# Patient Record
Sex: FEMALE | Race: WHITE | NOT HISPANIC OR LATINO | Employment: OTHER | URBAN - METROPOLITAN AREA
[De-identification: names, ages, dates, MRNs, and addresses within clinical notes are randomized per-mention and may not be internally consistent; named-entity substitution may affect disease eponyms.]

---

## 2017-01-04 ENCOUNTER — ANTICOAGULATION MONITORING (OUTPATIENT)
Dept: VASCULAR LAB | Facility: MEDICAL CENTER | Age: 77
End: 2017-01-04

## 2017-01-04 DIAGNOSIS — I48.20 CHRONIC ATRIAL FIBRILLATION (HCC): ICD-10-CM

## 2017-01-04 DIAGNOSIS — Z79.01 LONG TERM CURRENT USE OF ANTICOAGULANT THERAPY: ICD-10-CM

## 2017-01-04 LAB — INR PPP: 2.7 (ref 2–3.5)

## 2017-01-04 NOTE — PROGRESS NOTES
OP Anticoagulation Telephone Note    Date: 1/4/2017  Anticoagulation Summary as of 1/4/2017     INR goal 2.5-3.0   Selected INR 2.7 (1/3/2017)   Maintenance plan 4.5 mg (3 mg x 1.5) on Tue, Thu; 3 mg (3 mg x 1) all other days   Weekly total 24 mg   No change documented Arpita Wesley, Med Ass't   Plan last modified Emily Ibarraaggiereddy THAKKARAnitha Yi (12/28/2016)   Next INR check 1/17/2017   Priority Maintenance   Target end date Indefinite    Indications   Atrial fibrillation (HCC) [I48.91]  Long term current use of anticoagulant therapy [Z79.01]         Anticoagulation Episode Summary     INR check location Home Draw    Preferred lab     Send INR reminders to     Gabriella Zavaleta       Anticoagulation Care Providers     Provider Role Specialty Phone number    Hu Gamez M.D. Referring Cardiac Electrophysiology [phone]    [facility]agulation [facility] Responsible  [phone]    Kristopher Escobar, SUASNNED Responsible          Anticoagulation Patient Findings   Negatives Missed Doses, Extra Doses, Medication Changes, Antibiotic Use, Diet Changes, Dental/Other Procedures, Hospitalization, Bleeding Gums, Nose Bleeds, Blood in Urine, Blood in Stool, Any Bruising, Other Complaints      Plan:  Spoke with patient's  on the phone. Patient is therapeutic today. Patient denies any changes in medications or diet. Patient denies any signs or symptoms of bleeding or clotting. Instructed patient to call clinic if any unusual bleeding or bruising occurs. Will continue dosing as outlined above. Will follow-up with patient in 2 weeks.    Arpita Wesley, Medical Assistant      I have reviewed and agree with the plan above on 1/04/2017      Samantha Amaya, PHARMD

## 2017-01-17 ENCOUNTER — ANTICOAGULATION MONITORING (OUTPATIENT)
Dept: VASCULAR LAB | Facility: MEDICAL CENTER | Age: 77
End: 2017-01-17

## 2017-01-17 DIAGNOSIS — Z79.01 LONG TERM CURRENT USE OF ANTICOAGULANT THERAPY: ICD-10-CM

## 2017-01-17 DIAGNOSIS — I48.20 CHRONIC ATRIAL FIBRILLATION (HCC): ICD-10-CM

## 2017-01-17 LAB — INR PPP: 1.9 (ref 2–3.5)

## 2017-01-17 NOTE — PROGRESS NOTES
Anticoagulation Summary as of 1/17/2017     INR goal 2.5-3.0   Selected INR 1.9! (1/17/2017)   Maintenance plan 4.5 mg (3 mg x 1.5) on Tue, Thu; 3 mg (3 mg x 1) all other days   Weekly total 24 mg   Plan last modified Emily THAKKARAnitha Yi (12/28/2016)   Next INR check 1/24/2017   Priority Maintenance   Target end date Indefinite    Indications   Atrial fibrillation (CMS-HCC) [I48.91]  Long term current use of anticoagulant therapy [Z79.01]         Anticoagulation Episode Summary     INR check location Home Draw    Preferred lab     Send INR reminders to     Gabriella Zavaleta       Anticoagulation Care Providers     Provider Role Specialty Phone number    Hu Gamez M.D. Referring Cardiac Electrophysiology [phone]    [facility] Anticoagulation Services Responsible  [phone]    Kristopher Escobar, SUSANNED Responsible          Anticoagulation Patient Findings    Spoke with patient today regarding subtherapeutic INR of 1.9.  Patient denies any signs/symptoms of bruising or bleeding or any changes in diet and medications.  Instructed patient to call clinic with any questions or concerns.  She was taking low dose than outlined in dosing calendar.  Instructed her to increase weekly warfarin regimen as detailed above.  Follow up in 1 weeks.    Kristopher Escobar, SUSANNED

## 2017-01-24 ENCOUNTER — ANTICOAGULATION MONITORING (OUTPATIENT)
Dept: VASCULAR LAB | Facility: MEDICAL CENTER | Age: 77
End: 2017-01-24

## 2017-01-24 DIAGNOSIS — Z79.01 LONG TERM CURRENT USE OF ANTICOAGULANT THERAPY: ICD-10-CM

## 2017-01-24 DIAGNOSIS — I48.20 CHRONIC ATRIAL FIBRILLATION (HCC): ICD-10-CM

## 2017-01-24 LAB — INR PPP: 2.5 (ref 2–3.5)

## 2017-01-24 NOTE — PROGRESS NOTES
Anticoagulation Summary as of 1/24/2017     INR goal 2.5-3.0   Selected INR 2.5 (1/24/2017)   Maintenance plan 4.5 mg (3 mg x 1.5) on Tue, Thu; 3 mg (3 mg x 1) all other days   Weekly total 24 mg   Plan last modified Emily Ibarraaggiereddy THAKKARAnitha Yi (12/28/2016)   Next INR check 1/31/2017   Priority Maintenance   Target end date Indefinite    Indications   Atrial fibrillation (CMS-HCC) [I48.91]  Long term current use of anticoagulant therapy [Z79.01]         Anticoagulation Episode Summary     INR check location Home Draw    Preferred lab     Send INR reminders to     Gabriella Zavaleta       Anticoagulation Care Providers     Provider Role Specialty Phone number    Hu Gamez M.D. Referring Cardiac Electrophysiology [phone]    [facility] Anticoagulation Services Responsible  [phone]    Kristopher Escobar, SUSANNED Responsible          Anticoagulation Patient Findings    Left voicemail message to report a therapeutic INR of 2.5.  Pt to continue with current warfarin dosing regimen. Requested pt contact the clinic for any s/s of unusual bleeding, bruising, clotting or any changes to diet or medication. FU 1 weeks.  Kristopher Escobar, SUSANNED

## 2017-01-31 LAB — INR PPP: 2.9 (ref 2–3.5)

## 2017-02-01 ENCOUNTER — ANTICOAGULATION MONITORING (OUTPATIENT)
Dept: VASCULAR LAB | Facility: MEDICAL CENTER | Age: 77
End: 2017-02-01

## 2017-02-01 DIAGNOSIS — Z79.01 LONG TERM CURRENT USE OF ANTICOAGULANT THERAPY: ICD-10-CM

## 2017-02-01 DIAGNOSIS — I48.20 CHRONIC ATRIAL FIBRILLATION (HCC): ICD-10-CM

## 2017-02-01 NOTE — PROGRESS NOTES
Anticoagulation Summary as of 2/1/2017     INR goal 2.5-3.0   Selected INR 2.9 (1/31/2017)   Maintenance plan 4.5 mg (3 mg x 1.5) on Tue, Thu; 3 mg (3 mg x 1) all other days   Weekly total 24 mg   No change documented Emily Yi   Plan last modified Emily Yi (12/28/2016)   Next INR check 2/7/2017   Priority Maintenance   Target end date Indefinite    Indications   Atrial fibrillation (CMS-HCC) [I48.91]  Long term current use of anticoagulant therapy [Z79.01]         Anticoagulation Episode Summary     INR check location Home Draw    Preferred lab     Send INR reminders to     Gabriella Zavaleta       Anticoagulation Care Providers     Provider Role Specialty Phone number    Hu Gamez M.D. Referring Cardiac Electrophysiology [phone]    [facility] Responsible  [phone]    Kristopher Escobar, SUSANNED Responsible          Anticoagulation Patient Findings   Negatives Missed Doses, Extra Doses, Medication Changes, Antibiotic Use, Diet Changes, Dental/Other Procedures, Hospitalization, Bleeding Gums, Nose Bleeds, Blood in Urine, Blood in Stool, Any Bruising, Other Complaints        Spoke with the patient on the phone today, reporting a therapeutic INR of 2.9.  Confirmed the current warfarin dosing regimen and patient compliance.  Patient states she wants to decrease her weekly dose back down to only 4.5mg ONCE per week, because she does not feel good taking 4.5mg twice a week. She complains she is always cold and tired. I explained that previously being on that lower dose, she was falling below her therapeutic INR range. Patient insists that she wants to try it again anyway, since her INR is on the higher end. She will decrease dosing this week. I did not change her weekly dosing on the calendar YET, as it may end up being only a temporary decrease. Patient denies any other interval changes to diet and/or medications. Patient denies any signs/symptoms of bleeding or  clotting. Patient will follow up in 1 week.    Jason Yi PharmD

## 2017-02-07 ENCOUNTER — ANTICOAGULATION MONITORING (OUTPATIENT)
Dept: VASCULAR LAB | Facility: MEDICAL CENTER | Age: 77
End: 2017-02-07

## 2017-02-07 DIAGNOSIS — Z79.01 LONG TERM CURRENT USE OF ANTICOAGULANT THERAPY: ICD-10-CM

## 2017-02-07 DIAGNOSIS — I48.20 CHRONIC ATRIAL FIBRILLATION (HCC): ICD-10-CM

## 2017-02-07 LAB — INR PPP: 2.6 (ref 2–3.5)

## 2017-02-07 NOTE — PROGRESS NOTES
Anticoagulation Summary as of 2/7/2017     INR goal 2.5-3.0   Selected INR 2.6 (2/7/2017)   Maintenance plan 4.5 mg (3 mg x 1.5) on Tue; 3 mg (3 mg x 1) all other days   Weekly total 22.5 mg   Plan last modified Emily Yi (2/7/2017)   Next INR check 2/21/2017   Priority Maintenance   Target end date Indefinite    Indications   Atrial fibrillation (CMS-HCC) [I48.91]  Long term current use of anticoagulant therapy [Z79.01]         Anticoagulation Episode Summary     INR check location Home Draw    Preferred lab     Send INR reminders to     Comments Waqar       Anticoagulation Care Providers     Provider Role Specialty Phone number    Hu Gamez M.D. Referring Cardiac Electrophysiology [phone]    [facility] Anticoagulation Services Responsible  [phone]    Kristopher Escobar, SUSANNED Responsible          Anticoagulation Patient Findings   Negatives Missed Doses, Extra Doses, Medication Changes, Antibiotic Use, Diet Changes, Dental/Other Procedures, Hospitalization, Bleeding Gums, Nose Bleeds, Blood in Urine, Blood in Stool, Any Bruising, Other Complaints        Spoke with the patient on the phone today, reporting a therapeutic INR of 2.6.  Confirmed the current warfarin dosing regimen and patient compliance. Patient is in fact, taking 4.5mg only once per week (tues) so I changed the calendar to reflect this.  Patient denies any interval changes to diet and/or medications. Patient denies any signs/symptoms of bleeding or clotting. Patient will continue with the current warfarin dosing regimen, and will follow up again in 2 weeks.    Jason iY  PharmD

## 2017-02-13 LAB — INR PPP: 2.1 (ref 2–3.5)

## 2017-02-15 ENCOUNTER — HOSPITAL ENCOUNTER (OUTPATIENT)
Dept: LAB | Facility: MEDICAL CENTER | Age: 77
End: 2017-02-15
Attending: NURSE PRACTITIONER
Payer: MEDICARE

## 2017-02-15 ENCOUNTER — ANTICOAGULATION MONITORING (OUTPATIENT)
Dept: VASCULAR LAB | Facility: MEDICAL CENTER | Age: 77
End: 2017-02-15

## 2017-02-15 ENCOUNTER — OFFICE VISIT (OUTPATIENT)
Dept: CARDIOLOGY | Facility: MEDICAL CENTER | Age: 77
End: 2017-02-15
Payer: MEDICARE

## 2017-02-15 VITALS
SYSTOLIC BLOOD PRESSURE: 164 MMHG | BODY MASS INDEX: 26.84 KG/M2 | OXYGEN SATURATION: 98 % | HEIGHT: 67 IN | HEART RATE: 72 BPM | WEIGHT: 171 LBS | DIASTOLIC BLOOD PRESSURE: 76 MMHG

## 2017-02-15 DIAGNOSIS — I48.20 CHRONIC ATRIAL FIBRILLATION (HCC): ICD-10-CM

## 2017-02-15 DIAGNOSIS — Z95.0 PRESENCE OF PERMANENT CARDIAC PACEMAKER: Chronic | ICD-10-CM

## 2017-02-15 DIAGNOSIS — K57.32 DIVERTICULITIS OF LARGE INTESTINE WITHOUT PERFORATION OR ABSCESS WITHOUT BLEEDING: ICD-10-CM

## 2017-02-15 DIAGNOSIS — Z79.01 LONG TERM CURRENT USE OF ANTICOAGULANT THERAPY: Chronic | ICD-10-CM

## 2017-02-15 DIAGNOSIS — Z79.01 LONG TERM CURRENT USE OF ANTICOAGULANT THERAPY: ICD-10-CM

## 2017-02-15 DIAGNOSIS — I10 ESSENTIAL HYPERTENSION: ICD-10-CM

## 2017-02-15 LAB
ALBUMIN SERPL BCP-MCNC: 3.6 G/DL (ref 3.2–4.9)
ALBUMIN/GLOB SERPL: 1.2 G/DL
ALP SERPL-CCNC: 56 U/L (ref 30–99)
ALT SERPL-CCNC: 18 U/L (ref 2–50)
ANION GAP SERPL CALC-SCNC: 13 MMOL/L (ref 0–11.9)
AST SERPL-CCNC: 23 U/L (ref 12–45)
BASOPHILS # BLD AUTO: 0.03 K/UL (ref 0–0.12)
BASOPHILS NFR BLD AUTO: 0.5 % (ref 0–1.8)
BILIRUB SERPL-MCNC: 1.6 MG/DL (ref 0.1–1.5)
BUN SERPL-MCNC: 8 MG/DL (ref 8–22)
CALCIUM SERPL-MCNC: 9.2 MG/DL (ref 8.5–10.5)
CHLORIDE SERPL-SCNC: 101 MMOL/L (ref 96–112)
CO2 SERPL-SCNC: 24 MMOL/L (ref 20–33)
CREAT SERPL-MCNC: 0.85 MG/DL (ref 0.5–1.4)
EOSINOPHIL # BLD: 0.07 K/UL (ref 0–0.51)
EOSINOPHIL NFR BLD AUTO: 1.2 % (ref 0–6.9)
ERYTHROCYTE [DISTWIDTH] IN BLOOD BY AUTOMATED COUNT: 42.4 FL (ref 35.9–50)
GLOBULIN SER CALC-MCNC: 2.9 G/DL (ref 1.9–3.5)
GLUCOSE SERPL-MCNC: 85 MG/DL (ref 65–99)
HCT VFR BLD AUTO: 36.8 % (ref 37–47)
HGB BLD-MCNC: 12 G/DL (ref 12–16)
IMM GRANULOCYTES # BLD AUTO: 0.03 K/UL (ref 0–0.11)
IMM GRANULOCYTES NFR BLD AUTO: 0.5 % (ref 0–0.9)
INR PPP: 2.73 (ref 0.87–1.13)
INR PPP: 3.9 (ref 2–3.5)
LIPASE SERPL-CCNC: 99 U/L (ref 11–82)
LYMPHOCYTES # BLD: 1.23 K/UL (ref 1–4.8)
LYMPHOCYTES NFR BLD AUTO: 20.4 % (ref 22–41)
MCH RBC QN AUTO: 28.9 PG (ref 27–33)
MCHC RBC AUTO-ENTMCNC: 32.6 G/DL (ref 33.6–35)
MCV RBC AUTO: 88.7 FL (ref 81.4–97.8)
MONOCYTES # BLD: 0.84 K/UL (ref 0–0.85)
MONOCYTES NFR BLD AUTO: 13.9 % (ref 0–13.4)
NEUTROPHILS # BLD: 3.84 K/UL (ref 2–7.15)
NEUTROPHILS NFR BLD AUTO: 63.5 % (ref 44–72)
NRBC # BLD AUTO: 0 K/UL
NRBC BLD-RTO: 0 /100 WBC
PLATELET # BLD AUTO: 294 K/UL (ref 164–446)
PMV BLD AUTO: 10.5 FL (ref 9–12.9)
POTASSIUM SERPL-SCNC: 3.2 MMOL/L (ref 3.6–5.5)
PROT SERPL-MCNC: 6.5 G/DL (ref 6–8.2)
PROTHROMBIN TIME: 29.8 SEC (ref 12–14.6)
RBC # BLD AUTO: 4.15 M/UL (ref 4.2–5.4)
SODIUM SERPL-SCNC: 138 MMOL/L (ref 135–145)
WBC # BLD AUTO: 6 K/UL (ref 4.8–10.8)

## 2017-02-15 PROCEDURE — 80053 COMPREHEN METABOLIC PANEL: CPT

## 2017-02-15 PROCEDURE — 85025 COMPLETE CBC W/AUTO DIFF WBC: CPT

## 2017-02-15 PROCEDURE — 99214 OFFICE O/P EST MOD 30 MIN: CPT | Performed by: NURSE PRACTITIONER

## 2017-02-15 PROCEDURE — G8432 DEP SCR NOT DOC, RNG: HCPCS | Performed by: NURSE PRACTITIONER

## 2017-02-15 PROCEDURE — 85610 PROTHROMBIN TIME: CPT

## 2017-02-15 PROCEDURE — 4040F PNEUMOC VAC/ADMIN/RCVD: CPT | Mod: 8P | Performed by: NURSE PRACTITIONER

## 2017-02-15 PROCEDURE — 36415 COLL VENOUS BLD VENIPUNCTURE: CPT

## 2017-02-15 PROCEDURE — G8420 CALC BMI NORM PARAMETERS: HCPCS | Performed by: NURSE PRACTITIONER

## 2017-02-15 PROCEDURE — G8484 FLU IMMUNIZE NO ADMIN: HCPCS | Performed by: NURSE PRACTITIONER

## 2017-02-15 PROCEDURE — 83690 ASSAY OF LIPASE: CPT

## 2017-02-15 PROCEDURE — 1036F TOBACCO NON-USER: CPT | Performed by: NURSE PRACTITIONER

## 2017-02-15 PROCEDURE — 1101F PT FALLS ASSESS-DOCD LE1/YR: CPT | Mod: 8P | Performed by: NURSE PRACTITIONER

## 2017-02-15 RX ORDER — FLUCONAZOLE 200 MG/1
200 TABLET ORAL 2 TIMES DAILY
COMMUNITY
End: 2017-06-08

## 2017-02-15 RX ORDER — CEPHALEXIN 500 MG/1
500 CAPSULE ORAL 4 TIMES DAILY
COMMUNITY
End: 2017-06-08

## 2017-02-15 RX ORDER — PANTOPRAZOLE SODIUM 40 MG/1
40 TABLET, DELAYED RELEASE ORAL 2 TIMES DAILY
COMMUNITY
End: 2019-03-08

## 2017-02-15 ASSESSMENT — ENCOUNTER SYMPTOMS
DIZZINESS: 0
SPEECH CHANGE: 0
DOUBLE VISION: 0
ABDOMINAL PAIN: 1
SEIZURES: 0
NAUSEA: 0
MUSCULOSKELETAL NEGATIVE: 1
PND: 0
HEADACHES: 0
VOMITING: 0
PALPITATIONS: 0
SPUTUM PRODUCTION: 0
SHORTNESS OF BREATH: 0
LOSS OF CONSCIOUSNESS: 0
WEIGHT LOSS: 0
FEVER: 0
HEARTBURN: 0
PSYCHIATRIC NEGATIVE: 1
WHEEZING: 0
CHILLS: 0
SORE THROAT: 0
ORTHOPNEA: 0
BLURRED VISION: 0
CLAUDICATION: 0
COUGH: 0
FOCAL WEAKNESS: 0
FLANK PAIN: 0
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
## 2017-02-15 NOTE — PROGRESS NOTES
Anticoagulation Summary as of 2/15/2017     INR goal 2.5-3.0   Selected INR 3.9! (2/15/2017)   Maintenance plan 4.5 mg (3 mg x 1.5) on Tue; 3 mg (3 mg x 1) all other days   Weekly total 22.5 mg   Plan last modified Emily THAKKARAnitha Yi (2/7/2017)   Next INR check 2/21/2017   Priority Maintenance   Target end date Indefinite    Indications   Atrial fibrillation (CMS-HCC) [I48.91]  Long term current use of anticoagulant therapy [Z79.01]         Anticoagulation Episode Summary     INR check location Home Draw    Preferred lab     Send INR reminders to     Gabriella [facility] [place]      Anticoagulation Care Providers     Provider Role Specialty Phone number    Hu Gamez M.D. Referring Cardiac Electrophysiology [phone]    [facility] Responsible  [phone]    Kristopher Escobar, PHARMD Responsible          Anticoagulation Patient Findings   Positives Missed Doses    Negatives Extra Doses, Medication Changes, Antibiotic Use, Diet Changes, Dental/Other Procedures, Hospitalization, Bleeding Gums, Nose Bleeds, Blood in Urine, Blood in Stool, Any Bruising, Other Complaints    Comments Held doses for procedure that was cancelled        Spoke with patient today regarding supratherapeutic INR of 3.9.  Patient denies any signs/symptoms of bruising or bleeding or any changes in diet and medications.  Instructed patient to call clinic with any questions or concerns.  She held three doses last week for a procedure that was eventually cancelled.  She was then given two bolus doses of 5mg, which is contributing to high INR today.  Instructed her to HOLD X 1, then resume current warfarin regimen.  Follow up in 6 days.    Kristopher Escobar, SUSANNED

## 2017-02-15 NOTE — MR AVS SNAPSHOT
"        Madeline Dorantes   2/15/2017 2:40 PM   Office Visit   MRN: [id number]    Department:  [facility]   Dept Phone:  [phone]    Description:  Female : 1940   Provider:  Pat Hutchinson AAnithaPJEEVAN           Reason for Visit     Follow-Up           Allergies as of 2/15/2017     Allergen Noted Reactions    Lisinopril 2014       Angioedema 2014    Betadine [Povidone Iodine] 2010       Cefdinir 2013   Diarrhea    Cipro Xr 2009       Ciprofloxacin 2016       Severe headache    Fosamax 2009       Sulfa Drugs 2009   Hives      You were diagnosed with     Chronic atrial fibrillation (CMS-HCC)   [302842]       Essential hypertension   [3656996]       Long term current use of anticoagulant therapy   [7698798]       Presence of permanent cardiac pacemaker   [959958]       Diverticulitis of large intestine without perforation or abscess without bleeding   [248464]         Vital Signs     Blood Pressure Pulse Height Weight Body Mass Index Oxygen Saturation    164/76 mmHg 72 1.702 m (5' 7.01\") 77.565 kg (171 lb) 26.78 kg/m2 98%    Last Menstrual Period Smoking Status                1983 Never Smoker           Basic Information     Date Of Birth Sex Race Ethnicity Preferred Language    1940 Female White Non- English      Problem List              ICD-10-CM Priority Class Noted - Resolved    Atrial fibrillation (CMS-HCC) I48.91   2009 - Present    Hypothyroidism E03.9   2009 - Present    HTN (hypertension) I10   2009 - Present    GERD (gastroesophageal reflux disease) K21.9   2009 - Present    CHEST PAIN (Chronic)    2010 - Present    Long term current use of anticoagulant therapy (Chronic) Z79.01   2011 - Present    Dyspnea (Chronic) R06.00   2009 - Present    Peptic ulcer (Chronic) K27.9   2011 - Present    Presence of permanent cardiac pacemaker (Chronic) Z95.0   2011 - Present    Obstructive sleep apnea G47.33   " 7/21/2011 - Present    Third degree AV block (CMS-HCC) (Chronic) I44.2   9/28/2011 - Present    Cough R05   3/5/2013 - Present    Bronchitis J40   3/5/2013 - Present    TIA (transient ischemic attack) G45.9   5/12/2016 - Present    Sjogren's syndrome (CMS-HCC) M35.00   10/21/2016 - Present    H/O: pneumonia Z87.01   10/21/2016 - Present      Health Maintenance        Date Due Completion Dates    IMM DTaP/Tdap/Td Vaccine (1 - Tdap) 7/10/1957 ---    PAP SMEAR 7/10/1959 ---    COLONOSCOPY 7/10/1988 ---    IMM ZOSTER VACCINE 7/10/1998 ---    BONE DENSITY 7/10/2003 ---    IMM PNEUMOCOCCAL 65+ (ADULT) LOW/MEDIUM RISK SERIES (1 of 2 - PCV13) 7/10/2003 ---    IMM INFLUENZA (1) 9/1/2016 ---    MAMMOGRAM 12/2/2017 12/2/2016, 10/21/2015, 10/10/2014, 10/9/2013, 9/20/2012, 7/7/2011, 12/2/2008, 12/2/2008, 11/30/2007, 11/30/2007, 8/4/2006, 7/14/2004            Current Immunizations     No immunizations on file.      Below and/or attached are the medications your provider expects you to take. Review all of your home medications and newly ordered medications with your provider and/or pharmacist. Follow medication instructions as directed by your provider and/or pharmacist. Please keep your medication list with you and share with your provider. Update the information when medications are discontinued, doses are changed, or new medications (including over-the-counter products) are added; and carry medication information at all times in the event of emergency situations     Allergies:  LISINOPRIL - (reactions not documented)     BETADINE - (reactions not documented)     CEFDINIR - Diarrhea     CIPRO XR - (reactions not documented)     CIPROFLOXACIN - (reactions not documented)     FOSAMAX - (reactions not documented)     SULFA DRUGS - Hives               Medications  Valid as of: February 15, 2017 -  3:09 PM    Generic Name Brand Name Tablet Size Instructions for use    Calcium Carb-Cholecalciferol   Take 1,200 mg by mouth 3 times a  day.        Carboxymethylcellulose Sodium   1 Drop by Ophthalmic route 1 time daily as needed. Indications: Dry Eyes (Inactive)        Cephalexin (Cap) KEFLEX 500 MG Take 500 mg by mouth 4 times a day. 14 days        Cyanocobalamin (Solution) VITAMIN B-12 1000 MCG/ML 1,000 mcg by Intramuscular route. Once a week        CycloSPORINE (Emulsion) RESTASIS 0.05 % Place 1 Drop in both eyes 2 times a day. 0.4 ml  Indications: Drying and Inflammation of Cornea and Conjunctiva of Eyes        Estradiol (Cream) ESTRACE 0.1 MG/GM Insert  in vagina. 3 times daily  Indications: Vulvovaginal Atrophy        Famotidine   Take  by mouth.        Fluconazole (Tab) DIFLUCAN 200 MG Take 200 mg by mouth 2 Times a Day. For two weeks started 2/13/2017        Lactobacillus   Take 200 mg by mouth 2 Times a Day.        Levothyroxine Sodium (Tab) SYNTHROID 100 MCG Take 100 mcg by mouth. Every other day  Indications: Underactive Thyroid        Levothyroxine Sodium (Tab) SYNTHROID 88 MCG Take 88 mcg by mouth. Every other day  Indications: Underactive Thyroid        Liothyronine Sodium (Tab) CYTOMEL 5 MCG Take 5 mcg by mouth every day. Indications: Underactive Thyroid        Losartan Potassium (Tab) COZAAR 25 MG Take 1 Tab by mouth every day.        Magnesium (Cap) Magnesium 300 MG Take 400 mg by mouth every day.        Melatonin (Tab) Melatonin 1 MG Take 1 mg by mouth at bedtime as needed.        Metoprolol Succinate (TABLET SR 24 HR) TOPROL XL 25 MG Take 1 Tab by mouth every day.        Omeprazole (CAPSULE DELAYED RELEASE) PRILOSEC 20 MG Take 1 Cap by mouth every day.        Pantoprazole Sodium (Tablet Delayed Response) PROTONIX 40 MG Take 40 mg by mouth 2 Times a Day.        Potassium Acetate   Take 440 mg by mouth every evening.        Probiotic Product   Take 1 Tab by mouth.        Warfarin Sodium (Tab) COUMADIN 3 MG Take 1 Tab by mouth every day. TAKE AS DIRECTED.  Indications: A fib        .                 Medicines prescribed today were  sent to:     [facility] [place] - [address]    [address] [place] [address]    Phone: [phone] Fax: [phone]    Open 24 Hours?: No      Medication refill instructions:       If your prescription bottle indicates you have medication refills left, it is not necessary to call your provider’s office. Please contact your pharmacy and they will refill your medication.    If your prescription bottle indicates you do not have any refills left, you may request refills at any time through one of the following ways: The online [employer] system (except Urgent Care), by calling your provider’s office, or by asking your pharmacy to contact your provider’s office with a refill request. Medication refills are processed only during regular business hours and may not be available until the next business day. Your provider may request additional information or to have a follow-up visit with you prior to refilling your medication.   *Please Note: Medication refills are assigned a new Rx number when refilled electronically. Your pharmacy may indicate that no refills were authorized even though a new prescription for the same medication is available at the pharmacy. Please request the medicine by name with the pharmacy before contacting your provider for a refill.        Your To Do List     Future Labs/Procedures Complete By Expires    COMP METABOLIC PANEL  As directed 2/15/2018    LIPASE  As directed 2/15/2018    PROTHROMBIN TIME  As directed 2/15/2018         [employer] Access Code: Activation code not generated  Current [employer] Status: Active

## 2017-02-16 ENCOUNTER — TELEPHONE (OUTPATIENT)
Dept: CARDIOLOGY | Facility: MEDICAL CENTER | Age: 77
End: 2017-02-16

## 2017-02-16 DIAGNOSIS — E87.6 HYPOKALEMIA: ICD-10-CM

## 2017-02-16 RX ORDER — POTASSIUM CHLORIDE 20 MEQ/1
TABLET, EXTENDED RELEASE ORAL
Qty: 10 TAB | Refills: 0 | OUTPATIENT
Start: 2017-02-16 | End: 2017-06-08

## 2017-02-16 NOTE — TELEPHONE ENCOUNTER
Called pt. To give results.   To Eren at [facility] for Coumadin dosing. Copy of report faxed to Dr. Spence. Potassium 20mEq called to [facility], [place].

## 2017-02-16 NOTE — TELEPHONE ENCOUNTER
Called Meme to give improved CXBC results (Hgb=12.0, Hct=37.8). Potassium is low at 3.2. Pat will address this when she gets in to office and nurse will call pt.        From   Meme Barber    To   DESTINY Martinez.    Sent   2/15/2017  9:50 PM         Patrick Foster:    I am actually emailing about my Mom, Madeline Dorantes 1940. Today when we walked over to get her lab drawn she got SOB and asked me to slow down. She was a little pale too. I know in the hospital in [place] her hemoglobin dropped from 13.0 to 10.0 after fluids. Just wanted to make Pat aware, she might be lower. (no RBCs to carry oxygen?) She has an appt on this Friday with Dr. Spence. Thanks for all your help to expedite this!

## 2017-02-16 NOTE — TELEPHONE ENCOUNTER
----- Message from LOUIS Martinez sent at 2/16/2017  1:43 PM PST -----  KCL 20 mEq X 2 days   INR 2.73 good check with anticoag on warfarin doses.  CBC stable Hgb 12  See what Dr montilla recommends. Let her know I covered K+.

## 2017-02-17 ENCOUNTER — ANTICOAGULATION MONITORING (OUTPATIENT)
Dept: VASCULAR LAB | Facility: MEDICAL CENTER | Age: 77
End: 2017-02-17

## 2017-02-17 DIAGNOSIS — Z79.01 LONG TERM CURRENT USE OF ANTICOAGULANT THERAPY: ICD-10-CM

## 2017-02-17 LAB — INR PPP: 4.4 (ref 2–3.5)

## 2017-02-18 NOTE — PROGRESS NOTES
Anticoagulation Summary as of 2/17/2017     INR goal 2.5-3.0   Selected INR 4.4! (2/17/2017)   Maintenance plan 1.5 mg (3 mg x 0.5) on Mon, Fri; 3 mg (3 mg x 1) all other days   Weekly total 18 mg   Plan last modified Fazal Pineda, PHARMD (2/17/2017)   Next INR check 2/21/2017   Priority Maintenance   Target end date Indefinite    Indications   Atrial fibrillation (CMS-HCC) [I48.91]  Long term current use of anticoagulant therapy [Z79.01]         Anticoagulation Episode Summary     INR check location Home Draw    Preferred lab     Send INR reminders to     Comments Waqar       Anticoagulation Care Providers     Provider Role Specialty Phone number    Hu Gamez M.D. Referring Cardiac Electrophysiology [phone]    [facility] Anticoagulation Services Responsible  [phone]    SUSANNE QureshiD Responsible          Anticoagulation Patient Findings    Pt recently discharged and started on fluconazole.  Patient's INR was SUPRA therapeutic.   Denies any unusual s/s of bleeding, bruising, clotting.  Denies any changes to:   Diet  Confirmed dosing regimen. Patient has been holding for 3 days, will continue holding for 2 more days and reduce regimen by 20%.    Follow up in 4 days.    Fazal Pineda, PHARMD

## 2017-02-23 LAB — INR PPP: 2.5 (ref 2–3.5)

## 2017-02-28 ENCOUNTER — ANTICOAGULATION MONITORING (OUTPATIENT)
Dept: VASCULAR LAB | Facility: MEDICAL CENTER | Age: 77
End: 2017-02-28

## 2017-02-28 DIAGNOSIS — I48.91 ATRIAL FIBRILLATION, UNSPECIFIED TYPE (HCC): ICD-10-CM

## 2017-02-28 DIAGNOSIS — Z79.01 LONG TERM CURRENT USE OF ANTICOAGULANT THERAPY: ICD-10-CM

## 2017-02-28 NOTE — PROGRESS NOTES
Anticoagulation Summary as of 2/28/2017     INR goal 2.5-3.0   Selected INR 2.5 (2/23/2017)   Maintenance plan 1.5 mg (3 mg x 0.5) on Mon, Fri; 3 mg (3 mg x 1) all other days   Weekly total 18 mg   Plan last modified Fazal Pineda, PHARMD (2/17/2017)   Next INR check 3/9/2017   Priority Maintenance   Target end date Indefinite    Indications   Atrial fibrillation (CMS-HCC) [I48.91]  Long term current use of anticoagulant therapy [Z79.01]         Anticoagulation Episode Summary     INR check location Home Draw    Preferred lab     Send INR reminders to     [facility]      Anticoagulation Care Providers     Provider Role Specialty Phone number    Hu Gamez M.D. Referring Cardiac Electrophysiology [phone]    [facility] Anticoagulation Services Responsible  [phone]    Kristopher Escobar, SUSANNED Responsible          Anticoagulation Patient Findings   Negatives Missed Doses, Extra Doses, Medication Changes, Antibiotic Use, Diet Changes, Dental/Other Procedures, Hospitalization, Bleeding Gums, Nose Bleeds, Blood in Urine, Blood in Stool, Any Bruising, Other Complaints        Spoke with patient today regarding therapeutic INR of 2.5.  Patient denies any signs/symptoms of bruising or bleeding or any changes in diet and medications.  Instructed patient to call clinic with any questions or concerns.  Pt is to continue with current warfarin dosing regimen.  Follow up in 2 weeks from last test.    Kristopher Escobar, SUSANNED

## 2017-03-16 DIAGNOSIS — I10 ESSENTIAL HYPERTENSION, BENIGN: ICD-10-CM

## 2017-03-16 RX ORDER — LOSARTAN POTASSIUM 25 MG/1
25 TABLET ORAL DAILY
Qty: 90 TAB | Refills: 3 | Status: SHIPPED | OUTPATIENT
Start: 2017-03-16 | End: 2017-12-05 | Stop reason: SDUPTHER

## 2017-03-30 ENCOUNTER — ANTICOAGULATION MONITORING (OUTPATIENT)
Dept: VASCULAR LAB | Facility: MEDICAL CENTER | Age: 77
End: 2017-03-30

## 2017-03-30 DIAGNOSIS — Z79.01 LONG TERM CURRENT USE OF ANTICOAGULANT THERAPY: ICD-10-CM

## 2017-03-30 LAB — INR PPP: 4.3 (ref 2–3.5)

## 2017-03-31 NOTE — PROGRESS NOTES
Anticoagulation Summary as of 3/30/2017     INR goal 2.5-3.0   Selected INR 4.3! (3/29/2017)   Maintenance plan 1.5 mg (3 mg x 0.5) on Mon, Fri; 3 mg (3 mg x 1) all other days   Weekly total 18 mg   Plan last modified Fazal Pineda, PHARMD (2/17/2017)   Next INR check 4/3/2017   Priority Maintenance   Target end date Indefinite    Indications   Atrial fibrillation (CMS-HCC) [I48.91]  Long term current use of anticoagulant therapy [Z79.01]         Anticoagulation Episode Summary     INR check location Home Draw    Preferred lab     Send INR reminders to     Comments Waqar       Anticoagulation Care Providers     Provider Role Specialty Phone number    Hu Gamez M.D. Referring Cardiac Electrophysiology [phone]    [facility] Anticoagulation Services Responsible  [phone]    SUSANNE QureshiD Responsible          Anticoagulation Patient Findings    Patient's INR was SUPRA therapeutic.   Denies any unusual s/s of bleeding, bruising, clotting.  Denies any changes to:   Diet   Medications  Confirmed dosing regimen. Pt was taking more warfarin than recommended.   Pt already held dose.  Begin original (reduced) warfarin dosing regimen.    Follow up in 1 week(s)    Fazal Pineda, SUSANNED

## 2017-04-03 LAB — INR PPP: 2.7 (ref 2–3.5)

## 2017-04-05 ENCOUNTER — ANTICOAGULATION MONITORING (OUTPATIENT)
Dept: VASCULAR LAB | Facility: MEDICAL CENTER | Age: 77
End: 2017-04-05

## 2017-04-05 DIAGNOSIS — Z79.01 LONG TERM CURRENT USE OF ANTICOAGULANT THERAPY: ICD-10-CM

## 2017-04-05 DIAGNOSIS — I48.91 ATRIAL FIBRILLATION, UNSPECIFIED TYPE (HCC): ICD-10-CM

## 2017-04-05 NOTE — PROGRESS NOTES
Anticoagulation Summary as of 4/5/2017     INR goal 2.5-3.0   Selected INR 2.7 (4/3/2017)   Maintenance plan 1.5 mg (3 mg x 0.5) on Mon, Fri; 3 mg (3 mg x 1) all other days   Weekly total 18 mg   Plan last modified Fazal Pineda, SUSANNED (2/17/2017)   Next INR check 4/10/2017   Priority Maintenance   Target end date Indefinite    Indications   Atrial fibrillation (CMS-HCC) [I48.91]  Long term current use of anticoagulant therapy [Z79.01]         Anticoagulation Episode Summary     INR check location Home Draw    Preferred lab     Send INR reminders to     [facility]      Anticoagulation Care Providers     Provider Role Specialty Phone number    Hu Gamez M.D. Referring Cardiac Electrophysiology [phone]    [facility] Anticoagulation Services Responsible  [phone]    Kristopher Escobar PHARMD Responsible          Anticoagulation Patient Findings   Negatives Missed Doses, Extra Doses, Medication Changes, Antibiotic Use, Diet Changes, Dental/Other Procedures, Hospitalization, Bleeding Gums, Nose Bleeds, Blood in Urine, Blood in Stool, Any Bruising, Other Complaints        Spoke with patient today regarding therapeutic INR of 2.7.  Patient denies any signs/symptoms of bruising or bleeding or any changes in diet and medications.  Instructed patient to call clinic with any questions or concerns.  Pt is to continue with current warfarin dosing regimen.  Follow up in 1 weeks.    Kristopher Escobar PHARMD

## 2017-04-10 ENCOUNTER — ANTICOAGULATION MONITORING (OUTPATIENT)
Dept: VASCULAR LAB | Facility: MEDICAL CENTER | Age: 77
End: 2017-04-10

## 2017-04-10 DIAGNOSIS — Z79.01 LONG TERM CURRENT USE OF ANTICOAGULANT THERAPY: ICD-10-CM

## 2017-04-10 DIAGNOSIS — I48.91 ATRIAL FIBRILLATION, UNSPECIFIED TYPE (HCC): ICD-10-CM

## 2017-04-10 LAB — INR PPP: 2.9 (ref 2–3.5)

## 2017-04-11 NOTE — PROGRESS NOTES
Anticoagulation Summary as of 4/10/2017     INR goal 2.5-3.0   Selected INR 2.9 (4/10/2017)   Maintenance plan 1.5 mg (3 mg x 0.5) on Mon, Fri; 3 mg (3 mg x 1) all other days   Weekly total 18 mg   Plan last modified Fazal Pineda, PHARMD (2/17/2017)   Next INR check 4/17/2017   Priority Maintenance   Target end date Indefinite    Indications   Atrial fibrillation (CMS-HCC) [I48.91]  Long term current use of anticoagulant therapy [Z79.01]         Anticoagulation Episode Summary     INR check location Home Draw    Preferred lab     Send INR reminders to     [facility]       Anticoagulation Care Providers     Provider Role Specialty Phone number    Hu Gamez M.D. Referring Cardiac Electrophysiology [phone]    [facility] Anticoagulation Services Responsible  [phone]    Kristopher Escobar, SUSANNED Responsible          Anticoagulation Patient Findings    Spoke with patient today regarding therapeutic INR of 2.9.  Patient denies any signs/symptoms of bruising or bleeding or any changes in diet and medications.  Instructed patient to call clinic with any questions or concerns.  Pt is to continue with current warfarin dosing regimen.  Follow up in 1 weeks.    Kristopher Escobar, SUSANNED

## 2017-04-17 ENCOUNTER — ANTICOAGULATION MONITORING (OUTPATIENT)
Dept: VASCULAR LAB | Facility: MEDICAL CENTER | Age: 77
End: 2017-04-17

## 2017-04-17 DIAGNOSIS — Z79.01 LONG TERM CURRENT USE OF ANTICOAGULANT THERAPY: ICD-10-CM

## 2017-04-17 DIAGNOSIS — I48.91 ATRIAL FIBRILLATION, UNSPECIFIED TYPE (HCC): ICD-10-CM

## 2017-04-17 LAB — INR PPP: 2.7 (ref 2–3.5)

## 2017-04-17 NOTE — PROGRESS NOTES
Anticoagulation Summary as of 4/17/2017     INR goal 2.5-3.0   Selected INR 2.7 (4/17/2017)   Maintenance plan 1.5 mg (3 mg x 0.5) on Mon, Fri; 3 mg (3 mg x 1) all other days   Weekly total 18 mg   Plan last modified Fazal Pineda, SUSANNED (2/17/2017)   Next INR check 4/24/2017   Priority Maintenance   Target end date Indefinite    Indications   Atrial fibrillation (CMS-HCC) [I48.91]  Long term current use of anticoagulant therapy [Z79.01]         Anticoagulation Episode Summary     INR check location Home Draw    Preferred lab     Send INR reminders to     [facility]      Anticoagulation Care Providers     Provider Role Specialty Phone number    Hu Gamez M.D. Referring Cardiac Electrophysiology [phone]    [facility] Anticoagulation Services Responsible  [phone]    Kristopher Escobar PHARMD Responsible          Anticoagulation Patient Findings   Negatives Missed Doses, Extra Doses, Medication Changes, Antibiotic Use, Diet Changes, Dental/Other Procedures, Hospitalization, Bleeding Gums, Nose Bleeds, Blood in Urine, Blood in Stool, Any Bruising, Other Complaints        Spoke with patient today regarding therapeutic INR of 2.7.  Patient denies any signs/symptoms of bruising or bleeding or any changes in diet and medications.  Instructed patient to call clinic with any questions or concerns.  Pt is to continue with current warfarin dosing regimen.  Follow up in 1 weeks.    Kristopher Escobar PHARMD

## 2017-04-24 ENCOUNTER — ANTICOAGULATION MONITORING (OUTPATIENT)
Dept: VASCULAR LAB | Facility: MEDICAL CENTER | Age: 77
End: 2017-04-24

## 2017-04-24 DIAGNOSIS — Z79.01 LONG TERM CURRENT USE OF ANTICOAGULANT THERAPY: ICD-10-CM

## 2017-04-24 LAB — INR PPP: 3.2 (ref 2–3.5)

## 2017-04-24 NOTE — PROGRESS NOTES
Anticoagulation Summary as of 4/24/2017     INR goal 2.5-3.0   Selected INR 3.2! (4/24/2017)   Maintenance plan 1.5 mg (3 mg x 0.5) on Mon, Fri; 3 mg (3 mg x 1) all other days   Weekly total 18 mg   Plan last modified Fazal Pineda, PHARMD (2/17/2017)   Next INR check 5/1/2017   Priority Maintenance   Target end date Indefinite    Indications   Atrial fibrillation (CMS-HCC) [I48.91]  Long term current use of anticoagulant therapy [Z79.01]         Anticoagulation Episode Summary     INR check location Home Draw    Preferred lab     Send INR reminders to     Comments Waqar       Anticoagulation Care Providers     Provider Role Specialty Phone number    Hu Gamez M.D. Referring Cardiac Electrophysiology [phone]    [facility] Anticoagulation Services Responsible  [phone]    SUSANNE QureshiD Responsible          Anticoagulation Patient Findings    Patient's INR was SUPRA therapeutic.   Denies any unusual s/s of bleeding, bruising, clotting.  Denies any changes to:   Diet   Medications  Confirmed dosing regimen.    Given her INR is relatively stable compared to historical dosing, Pt is to continue with current warfarin dosing regimen.    Follow up in 1 week(s)    Fazal Pineda, SUSANNED

## 2017-05-01 LAB — INR PPP: 2.8 (ref 2–3.5)

## 2017-05-02 ENCOUNTER — ANTICOAGULATION MONITORING (OUTPATIENT)
Dept: VASCULAR LAB | Facility: MEDICAL CENTER | Age: 77
End: 2017-05-02

## 2017-05-02 DIAGNOSIS — Z79.01 LONG TERM CURRENT USE OF ANTICOAGULANT THERAPY: ICD-10-CM

## 2017-05-02 DIAGNOSIS — I48.91 ATRIAL FIBRILLATION, UNSPECIFIED TYPE (HCC): ICD-10-CM

## 2017-05-02 NOTE — PROGRESS NOTES
Anticoagulation Summary as of 5/2/2017     INR goal 2.5-3.0   Selected INR 2.8 (5/1/2017)   Maintenance plan 1.5 mg (3 mg x 0.5) on Mon, Fri; 3 mg (3 mg x 1) all other days   Weekly total 18 mg   Plan last modified Fazal Pineda, SUSANNED (2/17/2017)   Next INR check 5/8/2017   Priority Maintenance   Target end date Indefinite    Indications   Atrial fibrillation (CMS-HCC) [I48.91]  Long term current use of anticoagulant therapy [Z79.01]         Anticoagulation Episode Summary     INR check location Home Draw    Preferred lab     Send INR reminders to     [facility]      Anticoagulation Care Providers     Provider Role Specialty Phone number    Hu Gamez M.D. Referring Cardiac Electrophysiology [phone]    [facility] Anticoagulation Services Responsible  [phone]    Kristopher Escobar PHARMD Responsible          Anticoagulation Patient Findings   Negatives Missed Doses, Extra Doses, Medication Changes, Antibiotic Use, Diet Changes, Dental/Other Procedures, Hospitalization, Bleeding Gums, Nose Bleeds, Blood in Urine, Blood in Stool, Any Bruising, Other Complaints        Spoke with patient today regarding therapeutic INR of 2.8.  Patient denies any signs/symptoms of bruising or bleeding or any changes in diet and medications.  Instructed patient to call clinic with any questions or concerns.  Pt is to continue with current warfarin dosing regimen.  Follow up in 1 weeks.    Kristopher Escobar PHARMD

## 2017-05-08 ENCOUNTER — ANTICOAGULATION MONITORING (OUTPATIENT)
Dept: VASCULAR LAB | Facility: MEDICAL CENTER | Age: 77
End: 2017-05-08

## 2017-05-08 DIAGNOSIS — Z79.01 LONG TERM CURRENT USE OF ANTICOAGULANT THERAPY: ICD-10-CM

## 2017-05-08 DIAGNOSIS — I48.91 ATRIAL FIBRILLATION, UNSPECIFIED TYPE (HCC): ICD-10-CM

## 2017-05-08 LAB — INR PPP: 2.7 (ref 2–3.5)

## 2017-05-08 NOTE — PROGRESS NOTES
Anticoagulation Summary as of 5/8/2017     INR goal 2.5-3.0   Selected INR 2.7 (5/8/2017)   Maintenance plan 1.5 mg (3 mg x 0.5) on Mon, Fri; 3 mg (3 mg x 1) all other days   Weekly total 18 mg   Plan last modified Fazal Pineda, PHARMD (2/17/2017)   Next INR check 5/15/2017   Priority Maintenance   Target end date Indefinite    Indications   Atrial fibrillation (CMS-HCC) [I48.91]  Long term current use of anticoagulant therapy [Z79.01]         Anticoagulation Episode Summary     INR check location Home Draw    Preferred lab     Send INR reminders to     [facility]       Anticoagulation Care Providers     Provider Role Specialty Phone number    Hu Gamez M.D. Referring Cardiac Electrophysiology [phone]    [facility] Anticoagulation Services Responsible  [phone]    Kristopher Escobar, SUSANNED Responsible          Anticoagulation Patient Findings    Left voicemail message to report a therapeutic INR of 2.7.  Pt to continue with current warfarin dosing regimen. Requested pt contact the clinic for any s/s of unusual bleeding, bruising, clotting or any changes to diet or medication. FU 1 weeks.  Kristopher Escobar, SUSANNED

## 2017-05-15 ENCOUNTER — ANTICOAGULATION MONITORING (OUTPATIENT)
Dept: VASCULAR LAB | Facility: MEDICAL CENTER | Age: 77
End: 2017-05-15

## 2017-05-15 DIAGNOSIS — Z79.01 LONG TERM CURRENT USE OF ANTICOAGULANT THERAPY: ICD-10-CM

## 2017-05-15 DIAGNOSIS — I48.91 ATRIAL FIBRILLATION, UNSPECIFIED TYPE (HCC): ICD-10-CM

## 2017-05-15 LAB — INR PPP: 2.9 (ref 2–3.5)

## 2017-05-15 NOTE — PROGRESS NOTES
Anticoagulation Summary as of 5/15/2017     INR goal 2.5-3.0   Selected INR 2.9 (5/15/2017)   Maintenance plan 1.5 mg (3 mg x 0.5) on Mon, Fri; 3 mg (3 mg x 1) all other days   Weekly total 18 mg   Plan last modified Fazal Pineda, SUSANNED (2/17/2017)   Next INR check 5/22/2017   Priority Maintenance   Target end date Indefinite    Indications   Atrial fibrillation (CMS-HCC) [I48.91]  Long term current use of anticoagulant therapy [Z79.01]         Anticoagulation Episode Summary     INR check location Home Draw    Preferred lab     [facility]      Anticoagulation Care Providers     Provider Role Specialty Phone number    Hu Gamez M.D. Referring Cardiac Electrophysiology [phone]    [facility] Anticoagulation Services Responsible  [phone]    Kristopher Escobar PHARMD Responsible          Anticoagulation Patient Findings   Negatives Missed Doses, Extra Doses, Medication Changes, Antibiotic Use, Diet Changes, Dental/Other Procedures, Hospitalization, Bleeding Gums, Nose Bleeds, Blood in Urine, Blood in Stool, Any Bruising, Other Complaints        Spoke with patient today regarding therapeutic INR of 2.9.  Patient denies any signs/symptoms of bruising or bleeding or any changes in diet and medications.  Instructed patient to call clinic with any questions or concerns.  Pt is to continue with current warfarin dosing regimen.  Follow up in 1 weeks.    Kristopher Escobar PHARMD

## 2017-05-22 ENCOUNTER — ANTICOAGULATION MONITORING (OUTPATIENT)
Dept: VASCULAR LAB | Facility: MEDICAL CENTER | Age: 77
End: 2017-05-22

## 2017-05-22 DIAGNOSIS — Z79.01 LONG TERM CURRENT USE OF ANTICOAGULANT THERAPY: ICD-10-CM

## 2017-05-22 LAB — INR PPP: 3.3 (ref 2–3.5)

## 2017-05-22 NOTE — PROGRESS NOTES
Anticoagulation Summary as of 5/22/2017     INR goal 2.5-3.0   Selected INR 3.3! (5/22/2017)   Maintenance plan 1.5 mg (3 mg x 0.5) on Mon, Fri; 3 mg (3 mg x 1) all other days   Weekly total 18 mg   Plan last modified Fazal Pineda, PHARMD (2/17/2017)   Next INR check 6/5/2017   Priority Maintenance   Target end date Indefinite    Indications   Atrial fibrillation (CMS-HCC) [I48.91]  Long term current use of anticoagulant therapy [Z79.01]         Anticoagulation Episode Summary     INR check location Home Draw    Preferred lab     Send INR reminders to     Comments Waqar       Anticoagulation Care Providers     Provider Role Specialty Phone number    Hu Gamez M.D. Referring Cardiac Electrophysiology [phone]    [facility] Anticoagulation Services Responsible  [phone]    SUSANNE QureshiD Responsible          Anticoagulation Patient Findings    Patient's INR was SUPRA therapeutic.   Denies any unusual s/s of bleeding, bruising, clotting.  Denies any changes to:   Diet   Medications  Denies alcohol or cranberry use.   Confirmed dosing regimen.    Pt is to reduce today then continue with current warfarin dosing regimen.    Follow up in 2 week(s)    Fazal Pineda, SUSANNED

## 2017-06-05 LAB — INR PPP: 2 (ref 2–3.5)

## 2017-06-06 ENCOUNTER — ANTICOAGULATION MONITORING (OUTPATIENT)
Dept: VASCULAR LAB | Facility: MEDICAL CENTER | Age: 77
End: 2017-06-06

## 2017-06-06 DIAGNOSIS — Z79.01 LONG TERM CURRENT USE OF ANTICOAGULANT THERAPY: ICD-10-CM

## 2017-06-06 DIAGNOSIS — I48.91 ATRIAL FIBRILLATION, UNSPECIFIED TYPE (HCC): ICD-10-CM

## 2017-06-06 NOTE — PROGRESS NOTES
Anticoagulation Summary as of 6/6/2017     INR goal 2.5-3.0   Selected INR 2.0! (6/5/2017)   Maintenance plan 1.5 mg (3 mg x 0.5) on Mon, Fri; 3 mg (3 mg x 1) all other days   Weekly total 18 mg   Plan last modified Fazal Pineda, PHARMD (2/17/2017)   Next INR check 6/19/2017   Priority Maintenance   Target end date Indefinite    Indications   Atrial fibrillation (CMS-HCC) [I48.91]  Long term current use of anticoagulant therapy [Z79.01]         Anticoagulation Episode Summary     INR check location Home Draw    Preferred lab     Send INR reminders to     Comments Waqar       Anticoagulation Care Providers     Provider Role Specialty Phone number    Hu Gamez M.D. Referring Cardiac Electrophysiology [phone]    [facility]tico[facility] Responsible  [phone]    Kristopher Escobar, SUSANNED Responsible          Anticoagulation Patient Findings    Left voicemail message to report a sub therapeutic INR of 2.0.  INRs have been labile.  Pt to continue with current warfarin dosing regimen. Requested pt contact the clinic for any s/s of unusual bleeding, bruising, clotting or any changes to diet or medication. FU 2 weeks.  Samantha Amaya, PHARMD

## 2017-06-08 ENCOUNTER — OFFICE VISIT (OUTPATIENT)
Dept: CARDIOLOGY | Facility: MEDICAL CENTER | Age: 77
End: 2017-06-08
Payer: MEDICARE

## 2017-06-08 VITALS
SYSTOLIC BLOOD PRESSURE: 124 MMHG | HEART RATE: 91 BPM | DIASTOLIC BLOOD PRESSURE: 72 MMHG | HEIGHT: 67 IN | OXYGEN SATURATION: 72 % | WEIGHT: 160 LBS | BODY MASS INDEX: 25.11 KG/M2

## 2017-06-08 DIAGNOSIS — I10 ESSENTIAL HYPERTENSION: ICD-10-CM

## 2017-06-08 DIAGNOSIS — I48.20 CHRONIC ATRIAL FIBRILLATION (HCC): ICD-10-CM

## 2017-06-08 DIAGNOSIS — Z95.0 PRESENCE OF PERMANENT CARDIAC PACEMAKER: Chronic | ICD-10-CM

## 2017-06-08 DIAGNOSIS — I34.0 NON-RHEUMATIC MITRAL REGURGITATION: ICD-10-CM

## 2017-06-08 PROCEDURE — 99214 OFFICE O/P EST MOD 30 MIN: CPT | Performed by: NURSE PRACTITIONER

## 2017-06-08 ASSESSMENT — ENCOUNTER SYMPTOMS
WHEEZING: 0
PSYCHIATRIC NEGATIVE: 1
SPEECH CHANGE: 0
CHILLS: 0
SEIZURES: 0
FLANK PAIN: 0
COUGH: 0
CLAUDICATION: 0
SORE THROAT: 1
PND: 0
ORTHOPNEA: 0
LOSS OF CONSCIOUSNESS: 0
MUSCULOSKELETAL NEGATIVE: 1
WEIGHT LOSS: 0
SHORTNESS OF BREATH: 0
ABDOMINAL PAIN: 0
HEARTBURN: 0
PALPITATIONS: 0
NAUSEA: 0
DOUBLE VISION: 0
BLURRED VISION: 0
DIZZINESS: 0
FOCAL WEAKNESS: 0
SPUTUM PRODUCTION: 0
VOMITING: 0
HEADACHES: 0
FEVER: 0
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
## 2017-06-08 NOTE — MR AVS SNAPSHOT
"        Madeline Dorantes   2017 2:00 PM   Office Visit   MRN: [id number]    Department:  [facility]   Dept Phone:  [phone]    Description:  Female : 1940   Provider:  Pat Hutchinson A.P.N.           Reason for Visit     Follow-Up           Allergies as of 2017     Allergen Noted Reactions    Lisinopril 2014       Angioedema 2014    Betadine [Povidone Iodine] 2010       Cefdinir 2013   Diarrhea    Cipro Xr 2009       Ciprofloxacin 2016       Severe headache    Fosamax 2009       Sulfa Drugs 2009   Hives      You were diagnosed with     Chronic atrial fibrillation (CMS-HCC)   [323718]       Essential hypertension   [1234969]       Presence of permanent cardiac pacemaker   [588179]       Non-rheumatic mitral regurgitation   [026619]         Vital Signs     Blood Pressure Pulse Height Weight Body Mass Index Oxygen Saturation    124/72 mmHg 91 1.702 m (5' 7.01\") 72.576 kg (160 lb) 25.05 kg/m2 72%    Last Menstrual Period Smoking Status                1983 Never Smoker           Basic Information     Date Of Birth Sex Race Ethnicity Preferred Language    1940 Female White Non- English      Your appointments     Sep 12, 2017  1:20 PM   FOLLOW UP with Pat Hutchinson, A.P.N.   [facility] (--)    [address], [facility]  [facility] [address] [phone]              Problem List              ICD-10-CM Priority Class Noted - Resolved    Atrial fibrillation (CMS-HCC) I48.91   2009 - Present    Hypothyroidism E03.9   2009 - Present    HTN (hypertension) I10   2009 - Present    GERD (gastroesophageal reflux disease) K21.9   2009 - Present    CHEST PAIN (Chronic)    2010 - Present    Long term current use of anticoagulant therapy (Chronic) Z79.01   2011 - Present    Dyspnea (Chronic) R06.00   2009 - Present    Peptic ulcer (Chronic) K27.9   2011 - Present    Presence of " permanent cardiac pacemaker (Chronic) Z95.0   1/21/2011 - Present    Obstructive sleep apnea G47.33   7/21/2011 - Present    Third degree AV block (CMS-[facility]) (Chronic) I44.2   9/28/2011 - Present    Cough R05   3/5/2013 - Present    Bronchitis J40   3/5/2013 - Present    TIA (transient ischemic attack) G45.9   5/12/2016 - Present    Sjogren's syndrome (CMS-[facility]) M35.00   10/21/2016 - Present    H/O: pneumonia Z87.01   10/21/2016 - Present      Health Maintenance        Date Due Completion Dates    IMM DTaP/Tdap/Td Vaccine (1 - Tdap) 7/10/1957 ---    PAP SMEAR 7/10/1959 ---    COLONOSCOPY 7/10/1988 ---    IMM ZOSTER VACCINE 7/10/1998 ---    BONE DENSITY 7/10/2003 ---    IMM PNEUMOCOCCAL 65+ (ADULT) LOW/MEDIUM RISK SERIES (1 of 2 - PCV13) 7/10/2003 ---    MAMMOGRAM 12/2/2017 12/2/2016, 10/21/2015, 10/10/2014, 10/9/2013, 9/20/2012, 7/7/2011, 12/2/2008, 12/2/2008, 11/30/2007, 11/30/2007, 8/4/2006, 7/14/2004            Current Immunizations     No immunizations on file.      Below and/or attached are the medications your provider expects you to take. Review all of your home medications and newly ordered medications with your provider and/or pharmacist. Follow medication instructions as directed by your provider and/or pharmacist. Please keep your medication list with you and share with your provider. Update the information when medications are discontinued, doses are changed, or new medications (including over-the-counter products) are added; and carry medication information at all times in the event of emergency situations     Allergies:  LISINOPRIL - (reactions not documented)     BETADINE - (reactions not documented)     CEFDINIR - Diarrhea     CIPRO XR - (reactions not documented)     CIPROFLOXACIN - (reactions not documented)     FOSAMAX - (reactions not documented)     SULFA DRUGS - Hives               Medications  Valid as of: June 08, 2017 -  2:37 PM    Generic Name Brand Name Tablet Size Instructions for use     Calcium Carb-Cholecalciferol   Take 1,200 mg by mouth every day.        Carboxymethylcellulose Sodium   1 Drop by Ophthalmic route 1 time daily as needed. Indications: Dry Eyes (Inactive)        Cyanocobalamin (Solution) VITAMIN B-12 1000 MCG/ML 1,000 mcg by Intramuscular route. Once a week        Estradiol (Cream) ESTRACE 0.1 MG/GM Insert  in vagina. 3 times daily  Indications: Vulvovaginal Atrophy        Lactobacillus   Take 200 mg by mouth 2 Times a Day.        Levothyroxine Sodium (Tab) SYNTHROID 100 MCG Take 100 mcg by mouth. Every other day  Indications: Underactive Thyroid        Levothyroxine Sodium (Tab) SYNTHROID 88 MCG Take 88 mcg by mouth. Every other day  Indications: Underactive Thyroid        Liothyronine Sodium (Tab) CYTOMEL 5 MCG Take 5 mcg by mouth every day. Indications: Underactive Thyroid        Losartan Potassium (Tab) COZAAR 25 MG Take 1 Tab by mouth every day.        Magnesium (Cap) Magnesium 300 MG Take 400 mg by mouth every day.        Melatonin (Tab) Melatonin 1 MG Take 1 mg by mouth at bedtime as needed.        Metoprolol Succinate (TABLET SR 24 HR) TOPROL XL 25 MG Take 1 Tab by mouth every day.        Pantoprazole Sodium (Tablet Delayed Response) PROTONIX 40 MG Take 40 mg by mouth 2 Times a Day.        Potassium Acetate   Take 440 mg by mouth every evening.        Probiotic Product   Take 1 Tab by mouth.        Warfarin Sodium (Tab) COUMADIN 3 MG Take 1 Tab by mouth every day. TAKE AS DIRECTED.  Indications: A fib        .                 Medicines prescribed today were sent to:     [facility] [place] - [address]    [address]  [facility] [address]    Phone: [phone] Fax: [phone]    Open 24 Hours?: No    [id number] - [place] - [address] [facility] [address]    [address] [address]    Phone: [phone] Fax: [phone]    Open 24 Hours?: No      Medication refill instructions:       If your prescription bottle  indicates you have medication refills left, it is not necessary to call your provider’s office. Please contact your pharmacy and they will refill your medication.    If your prescription bottle indicates you do not have any refills left, you may request refills at any time through one of the following ways: The online [employer] system (except Urgent Care), by calling your provider’s office, or by asking your pharmacy to contact your provider’s office with a refill request. Medication refills are processed only during regular business hours and may not be available until the next business day. Your provider may request additional information or to have a follow-up visit with you prior to refilling your medication.   *Please Note: Medication refills are assigned a new Rx number when refilled electronically. Your pharmacy may indicate that no refills were authorized even though a new prescription for the same medication is available at the pharmacy. Please request the medicine by name with the pharmacy before contacting your provider for a refill.        Your To Do List     Future Labs/Procedures Complete By Expires    Echocardiogram Comp w/o Cont  As directed 6/8/2018    Scheduling Instructions:    Valvular heart disease         [employer] Access Code: Activation code not generated  Current [employer] Status: Active
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
## 2017-06-19 ENCOUNTER — ANTICOAGULATION MONITORING (OUTPATIENT)
Dept: VASCULAR LAB | Facility: MEDICAL CENTER | Age: 77
End: 2017-06-19

## 2017-06-19 DIAGNOSIS — I48.20 CHRONIC ATRIAL FIBRILLATION (HCC): ICD-10-CM

## 2017-06-19 DIAGNOSIS — Z79.01 LONG TERM CURRENT USE OF ANTICOAGULANT THERAPY: ICD-10-CM

## 2017-06-19 LAB — INR PPP: 2.2 (ref 2–3.5)

## 2017-06-19 NOTE — PROGRESS NOTES
Anticoagulation Summary as of 6/19/2017     INR goal 2.5-3.0   Selected INR 2.2! (6/19/2017)   Maintenance plan 1.5 mg (3 mg x 0.5) on Mon, Fri; 3 mg (3 mg x 1) all other days   Weekly total 18 mg   Plan last modified Fazal Pineda, PHARMD (2/17/2017)   Next INR check 7/3/2017   Priority Maintenance   Target end date Indefinite    Indications   Atrial fibrillation (CMS-HCC) [I48.91]  Long term current use of anticoagulant therapy [Z79.01]         Anticoagulation Episode Summary     INR check location Home Draw    Preferred lab     Send INR reminders to     Gabriella Zavaleta       Anticoagulation Care Providers     Provider Role Specialty Phone number    Hu Gamez M.D. Referring Cardiac Electrophysiology [phone]    [facility] Anticoagulation [facility] Responsible  [phone]    Kristopher Escobar, PHARMD Responsible          Anticoagulation Patient Findings   Negatives Missed Doses, Extra Doses, Medication Changes, Antibiotic Use, Diet Changes, Dental/Other Procedures, Hospitalization, Bleeding Gums, Nose Bleeds, Blood in Urine, Blood in Stool, Any Bruising, Other Complaints        Spoke with patient today regarding subtherapeutic INR of 2.2.  Patient denies any signs/symptoms of bruising or bleeding or any changes in diet and medications.  Instructed patient to call clinic with any questions or concerns.  Instructed patient to bolus with 3mg X 1, then resume current warfarin regimen.  Follow up in 2 weeks.    Kristopher Escobar, SUSANNED

## 2017-07-03 ENCOUNTER — ANTICOAGULATION MONITORING (OUTPATIENT)
Dept: VASCULAR LAB | Facility: MEDICAL CENTER | Age: 77
End: 2017-07-03

## 2017-07-03 DIAGNOSIS — Z79.01 LONG TERM CURRENT USE OF ANTICOAGULANT THERAPY: ICD-10-CM

## 2017-07-03 DIAGNOSIS — I48.20 CHRONIC ATRIAL FIBRILLATION (HCC): ICD-10-CM

## 2017-07-03 LAB — INR PPP: 2.3 (ref 2–3.5)

## 2017-07-03 NOTE — PROGRESS NOTES
Anticoagulation Summary as of 7/3/2017     INR goal 2.5-3.0   Selected INR 2.3! (7/3/2017)   Maintenance plan 1.5 mg (3 mg x 0.5) on Fri; 3 mg (3 mg x 1) all other days   Weekly total 19.5 mg   Plan last modified Kristopher Escobar, GEM (7/3/2017)   Next INR check 7/17/2017   Priority Maintenance   Target end date Indefinite    Indications   Atrial fibrillation (CMS-HCC) [I48.91]  Long term current use of anticoagulant therapy [Z79.01]         Anticoagulation Episode Summary     INR check location Home Draw    Preferred lab     Send INR reminders to     Gabriella Zavaleta       Anticoagulation Care Providers     Provider Role Specialty Phone number    Hu Gamez M.D. Referring Cardiac Electrophysiology [phone]    [facility] Anticoagulation Services Responsible  [phone]    Kristopher Escobar, SUSANNED Responsible          Anticoagulation Patient Findings   Negatives Missed Doses, Extra Doses, Medication Changes, Antibiotic Use, Diet Changes, Dental/Other Procedures, Hospitalization, Bleeding Gums, Nose Bleeds, Blood in Urine, Blood in Stool, Any Bruising, Other Complaints        Spoke with patient today regarding subtherapeutic INR of 2.3.  Patient denies any signs/symptoms of bruising or bleeding or any changes in diet and medications.  Instructed patient to call clinic with any questions or concerns.  Instructed patient to increase weekly warfarin regimen by ~8% as detailed above.  Follow up in 2 weeks.    Kristopher Escobar, GEM

## 2017-07-17 ENCOUNTER — HOSPITAL ENCOUNTER (OUTPATIENT)
Dept: CARDIOLOGY | Facility: MEDICAL CENTER | Age: 77
End: 2017-07-17
Attending: NURSE PRACTITIONER
Payer: MEDICARE

## 2017-07-17 DIAGNOSIS — I34.0 NON-RHEUMATIC MITRAL REGURGITATION: ICD-10-CM

## 2017-07-17 LAB
LV EJECT FRACT  99904: 65
LV EJECT FRACT MOD 2C 99903: 45.5
LV EJECT FRACT MOD 4C 99902: 48.51
LV EJECT FRACT MOD BP 99901: 46.3

## 2017-07-17 PROCEDURE — 93306 TTE W/DOPPLER COMPLETE: CPT | Mod: 26 | Performed by: INTERNAL MEDICINE

## 2017-07-17 PROCEDURE — 93306 TTE W/DOPPLER COMPLETE: CPT

## 2017-07-19 ENCOUNTER — TELEPHONE (OUTPATIENT)
Dept: CARDIOLOGY | Facility: MEDICAL CENTER | Age: 77
End: 2017-07-19

## 2017-07-19 NOTE — TELEPHONE ENCOUNTER
----- Message from LOUIS Martinez sent at 7/18/2017  1:01 PM PDT -----  Echo stable. Have we tested her for SYEDA? If she would consider testing would do OPO  Thank you

## 2017-07-24 ENCOUNTER — ANTICOAGULATION MONITORING (OUTPATIENT)
Dept: VASCULAR LAB | Facility: MEDICAL CENTER | Age: 77
End: 2017-07-24

## 2017-07-24 DIAGNOSIS — Z79.01 LONG TERM CURRENT USE OF ANTICOAGULANT THERAPY: ICD-10-CM

## 2017-07-24 DIAGNOSIS — I48.20 CHRONIC ATRIAL FIBRILLATION (HCC): ICD-10-CM

## 2017-07-24 LAB — INR PPP: 2.6 (ref 2–3.5)

## 2017-07-24 NOTE — PROGRESS NOTES
Anticoagulation Summary as of 7/24/2017     INR goal 2.5-3.0   Selected INR 2.6 (7/24/2017)   Maintenance plan 1.5 mg (3 mg x 0.5) on Fri; 3 mg (3 mg x 1) all other days   Weekly total 19.5 mg   Plan last modified Kristopher Escobar, GEM (7/3/2017)   Next INR check 8/7/2017   Priority Maintenance   Target end date Indefinite    Indications   Atrial fibrillation (CMS-HCC) [I48.91]  Long term current use of anticoagulant therapy [Z79.01]         Anticoagulation Episode Summary     INR check location Home Draw    Preferred lab     Send INR reminders to     Comments [facility]      Anticoagulation Care Providers     Provider Role Specialty Phone number    Hu Gamez M.D. Referring Cardiac Electrophysiology [phone]    [facility] Anticoagulation Services Responsible  [phone]    Kristopher Escobar, SUSANNED Responsible          Anticoagulation Patient Findings      Left message to report a therapeutic INR of 2.6.    Pt to continue with current warfarin dosing regimen. Requested pt contact the clinic for any s/s of unusual bleeding, bruising, clotting or any changes to diet or medication.    FU INR in 2 weeks.    Kellen Lackey, PHARMD

## 2017-08-09 ENCOUNTER — ANTICOAGULATION MONITORING (OUTPATIENT)
Dept: VASCULAR LAB | Facility: MEDICAL CENTER | Age: 77
End: 2017-08-09

## 2017-08-09 DIAGNOSIS — Z79.01 LONG TERM CURRENT USE OF ANTICOAGULANT THERAPY: ICD-10-CM

## 2017-08-09 DIAGNOSIS — I48.20 CHRONIC ATRIAL FIBRILLATION (HCC): ICD-10-CM

## 2017-08-09 LAB — INR PPP: 2.1 (ref 2–3.5)

## 2017-08-09 NOTE — PROGRESS NOTES
Anticoagulation Summary as of 8/9/2017     INR goal 2.5-3.0   Selected INR 2.1! (8/9/2017)   Maintenance plan 1.5 mg (3 mg x 0.5) on Fri; 3 mg (3 mg x 1) all other days   Weekly total 19.5 mg   Plan last modified Kristopher Escobar PHARMD (7/3/2017)   Next INR check 8/23/2017   Priority Maintenance   Target end date Indefinite    Indications   Atrial fibrillation (CMS-HCC) [I48.91]  Long term current use of anticoagulant therapy [Z79.01]         Anticoagulation Episode Summary     INR check location Home Draw    Preferred lab     Send INR reminders to     Comments Waqar       Anticoagulation Care Providers     Provider Role Specialty Phone number    Hu Gamez M.D. Referring Cardiac Electrophysiology [phone]    [facility] Anticoagulation Services Responsible  [phone]    Kristopher Escobar PHARMD Responsible          Anticoagulation Patient Findings      Spoke with patient.  INR is SUB therapeutic.  Pt was taking wrong dose, went back to 1.5mg on Mondays by accident   Pt denies any unusual s/s of bleeding, bruising, clotting or any changes to diet or medications. Denies any etoh, cranberries, supplements, or illness.   Pt verifies warfarin weekly dosing.     Will have pt take boost dose of 4.5mg x1 today, and then resume her normal dose of 3mg daily except fridays 1.5mg    Repeat INR in 2 weeks.     Kellen Lackey, PHARMD

## 2017-08-14 ENCOUNTER — TELEPHONE (OUTPATIENT)
Dept: CARDIOLOGY | Facility: MEDICAL CENTER | Age: 77
End: 2017-08-14

## 2017-08-14 NOTE — TELEPHONE ENCOUNTER
Called pt. To advise. Note sent to [facility] (Kellen Lackey) to advise re: bridging.   Clearance letter faxed to Dr. MIKE Holguin.      Message  Received: Today       LOUIS Martinez L.P.N.       Caller: Unspecified (Today, 12:35 PM)                     Will need to be bridged for surgery prior TIA and she is pacemaker dependent.Moderate risk.

## 2017-08-14 NOTE — TELEPHONE ENCOUNTER
Dr. Álvaro Holguin would like to schedule pt. For carpal tunnel releases (30 min. Of general anesthesia). He is requesting cardiac clearance and instructions re: holding Coumadin.    Romy [phone] , [phone] fax.

## 2017-08-23 ENCOUNTER — ANTICOAGULATION MONITORING (OUTPATIENT)
Dept: VASCULAR LAB | Facility: MEDICAL CENTER | Age: 77
End: 2017-08-23

## 2017-08-23 DIAGNOSIS — Z79.01 LONG TERM CURRENT USE OF ANTICOAGULANT THERAPY: ICD-10-CM

## 2017-08-23 DIAGNOSIS — I48.20 CHRONIC ATRIAL FIBRILLATION (HCC): ICD-10-CM

## 2017-08-23 LAB — INR PPP: 2.9 (ref 2–3.5)

## 2017-08-23 NOTE — PROGRESS NOTES
Anticoagulation Summary as of 8/23/2017     INR goal 2.5-3.0   Selected INR 2.9 (8/23/2017)   Maintenance plan 1.5 mg (3 mg x 0.5) on Fri; 3 mg (3 mg x 1) all other days   Weekly total 19.5 mg   Plan last modified Kristopher Escobar PHARMD (7/3/2017)   Next INR check 9/6/2017   Priority Maintenance   Target end date Indefinite    Indications   Atrial fibrillation (CMS-HCC) [I48.91]  Long term current use of anticoagulant therapy [Z79.01]         Anticoagulation Episode Summary     INR check location Home Draw    Preferred lab     Send INR reminders to     [facility]      Anticoagulation Care Providers     Provider Role Specialty Phone number    Hu Gamez M.D. Referring Cardiac Electrophysiology [phone]    [facility] Anticoagulation Services Responsible  [phone]    Kristopher Escobar PHARMD Responsible          Anticoagulation Patient Findings   Negatives Missed Doses, Extra Doses, Medication Changes, Antibiotic Use, Diet Changes, Dental/Other Procedures, Hospitalization, Bleeding Gums, Nose Bleeds, Blood in Urine, Blood in Stool, Any Bruising, Other Complaints        Spoke with patient today regarding therapeutic INR of 2.9.  Patient denies any signs/symptoms of bruising or bleeding or any changes in diet and medications.  Instructed patient to call clinic with any questions or concerns.  Pt is to continue with current warfarin dosing regimen.  Follow up in 2 weeks.    Kristopher Escobar PHARMD

## 2017-09-07 ENCOUNTER — ANTICOAGULATION MONITORING (OUTPATIENT)
Dept: VASCULAR LAB | Facility: MEDICAL CENTER | Age: 77
End: 2017-09-07

## 2017-09-07 DIAGNOSIS — I48.91 ATRIAL FIBRILLATION, UNSPECIFIED TYPE (HCC): ICD-10-CM

## 2017-09-07 DIAGNOSIS — Z79.01 LONG TERM CURRENT USE OF ANTICOAGULANT THERAPY: ICD-10-CM

## 2017-09-07 LAB — INR PPP: 2.6 (ref 2–3.5)

## 2017-09-07 NOTE — PROGRESS NOTES
Anticoagulation Summary  As of 9/7/2017    INR goal:   2.5-3.0   TTR:   67.6 % (2.3 y)   Today's INR:   2.6   Maintenance plan:   1.5 mg (3 mg x 0.5) on Fri; 3 mg (3 mg x 1) all other days   Weekly total:   19.5 mg   Plan last modified:   Kristopher Escobar PharmD (7/3/2017)   Next INR check:   9/21/2017   Priority:   Maintenance   Target end date:   Indefinite    Indications    Atrial fibrillation (CMS-HCC) [I48.91]  Long term current use of anticoagulant therapy [Z79.01]             Anticoagulation Episode Summary     INR check location:   Home Draw    Preferred lab:       Send INR reminders to:       Comments:   Waqar MCLAUGHLIN      Anticoagulation Care Providers     Provider Role Specialty Phone number    Hu Gamez M.D. Referring Cardiac Electrophysiology [phone]    [facility] Anticoagulation Services Responsible  [phone]    Clarisse QureshiD Responsible          Anticoagulation Patient Findings    See note by Hosea Amaya, ClarisseD

## 2017-09-12 ENCOUNTER — TELEPHONE (OUTPATIENT)
Dept: CARDIOLOGY | Facility: MEDICAL CENTER | Age: 77
End: 2017-09-12

## 2017-09-12 ENCOUNTER — OFFICE VISIT (OUTPATIENT)
Dept: CARDIOLOGY | Facility: MEDICAL CENTER | Age: 77
End: 2017-09-12
Payer: MEDICARE

## 2017-09-12 VITALS
DIASTOLIC BLOOD PRESSURE: 80 MMHG | OXYGEN SATURATION: 96 % | SYSTOLIC BLOOD PRESSURE: 110 MMHG | BODY MASS INDEX: 25.27 KG/M2 | HEIGHT: 67 IN | HEART RATE: 88 BPM | WEIGHT: 161 LBS

## 2017-09-12 DIAGNOSIS — G45.8 OTHER SPECIFIED TRANSIENT CEREBRAL ISCHEMIAS: ICD-10-CM

## 2017-09-12 DIAGNOSIS — Z95.0 PRESENCE OF PERMANENT CARDIAC PACEMAKER: Chronic | ICD-10-CM

## 2017-09-12 DIAGNOSIS — Z79.01 LONG TERM CURRENT USE OF ANTICOAGULANT THERAPY: Chronic | ICD-10-CM

## 2017-09-12 DIAGNOSIS — I48.20 CHRONIC ATRIAL FIBRILLATION (HCC): ICD-10-CM

## 2017-09-12 PROCEDURE — 99214 OFFICE O/P EST MOD 30 MIN: CPT | Mod: 25 | Performed by: NURSE PRACTITIONER

## 2017-09-12 ASSESSMENT — ENCOUNTER SYMPTOMS
PALPITATIONS: 0
LOSS OF CONSCIOUSNESS: 0
SHORTNESS OF BREATH: 0
CHILLS: 0
ABDOMINAL PAIN: 0
SPEECH CHANGE: 0
CLAUDICATION: 0
HEADACHES: 0
COUGH: 0
DIZZINESS: 0
VOMITING: 0
SORE THROAT: 0
BLURRED VISION: 0
NAUSEA: 0
ORTHOPNEA: 0
BRUISES/BLEEDS EASILY: 0
FEVER: 0
BLOOD IN STOOL: 0
MYALGIAS: 0
HEARTBURN: 0
FOCAL WEAKNESS: 0
PND: 0
WHEEZING: 0
PSYCHIATRIC NEGATIVE: 1
DOUBLE VISION: 0

## 2017-09-12 NOTE — TELEPHONE ENCOUNTER
Pt. Came in for appointment today. She is scheduled for carpal tinnel release surgery with Dr. Holguin (see 8-14-17 nursing note) and [facility] has not yet contacted her with bridging instructions.  Left message for clinic to call pt. And call nurse as well.
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
## 2017-09-18 ENCOUNTER — ANTICOAGULATION MONITORING (OUTPATIENT)
Dept: VASCULAR LAB | Facility: MEDICAL CENTER | Age: 77
End: 2017-09-18

## 2017-09-18 DIAGNOSIS — Z79.01 LONG TERM CURRENT USE OF ANTICOAGULANT THERAPY: ICD-10-CM

## 2017-09-18 DIAGNOSIS — I48.20 CHRONIC ATRIAL FIBRILLATION (HCC): ICD-10-CM

## 2017-09-18 NOTE — PROGRESS NOTES
Patient has upcoming carpal tunnel release procedure and will need to hold warfarin for five days.  CHADS2 = 4 with hx of TIA.  Will have patient bridge with lovenox (cardiology in agreement given patient history).  Bridging instructions as below.  Instructions emailed to patient and discuss over phone.  Prescription of lovenox injections sent to preferred pharmacy.    9/22:  NO Warfarin  9/23:  NO Warfarin, Lovenox 100mg SQ one time daily  9/24:  NO Warfarin, Lovenox 100mg SQ one time daily  9/25:  NO Warfarin, Lovenox 100mg SQ one time daily  9/26:  NO Warfarin, NO Lovenox  9/27:  Procedure Day, restart warfarin 3mg at physician discretion  9/28:  Warfarin 3mg, Lovenox 100mg SQ one time daily  9/29:  Continue warfarin and lovenox until INR >2.0  Check INR Oct 2nd    Kristopher Escobar, ClarisseD

## 2017-09-20 ENCOUNTER — HOSPITAL ENCOUNTER (OUTPATIENT)
Dept: HOSPITAL 8 - STAR | Age: 77
Discharge: HOME | End: 2017-09-20
Attending: ORTHOPAEDIC SURGERY
Payer: MEDICARE

## 2017-09-20 DIAGNOSIS — G56.01: ICD-10-CM

## 2017-09-20 DIAGNOSIS — M81.0: ICD-10-CM

## 2017-09-20 DIAGNOSIS — I48.91: ICD-10-CM

## 2017-09-20 DIAGNOSIS — Z01.818: Primary | ICD-10-CM

## 2017-09-20 LAB
AST SERPL-CCNC: 25 U/L (ref 15–37)
BUN SERPL-MCNC: 16 MG/DL (ref 7–18)
HCT VFR BLD CALC: 42.8 % (ref 34.6–47.8)
HGB BLD-MCNC: 14 G/DL (ref 11.7–16.4)
WBC # BLD AUTO: 4.5 X10^3/UL (ref 3.4–10)

## 2017-09-20 PROCEDURE — 85610 PROTHROMBIN TIME: CPT

## 2017-09-20 PROCEDURE — 85025 COMPLETE CBC W/AUTO DIFF WBC: CPT

## 2017-09-20 PROCEDURE — 93005 ELECTROCARDIOGRAM TRACING: CPT

## 2017-09-20 PROCEDURE — 36415 COLL VENOUS BLD VENIPUNCTURE: CPT

## 2017-09-20 PROCEDURE — 80053 COMPREHEN METABOLIC PANEL: CPT

## 2017-09-20 PROCEDURE — 85730 THROMBOPLASTIN TIME PARTIAL: CPT

## 2017-09-21 ENCOUNTER — ANTICOAGULATION MONITORING (OUTPATIENT)
Dept: VASCULAR LAB | Facility: MEDICAL CENTER | Age: 77
End: 2017-09-21

## 2017-09-21 DIAGNOSIS — Z79.01 LONG TERM CURRENT USE OF ANTICOAGULANT THERAPY: ICD-10-CM

## 2017-09-21 DIAGNOSIS — I48.91 ATRIAL FIBRILLATION, UNSPECIFIED TYPE (HCC): ICD-10-CM

## 2017-09-21 LAB — INR PPP: 4.2 (ref 2–3.5)

## 2017-09-22 ENCOUNTER — TELEPHONE (OUTPATIENT)
Dept: VASCULAR LAB | Facility: MEDICAL CENTER | Age: 77
End: 2017-09-22

## 2017-09-22 ENCOUNTER — PATIENT MESSAGE (OUTPATIENT)
Dept: CARDIOLOGY | Facility: MEDICAL CENTER | Age: 77
End: 2017-09-22

## 2017-09-22 NOTE — TELEPHONE ENCOUNTER
To Eren Mckeon to address. Pt. Needs to start Enoxaparin tomorrow.              ----- Message -----     From: Madeline Dorantes     Sent: 9/22/2017 10:22 AM PDT       To: LOUIS Martinez  Subject: Prescription Question    I filled the script for Enoxaparin, I noticed that it said it had Sulfonamide derivatives and sulfa antibiotics in it. I'm allergic to sulfa. Is this ok to use? I have to starts the shots on Saturday. phone  (cell)  Madeline Dorantes

## 2017-09-22 NOTE — TELEPHONE ENCOUNTER
9/22/2017    Spoke to patient on phone regarding her concern of her sulfa allergy with use of enoxaparin. As enoxaparin does not contain a sulfonamide group I explained to her that this would be safe for her to take.    Eren Mckeon, PharmD

## 2017-09-26 ENCOUNTER — ANTICOAGULATION MONITORING (OUTPATIENT)
Dept: VASCULAR LAB | Facility: MEDICAL CENTER | Age: 77
End: 2017-09-26

## 2017-09-26 DIAGNOSIS — Z79.01 LONG TERM CURRENT USE OF ANTICOAGULANT THERAPY: ICD-10-CM

## 2017-09-26 LAB — INR PPP: 1.1 (ref 2–3.5)

## 2017-09-27 ENCOUNTER — HOSPITAL ENCOUNTER (OUTPATIENT)
Dept: HOSPITAL 8 - OUT | Age: 77
End: 2017-09-27
Attending: ORTHOPAEDIC SURGERY
Payer: MEDICARE

## 2017-09-27 VITALS — DIASTOLIC BLOOD PRESSURE: 66 MMHG | SYSTOLIC BLOOD PRESSURE: 140 MMHG

## 2017-09-27 VITALS — BODY MASS INDEX: 24.11 KG/M2 | WEIGHT: 160.94 LBS | HEIGHT: 68.5 IN

## 2017-09-27 DIAGNOSIS — Z87.39: ICD-10-CM

## 2017-09-27 DIAGNOSIS — Z88.8: ICD-10-CM

## 2017-09-27 DIAGNOSIS — E03.9: ICD-10-CM

## 2017-09-27 DIAGNOSIS — Z91.09: ICD-10-CM

## 2017-09-27 DIAGNOSIS — Z88.6: ICD-10-CM

## 2017-09-27 DIAGNOSIS — I10: ICD-10-CM

## 2017-09-27 DIAGNOSIS — G56.01: Primary | ICD-10-CM

## 2017-09-27 DIAGNOSIS — Z88.1: ICD-10-CM

## 2017-09-27 DIAGNOSIS — K21.9: ICD-10-CM

## 2017-09-27 PROCEDURE — 29848 WRIST ENDOSCOPY/SURGERY: CPT

## 2017-09-27 PROCEDURE — 85610 PROTHROMBIN TIME: CPT

## 2017-09-27 PROCEDURE — 36415 COLL VENOUS BLD VENIPUNCTURE: CPT

## 2017-10-02 ENCOUNTER — ANTICOAGULATION MONITORING (OUTPATIENT)
Dept: VASCULAR LAB | Facility: MEDICAL CENTER | Age: 77
End: 2017-10-02

## 2017-10-02 DIAGNOSIS — Z79.01 LONG TERM CURRENT USE OF ANTICOAGULANT THERAPY: ICD-10-CM

## 2017-10-02 LAB — INR PPP: 1.5 (ref 2–3.5)

## 2017-10-02 NOTE — PROGRESS NOTES
Anticoagulation Summary  As of 10/2/2017    INR goal:   2.5-3.0   TTR:   66.1 % (2.4 y)   Today's INR:   1.5!   Maintenance plan:   1.5 mg (3 mg x 0.5) on Fri; 3 mg (3 mg x 1) all other days   Weekly total:   19.5 mg   Plan last modified:   Kristopher Escobar PharmD (7/3/2017)   Next INR check:   10/4/2017   Priority:   Maintenance   Target end date:   Indefinite    Indications    Atrial fibrillation (CMS-HCC) [I48.91]  Long term current use of anticoagulant therapy [Z79.01]             Anticoagulation Episode Summary     INR check location:   Home Draw    Preferred lab:       Send INR reminders to:       Comments:   Waqar       Anticoagulation Care Providers     Provider Role Specialty Phone number    Hu Gamez M.D. Referring Cardiac Electrophysiology [phone]    [facility] Anticoagulation Services Responsible  [phone]    Kristopher Escobar PharmD Responsible          Anticoagulation Patient Findings      HPI:  Madeline Dorantes, on anticoagulation therapy with warfarin for afib.   Changes to current medical/health status since last appt: Pt has been bridging.  Denies signs/symptoms of bleeding and/or thrombosis since the last appt.    Denies any interval changes to diet  Denies any interval changes to medications since last appt.   Denies any complications or cost restrictions with current therapy.   Continue with enoxaparin bridge.    A/P   INR  SUB-therapeutic.   Bolus 4.5 mg today and tomorrow.    Next INR in 2 days.    Fazal Pineda, PharmD

## 2017-10-03 DIAGNOSIS — I10 ESSENTIAL HYPERTENSION: ICD-10-CM

## 2017-10-03 RX ORDER — METOPROLOL SUCCINATE 25 MG/1
25 TABLET, EXTENDED RELEASE ORAL DAILY
Qty: 90 TAB | Refills: 3 | Status: SHIPPED
Start: 2017-10-03 | End: 2018-11-26 | Stop reason: SDUPTHER

## 2017-10-04 LAB — INR PPP: 2.1 (ref 2–3.5)

## 2017-10-05 ENCOUNTER — ANTICOAGULATION MONITORING (OUTPATIENT)
Dept: VASCULAR LAB | Facility: MEDICAL CENTER | Age: 77
End: 2017-10-05

## 2017-10-05 DIAGNOSIS — Z79.01 LONG TERM CURRENT USE OF ANTICOAGULANT THERAPY: ICD-10-CM

## 2017-10-05 NOTE — PROGRESS NOTES
Anticoagulation Summary  As of 10/5/2017    INR goal:   2.5-3.0   TTR:   65.9 % (2.4 y)   Today's INR:   2.1! (10/4/2017)   Maintenance plan:   1.5 mg (3 mg x 0.5) on Fri; 3 mg (3 mg x 1) all other days   Weekly total:   19.5 mg   Plan last modified:   Kristopher Escobar PharmD (7/3/2017)   Next INR check:   10/11/2017   Priority:   Maintenance   Target end date:   Indefinite    Indications    Atrial fibrillation (CMS-HCC) [I48.91]  Long term current use of anticoagulant therapy [Z79.01]             Anticoagulation Episode Summary     INR check location:   Home Draw    Preferred lab:       Send INR reminders to:       Comments:   Waqar MCLAUGHLIN  goal range changed 11- per cards      Anticoagulation Care Providers     Provider Role Specialty Phone number    Hu Gamez M.D. Referring Cardiac Electrophysiology [phone]    [facility] Anticoagulation [facility] Responsible  [phone]    Kristopher Escobar, PharmJANELLE Responsible          See note by Miri Lizarraga 05/20/2014    Samantha Amaya, [place]

## 2017-10-11 ENCOUNTER — ANTICOAGULATION MONITORING (OUTPATIENT)
Dept: VASCULAR LAB | Facility: MEDICAL CENTER | Age: 77
End: 2017-10-11

## 2017-10-11 DIAGNOSIS — Z79.01 LONG TERM CURRENT USE OF ANTICOAGULANT THERAPY: ICD-10-CM

## 2017-10-11 LAB — INR PPP: 2.4 (ref 2–3.5)

## 2017-10-11 NOTE — PROGRESS NOTES
Anticoagulation Summary  As of 10/11/2017    INR goal:   2.5-3.0   TTR:   65.4 % (2.4 y)   Today's INR:   2.4!   Maintenance plan:   1.5 mg (3 mg x 0.5) on Fri; 3 mg (3 mg x 1) all other days   Weekly total:   19.5 mg   Plan last modified:   Kristopher Escobar PharmD (7/3/2017)   Next INR check:   10/18/2017   Priority:   Maintenance   Target end date:   Indefinite    Indications    Atrial fibrillation (CMS-HCC) [I48.91]  Long term current use of anticoagulant therapy [Z79.01]             Anticoagulation Episode Summary     INR check location:   Home Draw    Preferred lab:       Send INR reminders to:       Comments:   Waqar MCLAUGHLIN  goal range changed 11- per cards      Anticoagulation Care Providers     Provider Role Specialty Phone number    Hu Gamez M.D. Referring Cardiac Electrophysiology [phone]    [facility] Anticoagulation Services Responsible  [phone]    Kristopher Escobar PharmD Responsible          Anticoagulation Patient Findings      Left voicemail message to report a SUB therapeutic INR of 2.4.    Will have pt take increase dose of warfarin friday of 3 mg and then   Pt to continue with current warfarin dosing regimen. Requested pt contact the clinic for any s/s of unusual bleeding, bruising, clotting or any changes to diet or medication.    FU INR in 1 weeks.    Kellen Lackey, ClarisseD

## 2017-10-18 ENCOUNTER — ANTICOAGULATION MONITORING (OUTPATIENT)
Dept: VASCULAR LAB | Facility: MEDICAL CENTER | Age: 77
End: 2017-10-18

## 2017-10-18 DIAGNOSIS — Z79.01 LONG TERM CURRENT USE OF ANTICOAGULANT THERAPY: ICD-10-CM

## 2017-10-18 DIAGNOSIS — I48.91 ATRIAL FIBRILLATION, UNSPECIFIED TYPE (HCC): ICD-10-CM

## 2017-10-18 LAB — INR PPP: 2.9 (ref 2–3.5)

## 2017-10-24 ENCOUNTER — SLEEP CENTER VISIT (OUTPATIENT)
Dept: SLEEP MEDICINE | Facility: MEDICAL CENTER | Age: 77
End: 2017-10-24
Payer: MEDICARE

## 2017-10-24 VITALS
HEIGHT: 67 IN | SYSTOLIC BLOOD PRESSURE: 120 MMHG | BODY MASS INDEX: 25.52 KG/M2 | DIASTOLIC BLOOD PRESSURE: 70 MMHG | OXYGEN SATURATION: 98 % | RESPIRATION RATE: 16 BRPM | WEIGHT: 162.6 LBS | HEART RATE: 84 BPM

## 2017-10-24 DIAGNOSIS — I10 ESSENTIAL HYPERTENSION: ICD-10-CM

## 2017-10-24 DIAGNOSIS — I48.91 ATRIAL FIBRILLATION, UNSPECIFIED TYPE (HCC): ICD-10-CM

## 2017-10-24 DIAGNOSIS — M35.00 SJOGREN'S SYNDROME, WITH UNSPECIFIED ORGAN INVOLVEMENT (HCC): ICD-10-CM

## 2017-10-24 DIAGNOSIS — G47.33 OBSTRUCTIVE SLEEP APNEA: ICD-10-CM

## 2017-10-24 DIAGNOSIS — K21.9 GASTROESOPHAGEAL REFLUX DISEASE, ESOPHAGITIS PRESENCE NOT SPECIFIED: ICD-10-CM

## 2017-10-24 PROCEDURE — 99213 OFFICE O/P EST LOW 20 MIN: CPT | Performed by: NURSE PRACTITIONER
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
## 2017-10-24 NOTE — PATIENT INSTRUCTIONS
Plan:    1) Continue CPAP @ 5 CM H20. Fit for new mask today in the office. RX for new machine, mask and supplies sent to her DME   2) Sleep hygiene discussed.   3) Continue to follow with Cardiology.   4) Follow up in 2 months with compliance card download, sooner if needed.

## 2017-11-01 ENCOUNTER — ANTICOAGULATION MONITORING (OUTPATIENT)
Dept: VASCULAR LAB | Facility: MEDICAL CENTER | Age: 77
End: 2017-11-01

## 2017-11-01 DIAGNOSIS — Z79.01 LONG TERM CURRENT USE OF ANTICOAGULANT THERAPY: ICD-10-CM

## 2017-11-01 LAB — INR PPP: 2.1 (ref 2–3.5)

## 2017-11-01 NOTE — PROGRESS NOTES
Anticoagulation Summary  As of 11/1/2017    INR goal:   2.5-3.0   TTR:   65.3 % (2.4 y)   Today's INR:   2.1!   Maintenance plan:   3 mg (3 mg x 1) every day   Weekly total:   21 mg   Plan last modified:   Kellen Lackey, PharmD (11/1/2017)   Next INR check:   11/15/2017   Priority:   Maintenance   Target end date:   Indefinite    Indications    Atrial fibrillation (CMS-HCC) [I48.91]  Long term current use of anticoagulant therapy [Z79.01]             Anticoagulation Episode Summary     INR check location:   Home Draw    Preferred lab:       Send INR reminders to:       Comments:   Waqar MCLAUGHLIN  goal range changed 11- per cards      Anticoagulation Care Providers     Provider Role Specialty Phone number    Hu Gamez M.D. Referring Cardiac Electrophysiology [phone]    [facility] Anticoagulation Services Responsible  [phone]    Clarisse BellD Responsible          Anticoagulation Patient Findings      Spoke with patient.  INR is SUB therapeutic.   Pt denies any unusual s/s of bleeding, bruising, clotting or any changes to diet or medications. Denies any etoh, cranberries, supplements, or illness.   Pt verifies warfarin weekly dosing.     Will have pt take a boost dose of 4.5mg x1 today and then resume her normal weekly dose of 3mg daily.     Repeat INR in 2 weeks.     Kellen Lackey, PharmD

## 2017-11-15 LAB — INR PPP: 1.9 (ref 2–3.5)

## 2017-11-17 ENCOUNTER — ANTICOAGULATION MONITORING (OUTPATIENT)
Dept: VASCULAR LAB | Facility: MEDICAL CENTER | Age: 77
End: 2017-11-17

## 2017-11-17 DIAGNOSIS — Z79.01 LONG TERM CURRENT USE OF ANTICOAGULANT THERAPY: ICD-10-CM

## 2017-11-17 DIAGNOSIS — I48.0 PAROXYSMAL ATRIAL FIBRILLATION (HCC): ICD-10-CM

## 2017-11-18 NOTE — PROGRESS NOTES
Anticoagulation Summary  As of 11/17/2017    INR goal:   2.5-3.0   TTR:   64.3 % (2.5 y)   Today's INR:   1.9! (11/15/2017)   Maintenance plan:   4.5 mg (3 mg x 1.5) on Wed, Fri; 3 mg (3 mg x 1) all other days   Weekly total:   24 mg   Plan last modified:   Samantha Amaya PharmD (11/17/2017)   Next INR check:   11/22/2017   Priority:   Maintenance   Target end date:   Indefinite    Indications    Atrial fibrillation (CMS-HCC) [I48.91]  Long term current use of anticoagulant therapy [Z79.01]             Anticoagulation Episode Summary     INR check location:   Home Draw    Preferred lab:       Send INR reminders to:       Comments:   Waqar MCLAUGHLIN  goal range changed 11- per cards      Anticoagulation Care Providers     Provider Role Specialty Phone number    Hu Gamez M.D. Referring Cardiac Electrophysiology [phone]    [facility] Anticoagulation Services Responsible  [phone]    Clarisse BellD Responsible          Anticoagulation Patient Findings    See note by Anisha Dowling 05.20.14    Samantha Amaya PharmD

## 2017-11-21 LAB — INR PPP: 1.8 (ref 2–3.5)

## 2017-11-27 DIAGNOSIS — Z79.01 CHRONIC ANTICOAGULATION: ICD-10-CM

## 2017-11-27 RX ORDER — WARFARIN SODIUM 3 MG/1
3-4.5 TABLET ORAL DAILY
Qty: 135 TAB | Refills: 1 | Status: SHIPPED | OUTPATIENT
Start: 2017-11-27 | End: 2018-06-28 | Stop reason: SDUPTHER

## 2017-11-28 ENCOUNTER — ANTICOAGULATION MONITORING (OUTPATIENT)
Dept: VASCULAR LAB | Facility: MEDICAL CENTER | Age: 77
End: 2017-11-28

## 2017-11-28 DIAGNOSIS — Z79.01 LONG TERM CURRENT USE OF ANTICOAGULANT THERAPY: ICD-10-CM

## 2017-11-28 DIAGNOSIS — I48.0 PAROXYSMAL ATRIAL FIBRILLATION (HCC): ICD-10-CM

## 2017-11-28 NOTE — PROGRESS NOTES
OP Telephone Anticoagulation Service Note    Date: 11/28/2017      Anticoagulation Summary  As of 11/28/2017    INR goal:   2.5-3.0   TTR:   63.9 % (2.5 y)   Today's INR:   1.8! (11/21/2017)   Maintenance plan:   4.5 mg (3 mg x 1.5) on Tue, Thu, Sat; 3 mg (3 mg x 1) all other days   Weekly total:   25.5 mg   Plan last modified:   Shyanne Damon, PharmD (11/28/2017)   Next INR check:   11/29/2017   Priority:   Maintenance   Target end date:   Indefinite    Indications    Atrial fibrillation (CMS-HCC) [I48.91]  Long term current use of anticoagulant therapy [Z79.01]             Anticoagulation Episode Summary     INR check location:   Home Draw    Preferred lab:       Send INR reminders to:       Comments:   Waqar MCLAUGHLIN  goal range changed 11- per cards      Anticoagulation Care Providers     Provider Role Specialty Phone number    Hu Gamez M.D. Referring Cardiac Electrophysiology [phone]    [facility] Anticoagulation Services Responsible  [phone]    Kristopher Escobar, PharmD Responsible          Anticoagulation Patient Findings        Plan: See note from Luis Miguel Dowling from 5/20/14, note is below:    Talked to patient on the phone. No s/s of bleeding or thrombosis. Patient denies missing doses. She reported taking 4.5mg on T, Th and 3mg on all other days of the week. No interval changes in medications. She has been in [facility] rehab facility recently. Eating more greens than normal.  patient to try to keep a consistent diet. INR has been subtherapeutic since 11/01/2017. Lovenox injection offered considering patient's history of TIA.   Bridging declines by patient due to injection pain. Counseled patient on s/s of thrombosis and advise patient to seek emergency care if she notices any s/s of stroke or thrombosis. Bolus of 4.5mg x 1 dose then start increased weekly dose. Re-check INR in 7 days. Plan discussed with Kellen.         Medication: Warfarin (Coumadin)     Sunday Monday Tuesday Wednesday    Thursday Friday Saturday      3 mg    3 mg    4.5 mg    3 mg    4.5 mg    3 mg    4.5 mg      1 tab(s)    1 tab(s)    1.5 tab(s)    1 tab(s)    1.5 tab(s)    1 tab(s)  1.5 tab(s)      Next Appointment: Wednesday, 11/29/2017         Anisha Dowling, Pharmacy Intern  I have reviewed and agree with the plan above on  11/28/2017     Shaynne Damon, Pharm D            Shyanne Damon, PharmD

## 2017-11-30 ENCOUNTER — ANTICOAGULATION MONITORING (OUTPATIENT)
Dept: VASCULAR LAB | Facility: MEDICAL CENTER | Age: 77
End: 2017-11-30

## 2017-11-30 DIAGNOSIS — I48.0 PAROXYSMAL ATRIAL FIBRILLATION (HCC): ICD-10-CM

## 2017-11-30 DIAGNOSIS — Z79.01 LONG TERM CURRENT USE OF ANTICOAGULANT THERAPY: ICD-10-CM

## 2017-11-30 LAB — INR PPP: 3.4 (ref 2–3.5)

## 2017-12-01 NOTE — PROGRESS NOTES
OP Telephone Anticoagulation Service Note    Date: 11/30/2017      Anticoagulation Summary  As of 11/30/2017    INR goal:   2.5-3.0   TTR:   63.5 % (2.5 y)   Today's INR:   3.4!   Maintenance plan:   4.5 mg (3 mg x 1.5) on Tue, Thu; 3 mg (3 mg x 1) all other days   Weekly total:   24 mg   Plan last modified:   Shyanne Damon PharmD (11/30/2017)   Next INR check:   12/7/2017   Priority:   Maintenance   Target end date:   Indefinite    Indications    Atrial fibrillation (CMS-HCC) [I48.91]  Long term current use of anticoagulant therapy [Z79.01]             Anticoagulation Episode Summary     INR check location:   Home Draw    Preferred lab:       Send INR reminders to:       Comments:   Waqar MCLAUGHLIN  goal range changed 11- per cards      Anticoagulation Care Providers     Provider Role Specialty Phone number    Hu Gamez M.D. Referring Cardiac Electrophysiology [phone]    [facility] Anticoagulation Services Responsible  [phone]    Kristopher Escobar, PharmD Responsible          Anticoagulation Patient Findings        Plan: INR is supratherapeutic. Spoke with pt on the phone. Confirmed dosing regimen. Denies s/s of bleeding. No medication changes. Discussed with pt she is likely high from bolus doses last week. She would like to only do 4.5 mg on Tue/Thur then 3 mg row of the week, which is reasonable. Follow up 1 week.           Shyanne Damon, Yesenia

## 2017-12-05 ENCOUNTER — HOSPITAL ENCOUNTER (OUTPATIENT)
Dept: RADIOLOGY | Facility: MEDICAL CENTER | Age: 77
End: 2017-12-05
Attending: FAMILY MEDICINE
Payer: MEDICARE

## 2017-12-05 ENCOUNTER — OFFICE VISIT (OUTPATIENT)
Dept: CARDIOLOGY | Facility: MEDICAL CENTER | Age: 77
End: 2017-12-05
Payer: MEDICARE

## 2017-12-05 VITALS
HEART RATE: 86 BPM | DIASTOLIC BLOOD PRESSURE: 68 MMHG | SYSTOLIC BLOOD PRESSURE: 126 MMHG | HEIGHT: 67 IN | WEIGHT: 164.4 LBS | OXYGEN SATURATION: 95 % | BODY MASS INDEX: 25.8 KG/M2

## 2017-12-05 DIAGNOSIS — Z79.01 LONG TERM CURRENT USE OF ANTICOAGULANT THERAPY: Chronic | ICD-10-CM

## 2017-12-05 DIAGNOSIS — I10 ESSENTIAL HYPERTENSION, BENIGN: ICD-10-CM

## 2017-12-05 DIAGNOSIS — Z95.0 PRESENCE OF PERMANENT CARDIAC PACEMAKER: Chronic | ICD-10-CM

## 2017-12-05 DIAGNOSIS — I48.91 ATRIAL FIBRILLATION, UNSPECIFIED TYPE (HCC): ICD-10-CM

## 2017-12-05 DIAGNOSIS — Z12.39 SCREENING BREAST EXAMINATION: ICD-10-CM

## 2017-12-05 LAB — EKG IMPRESSION: NORMAL

## 2017-12-05 PROCEDURE — 93000 ELECTROCARDIOGRAM COMPLETE: CPT | Mod: 59 | Performed by: NURSE PRACTITIONER

## 2017-12-05 PROCEDURE — G0202 SCR MAMMO BI INCL CAD: HCPCS

## 2017-12-05 PROCEDURE — 93279 PRGRMG DEV EVAL PM/LDLS PM: CPT | Performed by: NURSE PRACTITIONER

## 2017-12-05 PROCEDURE — 99214 OFFICE O/P EST MOD 30 MIN: CPT | Mod: 25 | Performed by: NURSE PRACTITIONER

## 2017-12-05 RX ORDER — OLOPATADINE HYDROCHLORIDE 1 MG/ML
SOLUTION/ DROPS OPHTHALMIC
COMMUNITY
Start: 2017-10-11 | End: 2019-06-13

## 2017-12-05 RX ORDER — LOSARTAN POTASSIUM 25 MG/1
25 TABLET ORAL DAILY
Qty: 90 TAB | Refills: 3 | Status: SHIPPED | OUTPATIENT
Start: 2017-12-05 | End: 2018-12-31 | Stop reason: SDUPTHER

## 2017-12-05 ASSESSMENT — ENCOUNTER SYMPTOMS
PSYCHIATRIC NEGATIVE: 1
FEVER: 0
DOUBLE VISION: 0
VOMITING: 0
BRUISES/BLEEDS EASILY: 0
ORTHOPNEA: 0
DIZZINESS: 0
PND: 0
BLOOD IN STOOL: 0
SHORTNESS OF BREATH: 0
FOCAL WEAKNESS: 0
ABDOMINAL PAIN: 0
WHEEZING: 0
LOSS OF CONSCIOUSNESS: 0
HEARTBURN: 0
HEADACHES: 0
BLURRED VISION: 0
CLAUDICATION: 0
PALPITATIONS: 0
COUGH: 0
CHILLS: 0
NAUSEA: 0
SORE THROAT: 0
MYALGIAS: 0
SPEECH CHANGE: 0
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
## 2017-12-05 NOTE — LETTER
[facility]   [address], [address]  [place] [address]  Phone: [phone]  Fax: [phone]              Madeline KARON Dorantes  1940    Encounter Date: 12/5/2017    LOUIS Martinez          PROGRESS NOTE:  No notes on file      Shantelle Spence M.D.  [address]  [facility] [address]  VIA Facsimile: [phone]

## 2017-12-07 ENCOUNTER — ANTICOAGULATION MONITORING (OUTPATIENT)
Dept: VASCULAR LAB | Facility: MEDICAL CENTER | Age: 77
End: 2017-12-07

## 2017-12-07 DIAGNOSIS — I48.0 PAROXYSMAL ATRIAL FIBRILLATION (HCC): ICD-10-CM

## 2017-12-07 DIAGNOSIS — Z79.01 LONG TERM CURRENT USE OF ANTICOAGULANT THERAPY: ICD-10-CM

## 2017-12-07 LAB — INR PPP: 3.3 (ref 2–3.5)

## 2017-12-07 NOTE — PROGRESS NOTES
OP Telephone Anticoagulation Service Note    Date: 12/7/2017      Anticoagulation Summary  As of 12/7/2017    INR goal:   2.5-3.0   TTR:   63.1 % (2.5 y)   Today's INR:   3.3!   Maintenance plan:   4.5 mg (3 mg x 1.5) on Tue; 3 mg (3 mg x 1) all other days   Weekly total:   22.5 mg   Plan last modified:   Shyanne Damon PharmD (12/7/2017)   Next INR check:   12/14/2017   Priority:   Maintenance   Target end date:   Indefinite    Indications    Atrial fibrillation (CMS-HCC) [I48.91]  Long term current use of anticoagulant therapy [Z79.01]             Anticoagulation Episode Summary     INR check location:   Home Draw    Preferred lab:       Send INR reminders to:       Comments:   Waqar MCLAUGHLIN  goal range changed 11- per cards      Anticoagulation Care Providers     Provider Role Specialty Phone number    Hu Gamez M.D. Referring Cardiac Electrophysiology [phone]    [facility] Anticoagulation Services Responsible  [phone]    Kristopher Escobar, PharmD Responsible          Anticoagulation Patient Findings        Plan: INR supratherapeutic. Spoke with pt on the phone. She report she took 4.5 mg on T/Th then 3 mg ROW. Which is more than directed. Instructed pt to reduce weekly regimen as outlined. Follow up 1 week.           Shyanne Damon, Yesenia

## 2017-12-14 ENCOUNTER — ANTICOAGULATION MONITORING (OUTPATIENT)
Dept: VASCULAR LAB | Facility: MEDICAL CENTER | Age: 77
End: 2017-12-14

## 2017-12-14 DIAGNOSIS — Z79.01 LONG TERM CURRENT USE OF ANTICOAGULANT THERAPY: ICD-10-CM

## 2017-12-14 LAB — INR PPP: 3.7 (ref 2–3.5)

## 2017-12-14 NOTE — PROGRESS NOTES
Anticoagulation Summary  As of 12/14/2017    INR goal:   2.5-3.0   TTR:   62.6 % (2.6 y)   Today's INR:   3.7!   Maintenance plan:   3 mg (3 mg x 1) every day   Weekly total:   21 mg   Plan last modified:   Fazal Pineda, PharmD (12/14/2017)   Next INR check:   12/21/2017   Priority:   Maintenance   Target end date:   Indefinite    Indications    Atrial fibrillation (CMS-HCC) [I48.91]  Long term current use of anticoagulant therapy [Z79.01]             Anticoagulation Episode Summary     INR check location:   Home Draw    Preferred lab:       Send INR reminders to:       Comments:   Shellydolores MCLAUGHLIN  goal range changed 11- per cards      Anticoagulation Care Providers     Provider Role Specialty Phone number    Hu Gamez M.D. Referring Cardiac Electrophysiology [phone]    [facility] Anticoagulation Services Responsible  [phone]    Clarisse BellD Responsible          Anticoagulation Patient Findings      HPI:  Madeline Dorantes, on anticoagulation therapy with warfarin for Afib.   Changes to current medical/health status since last appt: none  Denies signs/symptoms of bleeding and/or thrombosis since the last appt.    Denies any interval changes to diet  Denies any interval changes to medications since last appt.   Denies any complications or cost restrictions with current therapy.   Confirmed dosing regimen, pt did take warfarin correctly.    A/P   INR  SUPRA-therapeutic.   Begin reduced regimen.      Next INR in 1 week(s).    Fazal Pineda, PharmD

## 2017-12-21 ENCOUNTER — ANTICOAGULATION MONITORING (OUTPATIENT)
Dept: VASCULAR LAB | Facility: MEDICAL CENTER | Age: 77
End: 2017-12-21

## 2017-12-21 DIAGNOSIS — Z79.01 LONG TERM CURRENT USE OF ANTICOAGULANT THERAPY: ICD-10-CM

## 2017-12-21 DIAGNOSIS — I48.0 PAROXYSMAL ATRIAL FIBRILLATION (HCC): ICD-10-CM

## 2017-12-21 LAB — INR PPP: 2.8 (ref 2–3.5)

## 2017-12-21 NOTE — PROGRESS NOTES
Anticoagulation Summary  As of 12/21/2017    INR goal:   2.5-3.0   TTR:   62.3 % (2.6 y)   Today's INR:   2.8   Maintenance plan:   3 mg (3 mg x 1) every day   Weekly total:   21 mg   Plan last modified:   Samantha Amaya PharmD (12/21/2017)   Next INR check:   12/28/2017   Priority:   Maintenance   Target end date:   Indefinite    Indications    Atrial fibrillation (CMS-HCC) [I48.91]  Long term current use of anticoagulant therapy [Z79.01]             Anticoagulation Episode Summary     INR check location:   Home Draw    Preferred lab:       Send INR reminders to:       Comments:   Waqar MCLAUGHLIN  goal range changed 11- per cards      Anticoagulation Care Providers     Provider Role Specialty Phone number    Hu Gamez M.D. Referring Cardiac Electrophysiology [phone]    [facility] Anticoagulation Services Responsible  [phone]    Clarisse BellD Responsible          Anticoagulation Patient Findings    Spoke with Mr. Dorantes to report a therapeutic INR of 2.8. Pt is to continue with current warfarin dosing regimen.  Pt denies any unusual s/s of bleeding, bruising, clotting or any changes to diet or medications.  Follow up in 1 weeks, to reduce risk of adverse events related to this high risk medication,  Warfarin.    Samantha Amaya, ClarisseD

## 2017-12-28 ENCOUNTER — ANTICOAGULATION MONITORING (OUTPATIENT)
Dept: VASCULAR LAB | Facility: MEDICAL CENTER | Age: 77
End: 2017-12-28

## 2017-12-28 DIAGNOSIS — Z79.01 LONG TERM CURRENT USE OF ANTICOAGULANT THERAPY: ICD-10-CM

## 2017-12-28 DIAGNOSIS — I48.0 PAROXYSMAL ATRIAL FIBRILLATION (HCC): ICD-10-CM

## 2017-12-28 LAB — INR PPP: 2.2 (ref 2–3.5)

## 2017-12-28 NOTE — PROGRESS NOTES
Anticoagulation Summary  As of 12/28/2017    INR goal:   2.5-3.0   TTR:   62.2 % (2.6 y)   Today's INR:   2.2!   Maintenance plan:   3 mg (3 mg x 1) every day   Weekly total:   21 mg   Plan last modified:   Skip Gonzalez, Yesenia (12/28/2017)   Next INR check:   1/4/2018   Priority:   Maintenance   Target end date:   Indefinite    Indications    Atrial fibrillation (CMS-HCC) [I48.91]  Long term current use of anticoagulant therapy [Z79.01]             Anticoagulation Episode Summary     INR check location:   Home Draw    Preferred lab:       Send INR reminders to:       Comments:   Waqar MCLAUGHLIN  goal range changed 11- per cards      Anticoagulation Care Providers     Provider Role Specialty Phone number    Hu Gamez M.D. Referring Cardiac Electrophysiology [phone]    [facility] Anticoagulation Services Responsible  [phone]    Clarisse BellD Responsible          Anticoagulation Patient Findings        Spoke to patient on the phone.   INR  sub-therapeutic.   Denies signs/symptoms of bleeding and/or thrombosis.   Denies changes to diet or medications.   Follow up appointment in 1 week(s).    4.5mg today then continue weekly warfarin dose as noted      Skip Gonzalez, Yesenia

## 2018-01-04 ENCOUNTER — ANTICOAGULATION MONITORING (OUTPATIENT)
Dept: VASCULAR LAB | Facility: MEDICAL CENTER | Age: 78
End: 2018-01-04

## 2018-01-04 DIAGNOSIS — Z79.01 LONG TERM CURRENT USE OF ANTICOAGULANT THERAPY: ICD-10-CM

## 2018-01-04 DIAGNOSIS — I48.0 PAROXYSMAL ATRIAL FIBRILLATION (HCC): ICD-10-CM

## 2018-01-04 LAB — INR PPP: 2.7 (ref 2–3.5)

## 2018-01-04 NOTE — PROGRESS NOTES
OP Anticoagulation Telephone Note    Date: 1/4/2018  Anticoagulation Summary  As of 1/4/2018    INR goal:   2.5-3.0   TTR:   62.0 % (2.6 y)   Today's INR:   2.7   Maintenance plan:   3 mg (3 mg x 1) every day   Weekly total:   21 mg   No change documented:   Arpita Wesley, Med Ass't   Plan last modified:   Skip Gonzalez, PharmD (12/28/2017)   Next INR check:   1/18/2018   Priority:   Maintenance   Target end date:   Indefinite    Indications    Atrial fibrillation (CMS-HCC) [I48.91]  Long term current use of anticoagulant therapy [Z79.01]             Anticoagulation Episode Summary     INR check location:   Home Draw    Preferred lab:       Send INR reminders to:       Comments:   Shellydolores MCLAUGHLIN  goal range changed 11- per cards      Anticoagulation Care Providers     Provider Role Specialty Phone number    Hu Gamez M.D. Referring Cardiac Electrophysiology [phone]    [facility] Anticoagulation Services Responsible  [phone]    Kristopher Escobar, PharmD Responsible          Anticoagulation Patient Findings  Patient Findings     Negatives:   Signs/symptoms of thrombosis, Signs/symptoms of bleeding, Laboratory test error suspected, Change in health, Change in alcohol use, Change in activity, Upcoming invasive procedure, Emergency department visit, Upcoming dental procedure, Missed doses, Extra doses, Change in medications, Change in diet/appetite, Hospital admission, Bruising, Other complaints      Plan:  Spoke with patient on the phone. Patient is therapeutic today. Patient denies any changes in medications or diet. Patient denies any signs or symptoms of bleeding or clotting. Instructed patient to call clinic if any unusual bleeding or bruising occurs. Will continue dosing as outlined above. Will follow-up with patient in 2 weeks.    Arpita Wesley, Medical Assistant

## 2018-01-16 ENCOUNTER — SLEEP CENTER VISIT (OUTPATIENT)
Dept: SLEEP MEDICINE | Facility: MEDICAL CENTER | Age: 78
End: 2018-01-16
Payer: MEDICARE

## 2018-01-16 VITALS
HEIGHT: 67 IN | RESPIRATION RATE: 16 BRPM | HEART RATE: 76 BPM | WEIGHT: 164.8 LBS | DIASTOLIC BLOOD PRESSURE: 70 MMHG | BODY MASS INDEX: 25.87 KG/M2 | SYSTOLIC BLOOD PRESSURE: 106 MMHG | OXYGEN SATURATION: 97 %

## 2018-01-16 DIAGNOSIS — M35.00 SJOGREN'S SYNDROME, WITH UNSPECIFIED ORGAN INVOLVEMENT (HCC): ICD-10-CM

## 2018-01-16 DIAGNOSIS — I10 ESSENTIAL HYPERTENSION: ICD-10-CM

## 2018-01-16 DIAGNOSIS — K21.9 GASTROESOPHAGEAL REFLUX DISEASE, ESOPHAGITIS PRESENCE NOT SPECIFIED: ICD-10-CM

## 2018-01-16 DIAGNOSIS — I48.0 PAROXYSMAL ATRIAL FIBRILLATION (HCC): ICD-10-CM

## 2018-01-16 DIAGNOSIS — G47.33 OBSTRUCTIVE SLEEP APNEA: ICD-10-CM

## 2018-01-16 PROCEDURE — 99213 OFFICE O/P EST LOW 20 MIN: CPT | Performed by: NURSE PRACTITIONER
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
## 2018-01-17 ENCOUNTER — ANTICOAGULATION MONITORING (OUTPATIENT)
Dept: VASCULAR LAB | Facility: MEDICAL CENTER | Age: 78
End: 2018-01-17

## 2018-01-17 DIAGNOSIS — Z79.01 LONG TERM CURRENT USE OF ANTICOAGULANT THERAPY: ICD-10-CM

## 2018-01-17 LAB — INR PPP: 2.4 (ref 2–3.5)

## 2018-01-17 NOTE — PROGRESS NOTES
Anticoagulation Summary  As of 1/17/2018    INR goal:   2.5-3.0   TTR:   62.1 % (2.7 y)   Today's INR:   2.4!   Maintenance plan:   3 mg (3 mg x 1) every day   Weekly total:   21 mg   Plan last modified:   Skip Gonzalez, PharmD (12/28/2017)   Next INR check:   1/31/2018   Priority:   Maintenance   Target end date:   Indefinite    Indications    Atrial fibrillation (CMS-HCC) [I48.91]  Long term current use of anticoagulant therapy [Z79.01]             Anticoagulation Episode Summary     INR check location:   Home Draw    Preferred lab:       Send INR reminders to:       Comments:   Shellydolores MCLAUGHLIN  goal range changed 11- per cards      Anticoagulation Care Providers     Provider Role Specialty Phone number    Hu Gamez M.D. Referring Cardiac Electrophysiology [phone]    [facility] Anticoagulation Services Responsible  [phone]    Kristopher Escobar, PharmD Responsible          Anticoagulation Patient Findings    Pharmacy Intern Josie to call patient and report a slightly SUB therapeutic INR.    Pt to take a boost dose of 4.5mg x1 today.  Pt instructed to continue with current warfarin dosing regimen, confirms dosing.   Pt denies any s/s of bleeding, bruising, clotting or any changes to diet or medication.    Will follow up in 2 weeks.     Kellen Lackey, PharmD

## 2018-01-30 ENCOUNTER — HOSPITAL ENCOUNTER (OUTPATIENT)
Dept: RADIOLOGY | Facility: MEDICAL CENTER | Age: 78
End: 2018-01-30
Attending: FAMILY MEDICINE
Payer: MEDICARE

## 2018-01-30 DIAGNOSIS — M79.671 RIGHT FOOT PAIN: ICD-10-CM

## 2018-01-30 PROCEDURE — 73630 X-RAY EXAM OF FOOT: CPT | Mod: RT

## 2018-01-31 ENCOUNTER — ANTICOAGULATION MONITORING (OUTPATIENT)
Dept: VASCULAR LAB | Facility: MEDICAL CENTER | Age: 78
End: 2018-01-31

## 2018-01-31 DIAGNOSIS — Z79.01 LONG TERM CURRENT USE OF ANTICOAGULANT THERAPY: ICD-10-CM

## 2018-01-31 DIAGNOSIS — I48.91 ATRIAL FIBRILLATION, UNSPECIFIED TYPE (HCC): ICD-10-CM

## 2018-01-31 LAB — INR PPP: 2.5 (ref 2–3.5)

## 2018-01-31 NOTE — PROGRESS NOTES
Anticoagulation Summary  As of 1/31/2018    INR goal:   2.5-3.0   TTR:   61.2 % (2.7 y)   Today's INR:   2.5   Maintenance plan:   3 mg (3 mg x 1) every day   Weekly total:   21 mg   Plan last modified:   Skip Gonzalez, PharmD (12/28/2017)   Next INR check:   2/14/2018   Priority:   Maintenance   Target end date:   Indefinite    Indications    Atrial fibrillation (CMS-HCC) [I48.91]  Long term current use of anticoagulant therapy [Z79.01]             Anticoagulation Episode Summary     INR check location:   Home Draw    Preferred lab:       Send INR reminders to:       Comments:   Waqar MCLAUGHLIN  goal range changed 11- per cards      Anticoagulation Care Providers     Provider Role Specialty Phone number    Hu Gamez M.D. Referring Cardiac Electrophysiology [phone]    [facility] Anticoagulation Services Responsible  [phone]    Kristopher Escobar, PharmD Responsible          Anticoagulation Patient Findings  Patient Findings     Negatives:   Signs/symptoms of thrombosis, Signs/symptoms of bleeding, Laboratory test error suspected, Change in health, Change in alcohol use, Change in activity, Upcoming invasive procedure, Emergency department visit, Upcoming dental procedure, Missed doses, Extra doses, Change in medications, Change in diet/appetite, Hospital admission, Bruising, Other complaints        Spoke with patient today regarding therapeutic INR of 2.5.  Patient denies any signs/symptoms of bruising or bleeding or any changes in diet and medications.  Instructed patient to call clinic with any questions or concerns.  Pt is to continue with current warfarin dosing regimen.  Follow up in 2 weeks, to reduce risk of adverse events related to this high risk medication,  Warfarin.    Kristopher Escobar, ClarisseD

## 2018-02-15 ENCOUNTER — ANTICOAGULATION MONITORING (OUTPATIENT)
Dept: VASCULAR LAB | Facility: MEDICAL CENTER | Age: 78
End: 2018-02-15

## 2018-02-15 DIAGNOSIS — Z79.01 LONG TERM CURRENT USE OF ANTICOAGULANT THERAPY: ICD-10-CM

## 2018-02-15 DIAGNOSIS — I48.91 ATRIAL FIBRILLATION, UNSPECIFIED TYPE (HCC): ICD-10-CM

## 2018-02-15 LAB — INR PPP: 2.6 (ref 2–3.5)

## 2018-02-16 NOTE — PROGRESS NOTES
Anticoagulation Summary  As of 2/15/2018    INR goal:   2.5-3.0   TTR:   61.8 % (2.7 y)   Today's INR:   2.6   Maintenance plan:   3 mg (3 mg x 1) every day   Weekly total:   21 mg   Plan last modified:   Skip Gonzalez, PharmD (12/28/2017)   Next INR check:   3/1/2018   Priority:   Maintenance   Target end date:   Indefinite    Indications    Atrial fibrillation (CMS-HCC) [I48.91]  Long term current use of anticoagulant therapy [Z79.01]             Anticoagulation Episode Summary     INR check location:   Home Draw    Preferred lab:       Send INR reminders to:       Comments:   Waqar LISSETTE  goal range changed 11- per cards      Anticoagulation Care Providers     Provider Role Specialty Phone number    Hu Gamez M.D. Referring Cardiac Electrophysiology [phone]    [facility] Anticoagulation Services Responsible  [phone]    Clarisse BellD Responsible          Anticoagulation Patient Findings    Spoke with Maggie to report a therapeutic INR of 2.6. Pt is to continue with current warfarin dosing regimen.  Pt denies any unusual s/s of bleeding, bruising, clotting or any changes to diet or medications.  Follow up in 2 weeks, to reduce risk of adverse events related to this high risk medication,  Warfarin.    Samantha Amaya, PharmD

## 2018-02-27 ENCOUNTER — HOSPITAL ENCOUNTER (OUTPATIENT)
Dept: LAB | Facility: MEDICAL CENTER | Age: 78
End: 2018-02-27
Attending: INTERNAL MEDICINE
Payer: MEDICARE

## 2018-03-01 LAB — INR PPP: 3.6 (ref 2–3.5)

## 2018-03-02 ENCOUNTER — ANTICOAGULATION MONITORING (OUTPATIENT)
Dept: VASCULAR LAB | Facility: MEDICAL CENTER | Age: 78
End: 2018-03-02

## 2018-03-02 DIAGNOSIS — I48.91 ATRIAL FIBRILLATION, UNSPECIFIED TYPE (HCC): ICD-10-CM

## 2018-03-02 DIAGNOSIS — Z79.01 LONG TERM CURRENT USE OF ANTICOAGULANT THERAPY: ICD-10-CM

## 2018-03-02 NOTE — PROGRESS NOTES
Anticoagulation Summary  As of 3/2/2018    INR goal:   2.5-3.0   TTR:   61.5 % (2.8 y)   Today's INR:   3.6! (3/1/2018)   Maintenance plan:   3 mg (3 mg x 1) every day   Weekly total:   21 mg   Plan last modified:   Skip Gonzalez PharmD (12/28/2017)   Next INR check:   3/15/2018   Priority:   Maintenance   Target end date:   Indefinite    Indications    Atrial fibrillation (CMS-HCC) [I48.91]  Long term current use of anticoagulant therapy [Z79.01]             Anticoagulation Episode Summary     INR check location:   Home Draw    Preferred lab:       Send INR reminders to:       Comments:   Shellydolores MCLAUGHLIN  goal range changed 11- per cards      Anticoagulation Care Providers     Provider Role Specialty Phone number    Hu Gamez M.D. Referring Cardiac Electrophysiology [phone]    [facility] Anticoagulation Services Responsible  [phone]    Kristopher Escobar, PharmD Responsible          Anticoagulation Patient Findings  Patient Findings     Positives:   Change in medications    Negatives:   Signs/symptoms of thrombosis, Signs/symptoms of bleeding, Laboratory test error suspected, Change in health, Change in alcohol use, Change in activity, Upcoming invasive procedure, Emergency department visit, Upcoming dental procedure, Missed doses, Extra doses, Change in diet/appetite, Hospital admission, Bruising, Other complaints    Comments:   Was taking some cold/cough medicine and herbal remedies this week, no change in diet        Spoke with patient today regarding supratherapeutic INR of 3.6.  Patient denies any signs/symptoms of bruising or bleeding or any changes in diet and medications.  Instructed patient to call clinic with any questions or concerns.  Patient took decreased dose of 1.5mg yesterday, no further interventions given, patient to resume current warfarin regimen.  Follow up in 2 weeks, to reduce risk of adverse events related to this high risk medication,  Warfarin.    Kristopher Escobar, ClarisseD

## 2018-03-06 ENCOUNTER — OFFICE VISIT (OUTPATIENT)
Dept: CARDIOLOGY | Facility: MEDICAL CENTER | Age: 78
End: 2018-03-06
Payer: MEDICARE

## 2018-03-06 VITALS
BODY MASS INDEX: 26.06 KG/M2 | WEIGHT: 166 LBS | DIASTOLIC BLOOD PRESSURE: 74 MMHG | SYSTOLIC BLOOD PRESSURE: 132 MMHG | HEART RATE: 78 BPM | OXYGEN SATURATION: 97 % | HEIGHT: 67 IN

## 2018-03-06 DIAGNOSIS — I44.2 THIRD DEGREE AV BLOCK (HCC): Chronic | ICD-10-CM

## 2018-03-06 DIAGNOSIS — G45.8 OTHER SPECIFIED TRANSIENT CEREBRAL ISCHEMIAS: ICD-10-CM

## 2018-03-06 DIAGNOSIS — Z79.01 LONG TERM CURRENT USE OF ANTICOAGULANT THERAPY: Chronic | ICD-10-CM

## 2018-03-06 DIAGNOSIS — I48.19 PERSISTENT ATRIAL FIBRILLATION (HCC): ICD-10-CM

## 2018-03-06 DIAGNOSIS — Z95.0 PRESENCE OF PERMANENT CARDIAC PACEMAKER: Chronic | ICD-10-CM

## 2018-03-06 PROCEDURE — 99214 OFFICE O/P EST MOD 30 MIN: CPT | Performed by: INTERNAL MEDICINE

## 2018-03-06 PROCEDURE — 93279 PRGRMG DEV EVAL PM/LDLS PM: CPT | Performed by: INTERNAL MEDICINE

## 2018-03-06 RX ORDER — METHYLPREDNISOLONE 4 MG
TABLET, DOSE PACK ORAL
COMMUNITY
Start: 2018-02-13 | End: 2019-06-13
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
## 2018-03-15 ENCOUNTER — ANTICOAGULATION MONITORING (OUTPATIENT)
Dept: VASCULAR LAB | Facility: MEDICAL CENTER | Age: 78
End: 2018-03-15

## 2018-03-15 DIAGNOSIS — I48.19 PERSISTENT ATRIAL FIBRILLATION (HCC): ICD-10-CM

## 2018-03-15 DIAGNOSIS — Z79.01 LONG TERM CURRENT USE OF ANTICOAGULANT THERAPY: ICD-10-CM

## 2018-03-15 LAB — INR PPP: 2.9 (ref 2–3.5)

## 2018-03-16 NOTE — PROGRESS NOTES
OP Anticoagulation Telephone Note    Date: 3/15/2018  Anticoagulation Summary  As of 3/15/2018    INR goal:   2.5-3.0   TTR:   60.9 % (2.8 y)   Today's INR:   2.9   Maintenance plan:   3 mg (3 mg x 1) every day   Weekly total:   21 mg   No change documented:   Arpita Wesley, Med Ass't   Plan last modified:   Skip Gonzalez, PharmD (12/28/2017)   Next INR check:   4/15/2018   Priority:   Maintenance   Target end date:   Indefinite    Indications    Atrial fibrillation (CMS-HCC) [I48.91]  Long term current use of anticoagulant therapy [Z79.01]             Anticoagulation Episode Summary     INR check location:   Home Draw    Preferred lab:       Send INR reminders to:       Comments:   Waqar LISSETTE  goal range changed 11- per cards      Anticoagulation Care Providers     Provider Role Specialty Phone number    Hu Gamez M.D. Referring Cardiac Electrophysiology [phone]    [facility] Anticoagulation Services Responsible  [phone]    Clarisse BellD Responsible          Anticoagulation Patient Findings  Patient Findings     Negatives:   Signs/symptoms of thrombosis, Signs/symptoms of bleeding, Laboratory test error suspected, Change in health, Change in alcohol use, Change in activity, Upcoming invasive procedure, Emergency department visit, Upcoming dental procedure, Missed doses, Extra doses, Change in medications, Change in diet/appetite, Hospital admission, Bruising, Other complaints      Plan:  Spoke with patient on the phone. Patient is therapeutic today. Patient denies any changes in medications or diet. Patient denies any signs or symptoms of bleeding or clotting. Instructed patient to call clinic if any unusual bleeding or bruising occurs. Will continue dosing as outlined above. Will follow-up with patient in 4 weeks.    Arpita Wesley, Medical Assistant  I have reviewed and agree with the plan above on  3/15/2018    Shyanne Damon, Pharm D

## 2018-04-12 ENCOUNTER — ANTICOAGULATION MONITORING (OUTPATIENT)
Dept: VASCULAR LAB | Facility: MEDICAL CENTER | Age: 78
End: 2018-04-12

## 2018-04-12 DIAGNOSIS — Z79.01 LONG TERM CURRENT USE OF ANTICOAGULANT THERAPY: ICD-10-CM

## 2018-04-12 LAB — INR PPP: 2.8 (ref 2–3.5)

## 2018-04-12 NOTE — PROGRESS NOTES
Anticoagulation Summary  As of 4/12/2018    INR goal:   2.5-3.0   TTR:   61.9 % (2.9 y)   Today's INR:   2.8   Maintenance plan:   3 mg (3 mg x 1) every day   Weekly total:   21 mg   Plan last modified:   Skip Gonzalez, PharmD (12/28/2017)   Next INR check:   5/10/2018   Priority:   Maintenance   Target end date:   Indefinite    Indications    Atrial fibrillation (CMS-HCC) [I48.91]  Long term current use of anticoagulant therapy [Z79.01]             Anticoagulation Episode Summary     INR check location:   Home Draw    Preferred lab:       Send INR reminders to:       Comments:   Shellydolores MCLAUGHLIN  goal range changed 11- per cards      Anticoagulation Care Providers     Provider Role Specialty Phone number    Hu Gamez M.D. Referring Cardiac Electrophysiology [phone]    [facility] Anticoagulation Services Responsible  [phone]    Kristopher Escobar, PharmD Responsible          Anticoagulation Patient Findings    HPI:  Madeline Dorantes, on anticoagulation therapy with warfarin for AF  Changes to current medical/health status since last appt: Denies signs/symptoms of bleeding and/or thrombosis since the last appt.    Denies any interval changes to diet  Denies any interval changes to medications since last appt.   Denies any complications or cost restrictions with current therapy.     A/P   INR  therapeutic.   Pt is to continue with current warfarin dosing regimen.     Next INR in 4 week(s).    Fazal Pineda, PharmD

## 2018-05-10 ENCOUNTER — ANTICOAGULATION MONITORING (OUTPATIENT)
Dept: VASCULAR LAB | Facility: MEDICAL CENTER | Age: 78
End: 2018-05-10

## 2018-05-10 DIAGNOSIS — Z79.01 LONG TERM CURRENT USE OF ANTICOAGULANT THERAPY: ICD-10-CM

## 2018-05-10 DIAGNOSIS — I48.0 PAROXYSMAL ATRIAL FIBRILLATION (HCC): ICD-10-CM

## 2018-05-10 LAB — INR PPP: 2.5 (ref 2–3.5)

## 2018-05-10 NOTE — PROGRESS NOTES
OP Anticoagulation Service Note    Date: 5/10/2018  Anticoagulation Summary  As of 5/10/2018    INR goal:   2.5-3.0   TTR:   62.9 % (3 y)   Today's INR:   2.5   Warfarin maintenance plan:   3 mg (3 mg x 1) every day   Weekly warfarin total:   21 mg   No change documented:   Vipin Obando Ass't   Plan last modified:   Skip Gonzalez, PharmD (12/28/2017)   Next INR check:   6/7/2018   Priority:   Maintenance   Target end date:   Indefinite    Indications    Atrial fibrillation (HCC) [I48.91]  Long term current use of anticoagulant therapy [Z79.01]             Anticoagulation Episode Summary     INR check location:   Home Draw    Preferred lab:       Send INR reminders to:       Comments:   Waqar LISSETTE  goal range changed 11- per cards      Anticoagulation Care Providers     Provider Role Specialty Phone number    Hu Gamez M.D. Referring Cardiac Electrophysiology [phone]    [facility] Anticoagulation Services Responsible  [phone]    Clarisse BellD Responsible          Anticoagulation Patient Findings  Patient Findings     Negatives:   Signs/symptoms of thrombosis, Signs/symptoms of bleeding, Laboratory test error suspected, Change in health, Change in alcohol use, Change in activity, Upcoming invasive procedure, Emergency department visit, Upcoming dental procedure, Missed doses, Extra doses, Change in medications, Change in diet/appetite, Hospital admission, Bruising, Other complaints        Plan: Spoke to patient's . Patient is therapeutic and will remain on the same dose. Patient reports no unusual bleeding or bruising and no changes to medication or diet. Patient is to be checked again in 4 weeks.    Vipin Obando. Ass't  [facility]    I have reviewed and agree with the plan above on 05/10/18    Fazal Pineda, ClarisseD

## 2018-06-06 ENCOUNTER — ANTICOAGULATION MONITORING (OUTPATIENT)
Dept: VASCULAR LAB | Facility: MEDICAL CENTER | Age: 78
End: 2018-06-06

## 2018-06-06 DIAGNOSIS — Z79.01 LONG TERM CURRENT USE OF ANTICOAGULANT THERAPY: ICD-10-CM

## 2018-06-06 DIAGNOSIS — I48.91 ATRIAL FIBRILLATION, UNSPECIFIED TYPE (HCC): ICD-10-CM

## 2018-06-06 LAB — INR PPP: 2.6 (ref 2–3.5)

## 2018-06-06 NOTE — PROGRESS NOTES
OP Anticoagulation Telephone Note    Date: 6/6/2018  Anticoagulation Summary  As of 6/6/2018    INR goal:   2.5-3.0   TTR:   63.8 % (3 y)   Today's INR:   2.6   Warfarin maintenance plan:   3 mg (3 mg x 1) every day   Weekly warfarin total:   21 mg   No change documented:   Vipin Santiago Ass't   Plan last modified:   Skip Gonzalez, PharmD (12/28/2017)   Next INR check:   6/9/2018   Priority:   Maintenance   Target end date:   Indefinite    Indications    Atrial fibrillation (HCC) [I48.91]  Long term current use of anticoagulant therapy [Z79.01]             Anticoagulation Episode Summary     INR check location:   Home Draw    Preferred lab:       Send INR reminders to:       Comments:   Waqar MCLAUGHLIN  goal range changed 11- per cards      Anticoagulation Care Providers     Provider Role Specialty Phone number    Hu Gamez M.D. Referring Cardiac Electrophysiology [phone]    [facility] Anticoagulation Services Responsible  [phone]    Kristopher Escobar, PharmD Responsible          Anticoagulation Patient Findings  Patient Findings     Negatives:   Signs/symptoms of thrombosis, Signs/symptoms of bleeding, Laboratory test error suspected, Change in health, Change in alcohol use, Change in activity, Upcoming invasive procedure, Emergency department visit, Upcoming dental procedure, Missed doses, Extra doses, Change in medications, Change in diet/appetite, Hospital admission, Bruising, Other complaints      Plan:  Spoke with patient on the phone. Patient is therapeutic today. Patient denies any changes in medications or diet. Patient wants to start a supplement that has fish oil in it. I spoke to Kristopher and he told me to have her start the Supplement today and have her check her INR in 3-5 days and will adjust after she starts it. Patient denies any signs or symptoms of bleeding or clotting. Instructed patient to call clinic if any unusual bleeding or bruising occurs. Will continue dosing as  outlined above. Will follow-up with patient in 3 days.    Arpita Wesley, Medical Assistant    I have reviewed and am in agreement with the above stated plan on 6-6-18.  Kristopher Escobar, ClarisseD

## 2018-06-11 ENCOUNTER — ANTICOAGULATION MONITORING (OUTPATIENT)
Dept: VASCULAR LAB | Facility: MEDICAL CENTER | Age: 78
End: 2018-06-11

## 2018-06-11 DIAGNOSIS — I48.91 ATRIAL FIBRILLATION, UNSPECIFIED TYPE (HCC): ICD-10-CM

## 2018-06-11 DIAGNOSIS — Z79.01 LONG TERM CURRENT USE OF ANTICOAGULANT THERAPY: ICD-10-CM

## 2018-06-11 LAB — INR PPP: 2.6 (ref 2–3.5)

## 2018-06-11 NOTE — PROGRESS NOTES
OP Telephone Anticoagulation Service Note    Date: 6/11/2018      Anticoagulation Summary  As of 6/11/2018    INR goal:   2.5-3.0   TTR:   63.9 % (3.1 y)   Today's INR:   2.6   Warfarin maintenance plan:   3 mg (3 mg x 1) every day   Weekly warfarin total:   21 mg   Plan last modified:   Skip Gonzalez PharmD (12/28/2017)   Next INR check:      Priority:   Maintenance   Target end date:   Indefinite    Indications    Atrial fibrillation (HCC) [I48.91]  Long term current use of anticoagulant therapy [Z79.01]             Anticoagulation Episode Summary     INR check location:   Home Draw    Preferred lab:       Send INR reminders to:       Comments:   Waqar MCLAUGHLIN  goal range changed 11- per cards      Anticoagulation Care Providers     Provider Role Specialty Phone number    Hu Gamez M.D. Referring Cardiac Electrophysiology [phone]    [facility] Anticoagulation Services Responsible  [phone]    Kristopher Escobar PharmD Responsible          Anticoagulation Patient Findings        Plan: Therapeutic INR 2.6 on 6/11/2018.     Spoke with patient on the phone. Patient is therapeutic today. Confirmed dosing. No missed tablets in the last week. Patient denies any changes in medications or diet. Patient denies any signs or symptoms of bleeding or clotting. Instructed patient to call clinic if any unusual bleeding or bruising occurs. Will continue dosing as outlined. Will follow-up with patient in 2 week(s), June 25, 2018.       Etelvina Hunt, Pharmacy Intern   I have reviewed and agree with the plan above on  6/12/2018    Shyanne Damon, Pharm D

## 2018-06-26 ENCOUNTER — ANTICOAGULATION MONITORING (OUTPATIENT)
Dept: VASCULAR LAB | Facility: MEDICAL CENTER | Age: 78
End: 2018-06-26

## 2018-06-26 DIAGNOSIS — Z79.01 LONG TERM CURRENT USE OF ANTICOAGULANT THERAPY: ICD-10-CM

## 2018-06-26 DIAGNOSIS — I48.91 ATRIAL FIBRILLATION, UNSPECIFIED TYPE (HCC): ICD-10-CM

## 2018-06-26 LAB — INR PPP: 2.9 (ref 2–3.5)

## 2018-06-26 NOTE — PROGRESS NOTES
OP Anticoagulation Telephone Note    Date: 6/26/2018  Anticoagulation Summary  As of 6/26/2018    INR goal:   2.5-3.0   TTR:   64.4 % (3.1 y)   Today's INR:   2.9   Warfarin maintenance plan:   3 mg (3 mg x 1) every day   Weekly warfarin total:   21 mg   No change documented:   rApita Wesley, Med Ass't   Plan last modified:   Skip Gonzalez, PharmD (12/28/2017)   Next INR check:   7/10/2018   Priority:   Maintenance   Target end date:   Indefinite    Indications    Atrial fibrillation (HCC) [I48.91]  Long term current use of anticoagulant therapy [Z79.01]             Anticoagulation Episode Summary     INR check location:   Home Draw    Preferred lab:       Send INR reminders to:       Comments:   Shellydolores MCLAUGHLIN  goal range changed 11- per cards      Anticoagulation Care Providers     Provider Role Specialty Phone number    Hu Gamez M.D. Referring Cardiac Electrophysiology [phone]    [facility] Anticoagulation Services Responsible  [phone]    Kristopher Escobar, PharmD Responsible          Anticoagulation Patient Findings  Patient Findings     Negatives:   Signs/symptoms of thrombosis, Signs/symptoms of bleeding, Laboratory test error suspected, Change in health, Change in alcohol use, Change in activity, Upcoming invasive procedure, Emergency department visit, Upcoming dental procedure, Missed doses, Extra doses, Change in medications, Change in diet/appetite, Hospital admission, Bruising, Other complaints      Plan:  Spoke with patient on the phone. Patient is therapeutic today. Patient denies any changes in medications or diet. Patient denies any signs or symptoms of bleeding or clotting. Instructed patient to call clinic if any unusual bleeding or bruising occurs. Will continue dosing as outlined above. Will follow-up with patient in 2 weeks.    Arpita Wesley, Medical Assistant  I have reviewed and agree with the plan above on  6/26/2018    Shyanne Damon, Pharm D

## 2018-06-28 DIAGNOSIS — Z79.01 CHRONIC ANTICOAGULATION: ICD-10-CM

## 2018-06-28 RX ORDER — WARFARIN SODIUM 3 MG/1
3-4.5 TABLET ORAL DAILY
Qty: 135 TAB | Refills: 3 | Status: SHIPPED | OUTPATIENT
Start: 2018-06-28 | End: 2019-08-08 | Stop reason: SDUPTHER

## 2018-07-02 LAB — INR PPP: 3 (ref 2–3.5)

## 2018-07-03 ENCOUNTER — ANTICOAGULATION MONITORING (OUTPATIENT)
Dept: VASCULAR LAB | Facility: MEDICAL CENTER | Age: 78
End: 2018-07-03

## 2018-07-03 DIAGNOSIS — Z79.01 LONG TERM CURRENT USE OF ANTICOAGULANT THERAPY: ICD-10-CM

## 2018-07-03 DIAGNOSIS — I48.91 ATRIAL FIBRILLATION, UNSPECIFIED TYPE (HCC): ICD-10-CM

## 2018-07-03 NOTE — PROGRESS NOTES
Anticoagulation Summary  As of 7/3/2018    INR goal:   2.5-3.0   TTR:   64.6 % (3.1 y)   Today's INR:   3.0 (7/2/2018)   Warfarin maintenance plan:   3 mg (3 mg x 1) every day   Weekly warfarin total:   21 mg   Plan last modified:   Clarisse Jean BaptisteD (12/28/2017)   Next INR check:   7/16/2018   Priority:   Maintenance   Target end date:   Indefinite    Indications    Atrial fibrillation (HCC) [I48.91]  Long term current use of anticoagulant therapy [Z79.01]             Anticoagulation Episode Summary     INR check location:   Home Draw    Preferred lab:       Send INR reminders to:       Comments:   Waqar MCLAUGHLIN  goal range changed 11- per cards      Anticoagulation Care Providers     Provider Role Specialty Phone number    Hu Gamez M.D. Referring Cardiac Electrophysiology [phone]    [facility] Anticoagulation Services Responsible  [phone]    Clarisse BellD Responsible          Anticoagulation Patient Findings  Spoke with Madeline to report a therapeutic INR of 3.0. Pt is to continue with current warfarin dosing regimen.  Pt denies any unusual s/s of bleeding, bruising, clotting or any changes to diet or medications.  Follow up in 2 weeks, to reduce risk of adverse events related to this high risk medication,  Warfarin.    Samantha Amaya, PharmD

## 2018-07-24 ENCOUNTER — ANTICOAGULATION MONITORING (OUTPATIENT)
Dept: VASCULAR LAB | Facility: MEDICAL CENTER | Age: 78
End: 2018-07-24

## 2018-07-24 DIAGNOSIS — I48.91 ATRIAL FIBRILLATION, UNSPECIFIED TYPE (HCC): ICD-10-CM

## 2018-07-24 DIAGNOSIS — Z79.01 LONG TERM CURRENT USE OF ANTICOAGULANT THERAPY: ICD-10-CM

## 2018-07-24 LAB
INR PPP: 2.6 (ref 2–3.5)
INR PPP: 2.6 (ref 2–3.5)

## 2018-07-24 NOTE — PROGRESS NOTES
OP Anticoagulation Telephone Note    Date: 7/24/2018  Anticoagulation Summary  As of 7/24/2018    INR goal:   2.5-3.0   TTR:   65.3 % (3.2 y)   Today's INR:   2.6   Warfarin maintenance plan:   3 mg (3 mg x 1) every day   Weekly warfarin total:   21 mg   No change documented:   Vipin Santiago Ass't   Plan last modified:   Skip Gonzalez, Yesenia (12/28/2017)   Next INR check:   8/7/2018   Priority:   Maintenance   Target end date:   Indefinite    Indications    Atrial fibrillation (HCC) [I48.91]  Long term current use of anticoagulant therapy [Z79.01]             Anticoagulation Episode Summary     INR check location:   Home Draw    Preferred lab:       Send INR reminders to:       Comments:   Shellydolores MCLAUGHLIN  goal range changed 11- per cards      Anticoagulation Care Providers     Provider Role Specialty Phone number    Hu Gamez M.D. Referring Cardiac Electrophysiology [phone]    [facility] Anticoagulation Services Responsible  [phone]    Kristopher Escobar, PharmD Responsible          Anticoagulation Patient Findings  Patient Findings     Negatives:   Signs/symptoms of thrombosis, Signs/symptoms of bleeding, Laboratory test error suspected, Change in health, Change in alcohol use, Change in activity, Upcoming invasive procedure, Emergency department visit, Upcoming dental procedure, Missed doses, Extra doses, Change in medications, Change in diet/appetite, Hospital admission, Bruising, Other complaints      Plan:  Spoke with patient on the phone. Patient is therapeutic today. Patient denies any changes in medications or diet. Patient denies any signs or symptoms of bleeding or clotting. Instructed patient to call clinic if any unusual bleeding or bruising occurs. Will continue dosing as outlined above. Will follow-up with patient in 2 weeks.    Arpita Wesley, Medical Assistant    I have reviewed and concur with the above plan     Skip Gonzalez, ClarisseD

## 2018-08-07 LAB — INR PPP: 3 (ref 2–3.5)

## 2018-08-08 ENCOUNTER — ANTICOAGULATION MONITORING (OUTPATIENT)
Dept: VASCULAR LAB | Facility: MEDICAL CENTER | Age: 78
End: 2018-08-08

## 2018-08-08 DIAGNOSIS — Z79.01 LONG TERM CURRENT USE OF ANTICOAGULANT THERAPY: ICD-10-CM

## 2018-08-08 DIAGNOSIS — I48.91 ATRIAL FIBRILLATION, UNSPECIFIED TYPE (HCC): ICD-10-CM

## 2018-08-21 LAB — INR PPP: 3.3 (ref 2–3.5)

## 2018-08-22 ENCOUNTER — ANTICOAGULATION MONITORING (OUTPATIENT)
Dept: VASCULAR LAB | Facility: MEDICAL CENTER | Age: 78
End: 2018-08-22

## 2018-08-22 DIAGNOSIS — Z79.01 LONG TERM CURRENT USE OF ANTICOAGULANT THERAPY: ICD-10-CM

## 2018-08-22 DIAGNOSIS — I48.91 ATRIAL FIBRILLATION, UNSPECIFIED TYPE (HCC): ICD-10-CM

## 2018-08-22 NOTE — PROGRESS NOTES
Anticoagulation Summary  As of 8/22/2018    INR goal:   2.5-3.0   TTR:   64.9 % (3.3 y)   Today's INR:   3.3! (8/21/2018)   Warfarin maintenance plan:   1.5 mg (3 mg x 0.5) on Wed; 3 mg (3 mg x 1) all other days   Weekly warfarin total:   19.5 mg   Plan last modified:   Fazal Pineda PharmD (8/22/2018)   Next INR check:   9/5/2018   Priority:   Maintenance   Target end date:   Indefinite    Indications    Atrial fibrillation (HCC) [I48.91]  Long term current use of anticoagulant therapy [Z79.01]             Anticoagulation Episode Summary     INR check location:   Home Draw    Preferred lab:       Send INR reminders to:       Comments:   Waqar MCLAUGHLIN  goal range changed 11- per cards      Anticoagulation Care Providers     Provider Role Specialty Phone number    Hu Gamez M.D. Referring Cardiac Electrophysiology [phone]    [facility] Anticoagulation Services Responsible  [phone]    Clarisse BellD Responsible          Anticoagulation Patient Findings    Left voicemail message to report a SUPRA therapeutic INR of 3.3.  Pt to begin reduced warfarin dosing regimen. Requested pt contact the clinic for any s/s of unusual bleeding, bruising, clotting or any changes to diet or medication. FU 2 weeks.    Fazal Pineda, ClarisseD

## 2018-09-06 ENCOUNTER — OFFICE VISIT (OUTPATIENT)
Dept: CARDIOLOGY | Facility: MEDICAL CENTER | Age: 78
End: 2018-09-06
Payer: MEDICARE

## 2018-09-06 ENCOUNTER — ANTICOAGULATION MONITORING (OUTPATIENT)
Dept: VASCULAR LAB | Facility: MEDICAL CENTER | Age: 78
End: 2018-09-06

## 2018-09-06 VITALS
WEIGHT: 162 LBS | DIASTOLIC BLOOD PRESSURE: 60 MMHG | BODY MASS INDEX: 24.55 KG/M2 | HEIGHT: 68 IN | HEART RATE: 88 BPM | OXYGEN SATURATION: 96 % | SYSTOLIC BLOOD PRESSURE: 120 MMHG

## 2018-09-06 DIAGNOSIS — I48.20 CHRONIC ATRIAL FIBRILLATION (HCC): ICD-10-CM

## 2018-09-06 DIAGNOSIS — Z79.01 CHRONIC ANTICOAGULATION: ICD-10-CM

## 2018-09-06 DIAGNOSIS — I48.91 ATRIAL FIBRILLATION, UNSPECIFIED TYPE (HCC): ICD-10-CM

## 2018-09-06 DIAGNOSIS — I44.2 THIRD DEGREE AV BLOCK (HCC): Chronic | ICD-10-CM

## 2018-09-06 DIAGNOSIS — Z98.890 S/P AV NODAL ABLATION: ICD-10-CM

## 2018-09-06 DIAGNOSIS — Z95.0 PRESENCE OF PERMANENT CARDIAC PACEMAKER: Chronic | ICD-10-CM

## 2018-09-06 DIAGNOSIS — Z79.01 LONG TERM CURRENT USE OF ANTICOAGULANT THERAPY: ICD-10-CM

## 2018-09-06 DIAGNOSIS — Z79.01 LONG TERM CURRENT USE OF ANTICOAGULANT THERAPY: Chronic | ICD-10-CM

## 2018-09-06 DIAGNOSIS — R00.2 PALPITATIONS: ICD-10-CM

## 2018-09-06 LAB — INR PPP: 2.6 (ref 2–3.5)

## 2018-09-06 PROCEDURE — 99214 OFFICE O/P EST MOD 30 MIN: CPT | Performed by: NURSE PRACTITIONER

## 2018-09-06 PROCEDURE — 93279 PRGRMG DEV EVAL PM/LDLS PM: CPT | Performed by: NURSE PRACTITIONER

## 2018-09-06 ASSESSMENT — ENCOUNTER SYMPTOMS
PND: 0
SHORTNESS OF BREATH: 0
PALPITATIONS: 0
COUGH: 0
HEADACHES: 0
VOMITING: 0
ORTHOPNEA: 0
PSYCHIATRIC NEGATIVE: 1
FEVER: 0
HEARTBURN: 0
SPEECH CHANGE: 0
CLAUDICATION: 0
SORE THROAT: 0
MYALGIAS: 0
WHEEZING: 0
DIZZINESS: 0
ABDOMINAL PAIN: 0
FOCAL WEAKNESS: 0
CHILLS: 0
NAUSEA: 0
LOSS OF CONSCIOUSNESS: 0
BLURRED VISION: 0
BLOOD IN STOOL: 0
DOUBLE VISION: 0
BRUISES/BLEEDS EASILY: 0
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
## 2018-09-06 NOTE — PROGRESS NOTES
OP Anticoagulation Telephone Note    Date: 9/6/2018  Anticoagulation Summary  As of 9/6/2018    INR goal:   2.5-3.0   TTR:   64.8 % (3.3 y)   Today's INR:   2.6   Warfarin maintenance plan:   1.5 mg (3 mg x 0.5) on Wed; 3 mg (3 mg x 1) all other days   Weekly warfarin total:   19.5 mg   No change documented:   Arpita Wesley, Med Ass't   Plan last modified:   Fazal Pineda, PharmD (8/22/2018)   Next INR check:   9/20/2018   Priority:   Maintenance   Target end date:   Indefinite    Indications    Atrial fibrillation (HCC) [I48.91]  Long term current use of anticoagulant therapy [Z79.01]             Anticoagulation Episode Summary     INR check location:   Home Draw    Preferred lab:       Send INR reminders to:       Comments:   Waqar LISSETTE  goal range changed 11- per cards      Anticoagulation Care Providers     Provider Role Specialty Phone number    Hu Gamez M.D. Referring Cardiac Electrophysiology [phone]    [facility] Anticoagulation Services Responsible  [phone]    Kristopher Escobar, PharmD Responsible          Anticoagulation Patient Findings  Patient Findings     Negatives:   Signs/symptoms of thrombosis, Signs/symptoms of bleeding, Laboratory test error suspected, Change in health, Change in alcohol use, Change in activity, Upcoming invasive procedure, Emergency department visit, Upcoming dental procedure, Missed doses, Extra doses, Change in medications, Change in diet/appetite, Hospital admission, Bruising, Other complaints      Plan:  Left message on patient's answering machine/ voicemail. Instructed patient to call back with any concerns regarding any unusual bleeding or bruising, any medication or diet changes, or any signs or symptoms of thrombosis. Instructed patient to resume medication as outlined above. Patient to follow up in 2 weeks.     Arpita Wesley, Medical Assistant    I have reviewed and concur with the above plan     Skip Gonzalez, ClarisseD
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
## 2018-09-20 ENCOUNTER — ANTICOAGULATION MONITORING (OUTPATIENT)
Dept: VASCULAR LAB | Facility: MEDICAL CENTER | Age: 78
End: 2018-09-20

## 2018-09-20 DIAGNOSIS — Z79.01 LONG TERM CURRENT USE OF ANTICOAGULANT THERAPY: ICD-10-CM

## 2018-09-20 DIAGNOSIS — I48.20 CHRONIC ATRIAL FIBRILLATION (HCC): ICD-10-CM

## 2018-09-20 LAB — INR PPP: 2.2 (ref 2–3.5)

## 2018-09-20 NOTE — PROGRESS NOTES
Anticoagulation Summary  As of 9/20/2018    INR goal:   2.5-3.0   TTR:   64.4 % (3.3 y)   Today's INR:   2.2!   Warfarin maintenance plan:   1.5 mg (3 mg x 0.5) on Wed; 3 mg (3 mg x 1) all other days   Weekly warfarin total:   19.5 mg   Plan last modified:   Fazal Pineda, PharmD (8/22/2018)   Next INR check:      Priority:   Maintenance   Target end date:   Indefinite    Indications    Atrial fibrillation (HCC) [I48.91]  Long term current use of anticoagulant therapy [Z79.01]             Anticoagulation Episode Summary     INR check location:   Home Draw    Preferred lab:       Send INR reminders to:       Comments:   Waqar MCLAUGHLIN  goal range changed 11- per cards      Anticoagulation Care Providers     Provider Role Specialty Phone number    Hu Gamez M.D. Referring Cardiac Electrophysiology [phone]    [facility] Anticoagulation Services Responsible  [phone]    Kristopher Escobar, PharmD Responsible          Anticoagulation Patient Findings  CHADS2-VASc score: 5    INR SUB therapeutic at 2.2 (goal 2.5-3.0). Spoke to patient on phone. Confirmed patient's current warfarin dose (1 tab [3 mg] everyday but 0.5 tab [1.5 mg]). Instructed patient to increase dose to 1 tablet everyday and pt stated understanding. No s/s of bleeding per patient. No new medications or change in diet per patient. Check INR again in 2 weeks. Instructed patient to call clinic at [phone] if there are any questions.     Fortino Page  2019 Doctor of Pharmacy Candidate

## 2018-10-02 ENCOUNTER — TELEPHONE (OUTPATIENT)
Dept: CARDIOLOGY | Facility: MEDICAL CENTER | Age: 78
End: 2018-10-02

## 2018-10-02 NOTE — TELEPHONE ENCOUNTER
Results letter mailed.      FW: Edit   Received: Today   Message Contents   DESTINY Martinez.  Kristin Shaffer L.P.N.             Labs about the same LDL still in the 120 mgs/dl range.

## 2018-10-05 ENCOUNTER — ANTICOAGULATION MONITORING (OUTPATIENT)
Dept: VASCULAR LAB | Facility: MEDICAL CENTER | Age: 78
End: 2018-10-05

## 2018-10-05 DIAGNOSIS — Z79.01 LONG TERM CURRENT USE OF ANTICOAGULANT THERAPY: ICD-10-CM

## 2018-10-05 DIAGNOSIS — I48.20 CHRONIC ATRIAL FIBRILLATION (HCC): ICD-10-CM

## 2018-10-05 LAB — INR PPP: 2.5 (ref 2–3.5)

## 2018-10-05 NOTE — PROGRESS NOTES
OP Anticoagulation Telephone Note    Date: 10/5/2018  Anticoagulation Summary  As of 10/5/2018    INR goal:   2.5-3.0   TTR:   63.6 % (3.4 y)   Today's INR:   2.5 (10/4/2018)   Warfarin maintenance plan:   3 mg (3 mg x 1) every day   Weekly warfarin total:   21 mg   No change documented:   Arpita Wesley, Med Ass't   Plan last modified:   Fortino Page, Pharmacy Intern (9/20/2018)   Next INR check:   10/18/2018   Priority:   Maintenance   Target end date:   Indefinite    Indications    Atrial fibrillation (HCC) [I48.91]  Long term current use of anticoagulant therapy [Z79.01]             Anticoagulation Episode Summary     INR check location:   Home Draw    Preferred lab:       Send INR reminders to:       Comments:   Waqar MCLAUGHLIN  goal range changed 11- per cards      Anticoagulation Care Providers     Provider Role Specialty Phone number    Hu Gamez M.D. Referring Cardiac Electrophysiology [phone]    [facility] Anticoagulation Services Responsible  [phone]    Kristopher Escobar, PharmD Responsible          Anticoagulation Patient Findings  Patient Findings     Negatives:   Signs/symptoms of thrombosis, Signs/symptoms of bleeding, Laboratory test error suspected, Change in health, Change in alcohol use, Change in activity, Upcoming invasive procedure, Emergency department visit, Upcoming dental procedure, Missed doses, Extra doses, Change in medications, Change in diet/appetite, Hospital admission, Bruising, Other complaints      Plan: Spoke with patient on the phone. Patient is therapeutic today. Patient denies any changes in medications or diet. Patient denies any signs or symptoms of bleeding or clotting. Instructed patient to call clinic if any unusual bleeding or bruising occurs. Will continue dosing as outlined above. Will follow-up with patient in 2 weeks.    Arpita Wesley, Medical Assistant    I have reviewed and agree with the plan above on 10/05/18    Fazal Pineda, PharmD

## 2018-10-17 ENCOUNTER — ANTICOAGULATION MONITORING (OUTPATIENT)
Dept: VASCULAR LAB | Facility: MEDICAL CENTER | Age: 78
End: 2018-10-17

## 2018-10-17 DIAGNOSIS — I48.20 CHRONIC ATRIAL FIBRILLATION (HCC): ICD-10-CM

## 2018-10-17 DIAGNOSIS — Z79.01 LONG TERM CURRENT USE OF ANTICOAGULANT THERAPY: ICD-10-CM

## 2018-10-17 LAB — INR PPP: 2.7 (ref 2–3.5)

## 2018-10-17 NOTE — PROGRESS NOTES
Anticoagulation Summary  As of 10/17/2018    INR goal:   2.5-3.0   TTR:   64.0 % (3.4 y)   Today's INR:   2.7   Warfarin maintenance plan:   3 mg (3 mg x 1) every day   Weekly warfarin total:   21 mg   Plan last modified:   Fortino Page, Pharmacy Intern (9/20/2018)   Next INR check:   10/31/2018   Priority:   Maintenance   Target end date:   Indefinite    Indications    Atrial fibrillation (HCC) [I48.91]  Long term current use of anticoagulant therapy [Z79.01]             Anticoagulation Episode Summary     INR check location:   Home Draw    Preferred lab:       Send INR reminders to:       Comments:   Waqar  - [phone] -   goal range changed 11- per cards      Anticoagulation Care Providers     Provider Role Specialty Phone number    Hu Gamez M.D. Referring Cardiac Electrophysiology [phone]    [facility] Anticoagulation Services Responsible  [phone]    Kristopher Escobar, PharmD Responsible          Anticoagulation Patient Findings    Spoke with patient to report a therapeutic INR.    Pt instructed to continue with current warfarin dosing regimen, confirms dosing.   Pt denies any s/s of bleeding, bruising, clotting or any changes to diet or medication.    Will follow up in 2 week(s).     Kellen Lackey, PharmD

## 2018-11-01 ENCOUNTER — ANTICOAGULATION MONITORING (OUTPATIENT)
Dept: VASCULAR LAB | Facility: MEDICAL CENTER | Age: 78
End: 2018-11-01

## 2018-11-01 DIAGNOSIS — I48.91 ATRIAL FIBRILLATION, UNSPECIFIED TYPE (HCC): ICD-10-CM

## 2018-11-01 DIAGNOSIS — Z79.01 LONG TERM CURRENT USE OF ANTICOAGULANT THERAPY: ICD-10-CM

## 2018-11-01 LAB — INR PPP: 2.3 (ref 2–3.5)

## 2018-11-01 NOTE — PROGRESS NOTES
OP Telephone Anticoagulation Service Note    Date: 11/1/2018      Anticoagulation Summary  As of 11/1/2018    INR goal:   2.5-3.0   TTR:   63.8 % (3.4 y)   Today's INR:   2.3!   Warfarin maintenance plan:   3 mg (3 mg x 1) every day   Weekly warfarin total:   21 mg   Plan last modified:   Fortino Page, Pharmacy Intern (9/20/2018)   Next INR check:   11/15/2018   Priority:   Maintenance   Target end date:   Indefinite    Indications    Atrial fibrillation (HCC) [I48.91]  Long term current use of anticoagulant therapy [Z79.01]             Anticoagulation Episode Summary     INR check location:   Home Draw    Preferred lab:       Send INR reminders to:       Comments:   Waqar  - [phone] -   goal range changed 11- per cards      Anticoagulation Care Providers     Provider Role Specialty Phone number    Hu Gamez M.D. Referring Cardiac Electrophysiology [phone]    [facility] Anticoagulation Services Responsible  [phone]    Kristopher Escobar, PharmD Responsible          Anticoagulation Patient Findings        Plan: Spoke with patient on the phone. Patient is subtherapeutic today. Confirmed dosing. No missed tablets in the last week. Patient reports eating more brocolli and spinach this past week and will stop. . Patient denies any signs or symptoms of bleeding or clotting. Instructed patient to call clinic if any unusual bleeding or bruising occurs. Will have pt take 4.5 mg tonight then continue dosing as outlined. Will follow-up with patient in 2 week(s).              Shyanne Damon, PharmD

## 2018-11-14 ENCOUNTER — APPOINTMENT (RX ONLY)
Dept: URBAN - NONMETROPOLITAN AREA CLINIC 15 | Facility: CLINIC | Age: 78
Setting detail: DERMATOLOGY
End: 2018-11-14

## 2018-11-14 ENCOUNTER — TELEPHONE (OUTPATIENT)
Dept: CARDIOLOGY | Facility: MEDICAL CENTER | Age: 78
End: 2018-11-14

## 2018-11-14 DIAGNOSIS — L82.1 OTHER SEBORRHEIC KERATOSIS: ICD-10-CM

## 2018-11-14 DIAGNOSIS — D18.0 HEMANGIOMA: ICD-10-CM

## 2018-11-14 DIAGNOSIS — L81.4 OTHER MELANIN HYPERPIGMENTATION: ICD-10-CM

## 2018-11-14 DIAGNOSIS — I83.9 ASYMPTOMATIC VARICOSE VEINS OF LOWER EXTREMITIES: ICD-10-CM

## 2018-11-14 DIAGNOSIS — D22 MELANOCYTIC NEVI: ICD-10-CM

## 2018-11-14 PROBLEM — I10 ESSENTIAL (PRIMARY) HYPERTENSION: Status: ACTIVE | Noted: 2018-11-14

## 2018-11-14 PROBLEM — D22.62 MELANOCYTIC NEVI OF LEFT UPPER LIMB, INCLUDING SHOULDER: Status: ACTIVE | Noted: 2018-11-14

## 2018-11-14 PROBLEM — D22.72 MELANOCYTIC NEVI OF LEFT LOWER LIMB, INCLUDING HIP: Status: ACTIVE | Noted: 2018-11-14

## 2018-11-14 PROBLEM — D22.71 MELANOCYTIC NEVI OF RIGHT LOWER LIMB, INCLUDING HIP: Status: ACTIVE | Noted: 2018-11-14

## 2018-11-14 PROBLEM — I83.93 ASYMPTOMATIC VARICOSE VEINS OF BILATERAL LOWER EXTREMITIES: Status: ACTIVE | Noted: 2018-11-14

## 2018-11-14 PROBLEM — D18.01 HEMANGIOMA OF SKIN AND SUBCUTANEOUS TISSUE: Status: ACTIVE | Noted: 2018-11-14

## 2018-11-14 PROBLEM — D22.5 MELANOCYTIC NEVI OF TRUNK: Status: ACTIVE | Noted: 2018-11-14

## 2018-11-14 PROBLEM — D22.61 MELANOCYTIC NEVI OF RIGHT UPPER LIMB, INCLUDING SHOULDER: Status: ACTIVE | Noted: 2018-11-14

## 2018-11-14 PROCEDURE — 99214 OFFICE O/P EST MOD 30 MIN: CPT

## 2018-11-14 PROCEDURE — ? LIQUID NITROGEN

## 2018-11-14 PROCEDURE — ? COUNSELING

## 2018-11-14 ASSESSMENT — LOCATION DETAILED DESCRIPTION DERM
LOCATION DETAILED: LEFT DISTAL POSTERIOR THIGH
LOCATION DETAILED: RIGHT DISTAL PRETIBIAL REGION
LOCATION DETAILED: LEFT POPLITEAL SKIN
LOCATION DETAILED: LEFT DISTAL PRETIBIAL REGION
LOCATION DETAILED: SUBXIPHOID
LOCATION DETAILED: RIGHT DISTAL POSTERIOR UPPER ARM
LOCATION DETAILED: LEFT PROXIMAL PRETIBIAL REGION
LOCATION DETAILED: RIGHT DISTAL DORSAL FOREARM
LOCATION DETAILED: LEFT SUPERIOR ANTERIOR NECK
LOCATION DETAILED: INFERIOR THORACIC SPINE
LOCATION DETAILED: LEFT DISTAL DORSAL FOREARM
LOCATION DETAILED: LEFT DISTAL POSTERIOR UPPER ARM
LOCATION DETAILED: LEFT MID-UPPER BACK
LOCATION DETAILED: RIGHT CENTRAL EYEBROW
LOCATION DETAILED: LEFT VENTRAL LATERAL PROXIMAL FOREARM
LOCATION DETAILED: RIGHT PROXIMAL POSTERIOR UPPER ARM
LOCATION DETAILED: LEFT CENTRAL LATERAL NECK
LOCATION DETAILED: RIGHT POPLITEAL SKIN
LOCATION DETAILED: RIGHT VENTRAL PROXIMAL FOREARM
LOCATION DETAILED: RIGHT ANTERIOR DISTAL THIGH
LOCATION DETAILED: RIGHT SUPERIOR MEDIAL BUCCAL CHEEK
LOCATION DETAILED: RIGHT PROXIMAL DORSAL FOREARM
LOCATION DETAILED: RIGHT LATERAL SUPERIOR CHEST
LOCATION DETAILED: RIGHT SUPERIOR UPPER BACK
LOCATION DETAILED: LEFT LATERAL ELBOW
LOCATION DETAILED: RIGHT PROXIMAL PRETIBIAL REGION
LOCATION DETAILED: LOWER STERNUM
LOCATION DETAILED: RIGHT DISTAL POSTERIOR THIGH
LOCATION DETAILED: MIDDLE STERNUM

## 2018-11-14 ASSESSMENT — LOCATION SIMPLE DESCRIPTION DERM
LOCATION SIMPLE: RIGHT CHEEK
LOCATION SIMPLE: RIGHT PRETIBIAL REGION
LOCATION SIMPLE: CHEST
LOCATION SIMPLE: NECK
LOCATION SIMPLE: LEFT POSTERIOR UPPER ARM
LOCATION SIMPLE: RIGHT POPLITEAL SKIN
LOCATION SIMPLE: UPPER BACK
LOCATION SIMPLE: LEFT ANTERIOR NECK
LOCATION SIMPLE: RIGHT THIGH
LOCATION SIMPLE: LEFT ELBOW
LOCATION SIMPLE: RIGHT EYEBROW
LOCATION SIMPLE: LEFT POSTERIOR THIGH
LOCATION SIMPLE: LEFT PRETIBIAL REGION
LOCATION SIMPLE: RIGHT UPPER BACK
LOCATION SIMPLE: LEFT POPLITEAL SKIN
LOCATION SIMPLE: LEFT FOREARM
LOCATION SIMPLE: ABDOMEN
LOCATION SIMPLE: LEFT UPPER BACK
LOCATION SIMPLE: RIGHT POSTERIOR THIGH
LOCATION SIMPLE: RIGHT POSTERIOR UPPER ARM
LOCATION SIMPLE: RIGHT FOREARM

## 2018-11-14 ASSESSMENT — LOCATION ZONE DERM
LOCATION ZONE: LEG
LOCATION ZONE: TRUNK
LOCATION ZONE: FACE
LOCATION ZONE: NECK
LOCATION ZONE: ARM

## 2018-11-14 NOTE — PROCEDURE: LIQUID NITROGEN
Duration Of Freeze Thaw-Cycle (Seconds): 0
Add 52 Modifier (Optional): no
Medical Necessity Information: It is in your best interest to select a reason for this procedure from the list below. All of these items fulfill various CMS LCD requirements except the new and changing color options.
Post-Care Instructions: I reviewed with the patient in detail post-care instructions. Patient is to wear sunprotection, and avoid picking at any of the treated lesions. Pt may apply Vaseline to crusted or scabbing areas.
Medical Necessity Clause: This procedure was medically necessary because the lesions that were treated were:  If lesion does not resolve, bx is needed.
Detail Level: Detailed
Consent: The patient's consent was obtained including but not limited to risks of crusting, scabbing, blistering, scarring, darker or lighter pigmentary change, recurrence, incomplete removal and infection.

## 2018-11-15 ENCOUNTER — ANTICOAGULATION MONITORING (OUTPATIENT)
Dept: VASCULAR LAB | Facility: MEDICAL CENTER | Age: 78
End: 2018-11-15

## 2018-11-15 DIAGNOSIS — Z79.01 LONG TERM CURRENT USE OF ANTICOAGULANT THERAPY: ICD-10-CM

## 2018-11-15 DIAGNOSIS — I48.91 ATRIAL FIBRILLATION, UNSPECIFIED TYPE (HCC): ICD-10-CM

## 2018-11-15 LAB — INR PPP: 2.2 (ref 2–3.5)

## 2018-11-15 NOTE — TELEPHONE ENCOUNTER
Spoke with pt. She is confident that the Losartan 25mg supplied by her pharmacy is not the recalled drug. She elected to continue it.

## 2018-11-15 NOTE — PROGRESS NOTES
OP Telephone Anticoagulation Service Note    Date: 11/15/2018      Anticoagulation Summary  As of 11/15/2018    INR goal:   2.5-3.0   TTR:   63.1 % (3.5 y)   Today's INR:   2.2!   Warfarin maintenance plan:   3 mg (3 mg x 1) every day   Weekly warfarin total:   21 mg   Plan last modified:   Fortino Page, Pharmacy Intern (9/20/2018)   Next INR check:      Priority:   Maintenance   Target end date:   Indefinite    Indications    Atrial fibrillation (HCC) [I48.91]  Long term current use of anticoagulant therapy [Z79.01]             Anticoagulation Episode Summary     INR check location:   Home Draw    Preferred lab:       Send INR reminders to:       Comments:   Waqar  - [phone] -   goal range changed 11- per cards      Anticoagulation Care Providers     Provider Role Specialty Phone number    Hu Gamez M.D. Referring Cardiac Electrophysiology [phone]    [facility] Anticoagulation Services Responsible  [phone]    Kristopher Escobar, PharmD Responsible          Anticoagulation Patient Findings      INR SUB-therapeutic at 2.2.  Spoke w/ pt on phone.  Verified regimen w/ pt.  Instructed pt to BOLUS today with 4.5 mg (1.5 tabs). Pt to increase regimen to 4.5 mg on Thursdays & 3 mg on all other days.  NO s/s bleeding reported per pt.  NO changes in diet reported per pt.  NO changes in medications reported per pt.  Check INR in 2 week(s).  Instructed pt to call clinic at [phone] if there are any questions.  Pt stated understanding.    Garth Fortune, Pharmacy Intern      I have reviewed and concur with the above plan     Skip Gonzalez, PharmD

## 2018-11-15 NOTE — TELEPHONE ENCOUNTER
LOUIS Francisco 7 hours ago (10:23 AM)         Meme  sent me a link about Lorsartan. what do you think?     [email or web address]-[email or web address]   I will continue to take Lorsatan until you advise me to stop and take something else.     Madeline Jayna [email or web address]   [phone]      Replied to patient:I sent it to Pat. We will let you know what she says. The pharmacies by now have updated their prescriptions to not be dispensing those meds that are toxic. Please contact your pharmacy to see if you are still receiving th old drug. If you are we need to switch you medication.     To LAVERN

## 2018-11-15 NOTE — TELEPHONE ENCOUNTER
Left message for pt. To call.        Message   Received: Today   Message Contents   LOUIS Martinez L.P.N.   Caller: Unspecified (Yesterday,  6:09 PM)             Avapro 75mgs let her know lowest dose should be comparable to Cozaar 25mgs.

## 2018-11-26 DIAGNOSIS — I10 ESSENTIAL HYPERTENSION: ICD-10-CM

## 2018-11-26 RX ORDER — METOPROLOL SUCCINATE 25 MG/1
25 TABLET, EXTENDED RELEASE ORAL DAILY
Qty: 90 TAB | Refills: 3 | Status: SHIPPED | OUTPATIENT
Start: 2018-11-26 | End: 2019-08-02

## 2018-11-29 ENCOUNTER — ANTICOAGULATION MONITORING (OUTPATIENT)
Dept: VASCULAR LAB | Facility: MEDICAL CENTER | Age: 78
End: 2018-11-29

## 2018-11-29 DIAGNOSIS — Z79.01 LONG TERM CURRENT USE OF ANTICOAGULANT THERAPY: ICD-10-CM

## 2018-11-29 LAB — INR PPP: 2.4 (ref 2–3.5)

## 2018-11-29 NOTE — PROGRESS NOTES
OP Telephone Anticoagulation Service Note    Date: 11/29/2018      Anticoagulation Summary  As of 11/29/2018    INR goal:   2.5-3.0   TTR:   62.5 % (3.5 y)   Today's INR:   2.4!   Warfarin maintenance plan:   4.5 mg (3 mg x 1.5) on Thu; 3 mg (3 mg x 1) all other days   Weekly warfarin total:   22.5 mg   Plan last modified:   Garth Fortune, Pharmacy Intern (11/15/2018)   Next INR check:   12/13/2018   Priority:   Maintenance   Target end date:   Indefinite    Indications    Atrial fibrillation (HCC) [I48.91]  Long term current use of anticoagulant therapy [Z79.01]             Anticoagulation Episode Summary     INR check location:   Home Draw    Preferred lab:       Send INR reminders to:       Comments:   Waqar [facility] [phone] -   goal range changed 11- per cards      Anticoagulation Care Providers     Provider Role Specialty Phone number    Hu Gamez M.D. Referring Cardiac Electrophysiology [phone]    [facility] Anticoagulation Services Responsible  [phone]    Kristopher Escobar, PharmD Responsible          Anticoagulation Patient Findings      INR SUB-therapeutic at 2.4.  Spoke w/ pt on phone.  Verified regimen w/ pt.  Pt to continue on w/ current regimen.  Pt stated that her INR comes up slowly. She does not wish to increase her regimen anymore at this time (even though her INR has been sub-therapeutic for ~5 weeks now) due to concerns of her INR being too high. If INR is still sub-therapeutic upon next reading she is open to another weekly regimen increase.  NO s/s bleeding reported per pt.  NO changes in diet reported per pt.  NO changes in medications reported per pt.  Check INR in 2 week(s).  Instructed pt to call clinic at [phone] if there are any questions.  Pt stated understanding.    Garth Fortune, Pharmacy Intern

## 2018-12-12 ENCOUNTER — HOSPITAL ENCOUNTER (OUTPATIENT)
Dept: RADIOLOGY | Facility: MEDICAL CENTER | Age: 78
End: 2018-12-12
Attending: FAMILY MEDICINE
Payer: MEDICARE

## 2018-12-12 DIAGNOSIS — Z12.39 SCREENING BREAST EXAMINATION: ICD-10-CM

## 2018-12-12 PROCEDURE — 77067 SCR MAMMO BI INCL CAD: CPT

## 2018-12-13 ENCOUNTER — ANTICOAGULATION MONITORING (OUTPATIENT)
Dept: VASCULAR LAB | Facility: MEDICAL CENTER | Age: 78
End: 2018-12-13

## 2018-12-13 DIAGNOSIS — I48.91 ATRIAL FIBRILLATION, UNSPECIFIED TYPE (HCC): ICD-10-CM

## 2018-12-13 DIAGNOSIS — Z79.01 LONG TERM CURRENT USE OF ANTICOAGULANT THERAPY: ICD-10-CM

## 2018-12-13 LAB — INR PPP: 2.2 (ref 2–3.5)

## 2018-12-13 NOTE — PROGRESS NOTES
OP Anticoagulation Telephone Note    Date: 12/13/2018       Plan:  Spoke with pt on the phone. Pt is again subtherapeutic today. Low CHADS=2 (Htn, age).  Denies any changes in medications or diet. Denies any s/sx of bleeding or clotting. Will increase warfarin dosing as outlined below and follow-up with pt in 1wk.      Anticoagulation Summary  As of 12/13/2018    INR goal:   2.5-3.0   TTR:   61.8 % (3.6 y)   Today's INR:   2.2!   Warfarin maintenance plan:   4.5 mg (3 mg x 1.5) on Mon, Thu; 3 mg (3 mg x 1) all other days   Weekly warfarin total:   24 mg   Plan last modified:   Rosangela Mcnamara, A.P.NAnitha (12/13/2018)   Next INR check:   12/20/2018   Priority:   Maintenance   Target end date:   Indefinite    Indications    Atrial fibrillation (HCC) [I48.91]  Long term current use of anticoagulant therapy [Z79.01]             Anticoagulation Episode Summary     INR check location:   Home Draw    Preferred lab:       Send INR reminders to:       Comments:   [facility] [phone] -   goal range changed 11- per cards      Anticoagulation Care Providers     Provider Role Specialty Phone number    Hu Gamez M.D. Referring Cardiac Electrophysiology [phone]    [facility] Anticoagulation Services Responsible  [phone]    Kristopher Escobar, PharmD Responsible          CHARO Delgado  [facility]

## 2018-12-20 ENCOUNTER — ANTICOAGULATION MONITORING (OUTPATIENT)
Dept: VASCULAR LAB | Facility: MEDICAL CENTER | Age: 78
End: 2018-12-20

## 2018-12-20 DIAGNOSIS — I48.91 ATRIAL FIBRILLATION, UNSPECIFIED TYPE (HCC): ICD-10-CM

## 2018-12-20 DIAGNOSIS — Z79.01 LONG TERM CURRENT USE OF ANTICOAGULANT THERAPY: ICD-10-CM

## 2018-12-20 LAB — INR PPP: 2.7 (ref 2–3.5)

## 2018-12-20 NOTE — PROGRESS NOTES
Anticoagulation Summary  As of 12/20/2018    INR goal:   2.5-3.0   TTR:   61.7 % (3.6 y)   Today's INR:   2.7   Warfarin maintenance plan:   4.5 mg (3 mg x 1.5) on Mon, Thu; 3 mg (3 mg x 1) all other days   Weekly warfarin total:   24 mg   Plan last modified:   LOUIS Cho (12/13/2018)   Next INR check:   12/27/2018   Priority:   Maintenance   Target end date:   Indefinite    Indications    Atrial fibrillation (HCC) [I48.91]  Long term current use of anticoagulant therapy [Z79.01]             Anticoagulation Episode Summary     INR check location:   Home Draw    Preferred lab:       Send INR reminders to:       Comments:   Waqar  - [phone] -   goal range changed 11- per cards      Anticoagulation Care Providers     Provider Role Specialty Phone number    Hu Gamez M.D. Referring Cardiac Electrophysiology [phone]    [facility] Anticoagulation Services Responsible  [phone]    Kristopher Escobar, PharmD Responsible          Anticoagulation Patient Findings  Patient Findings     Negatives:   Signs/symptoms of thrombosis, Signs/symptoms of bleeding, Laboratory test error suspected, Change in health, Change in alcohol use, Change in activity, Upcoming invasive procedure, Emergency department visit, Upcoming dental procedure, Missed doses, Extra doses, Change in medications, Change in diet/appetite, Hospital admission, Bruising, Other complaints        Spoke with patient today regarding therapeutic INR of 2.7.  Patient denies any signs/symptoms of bruising or bleeding or any changes in diet and medications.  Instructed patient to call clinic with any questions or concerns.  Pt is to continue with current warfarin dosing regimen.  Follow up in 1 weeks, to reduce risk of adverse events related to this high risk medication,  Warfarin.    Kristopher Escobar, PharmD

## 2018-12-21 ENCOUNTER — OFFICE VISIT (OUTPATIENT)
Dept: URGENT CARE | Facility: PHYSICIAN GROUP | Age: 78
End: 2018-12-21
Payer: MEDICARE

## 2018-12-21 VITALS
OXYGEN SATURATION: 96 % | HEIGHT: 68 IN | HEART RATE: 84 BPM | SYSTOLIC BLOOD PRESSURE: 120 MMHG | DIASTOLIC BLOOD PRESSURE: 74 MMHG | BODY MASS INDEX: 24.71 KG/M2 | RESPIRATION RATE: 16 BRPM | TEMPERATURE: 97.8 F | WEIGHT: 163 LBS

## 2018-12-21 DIAGNOSIS — T78.3XXA ALLERGIC ANGIOEDEMA, INITIAL ENCOUNTER: ICD-10-CM

## 2018-12-21 PROCEDURE — 99214 OFFICE O/P EST MOD 30 MIN: CPT | Mod: 25 | Performed by: PHYSICIAN ASSISTANT

## 2018-12-21 RX ORDER — DEXAMETHASONE SODIUM PHOSPHATE 10 MG/ML
10 INJECTION INTRAMUSCULAR; INTRAVENOUS ONCE
Status: COMPLETED | OUTPATIENT
Start: 2018-12-21 | End: 2018-12-21

## 2018-12-21 RX ORDER — MOMETASONE FUROATE 1 MG/G
1 CREAM TOPICAL DAILY
Qty: 1 TUBE | Refills: 0 | Status: SHIPPED | OUTPATIENT
Start: 2018-12-21 | End: 2019-06-13

## 2018-12-21 RX ADMIN — DEXAMETHASONE SODIUM PHOSPHATE 10 MG: 10 INJECTION INTRAMUSCULAR; INTRAVENOUS at 13:10
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
## 2018-12-27 LAB — INR PPP: 2.9 (ref 2–3.5)

## 2018-12-28 ENCOUNTER — ANTICOAGULATION MONITORING (OUTPATIENT)
Dept: VASCULAR LAB | Facility: MEDICAL CENTER | Age: 78
End: 2018-12-28

## 2018-12-28 DIAGNOSIS — I48.91 ATRIAL FIBRILLATION, UNSPECIFIED TYPE (HCC): ICD-10-CM

## 2018-12-28 DIAGNOSIS — Z79.01 LONG TERM CURRENT USE OF ANTICOAGULANT THERAPY: ICD-10-CM

## 2018-12-28 NOTE — PROGRESS NOTES
Anticoagulation Summary  As of 12/28/2018    INR goal:   2.5-3.0   TTR:   61.9 % (3.6 y)   Today's INR:   2.9 (12/27/2018)   Warfarin maintenance plan:   4.5 mg (3 mg x 1.5) on Mon, Thu; 3 mg (3 mg x 1) all other days   Weekly warfarin total:   24 mg   Plan last modified:   LOUIS Cho (12/13/2018)   Next INR check:      Priority:   Maintenance   Target end date:   Indefinite    Indications    Atrial fibrillation (HCC) [I48.91]  Long term current use of anticoagulant therapy [Z79.01]             Anticoagulation Episode Summary     INR check location:   Home Draw    Preferred lab:       Send INR reminders to:       Comments:   Waqar  - [phone] -   goal range changed 11- per cards      Anticoagulation Care Providers     Provider Role Specialty Phone number    Hu Gamez M.D. Referring Cardiac Electrophysiology [phone]    [facility] Anticoagulation Services Responsible  [phone]    Kristopher Escobar, PharmD Responsible          Anticoagulation Patient Findings      Spoke with patient Madeline to report a therapeutic INR.    Pt instructed to continue with current warfarin dosing regimen, confirms dosing.   Pt denies any s/s of bleeding, bruising, clotting or any changes to diet or medication.    Will follow up in 1 week(s).     Kiran Carty, Pharmacy Intern     I have reviewed and concur with the above plan on 12/28/2018.  Samantha Amaya, Clinical Pharmacist

## 2018-12-31 DIAGNOSIS — I10 ESSENTIAL HYPERTENSION, BENIGN: ICD-10-CM

## 2018-12-31 RX ORDER — LOSARTAN POTASSIUM 25 MG/1
25 TABLET ORAL DAILY
Qty: 90 TAB | Refills: 3 | Status: SHIPPED | OUTPATIENT
Start: 2018-12-31 | End: 2019-03-08 | Stop reason: SDUPTHER

## 2018-12-31 RX ORDER — LOSARTAN POTASSIUM 25 MG/1
25 TABLET ORAL DAILY
Qty: 90 TAB | Refills: 3 | Status: CANCELLED | OUTPATIENT
Start: 2018-12-31

## 2019-01-03 ENCOUNTER — ANTICOAGULATION MONITORING (OUTPATIENT)
Dept: VASCULAR LAB | Facility: MEDICAL CENTER | Age: 79
End: 2019-01-03

## 2019-01-03 DIAGNOSIS — Z79.01 CHRONIC ANTICOAGULATION: ICD-10-CM

## 2019-01-03 DIAGNOSIS — Z79.01 LONG TERM CURRENT USE OF ANTICOAGULANT THERAPY: ICD-10-CM

## 2019-01-03 DIAGNOSIS — I48.91 ATRIAL FIBRILLATION, UNSPECIFIED TYPE (HCC): ICD-10-CM

## 2019-01-03 LAB — INR PPP: 2.7 (ref 2–3.5)

## 2019-01-03 NOTE — PROGRESS NOTES
Anticoagulation Summary  As of 1/3/2019    INR goal:   2.5-3.0   TTR:   62.1 % (3.6 y)   Today's INR:   2.7   Warfarin maintenance plan:   4.5 mg (3 mg x 1.5) on Mon, Thu; 3 mg (3 mg x 1) all other days   Weekly warfarin total:   24 mg   Plan last modified:   LOUIS Cho (12/13/2018)   Next INR check:      Priority:   Maintenance   Target end date:   Indefinite    Indications    Atrial fibrillation (HCC) [I48.91]  Long term current use of anticoagulant therapy [Z79.01]             Anticoagulation Episode Summary     INR check location:   Home Draw    Preferred lab:       Send INR reminders to:       Comments:   Waqar  - [phone] -   goal range changed 11- per cards      Anticoagulation Care Providers     Provider Role Specialty Phone number    Hu Gamez M.D. Referring Cardiac Electrophysiology [phone]    [facility] Anticoagulation Services Responsible  [phone]    Kristopher Escobar, PharmD Responsible          Anticoagulation Patient Findings      Spoke with patient Madeline to report a therapeutic INR.    Pt instructed to continue with current warfarin dosing regimen, confirms dosing.   Pt denies any s/s of bleeding, bruising, clotting or any changes to diet or medication.    Will follow up in 1 week(s).     Kiran Carty, Pharmacy Intern      I have reviewed and concur with the above plan     Skip Gonzalez, PharmD

## 2019-01-10 ENCOUNTER — ANTICOAGULATION MONITORING (OUTPATIENT)
Dept: MEDICAL GROUP | Facility: MEDICAL CENTER | Age: 79
End: 2019-01-10

## 2019-01-10 DIAGNOSIS — Z79.01 LONG TERM CURRENT USE OF ANTICOAGULANT THERAPY: ICD-10-CM

## 2019-01-10 DIAGNOSIS — I48.91 ATRIAL FIBRILLATION, UNSPECIFIED TYPE (HCC): ICD-10-CM

## 2019-01-10 LAB — INR PPP: 2.1 (ref 2–3.5)

## 2019-01-11 NOTE — PROGRESS NOTES
Anticoagulation Summary  As of 1/10/2019    INR goal:   2.5-3.0   TTR:   61.9 % (3.6 y)   Today's INR:   2.1!   Warfarin maintenance plan:   4.5 mg (3 mg x 1.5) on Mon, Thu; 3 mg (3 mg x 1) all other days   Weekly warfarin total:   24 mg   Plan last modified:   LOUIS Cho (12/13/2018)   Next INR check:   1/17/2019   Priority:   Maintenance   Target end date:   Indefinite    Indications    Atrial fibrillation (HCC) [I48.91]  Long term current use of anticoagulant therapy [Z79.01]             Anticoagulation Episode Summary     INR check location:   Home Draw    Preferred lab:       Send INR reminders to:       Comments:   Waqar  - [phone] -   goal range changed 11- per cards      Anticoagulation Care Providers     Provider Role Specialty Phone number    Hu Gamez M.D. Referring Cardiac Electrophysiology [phone]    [facility] Anticoagulation Services Responsible  [phone]    Kristopher Escobar, PharmD Responsible          Anticoagulation Patient Findings  Patient Findings     Negatives:   Signs/symptoms of thrombosis, Signs/symptoms of bleeding, Laboratory test error suspected, Change in health, Change in alcohol use, Change in activity, Upcoming invasive procedure, Emergency department visit, Upcoming dental procedure, Missed doses, Extra doses, Change in medications, Change in diet/appetite, Hospital admission, Bruising, Other complaints            Spoke to patient on the phone.   INR  sub-therapeutic.   Denies signs/symptoms of bleeding and/or thrombosis.   Denies changes to diet or medications.   Follow up appointment in 1 week(s).    6mg today then continue weekly warfarin dose as noted      Skip Gonzalez, PharmD

## 2019-01-17 ENCOUNTER — ANTICOAGULATION MONITORING (OUTPATIENT)
Dept: VASCULAR LAB | Facility: MEDICAL CENTER | Age: 79
End: 2019-01-17

## 2019-01-17 DIAGNOSIS — Z79.01 LONG TERM CURRENT USE OF ANTICOAGULANT THERAPY: ICD-10-CM

## 2019-01-17 DIAGNOSIS — I48.91 ATRIAL FIBRILLATION, UNSPECIFIED TYPE (HCC): ICD-10-CM

## 2019-01-17 LAB — INR PPP: 3 (ref 2–3.5)

## 2019-01-17 NOTE — PROGRESS NOTES
Anticoagulation Summary  As of 1/17/2019    INR goal:   2.5-3.0   TTR:   61.9 % (3.7 y)   Today's INR:   3.0   Warfarin maintenance plan:   4.5 mg (3 mg x 1.5) on Mon, Thu; 3 mg (3 mg x 1) all other days   Weekly warfarin total:   24 mg   Plan last modified:   LOUIS Cho (12/13/2018)   Next INR check:   1/24/2019   Priority:   Maintenance   Target end date:   Indefinite    Indications    Atrial fibrillation (HCC) [I48.91]  Long term current use of anticoagulant therapy [Z79.01]             Anticoagulation Episode Summary     INR check location:   Home Draw    Preferred lab:       Send INR reminders to:       Comments:   Waqar  - [phone] -   goal range changed 11- per cards      Anticoagulation Care Providers     Provider Role Specialty Phone number    Hu Gamez M.D. Referring Cardiac Electrophysiology [phone]    [facility] Anticoagulation Services Responsible  [phone]    Kristopher Escobar, PharmD Responsible          Anticoagulation Patient Findings      Spoke with patient Madeline to report a therapeutic INR.    Pt instructed to continue with current warfarin dosing regimen, confirms dosing.   Pt denies any s/s of bleeding, bruising, clotting or any changes to diet or medication.    Will follow up in 1 week(s).     Kiran Carty, Pharmacy Intern    I have reviewed and concur with the above plan on 01/17/2019.  Samantha Amaya, Clinical Pharmacist

## 2019-01-21 ENCOUNTER — APPOINTMENT (OUTPATIENT)
Dept: SLEEP MEDICINE | Facility: MEDICAL CENTER | Age: 79
End: 2019-01-21
Payer: MEDICARE

## 2019-01-25 ENCOUNTER — ANTICOAGULATION MONITORING (OUTPATIENT)
Dept: MEDICAL GROUP | Facility: PHYSICIAN GROUP | Age: 79
End: 2019-01-25

## 2019-01-25 DIAGNOSIS — I48.91 ATRIAL FIBRILLATION, UNSPECIFIED TYPE (HCC): ICD-10-CM

## 2019-01-25 DIAGNOSIS — Z79.01 LONG TERM CURRENT USE OF ANTICOAGULANT THERAPY: ICD-10-CM

## 2019-01-25 LAB — INR PPP: 3 (ref 2–3.5)

## 2019-01-25 NOTE — PROGRESS NOTES
Anticoagulation Summary  As of 1/25/2019    INR goal:   2.5-3.0   TTR:   62.1 % (3.7 y)   Today's INR:   3.0   Warfarin maintenance plan:   4.5 mg (3 mg x 1.5) on Mon, Thu; 3 mg (3 mg x 1) all other days   Weekly warfarin total:   24 mg   No change documented:   Vipin Goss Ass't   Plan last modified:   LOUIS Cho (12/13/2018)   Next INR check:   2/1/2019   Priority:   Maintenance   Target end date:   Indefinite    Indications    Atrial fibrillation (HCC) [I48.91]  Long term current use of anticoagulant therapy [Z79.01]             Anticoagulation Episode Summary     INR check location:   Home Draw    Preferred lab:       Send INR reminders to:       Comments:   Waqar  - [phone] -   goal range changed 11- per cards      Anticoagulation Care Providers     Provider Role Specialty Phone number    Hu Gamez M.D. Referring Cardiac Electrophysiology [phone]    [facility] Anticoagulation Services Responsible  [phone]    Kristopher Escobar, PharmD Responsible          Anticoagulation Patient Findings  Patient Findings     Negatives:   Signs/symptoms of thrombosis, Signs/symptoms of bleeding, Laboratory test error suspected, Change in health, Change in alcohol use, Change in activity, Upcoming invasive procedure, Emergency department visit, Upcoming dental procedure, Missed doses, Extra doses, Change in medications, Change in diet/appetite, Hospital admission, Bruising, Other complaints      Spoke with patient to report a therapeutic INR.  Pt instructed to continue with current warfarin dosing regimen. Pt denies any s/s of bleeding, bruising, clotting or any changes to diet or medication.  Will follow up in 1 weeks.  Vipin Goss Ass't    I have reviewed and am in agreement with the above stated plan on 1-25-19.  Kristopher Escobar, PharmD

## 2019-01-31 LAB — INR PPP: 2.6 (ref 2–3.5)

## 2019-02-01 ENCOUNTER — ANTICOAGULATION MONITORING (OUTPATIENT)
Dept: VASCULAR LAB | Facility: MEDICAL CENTER | Age: 79
End: 2019-02-01

## 2019-02-01 DIAGNOSIS — I48.91 ATRIAL FIBRILLATION, UNSPECIFIED TYPE (HCC): ICD-10-CM

## 2019-02-01 DIAGNOSIS — Z79.01 LONG TERM CURRENT USE OF ANTICOAGULANT THERAPY: ICD-10-CM

## 2019-02-01 NOTE — PROGRESS NOTES
Anticoagulation Summary  As of 2019    INR goal:   2.5-3.0   TTR:   62.3 % (3.7 y)   INR used for dosin.6 (2019)   Warfarin maintenance plan:   4.5 mg (3 mg x 1.5) every Mon, Thu; 3 mg (3 mg x 1) all other days   Weekly warfarin total:   24 mg   Plan last modified:   LOUIS Cho (2018)   Next INR check:   2019   Priority:   Maintenance   Target end date:   Indefinite    Indications    Atrial fibrillation (HCC) [I48.91]  Long term current use of anticoagulant therapy [Z79.01]             Anticoagulation Episode Summary     INR check location:   Home Draw    Preferred lab:       Send INR reminders to:       Comments:   Waqar [facility] [phone] -   goal range changed 2016 per cards      Anticoagulation Care Providers     Provider Role Specialty Phone number    Hu Gamez M.D. Referring Cardiac Electrophysiology [phone]    [facility] Anticoagulation Services Responsible  [phone]    Kristopher Escobar, PharmD Responsible          Anticoagulation Patient Findings  Patient Findings     Negatives:   Signs/symptoms of thrombosis, Signs/symptoms of bleeding, Laboratory test error suspected, Change in health, Change in alcohol use, Change in activity, Upcoming invasive procedure, Emergency department visit, Upcoming dental procedure, Missed doses, Extra doses, Change in medications, Change in diet/appetite, Hospital admission, Bruising, Other complaints        Spoke with patient today regarding therapeutic INR of 2.6.  Patient denies any signs/symptoms of bruising or bleeding or any changes in diet and medications.  Instructed patient to call clinic with any questions or concerns.  Pt is to continue with current warfarin dosing regimen.  Follow up in 1 weeks, to reduce risk of adverse events related to this high risk medication,  Warfarin.    Kristopher Escobar, PharmD

## 2019-02-07 ENCOUNTER — ANTICOAGULATION MONITORING (OUTPATIENT)
Dept: VASCULAR LAB | Facility: MEDICAL CENTER | Age: 79
End: 2019-02-07

## 2019-02-07 DIAGNOSIS — I48.91 ATRIAL FIBRILLATION, UNSPECIFIED TYPE (HCC): ICD-10-CM

## 2019-02-07 DIAGNOSIS — Z79.01 LONG TERM CURRENT USE OF ANTICOAGULANT THERAPY: ICD-10-CM

## 2019-02-07 LAB — INR PPP: 2.9 (ref 2–3.5)

## 2019-02-08 NOTE — PROGRESS NOTES
Anticoagulation Summary  As of 2019    INR goal:   2.5-3.0   TTR:   62.5 % (3.7 y)   INR used for dosin.9 (2019)   Warfarin maintenance plan:   4.5 mg (3 mg x 1.5) every Mon, Thu; 3 mg (3 mg x 1) all other days   Weekly warfarin total:   24 mg   Plan last modified:   LOUIS Cho (2018)   Next INR check:   2019   Priority:   Maintenance   Target end date:   Indefinite    Indications    Atrial fibrillation (HCC) [I48.91]  Long term current use of anticoagulant therapy [Z79.01]             Anticoagulation Episode Summary     INR check location:   Home Draw    Preferred lab:       Send INR reminders to:       Comments:   Waqar [facility] [phone] -   goal range changed 2016 per cards      Anticoagulation Care Providers     Provider Role Specialty Phone number    Hu Gamez M.D. Referring Cardiac Electrophysiology [phone]    [facility] Anticoagulation Services Responsible  [phone]    Kristopher Escobar, PharmD Responsible          Anticoagulation Patient Findings      Left voicemail message to report a therapeutic INR of 2.9.    Will have pt continue with current warfarin dosing regimen. Requested pt contact the clinic for any s/s of unusual bleeding, bruising, clotting or any changes to diet or medication.    FU INR in 1 week(s) on Thursday,     Ammon Aguilar, Pharmacy Intern

## 2019-02-10 LAB — INR PPP: 3.1 (ref 2–3.5)

## 2019-02-11 ENCOUNTER — ANTICOAGULATION MONITORING (OUTPATIENT)
Dept: VASCULAR LAB | Facility: MEDICAL CENTER | Age: 79
End: 2019-02-11

## 2019-02-11 DIAGNOSIS — Z79.01 LONG TERM CURRENT USE OF ANTICOAGULANT THERAPY: ICD-10-CM

## 2019-02-11 DIAGNOSIS — I48.91 ATRIAL FIBRILLATION, UNSPECIFIED TYPE (HCC): ICD-10-CM

## 2019-02-11 LAB — INR PPP: 3.4 (ref 2–3.5)

## 2019-02-11 NOTE — PROGRESS NOTES
Anticoagulation Summary  As of 2/11/2019    INR goal:   2.5-3.0   TTR:   62.4 % (3.7 y)   INR used for dosing:   3.1! (2/10/2019)   Warfarin maintenance plan:   4.5 mg (3 mg x 1.5) every Mon, Thu; 3 mg (3 mg x 1) all other days   Weekly warfarin total:   24 mg   Plan last modified:   BERE ChoPJEEVAN (12/13/2018)   Next INR check:   2/17/2019   Priority:   Maintenance   Target end date:   Indefinite    Indications    Atrial fibrillation (HCC) [I48.91]  Long term current use of anticoagulant therapy [Z79.01]             Anticoagulation Episode Summary     INR check location:   Home Draw    Preferred lab:       Send INR reminders to:       Comments:   Waqar  - [phone] -   goal range changed 11- per cards      Anticoagulation Care Providers     Provider Role Specialty Phone number    Hu Gamez M.D. Referring Cardiac Electrophysiology [phone]    [facility] Anticoagulation Services Responsible  [phone]    Kristopher Escobar, PharmD Responsible          Anticoagulation Patient Findings     Discussed warfarin dosing plan with Samantha RobbD due to patient's elevated INR.     Left voicemail message to patient's inbox to report a SUPRAtherapeutic INR of 3.4.    Instructed pt to take 1/2 dose of warfarin today of 1.5 mg and to continue with current warfarin dosing regimen.     Requested pt contact the clinic for any s/s of unusual bleeding, bruising, clotting or any changes to diet or medication.    Will follow up INR in 1 week(s) on Sunday, February 17th    Ammon Aguilar, Pharmacy Intern

## 2019-02-11 NOTE — PROGRESS NOTES
OP Telephone Anticoagulation Service Note    Date: 2/11/2019      Anticoagulation Summary  As of 2/11/2019    INR goal:   2.5-3.0   TTR:   62.3 % (3.7 y)   INR used for dosing:   3.4! (2/11/2019)   Warfarin maintenance plan:   4.5 mg (3 mg x 1.5) every Mon, Thu; 3 mg (3 mg x 1) all other days   Weekly warfarin total:   24 mg   Plan last modified:   LOUIS Cho (12/13/2018)   Next INR check:      Priority:   Maintenance   Target end date:   Indefinite    Indications    Atrial fibrillation (HCC) [I48.91]  Long term current use of anticoagulant therapy [Z79.01]             Anticoagulation Episode Summary     INR check location:   Home Draw    Preferred lab:       Send INR reminders to:       Comments:   Waqar  - [phone] -   goal range changed 11- per cards      Anticoagulation Care Providers     Provider Role Specialty Phone number    Hu Gamez M.D. Referring Cardiac Electrophysiology [phone]    [facility] Anticoagulation Services Responsible  [phone]    Kristopher Escobar, PharmD Responsible          Anticoagulation Patient Findings        Plan: INR is high today. Confirmed dosing regimen. No missed or extra doses taken. Patient reports the white of her eye is blood colored, it is healing. She reports she took a dandelion pill which she thinks elevated her INR. Instructed pt to call clinic with any concerns of bleeding or thrombosis. Instructed pt to take 3 mg tonight then resume usual regimen.  Follow up in 1 week(s)      Shyanne Damon, ClarisseD

## 2019-02-21 ENCOUNTER — ANTICOAGULATION MONITORING (OUTPATIENT)
Dept: VASCULAR LAB | Facility: MEDICAL CENTER | Age: 79
End: 2019-02-21

## 2019-02-21 DIAGNOSIS — I48.91 ATRIAL FIBRILLATION, UNSPECIFIED TYPE (HCC): ICD-10-CM

## 2019-02-21 DIAGNOSIS — Z79.01 LONG TERM CURRENT USE OF ANTICOAGULANT THERAPY: ICD-10-CM

## 2019-02-21 LAB — INR PPP: 2.6 (ref 2–3.5)

## 2019-02-21 NOTE — PROGRESS NOTES
Anticoagulation Summary  As of 2019    INR goal:   2.5-3.0   TTR:   62.3 % (3.8 y)   INR used for dosin.6 (2019)   Warfarin maintenance plan:   4.5 mg (3 mg x 1.5) every Mon, Thu; 3 mg (3 mg x 1) all other days   Weekly warfarin total:   24 mg   Plan last modified:   LOUIS Cho (2018)   Next INR check:   2019   Priority:   Maintenance   Target end date:   Indefinite    Indications    Atrial fibrillation (HCC) [I48.91]  Long term current use of anticoagulant therapy [Z79.01]             Anticoagulation Episode Summary     INR check location:   Home Draw    Preferred lab:       Send INR reminders to:       Comments:   Waqar  - [phone] -   goal range changed 2016 per cards      Anticoagulation Care Providers     Provider Role Specialty Phone number    Hu Gamez M.D. Referring Cardiac Electrophysiology [phone]    [facility] Anticoagulation Services Responsible  [phone]    Kristopher Escobar, PharmD Responsible          Anticoagulation Patient Findings      Spoke with patient this morning to report a therapeutic INR of 2.6.      Patient instructed to continue with current warfarin dosing regimen, confirms dosing.     Patient denies any s/s of bruising, clotting or any changes to diet or medication.     Patient did mention that she had some bleeding in her eye on Saturday,  due to a popping noise that she thought had occurred in her ear. She did say that the bleed resolved on its own, but I strongly encouraged her that if that happened again or any other type of uncontrolled bleeding she needs to notify our clinic and/or seek medical care.      Will follow up in 1 week(s) on      Ammon Aguilar, Pharmacy Intern

## 2019-02-28 ENCOUNTER — ANTICOAGULATION MONITORING (OUTPATIENT)
Dept: MEDICAL GROUP | Facility: MEDICAL CENTER | Age: 79
End: 2019-02-28

## 2019-02-28 DIAGNOSIS — I48.91 ATRIAL FIBRILLATION, UNSPECIFIED TYPE (HCC): ICD-10-CM

## 2019-02-28 DIAGNOSIS — Z79.01 LONG TERM CURRENT USE OF ANTICOAGULANT THERAPY: ICD-10-CM

## 2019-02-28 LAB — INR PPP: 3 (ref 2–3.5)

## 2019-02-28 NOTE — PROGRESS NOTES
Anticoagulation Summary  As of 2/28/2019    INR goal:   2.5-3.0   TTR:   62.5 % (3.8 y)   INR used for dosing:   3.0 (2/28/2019)   Warfarin maintenance plan:   4.5 mg (3 mg x 1.5) every Mon, Thu; 3 mg (3 mg x 1) all other days   Weekly warfarin total:   24 mg   No change documented:   Vipin Goss Ass't   Plan last modified:   LOUIS Cho (12/13/2018)   Next INR check:   3/7/2019   Priority:   Maintenance   Target end date:   Indefinite    Indications    Atrial fibrillation (HCC) [I48.91]  Long term current use of anticoagulant therapy [Z79.01]             Anticoagulation Episode Summary     INR check location:   Home Draw    Preferred lab:       Send INR reminders to:       Comments:   Waqar  - [phone] -   goal range changed 11- per cards      Anticoagulation Care Providers     Provider Role Specialty Phone number    Hu Gamez M.D. Referring Cardiac Electrophysiology [phone]    [facility] Anticoagulation Services Responsible  [phone]    Kristopher Escobar, PharmD Responsible          Anticoagulation Patient Findings  Patient Findings     Negatives:   Signs/symptoms of thrombosis, Signs/symptoms of bleeding, Laboratory test error suspected, Change in health, Change in alcohol use, Change in activity, Upcoming invasive procedure, Emergency department visit, Upcoming dental procedure, Missed doses, Extra doses, Change in medications, Change in diet/appetite, Hospital admission, Bruising, Other complaints      Left voicemail message to report 3.0 therapeutic INR of 2.0-3.0.  Patient to continue with current warfarin dosing regimen. Requested pt contact the clinic for any change to diet or medication, and to report any signs or symptoms of bleeding, bruising or clotting.  Pt to follow up in 1 weeks.  Vipin Goss Ass't      I have reviewed and concur with the above plan     Skip Gonzalez, ClarisseD

## 2019-03-06 ENCOUNTER — ANTICOAGULATION MONITORING (OUTPATIENT)
Dept: VASCULAR LAB | Facility: MEDICAL CENTER | Age: 79
End: 2019-03-06

## 2019-03-06 DIAGNOSIS — Z79.01 LONG TERM CURRENT USE OF ANTICOAGULANT THERAPY: ICD-10-CM

## 2019-03-06 DIAGNOSIS — I48.91 ATRIAL FIBRILLATION, UNSPECIFIED TYPE (HCC): ICD-10-CM

## 2019-03-06 LAB — INR PPP: 2.8 (ref 2–3.5)

## 2019-03-06 NOTE — PROGRESS NOTES
Anticoagulation Summary  As of 3/6/2019    INR goal:   2.5-3.0   TTR:   62.7 % (3.8 y)   INR used for dosin.8 (3/6/2019)   Warfarin maintenance plan:   4.5 mg (3 mg x 1.5) every Mon, Thu; 3 mg (3 mg x 1) all other days   Weekly warfarin total:   24 mg   Plan last modified:   LOUIS Cho (2018)   Next INR check:   3/13/2019   Priority:   Maintenance   Target end date:   Indefinite    Indications    Atrial fibrillation (HCC) [I48.91]  Long term current use of anticoagulant therapy [Z79.01]             Anticoagulation Episode Summary     INR check location:   Home Draw    Preferred lab:       Send INR reminders to:       Comments:   Waqar [facility] [phone] -   goal range changed 2016 per cards      Anticoagulation Care Providers     Provider Role Specialty Phone number    Hu Gamez M.D. Referring Cardiac Electrophysiology [phone]    [facility] Anticoagulation Services Responsible  [phone]    Kristopher Escobar, PharmD Responsible          Anticoagulation Patient Findings  Patient Findings     Positives:   Signs/symptoms of bleeding    Negatives:   Signs/symptoms of thrombosis, Laboratory test error suspected, Change in health, Change in alcohol use, Change in activity, Upcoming invasive procedure, Emergency department visit, Upcoming dental procedure, Missed doses, Extra doses, Change in medications, Change in diet/appetite, Hospital admission, Bruising, Other complaints        Spoke with patient today regarding therapeutic INR of 2.8.  Patient denies any signs/symptoms of bruising or any changes in diet and medications.  Instructed patient to call clinic with any questions or concerns.  Patient reports subconjunctival hemorrhage that has reoccurred from incident ~one month ago.  She denies any mitigating factors that may have caused this.  Strongly encouraged evaluation with optometrist to rule out.  Will have her decrease dosing this week as she is near top of therapeutic  range.  Follow up in 1 weeks, to reduce risk of adverse events related to this high risk medication,  Warfarin.    Kristopher Escobar, PharmD

## 2019-03-07 ENCOUNTER — TELEPHONE (OUTPATIENT)
Dept: CARDIOLOGY | Facility: MEDICAL CENTER | Age: 79
End: 2019-03-07

## 2019-03-07 NOTE — TELEPHONE ENCOUNTER
"Left message for Meme to call.         LOUIS Coello 37 minutes ago (10:04 AM)         I am using this format to talk to you about my Mom, Madeline \"Mary\" Jayna ERICA 1940.   In the past month, she has had two conjunctival hemorrhages and her BP was found to be elevated yesterday at the eye MD. Her INR level was 2.8 yesterday and 2.9 when she had the other bleed. She wakes up with the bleeds in her eye.   Last night before she went to bed her BP was 164/something, so she asked me what I thought and I told her maybe she should take two 25mg metoprolol instead of one.  This AM her BP was 153/66. I think she is taking 25mg Losartan in the AM and 25mg Metoprolol at HS.   Could you give her a call and see if there is anything she should be doing besides monitoring her BP? She has an appt with Dr Spence on 2019. Thanks Meme        "

## 2019-03-07 NOTE — TELEPHONE ENCOUNTER
Called Meme to advise.    Message   Received: Today   Message Contents   LOUIS Martinez L.P.N.   Caller: Unspecified (Today, 10:43 AM)             Have her increase her losartan to 50 mgs daily and monitor her BP.

## 2019-03-08 ENCOUNTER — OFFICE VISIT (OUTPATIENT)
Dept: CARDIOLOGY | Facility: MEDICAL CENTER | Age: 79
End: 2019-03-08
Payer: MEDICARE

## 2019-03-08 VITALS
BODY MASS INDEX: 24.25 KG/M2 | SYSTOLIC BLOOD PRESSURE: 164 MMHG | HEART RATE: 85 BPM | OXYGEN SATURATION: 96 % | HEIGHT: 68 IN | WEIGHT: 160 LBS | DIASTOLIC BLOOD PRESSURE: 74 MMHG

## 2019-03-08 DIAGNOSIS — I10 ESSENTIAL HYPERTENSION, BENIGN: ICD-10-CM

## 2019-03-08 DIAGNOSIS — G45.9 TIA (TRANSIENT ISCHEMIC ATTACK): ICD-10-CM

## 2019-03-08 DIAGNOSIS — Z86.69 HISTORY OF MIGRAINE HEADACHES: ICD-10-CM

## 2019-03-08 DIAGNOSIS — Z95.0 PRESENCE OF PERMANENT CARDIAC PACEMAKER: Chronic | ICD-10-CM

## 2019-03-08 DIAGNOSIS — Z79.01 LONG TERM CURRENT USE OF ANTICOAGULANT THERAPY: Chronic | ICD-10-CM

## 2019-03-08 DIAGNOSIS — I48.21 PERMANENT ATRIAL FIBRILLATION (HCC): ICD-10-CM

## 2019-03-08 PROCEDURE — 99214 OFFICE O/P EST MOD 30 MIN: CPT | Mod: 25 | Performed by: NURSE PRACTITIONER

## 2019-03-08 PROCEDURE — 93279 PRGRMG DEV EVAL PM/LDLS PM: CPT | Performed by: NURSE PRACTITIONER

## 2019-03-08 RX ORDER — MOMETASONE FUROATE 1 MG/G
CREAM TOPICAL
COMMUNITY
Start: 2018-12-21 | End: 2019-06-13

## 2019-03-08 RX ORDER — LOSARTAN POTASSIUM 25 MG/1
25 TABLET ORAL 2 TIMES DAILY
Qty: 180 TAB | Refills: 3 | Status: SHIPPED | OUTPATIENT
Start: 2019-03-08 | End: 2019-08-02 | Stop reason: SDUPTHER

## 2019-03-08 RX ORDER — MOMETASONE FUROATE 1 MG/G
CREAM TOPICAL
Refills: 0 | COMMUNITY
Start: 2018-12-21 | End: 2019-03-08

## 2019-03-08 ASSESSMENT — ENCOUNTER SYMPTOMS
FEVER: 0
NAUSEA: 0
BLOOD IN STOOL: 0
ORTHOPNEA: 0
DOUBLE VISION: 0
PALPITATIONS: 0
HEADACHES: 1
COUGH: 0
SORE THROAT: 0
MYALGIAS: 0
PSYCHIATRIC NEGATIVE: 1
VOMITING: 0
WHEEZING: 0
PND: 0
BLURRED VISION: 0
ABDOMINAL PAIN: 0
CLAUDICATION: 0
FOCAL WEAKNESS: 0
BRUISES/BLEEDS EASILY: 0
SHORTNESS OF BREATH: 0
DIZZINESS: 0
HEARTBURN: 0
CHILLS: 0
LOSS OF CONSCIOUSNESS: 0
SPEECH CHANGE: 0
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
## 2019-03-14 ENCOUNTER — ANTICOAGULATION MONITORING (OUTPATIENT)
Dept: VASCULAR LAB | Facility: MEDICAL CENTER | Age: 79
End: 2019-03-14

## 2019-03-14 DIAGNOSIS — I48.91 ATRIAL FIBRILLATION, UNSPECIFIED TYPE (HCC): ICD-10-CM

## 2019-03-14 DIAGNOSIS — Z79.01 LONG TERM CURRENT USE OF ANTICOAGULANT THERAPY: ICD-10-CM

## 2019-03-14 LAB — INR PPP: 2.9 (ref 2–3.5)

## 2019-03-14 NOTE — PROGRESS NOTES
Anticoagulation Summary  As of 3/14/2019    INR goal:   2.5-3.0   TTR:   62.9 % (3.8 y)   INR used for dosin.9 (3/14/2019)   Warfarin maintenance plan:   4.5 mg (3 mg x 1.5) every Mon, Thu; 3 mg (3 mg x 1) all other days   Weekly warfarin total:   24 mg   Plan last modified:   LOUIS Cho (2018)   Next INR check:   3/28/2019   Priority:   Maintenance   Target end date:   Indefinite    Indications    Atrial fibrillation (HCC) [I48.91]  Long term current use of anticoagulant therapy [Z79.01]             Anticoagulation Episode Summary     INR check location:   Home Draw    Preferred lab:       Send INR reminders to:       Comments:   [facility] [phone] -   goal range changed 2016 per cards      Anticoagulation Care Providers     Provider Role Specialty Phone number    Hu Gamez M.D. Referring Cardiac Electrophysiology [phone]    [facility] Responsible  [phone]    Kristopher Escobar, PharmD Responsible          Anticoagulation Patient Findings    Spoke with Madeline to report a therapeutic INR of 2.9. Continue current dosing regimen.  Follow up in 1 weeks, to reduce the risk of adverse events related to this high risk medication, warfarin.    Samantha Amaya, Clinical Pharmacist

## 2019-03-20 ENCOUNTER — ANTICOAGULATION MONITORING (OUTPATIENT)
Dept: VASCULAR LAB | Facility: MEDICAL CENTER | Age: 79
End: 2019-03-20

## 2019-03-20 DIAGNOSIS — Z79.01 LONG TERM CURRENT USE OF ANTICOAGULANT THERAPY: ICD-10-CM

## 2019-03-20 DIAGNOSIS — I48.91 ATRIAL FIBRILLATION, UNSPECIFIED TYPE (HCC): ICD-10-CM

## 2019-03-20 LAB — INR PPP: 2 (ref 2–3.5)

## 2019-03-20 NOTE — PROGRESS NOTES
Anticoagulation Summary  As of 3/20/2019    INR goal:   2.5-3.0   TTR:   62.8 % (3.8 y)   INR used for dosin.0! (3/20/2019)   Warfarin maintenance plan:   4.5 mg (3 mg x 1.5) every Mon, Thu; 3 mg (3 mg x 1) all other days   Weekly warfarin total:   24 mg   Plan last modified:   LOUIS Cho (2018)   Next INR check:   3/27/2019   Priority:   Maintenance   Target end date:   Indefinite    Indications    Atrial fibrillation (HCC) [I48.91]  Long term current use of anticoagulant therapy [Z79.01]             Anticoagulation Episode Summary     INR check location:   Home Draw    Preferred lab:       Send INR reminders to:       Comments:   [facility] [phone] -   goal range changed to 2.5-2.8 per reanna vaca 3-8-19      Anticoagulation Care Providers     Provider Role Specialty Phone number    uH Gamez M.D. Referring Cardiac Electrophysiology [phone]    [facility] Anticoagulation Services Responsible  [phone]    Kristopher Escobar, PharmD Responsible          Anticoagulation Patient Findings    Spoke with patient today regarding subtherapeutic INR of 2.0.  Patient denies any signs/symptoms of bruising or bleeding or any changes in diet and medications.  Instructed patient to call clinic with any questions or concerns.  Instructed patient to bolus with 4.5mg X 1, then resume current warfarin regimen.  Follow up in 1 weeks, to reduce risk of adverse events related to this high risk medication,  Warfarin.    Kristopher Escobar, PharmD

## 2019-03-21 ENCOUNTER — HOSPITAL ENCOUNTER (OUTPATIENT)
Dept: HOSPITAL 8 - CFH | Age: 79
Discharge: HOME | End: 2019-03-21
Attending: FAMILY MEDICINE
Payer: MEDICARE

## 2019-03-21 DIAGNOSIS — R51: Primary | ICD-10-CM

## 2019-03-21 PROCEDURE — 70496 CT ANGIOGRAPHY HEAD: CPT

## 2019-03-27 ENCOUNTER — ANTICOAGULATION MONITORING (OUTPATIENT)
Dept: VASCULAR LAB | Facility: MEDICAL CENTER | Age: 79
End: 2019-03-27

## 2019-03-27 DIAGNOSIS — I48.91 ATRIAL FIBRILLATION, UNSPECIFIED TYPE (HCC): ICD-10-CM

## 2019-03-27 DIAGNOSIS — Z79.01 LONG TERM CURRENT USE OF ANTICOAGULANT THERAPY: ICD-10-CM

## 2019-03-27 LAB — INR PPP: 2.4 (ref 2–3.5)

## 2019-03-27 NOTE — PROGRESS NOTES
Anticoagulation Summary  As of 3/27/2019    INR goal:   2.5-3.0   TTR:   62.5 % (3.8 y)   INR used for dosin.4! (3/27/2019)   Warfarin maintenance plan:   4.5 mg (3 mg x 1.5) every Mon, Thu; 3 mg (3 mg x 1) all other days   Weekly warfarin total:   24 mg   Plan last modified:   BERE ChoPJEEVAN (2018)   Next INR check:   4/3/2019   Priority:   Maintenance   Target end date:   Indefinite    Indications    Atrial fibrillation (HCC) [I48.91]  Long term current use of anticoagulant therapy [Z79.01]             Anticoagulation Episode Summary     INR check location:   Home Draw    Preferred lab:       Send INR reminders to:       Comments:   [facility] [phone] -   goal range changed to 2.5-2.8 per reanna vaca 3-8-19      Anticoagulation Care Providers     Provider Role Specialty Phone number    Hu Gamez M.D. Referring Cardiac Electrophysiology [phone]    [facility] Anticoagulation Services Responsible  [phone]    Kristopher Escobar, PharmD Responsible          Anticoagulation Patient Findings  Patient Findings     Negatives:   Signs/symptoms of thrombosis, Signs/symptoms of bleeding, Laboratory test error suspected, Change in health, Change in alcohol use, Change in activity, Upcoming invasive procedure, Emergency department visit, Upcoming dental procedure, Missed doses, Extra doses, Change in medications, Change in diet/appetite, Hospital admission, Bruising, Other complaints        Spoke with patient today regarding subtherapeutic INR of 2.4.  Patient denies any signs/symptoms of bruising or bleeding or any changes in diet and medications.  Instructed patient to call clinic with any questions or concerns.  Patient did not bolus dose last week as instructed.  Will have her bolus with 4.5mg X 1, then resume current warfarin regimen.  Follow up in 1 weeks, to reduce risk of adverse events related to this high risk medication,  Warfarin.    Kristopher Escobar, PharmD

## 2019-04-03 ENCOUNTER — ANTICOAGULATION MONITORING (OUTPATIENT)
Dept: VASCULAR LAB | Facility: MEDICAL CENTER | Age: 79
End: 2019-04-03

## 2019-04-03 DIAGNOSIS — I48.91 ATRIAL FIBRILLATION, UNSPECIFIED TYPE (HCC): ICD-10-CM

## 2019-04-03 DIAGNOSIS — Z79.01 LONG TERM CURRENT USE OF ANTICOAGULANT THERAPY: ICD-10-CM

## 2019-04-03 LAB — INR PPP: 2.4 (ref 2–3.5)

## 2019-04-03 NOTE — PROGRESS NOTES
Anticoagulation Summary  As of 4/3/2019    INR goal:   2.5-3.0   TTR:   62.2 % (3.9 y)   INR used for dosin.4! (4/3/2019)   Warfarin maintenance plan:   4.5 mg (3 mg x 1.5) every Mon, Thu, Sat; 3 mg (3 mg x 1) all other days   Weekly warfarin total:   25.5 mg   Plan last modified:   Kellen Lackey, Yesenia (4/3/2019)   Next INR check:   4/10/2019   Priority:   Maintenance   Target end date:   Indefinite    Indications    Atrial fibrillation (HCC) [I48.91]  Long term current use of anticoagulant therapy [Z79.01]             Anticoagulation Episode Summary     INR check location:   Home Draw    Preferred lab:       Send INR reminders to:       Comments:   [facility] [phone] -   goal range changed to 2.5-2.8 per reanna vaca 3-8-19      Anticoagulation Care Providers     Provider Role Specialty Phone number    Hu Gamez M.D. Referring Cardiac Electrophysiology [phone]    [facility] Anticoagulation Services Responsible  [phone]    Kristopher Escobar, PharmD Responsible          Anticoagulation Patient Findings      Spoke with patient.  INR is SUB therapeutic.   Pt denies any unusual s/s of bleeding, bruising, clotting or any changes to diet or medications. Denies any etoh, cranberries, supplements, or illness.   Pt verifies warfarin weekly dosing.     Will have pt start a 6% increased weekly warfarin dose.     Repeat INR in 1 week(s).     Kellen Lackey, PharmD

## 2019-04-09 ENCOUNTER — SLEEP CENTER VISIT (OUTPATIENT)
Dept: SLEEP MEDICINE | Facility: MEDICAL CENTER | Age: 79
End: 2019-04-09
Payer: MEDICARE

## 2019-04-09 VITALS
SYSTOLIC BLOOD PRESSURE: 140 MMHG | RESPIRATION RATE: 16 BRPM | HEART RATE: 90 BPM | WEIGHT: 157 LBS | HEIGHT: 68 IN | DIASTOLIC BLOOD PRESSURE: 78 MMHG | BODY MASS INDEX: 23.79 KG/M2 | OXYGEN SATURATION: 97 %

## 2019-04-09 DIAGNOSIS — G47.33 OSA (OBSTRUCTIVE SLEEP APNEA): ICD-10-CM

## 2019-04-09 DIAGNOSIS — K21.9 GASTROESOPHAGEAL REFLUX DISEASE, ESOPHAGITIS PRESENCE NOT SPECIFIED: ICD-10-CM

## 2019-04-09 DIAGNOSIS — I48.91 ATRIAL FIBRILLATION, UNSPECIFIED TYPE (HCC): ICD-10-CM

## 2019-04-09 DIAGNOSIS — I10 ESSENTIAL HYPERTENSION: ICD-10-CM

## 2019-04-09 DIAGNOSIS — M35.00 SJOGREN'S SYNDROME, WITH UNSPECIFIED ORGAN INVOLVEMENT (HCC): ICD-10-CM

## 2019-04-09 PROCEDURE — 99213 OFFICE O/P EST LOW 20 MIN: CPT | Performed by: NURSE PRACTITIONER
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
## 2019-04-09 NOTE — PATIENT INSTRUCTIONS
Plan:    1) Continue CPAP @ 5 CM H20. She is using her machine nightly and benefiting from use.   2) Sleep hygiene discussed. Recommend keeping a set sleep/wake schedule. Logging enough hours of sleep. Limiting/Avoiding naps. No caffeine after noon and no heavy meals in the evening. Recommend daily exercise. She states she does exercise routinely.   3) Continue to follow Cardiology and Rheumatology. She is pending consult with Neurology for further work up on Migraine headaches.   4) Annual sleep follow up with compliance card download, sooner if needed.

## 2019-04-10 ENCOUNTER — ANTICOAGULATION MONITORING (OUTPATIENT)
Dept: VASCULAR LAB | Facility: MEDICAL CENTER | Age: 79
End: 2019-04-10

## 2019-04-10 DIAGNOSIS — I48.91 ATRIAL FIBRILLATION, UNSPECIFIED TYPE (HCC): ICD-10-CM

## 2019-04-10 DIAGNOSIS — Z79.01 LONG TERM CURRENT USE OF ANTICOAGULANT THERAPY: ICD-10-CM

## 2019-04-10 LAB — INR PPP: 2.3 (ref 2–3.5)

## 2019-04-10 NOTE — PROGRESS NOTES
Anticoagulation Summary  As of 4/10/2019    INR goal:   2.5-3.0   TTR:   61.9 % (3.9 y)   INR used for dosin.3! (4/10/2019)   Warfarin maintenance plan:   3 mg (3 mg x 1) every Tue, Wed, Fri; 4.5 mg (3 mg x 1.5) all other days   Weekly warfarin total:   27 mg   Plan last modified:   Tricia Hernandez, Pharmacy Intern (4/10/2019)   Next INR check:   2019   Priority:   Maintenance   Target end date:   Indefinite    Indications    Atrial fibrillation (HCC) [I48.91]  Long term current use of anticoagulant therapy [Z79.01]             Anticoagulation Episode Summary     INR check location:   Home Draw    Preferred lab:       Send INR reminders to:       Comments:   [facility] [phone] -   goal range changed to 2.5-2.8 per reanna vaca 3-8-19      Anticoagulation Care Providers     Provider Role Specialty Phone number    Hu Gamez M.D. Referring Cardiac Electrophysiology [phone]    [facility] Anticoagulation Services Responsible  [phone]    Kristopher Escobar, PharmD Responsible          Anticoagulation Patient Findings    Spoke with patient to report SUB-therapeutic INR.     Patient denies any changes to medications, or any unusual s/sx of bleeding, bruising, or clotting. States that she has started to eat green salads about three times a week, and would like to maintain this amount of greens week to week.     Instructed patient to contact clinic with any questions or concerns.     Patient to BOLUS x1 with 4.5 mg tonight, and then increase weekly regimen by ~6% as outline above. Patient confirmed warfarin dosing changes. Follow-up INR in 1 week, .     Tricia Hernandez, Pharmacy Intern  Discussed warfarin dosing with Kristopher Escobar, Yesenia

## 2019-04-17 ENCOUNTER — ANTICOAGULATION MONITORING (OUTPATIENT)
Dept: VASCULAR LAB | Facility: MEDICAL CENTER | Age: 79
End: 2019-04-17

## 2019-04-17 DIAGNOSIS — Z79.01 LONG TERM CURRENT USE OF ANTICOAGULANT THERAPY: ICD-10-CM

## 2019-04-17 DIAGNOSIS — I48.91 ATRIAL FIBRILLATION, UNSPECIFIED TYPE (HCC): ICD-10-CM

## 2019-04-17 LAB — INR PPP: 3.2 (ref 2–3.5)

## 2019-04-17 NOTE — PROGRESS NOTES
Anticoagulation Summary  As of 4/17/2019    INR goal:   2.5-3.0   TTR:   61.8 % (3.9 y)   INR used for dosing:   3.2! (4/17/2019)   Warfarin maintenance plan:   4.5 mg (3 mg x 1.5) every Mon, Thu, Sat; 3 mg (3 mg x 1) all other days   Weekly warfarin total:   25.5 mg   Plan last modified:   Kristopher Escobar PharmJANELLE (4/17/2019)   Next INR check:   4/24/2019   Priority:   Maintenance   Target end date:   Indefinite    Indications    Atrial fibrillation (HCC) [I48.91]  Long term current use of anticoagulant therapy [Z79.01]             Anticoagulation Episode Summary     INR check location:   Home Draw    Preferred lab:       Send INR reminders to:       Comments:   [facility] [phone] -   goal range changed to 2.5-2.8 per reanna vaca 3-8-19      Anticoagulation Care Providers     Provider Role Specialty Phone number    Hu Gamez M.D. Referring Cardiac Electrophysiology [phone]    [facility] Anticoagulation Services Responsible  [phone]    Kristopher Escobar, PharmD Responsible          Anticoagulation Patient Findings  Patient Findings     Negatives:   Signs/symptoms of thrombosis, Signs/symptoms of bleeding, Laboratory test error suspected, Change in health, Change in alcohol use, Change in activity, Upcoming invasive procedure, Emergency department visit, Upcoming dental procedure, Missed doses, Extra doses, Change in medications, Change in diet/appetite, Hospital admission, Bruising, Other complaints        Spoke with patient today regarding supratherapeutic INR of 3.2.  Patient denies any signs/symptoms of bruising or bleeding or any changes in diet and medications.  Instructed patient to call clinic with any questions or concerns.  Patient quite confusing when recalling this past week's dosing, documented in calendar what she believes was taken this past week.  Instructed patient to decrease today's dose to 1.5mg, then begin new dosing regimen.  Asked that if patient alters this plan that she document  and let our clinic know at next INR so that accurate weekly dosing may be done.  Follow up in 1 weeks, to reduce risk of adverse events related to this high risk medication,  Warfarin.    Kristopher Escobar, PharmD

## 2019-04-24 ENCOUNTER — ANTICOAGULATION MONITORING (OUTPATIENT)
Dept: VASCULAR LAB | Facility: MEDICAL CENTER | Age: 79
End: 2019-04-24

## 2019-04-24 DIAGNOSIS — I48.91 ATRIAL FIBRILLATION, UNSPECIFIED TYPE (HCC): ICD-10-CM

## 2019-04-24 DIAGNOSIS — Z79.01 LONG TERM CURRENT USE OF ANTICOAGULANT THERAPY: ICD-10-CM

## 2019-04-24 LAB — INR PPP: 3.3 (ref 2–3.5)

## 2019-04-24 NOTE — PROGRESS NOTES
Anticoagulation Summary  As of 4/24/2019    INR goal:   2.5-3.0   TTR:   61.5 % (3.9 y)   INR used for dosing:   3.30! (4/24/2019)   Warfarin maintenance plan:   4.5 mg (3 mg x 1.5) every Mon, Thu, Sat; 3 mg (3 mg x 1) all other days   Weekly warfarin total:   25.5 mg   Plan last modified:   Kristopher Escobar PharmJANELLE (4/17/2019)   Next INR check:   5/1/2019   Priority:   Maintenance   Target end date:   Indefinite    Indications    Atrial fibrillation (HCC) [I48.91]  Long term current use of anticoagulant therapy [Z79.01]             Anticoagulation Episode Summary     INR check location:   Home Draw    Preferred lab:       Send INR reminders to:       Comments:   [facility] [phone] -   goal range changed to 2.5-2.8 per reanna vaca 3-8-19      Anticoagulation Care Providers     Provider Role Specialty Phone number    Hu Gamez M.D. Referring Cardiac Electrophysiology [phone]    [facility] Anticoagulation Services Responsible  [phone]    Kristopher Escobar, PharmD Responsible          Anticoagulation Patient Findings    Left a message on patients voicemail to report a SUPRA-therapeutic INR of 3.30.     Instructed patient to contact clinic with any changes to current medications, diet, or if experiencing any unusual s/sx of bleeding, bruising, or clotting.      Patient to take 1.5 mg of warfarin today only, and then continue with current warfarin regimen as stated above. Follow-up INR in 1 week, May 1.     Tricia Hernandez, Pharmacy Intern

## 2019-05-01 ENCOUNTER — ANTICOAGULATION MONITORING (OUTPATIENT)
Dept: VASCULAR LAB | Facility: MEDICAL CENTER | Age: 79
End: 2019-05-01

## 2019-05-01 DIAGNOSIS — Z79.01 LONG TERM CURRENT USE OF ANTICOAGULANT THERAPY: ICD-10-CM

## 2019-05-01 DIAGNOSIS — I48.91 ATRIAL FIBRILLATION, UNSPECIFIED TYPE (HCC): ICD-10-CM

## 2019-05-01 LAB — INR PPP: 3.4 (ref 2–3.5)

## 2019-05-01 NOTE — PROGRESS NOTES
Anticoagulation Summary  As of 5/1/2019    INR goal:   2.5-3.0   TTR:   61.2 % (3.9 y)   INR used for dosing:   3.40! (5/1/2019)   Warfarin maintenance plan:   4.5 mg (3 mg x 1.5) every Mon, Sat; 3 mg (3 mg x 1) all other days   Weekly warfarin total:   24 mg   Plan last modified:   Kristopher Escobar PharmD (5/1/2019)   Next INR check:   5/8/2019   Priority:   Maintenance   Target end date:   Indefinite    Indications    Atrial fibrillation (HCC) [I48.91]  Long term current use of anticoagulant therapy [Z79.01]             Anticoagulation Episode Summary     INR check location:   Home Draw    Preferred lab:       Send INR reminders to:       Comments:   [facility] [phone] -   goal range changed to 2.5-2.8 per reanna vaca 3-8-19      Anticoagulation Care Providers     Provider Role Specialty Phone number    Hu Gamez M.D. Referring Cardiac Electrophysiology [phone]    [facility] Anticoagulation Services Responsible  [phone]    Kristopher Escobar, PharmD Responsible          Anticoagulation Patient Findings  Patient Findings     Negatives:   Signs/symptoms of thrombosis, Signs/symptoms of bleeding, Laboratory test error suspected, Change in health, Change in alcohol use, Change in activity, Upcoming invasive procedure, Emergency department visit, Upcoming dental procedure, Missed doses, Extra doses, Change in medications, Change in diet/appetite, Hospital admission, Bruising, Other complaints        Spoke with patient today regarding supratherapeutic INR of 3.4.  Patient denies any signs/symptoms of bruising or bleeding or any changes in diet and medications.  Instructed patient to call clinic with any questions or concerns.  Instructed patient to decrease today's dose to 1.5mg, then decrease weekly warfarin regimen as detailed above.  Follow up in 1 weeks, to reduce risk of adverse events related to this high risk medication,  Warfarin.    Kristopher Escobar, ClarisseD

## 2019-05-08 ENCOUNTER — ANTICOAGULATION MONITORING (OUTPATIENT)
Dept: VASCULAR LAB | Facility: MEDICAL CENTER | Age: 79
End: 2019-05-08

## 2019-05-08 DIAGNOSIS — Z79.01 LONG TERM CURRENT USE OF ANTICOAGULANT THERAPY: ICD-10-CM

## 2019-05-08 DIAGNOSIS — I48.91 ATRIAL FIBRILLATION, UNSPECIFIED TYPE (HCC): ICD-10-CM

## 2019-05-08 LAB — INR PPP: 3 (ref 2–3.5)

## 2019-05-08 NOTE — PROGRESS NOTES
Anticoagulation Summary  As of 5/8/2019    INR goal:   2.5-3.0   TTR:   60.9 % (4 y)   INR used for dosing:   3.00 (5/8/2019)   Warfarin maintenance plan:   4.5 mg (3 mg x 1.5) every Mon; 3 mg (3 mg x 1) all other days   Weekly warfarin total:   22.5 mg   Plan last modified:   Kellen Lackey, Yesenia (5/8/2019)   Next INR check:   5/15/2019   Priority:   Maintenance   Target end date:   Indefinite    Indications    Atrial fibrillation (HCC) [I48.91]  Long term current use of anticoagulant therapy [Z79.01]             Anticoagulation Episode Summary     INR check location:   Home Draw    Preferred lab:       Send INR reminders to:       Comments:   Waqar [facility] [address] [phone] -   goal range changed to 2.5-2.8 per reanna vaca 3-8-19      Anticoagulation Care Providers     Provider Role Specialty Phone number    Hu Gamez M.D. Referring Cardiac Electrophysiology [phone]    [facility] Anticoagulation Services Responsible  [phone]    Kristopher Escobar, PharmD Responsible          Anticoagulation Patient Findings  Patient Findings     Negatives:   Signs/symptoms of thrombosis, Signs/symptoms of bleeding, Laboratory test error suspected, Change in health, Change in alcohol use, Change in activity, Upcoming invasive procedure, Emergency department visit, Upcoming dental procedure, Missed doses, Extra doses, Change in medications, Change in diet/appetite, Hospital admission, Bruising, Other complaints          Spoke with patient.  INR is SUPRA therapeutic, as per 3/8/19 note from [facility], INR range is now 2.5-2.8.   Pt denies any unusual s/s of bleeding, bruising, clotting or any changes to diet or medications. Denies any etoh, cranberries, supplements, or illness.   Pt verifies warfarin weekly dosing.     Will have pt start a reduced weekly dose of 3mg daily for the next week.     Repeat INR in 1 week(s).     Kellen Lackey, PharmD

## 2019-05-10 ENCOUNTER — HOSPITAL ENCOUNTER (OUTPATIENT)
Dept: HOSPITAL 8 - RAD | Age: 79
Discharge: HOME | End: 2019-05-10
Attending: NURSE PRACTITIONER
Payer: MEDICARE

## 2019-05-10 DIAGNOSIS — M19.071: Primary | ICD-10-CM

## 2019-05-10 PROCEDURE — 77002 NEEDLE LOCALIZATION BY XRAY: CPT

## 2019-05-10 PROCEDURE — 20610 DRAIN/INJ JOINT/BURSA W/O US: CPT

## 2019-05-16 ENCOUNTER — ANTICOAGULATION MONITORING (OUTPATIENT)
Dept: VASCULAR LAB | Facility: MEDICAL CENTER | Age: 79
End: 2019-05-16

## 2019-05-16 DIAGNOSIS — I48.91 ATRIAL FIBRILLATION, UNSPECIFIED TYPE (HCC): ICD-10-CM

## 2019-05-16 DIAGNOSIS — Z79.01 LONG TERM CURRENT USE OF ANTICOAGULANT THERAPY: ICD-10-CM

## 2019-05-16 LAB — INR PPP: 2.8 (ref 2–3.5)

## 2019-05-22 ENCOUNTER — ANTICOAGULATION MONITORING (OUTPATIENT)
Dept: VASCULAR LAB | Facility: MEDICAL CENTER | Age: 79
End: 2019-05-22

## 2019-05-22 DIAGNOSIS — Z79.01 LONG TERM CURRENT USE OF ANTICOAGULANT THERAPY: ICD-10-CM

## 2019-05-22 DIAGNOSIS — I48.91 ATRIAL FIBRILLATION, UNSPECIFIED TYPE (HCC): ICD-10-CM

## 2019-05-22 LAB — INR PPP: 2.1 (ref 2–3.5)

## 2019-05-22 NOTE — PROGRESS NOTES
Anticoagulation Summary  As of 2019    INR goal:   2.5-3.0   TTR:   61.1 % (4 y)   INR used for dosin.10! (2019)   Warfarin maintenance plan:   4.5 mg (3 mg x 1.5) every Mon; 3 mg (3 mg x 1) all other days   Weekly warfarin total:   22.5 mg   Plan last modified:   Kellen Lackey, PharmD (2019)   Next INR check:   2019   Priority:   Maintenance   Target end date:   Indefinite    Indications    Atrial fibrillation (HCC) [I48.91]  Long term current use of anticoagulant therapy [Z79.01]             Anticoagulation Episode Summary     INR check location:   Home Draw    Preferred lab:       Send INR reminders to:       Comments:   Waqar [facility] [phone] -   goal range changed to 2.5-2.8 per reanna vaca 3-8-19      Anticoagulation Care Providers     Provider Role Specialty Phone number    Hu Gamez M.D. Referring Cardiac Electrophysiology [phone]    [facility] Anticoagulation Services Responsible  [phone]    Kristopher Escobar, PharmD Responsible          Anticoagulation Patient Findings    HPI:  Madeline Dorantes, on anticoagulation therapy with warfarin for AF.   Changes to current medical/health status since last appt: none  Denies signs/symptoms of bleeding and/or thrombosis since the last appt.    Denies any interval changes to diet  Denies any interval changes to medications since last appt.   Denies any complications or cost restrictions with current therapy.     A/P   INR  SUB-therapeutic.   Bolus today then Pt is to continue with current warfarin dosing regimen.     Next INR in 1 week(s).    Fazal Pineda, PharmD

## 2019-05-30 ENCOUNTER — ANTICOAGULATION MONITORING (OUTPATIENT)
Dept: VASCULAR LAB | Facility: MEDICAL CENTER | Age: 79
End: 2019-05-30

## 2019-05-30 DIAGNOSIS — Z79.01 LONG TERM CURRENT USE OF ANTICOAGULANT THERAPY: ICD-10-CM

## 2019-05-30 DIAGNOSIS — I48.91 ATRIAL FIBRILLATION, UNSPECIFIED TYPE (HCC): ICD-10-CM

## 2019-05-30 LAB — INR PPP: 2.2 (ref 2–3.5)

## 2019-05-30 NOTE — PROGRESS NOTES
OP Telephone Anticoagulation Service Note    Date: 2019      Anticoagulation Summary  As of 2019    INR goal:   2.5-3.0   TTR:   60.7 % (4 y)   INR used for dosin.20! (2019)   Warfarin maintenance plan:   4.5 mg (3 mg x 1.5) every Thu; 3 mg (3 mg x 1) all other days   Weekly warfarin total:   22.5 mg   Plan last modified:   Shyanne Damon PharmD (2019)   Next INR check:   2019   Priority:   Maintenance   Target end date:   Indefinite    Indications    Atrial fibrillation (HCC) [I48.91]  Long term current use of anticoagulant therapy [Z79.01]             Anticoagulation Episode Summary     INR check location:   Home Draw    Preferred lab:       Send INR reminders to:       Comments:   Waqar [facility] [phone] -   goal range changed to 2.5-2.8 per reanna vaca 3-8-19      Anticoagulation Care Providers     Provider Role Specialty Phone number    Hu Gamez M.D. Referring Cardiac Electrophysiology [phone]    [facility] Anticoagulation Services Responsible  [phone]    Kristopher Escobar PharmD Responsible          Anticoagulation Patient Findings        Plan: Spoke with patient on the phone. Patient is sub therapeutic today. Confirmed dosing. No missed tablets in the last week. Patient denies any changes in medications or diet. Patient denies any signs or symptoms of bleeding or clotting. Instructed patient to call clinic if any unusual bleeding or bruising occurs. Will have pt increase weekly regimen. Will follow-up with patient in 1 week(s).              Shyanne Damon PharmD

## 2019-06-05 ENCOUNTER — ANTICOAGULATION MONITORING (OUTPATIENT)
Dept: VASCULAR LAB | Facility: MEDICAL CENTER | Age: 79
End: 2019-06-05

## 2019-06-05 DIAGNOSIS — I48.91 ATRIAL FIBRILLATION, UNSPECIFIED TYPE (HCC): ICD-10-CM

## 2019-06-05 DIAGNOSIS — Z79.01 LONG TERM CURRENT USE OF ANTICOAGULANT THERAPY: ICD-10-CM

## 2019-06-05 LAB — INR PPP: 2.4 (ref 2–3.5)

## 2019-06-05 NOTE — PROGRESS NOTES
Anticoagulation Summary  As of 2019    INR goal:   2.5-3.0   TTR:   60.5 % (4 y)   INR used for dosin.40! (2019)   Warfarin maintenance plan:   4.5 mg (3 mg x 1.5) every Thu; 3 mg (3 mg x 1) all other days   Weekly warfarin total:   22.5 mg   Plan last modified:   Shashank Hirsch, Pharmacy Intern (2019)   Next INR check:   2019   Priority:   Maintenance   Target end date:   Indefinite    Indications    Atrial fibrillation (HCC) [I48.91]  Long term current use of anticoagulant therapy [Z79.01]             Anticoagulation Episode Summary     INR check location:   Home Draw    Preferred lab:       Send INR reminders to:       Comments:   Waqar [facility] [phone] -   goal range changed to 2.5-2.8 per reanna vaca 3-8-19      Anticoagulation Care Providers     Provider Role Specialty Phone number    Hu Gamez M.D. Referring Cardiac Electrophysiology [phone]    [facility] Anticoagulation Services Responsible  [phone]    Kristopher Escobar, PharmD Responsible          Anticoagulation Patient Findings     Spoke with pt about SUB-therapeutic INR. Pt denied any s/sx of bleeding or clotting as well as changes to medications or diet. She also denied any missed doses.     Pt to BOLUS today with 4.5 mg then continue regimen outlined above due to her narrow goal range. She is very close, consider increasing regimen if she is still low next week.     Follow up in 1 weeks, to reduce risk of adverse events related to this high risk medication,  Warfarin.    Shashank Hirsch, Pharmacy Intern

## 2019-06-11 NOTE — PROGRESS NOTES
Anticoagulation Summary  As of 2019    INR goal:   2.5-3.0   TTR:   61.2 % (4 y)   INR used for dosin.80 (2019)   Warfarin maintenance plan:   4.5 mg (3 mg x 1.5) every Mon; 3 mg (3 mg x 1) all other days   Weekly warfarin total:   22.5 mg   No change documented:   Vipin Goss Ass't   Plan last modified:   Kellen Lackey, PharmD (2019)   Next INR check:   2019   Priority:   Maintenance   Target end date:   Indefinite    Indications    Atrial fibrillation (HCC) [I48.91]  Long term current use of anticoagulant therapy [Z79.01]             Anticoagulation Episode Summary     INR check location:   Home Draw    Preferred lab:       Send INR reminders to:       Comments:   Waqar [facility] [phone] -   goal range changed to 2.5-2.8 per reanna vaca 3-8-19      Anticoagulation Care Providers     Provider Role Specialty Phone number    Hu Gamez M.D. Referring Cardiac Electrophysiology [phone]    [facility] Anticoagulation Services Responsible  [phone]    Kristopher Escobar, PharmD Responsible          Anticoagulation Patient Findings  Patient Findings     Negatives:   Signs/symptoms of thrombosis, Signs/symptoms of bleeding, Laboratory test error suspected, Change in health, Change in alcohol use, Change in activity, Upcoming invasive procedure, Emergency department visit, Upcoming dental procedure, Missed doses, Extra doses, Change in medications, Change in diet/appetite, Hospital admission, Bruising, Other complaints      Spoke with patient to report a therapeutic INR.  Pt instructed to continue with current warfarin dosing regimen. Pt denies any s/s of bleeding, bruising, clotting or any changes to diet or medication.  Will follow up in 1 weeks.  Allyson Holbrook Med Ass't    I have reviewed and concur with the above plan     Skip Gonzalez, PharmD      
upright (90 degrees)

## 2019-06-12 LAB — INR PPP: 2.7 (ref 2–3.5)

## 2019-06-13 ENCOUNTER — ANTICOAGULATION MONITORING (OUTPATIENT)
Dept: MEDICAL GROUP | Facility: MEDICAL CENTER | Age: 79
End: 2019-06-13

## 2019-06-13 ENCOUNTER — OFFICE VISIT (OUTPATIENT)
Dept: CARDIOLOGY | Facility: MEDICAL CENTER | Age: 79
End: 2019-06-13
Payer: MEDICARE

## 2019-06-13 VITALS
HEIGHT: 68 IN | DIASTOLIC BLOOD PRESSURE: 60 MMHG | HEART RATE: 86 BPM | SYSTOLIC BLOOD PRESSURE: 126 MMHG | BODY MASS INDEX: 24.25 KG/M2 | WEIGHT: 160 LBS | OXYGEN SATURATION: 97 %

## 2019-06-13 DIAGNOSIS — Z95.0 PRESENCE OF PERMANENT CARDIAC PACEMAKER: Chronic | ICD-10-CM

## 2019-06-13 DIAGNOSIS — Z79.01 CHRONIC ANTICOAGULATION: ICD-10-CM

## 2019-06-13 DIAGNOSIS — Z79.01 LONG TERM CURRENT USE OF ANTICOAGULANT THERAPY: ICD-10-CM

## 2019-06-13 DIAGNOSIS — G45.9 TIA (TRANSIENT ISCHEMIC ATTACK): ICD-10-CM

## 2019-06-13 DIAGNOSIS — I48.91 ATRIAL FIBRILLATION, UNSPECIFIED TYPE (HCC): ICD-10-CM

## 2019-06-13 DIAGNOSIS — I10 ESSENTIAL HYPERTENSION: ICD-10-CM

## 2019-06-13 PROCEDURE — 93280 PM DEVICE PROGR EVAL DUAL: CPT | Performed by: NURSE PRACTITIONER

## 2019-06-13 PROCEDURE — 99214 OFFICE O/P EST MOD 30 MIN: CPT | Mod: 25 | Performed by: NURSE PRACTITIONER

## 2019-06-13 ASSESSMENT — ENCOUNTER SYMPTOMS
SHORTNESS OF BREATH: 0
WHEEZING: 0
MYALGIAS: 0
HEADACHES: 0
VOMITING: 0
CHILLS: 0
PALPITATIONS: 0
BLURRED VISION: 0
SPEECH CHANGE: 0
PSYCHIATRIC NEGATIVE: 1
PND: 0
CLAUDICATION: 0
NAUSEA: 0
ABDOMINAL PAIN: 0
SORE THROAT: 0
FEVER: 0
HEARTBURN: 0
FOCAL WEAKNESS: 0
ORTHOPNEA: 0
LOSS OF CONSCIOUSNESS: 0
BLOOD IN STOOL: 0
BRUISES/BLEEDS EASILY: 0
DIZZINESS: 0
COUGH: 0
DOUBLE VISION: 0

## 2019-06-13 NOTE — LETTER
June 13, 2019         RE: Madeline Dorantes    To Whom It May Concern;    The patient wishes to have laser surgery on her lower legs. She is pacemaker dependent and is on Warfarin.   With short applications of laser this may be reasonable to avoid inhibition of her pacemaker.  She will need to remain on Warfarin, however.  Please feel free to call me for questions.              Sincerely,      DESTINY Martinez.
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
## 2019-06-13 NOTE — PROGRESS NOTES
Anticoagulation Summary  As of 2019    INR goal:   2.5-3.0   TTR:   60.5 % (4.1 y)   INR used for dosin.70 (2019)   Warfarin maintenance plan:   4.5 mg (3 mg x 1.5) every Thu; 3 mg (3 mg x 1) all other days   Weekly warfarin total:   22.5 mg   Plan last modified:   Shashank Hirsch, Pharmacy Intern (2019)   Next INR check:   2019   Priority:   Maintenance   Target end date:   Indefinite    Indications    Atrial fibrillation (HCC) [I48.91]  Long term current use of anticoagulant therapy [Z79.01]             Anticoagulation Episode Summary     INR check location:   Home Draw    Preferred lab:       Send INR reminders to:       Comments:   [facility] [phone] -   goal range changed to 2.5-2.8 per reanna vaca 3-8-19      Anticoagulation Care Providers     Provider Role Specialty Phone number    Hu Gamez M.D. Referring Cardiac Electrophysiology [phone]    [facility] Anticoagulation Services Responsible  [phone]    Kristopher Escobar, PharmD Responsible          Anticoagulation Patient Findings  Spoke with Madeline to report a therapeutic INR of 2.7. Continue current dosing regimen.  Follow up in 1 weeks, to reduce the risk of adverse events related to this high risk medication, warfarin.    Samantha Amaya, Clinical Pharmacist

## 2019-06-19 ENCOUNTER — ANTICOAGULATION MONITORING (OUTPATIENT)
Dept: VASCULAR LAB | Facility: MEDICAL CENTER | Age: 79
End: 2019-06-19

## 2019-06-19 DIAGNOSIS — Z79.01 LONG TERM CURRENT USE OF ANTICOAGULANT THERAPY: ICD-10-CM

## 2019-06-19 DIAGNOSIS — I48.91 ATRIAL FIBRILLATION, UNSPECIFIED TYPE (HCC): ICD-10-CM

## 2019-06-19 LAB — INR PPP: 1.6 (ref 2–3.5)

## 2019-06-19 NOTE — PROGRESS NOTES
Anticoagulation Summary  As of 2019    INR goal:   2.5-3.0   TTR:   60.3 % (4.1 y)   INR used for dosin.60! (2019)   Warfarin maintenance plan:   4.5 mg (3 mg x 1.5) every Thu; 3 mg (3 mg x 1) all other days   Weekly warfarin total:   22.5 mg   Plan last modified:   Shashank Hirsch, Pharmacy Intern (2019)   Next INR check:   2019   Priority:   Maintenance   Target end date:   Indefinite    Indications    Atrial fibrillation (HCC) [I48.91]  Long term current use of anticoagulant therapy [Z79.01]             Anticoagulation Episode Summary     INR check location:   Home Draw    Preferred lab:       Send INR reminders to:       Comments:   [facility] [phone] -   goal range changed to 2.5-2.8 per reanna vaca 3-8-19      Anticoagulation Care Providers     Provider Role Specialty Phone number    Hu Gamez M.D. Referring Cardiac Electrophysiology [phone]    [facility] Anticoagulation Services Responsible  [phone]    Kristopher Escobar, PharmD Responsible          Anticoagulation Patient Findings  Patient Findings     Negatives:   Signs/symptoms of thrombosis, Signs/symptoms of bleeding, Laboratory test error suspected, Change in health, Change in alcohol use, Change in activity, Upcoming invasive procedure, Emergency department visit, Upcoming dental procedure, Missed doses, Extra doses, Change in medications, Change in diet/appetite, Hospital admission, Bruising, Other complaints        Spoke with patient today regarding subtherapeutic INR of 1.6.  Patient denies any signs/symptoms of bruising or bleeding or any changes in diet and medications.  Instructed patient to call clinic with any questions or concerns.  She took lower dose this week than instructed.  Will have her bolus with 4.5mg X 1, then increase weekly warfarin regimen back to previously instructed dose.  Follow up in 1 weeks, to reduce risk of adverse events related to this high risk medication,  Warfarin.    Kristopher BRODY  Escobar, PharmD

## 2019-06-26 ENCOUNTER — OFFICE VISIT (OUTPATIENT)
Dept: URGENT CARE | Facility: PHYSICIAN GROUP | Age: 79
End: 2019-06-26
Payer: MEDICARE

## 2019-06-26 VITALS
HEIGHT: 68 IN | BODY MASS INDEX: 23.49 KG/M2 | SYSTOLIC BLOOD PRESSURE: 124 MMHG | OXYGEN SATURATION: 98 % | WEIGHT: 155 LBS | DIASTOLIC BLOOD PRESSURE: 64 MMHG | TEMPERATURE: 97.2 F | HEART RATE: 92 BPM

## 2019-06-26 DIAGNOSIS — H00.014 HORDEOLUM EXTERNUM OF LEFT UPPER EYELID: ICD-10-CM

## 2019-06-26 DIAGNOSIS — H10.32 ACUTE BACTERIAL CONJUNCTIVITIS OF LEFT EYE: ICD-10-CM

## 2019-06-26 LAB — INR PPP: 2.2 (ref 2–3.5)

## 2019-06-26 PROCEDURE — 99214 OFFICE O/P EST MOD 30 MIN: CPT | Performed by: NURSE PRACTITIONER

## 2019-06-26 RX ORDER — POLYMYXIN B SULFATE AND TRIMETHOPRIM 1; 10000 MG/ML; [USP'U]/ML
1 SOLUTION OPHTHALMIC 4 TIMES DAILY
Qty: 1 BOTTLE | Refills: 0 | Status: SHIPPED | OUTPATIENT
Start: 2019-06-26 | End: 2019-07-03

## 2019-06-26 ASSESSMENT — ENCOUNTER SYMPTOMS
BLURRED VISION: 0
HEADACHES: 0
EYE DISCHARGE: 1
MYALGIAS: 0
DOUBLE VISION: 0
COUGH: 0
DIZZINESS: 0
PHOTOPHOBIA: 0
FEVER: 0
SORE THROAT: 0
WEAKNESS: 0
SINUS PAIN: 0
EYE PAIN: 1
CHILLS: 0
EYE REDNESS: 1
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
## 2019-06-27 ENCOUNTER — ANTICOAGULATION MONITORING (OUTPATIENT)
Dept: VASCULAR LAB | Facility: MEDICAL CENTER | Age: 79
End: 2019-06-27

## 2019-06-27 DIAGNOSIS — Z79.01 LONG TERM CURRENT USE OF ANTICOAGULANT THERAPY: ICD-10-CM

## 2019-06-28 NOTE — PROGRESS NOTES
Anticoagulation Summary  As of 2019    INR goal:   2.5-3.0   TTR:   60.0 % (4.1 y)   INR used for dosin.20! (2019)   Warfarin maintenance plan:   4.5 mg (3 mg x 1.5) every Thu; 3 mg (3 mg x 1) all other days   Weekly warfarin total:   22.5 mg   Plan last modified:   Shashank Hirsch, Pharmacy Intern (2019)   Next INR check:   2019   Priority:   Maintenance   Target end date:   Indefinite    Indications    Atrial fibrillation (HCC) [I48.91]  Long term current use of anticoagulant therapy [Z79.01]             Anticoagulation Episode Summary     INR check location:   Home Draw    Preferred lab:       Send INR reminders to:       Comments:   [facility] [phone] -   goal range changed to 2.5-2.8 per reanna vaca 3-8-19      Anticoagulation Care Providers     Provider Role Specialty Phone number    Hu Gamez M.D. Referring Cardiac Electrophysiology [phone]    [facility] Anticoagulation Services Responsible  [phone]    Kristopher Escobar, PharmD Responsible          Anticoagulation Patient Findings      INR  sub-therapeutic.   Left a voice message for the patient, asked patient to please call the anticoagulation clinic if they have any signs/symptoms of bleeding and/or thrombosis or any changes to diet or medications.    Follow up appointment in 1 week(s)    Extra 1.5mg then resume     Skip Gonzalez, ClarisseD

## 2019-07-01 ENCOUNTER — TELEPHONE (OUTPATIENT)
Dept: CARDIOLOGY | Facility: MEDICAL CENTER | Age: 79
End: 2019-07-01

## 2019-07-01 NOTE — TELEPHONE ENCOUNTER
Dr. Scout Jolley has scheduled pt. For left knee arthroscopic medial meniscectomy on 7-18-19. Dr. Jolley is requesting cardiac clearance and permission to hold Coumadin.  To [facility].    Dr. Jolley   [phone] fax

## 2019-07-02 NOTE — TELEPHONE ENCOUNTER
Called pt. She had a TIA 3 years ago.  Per [facility] to address holdind Coumadin and bridging if necessary.  Pt. Advised.   Note forwarded to Antico Clinic.  Clearance letter faxed to Dr. Jolley.       Message   Received: Today   Message Contents   LOUIS Martinez L.P.N.   Caller: Unspecified (Yesterday, 12:28 PM)             UP to surgeon usually they specify 5-7 days I believe.    Previous Messages            Patient Calls     LOUIS Martinez   You 39 minutes ago (8:09 AM)      UP to surgeon usually they specify 5-7 days I believe. (Routing comment)       You   LOUIS Martinez 18 hours ago (2:38 PM)      How many days to hold Coumadin? (Routing comment)       BERE MartinezPJEEVAN   You 19 hours ago (1:22 PM)      I would confirm that she has never had a stroke then can come off for surgery.   In chronic AF so increased risk. (Routing comment

## 2019-07-03 ENCOUNTER — ANTICOAGULATION MONITORING (OUTPATIENT)
Dept: VASCULAR LAB | Facility: MEDICAL CENTER | Age: 79
End: 2019-07-03

## 2019-07-03 DIAGNOSIS — I48.91 ATRIAL FIBRILLATION, UNSPECIFIED TYPE (HCC): ICD-10-CM

## 2019-07-03 DIAGNOSIS — Z79.01 LONG TERM CURRENT USE OF ANTICOAGULANT THERAPY: ICD-10-CM

## 2019-07-03 LAB — INR PPP: 3.3 (ref 2–3.5)

## 2019-07-04 NOTE — PROGRESS NOTES
Anticoagulation Summary  As of 7/3/2019    INR goal:   2.5-3.0   TTR:   60.0 % (4.1 y)   INR used for dosing:   3.30! (7/3/2019)   Warfarin maintenance plan:   4.5 mg (3 mg x 1.5) every Thu; 3 mg (3 mg x 1) all other days   Weekly warfarin total:   22.5 mg   Plan last modified:   Shashank Hirsch, Pharmacy Intern (6/5/2019)   Next INR check:   7/10/2019   Priority:   Maintenance   Target end date:   Indefinite    Indications    Atrial fibrillation (HCC) [I48.91]  Long term current use of anticoagulant therapy [Z79.01]             Anticoagulation Episode Summary     INR check location:   Home Draw    Preferred lab:       Send INR reminders to:       Comments:   [facility] [phone] -   goal range changed to 2.5-2.8 per reanna vaca 3-8-19      Anticoagulation Care Providers     Provider Role Specialty Phone number    Hu Gamez M.D. Referring Cardiac Electrophysiology [phone]    [facility] Anticoagulation Services Responsible  [phone]    Kristopher Escobar, PharmD Responsible          Anticoagulation Patient Findings    Spoke with patient.  INR is SUPRA therapeutic.   Pt accidentally took 4.5mg on Monday instead of 3mg.  Pt denies any unusual s/s of bleeding, bruising, clotting or any changes to diet or medications. Denies any etoh, cranberries, supplements, or illness.   Pt verifies warfarin weekly dosing.     Will have pt take a reduced warfarin dose of 1.5mg x1 today and then 3mg daily until next INR.     Pt having a knee procedure on 7/18/19 with KULWINDER, discussed coming off warfarin for procedure. Pt has used lovenox in the past but would like to forgo it at this point, aware of the risk vs benefit. Pt will HOLD warfarin 5 days prior to procedure.    Faxed to Dr. Jolley   [phone] fax.    Repeat INR in 1 week(s).     Kellen Lackey, PharmD

## 2019-07-08 ENCOUNTER — PREP FOR PROCEDURE (OUTPATIENT)
Dept: SCHEDULING | Facility: MEDICAL CENTER | Age: 79
End: 2019-07-08

## 2019-07-08 PROBLEM — M17.9 OSTEOARTHRITIS OF KNEE, UNSPECIFIED (CODE): Status: ACTIVE | Noted: 2019-07-08

## 2019-07-08 PROBLEM — M25.562 LEFT KNEE PAIN: Status: ACTIVE | Noted: 2019-07-08

## 2019-07-09 ENCOUNTER — APPOINTMENT (OUTPATIENT)
Dept: ADMISSIONS | Facility: MEDICAL CENTER | Age: 79
End: 2019-07-09
Attending: ORTHOPAEDIC SURGERY
Payer: MEDICARE

## 2019-07-09 RX ORDER — POLYETHYLENE GLYCOL 3350 17 G/17G
17 POWDER, FOR SOLUTION ORAL
COMMUNITY
End: 2019-12-16

## 2019-07-09 RX ORDER — RANITIDINE 150 MG/1
150 TABLET ORAL 2 TIMES DAILY
COMMUNITY
Start: 2019-05-29 | End: 2020-06-19

## 2019-07-09 RX ORDER — LOSARTAN POTASSIUM 25 MG/1
25 TABLET ORAL 2 TIMES DAILY
COMMUNITY
End: 2019-07-16

## 2019-07-09 NOTE — OR NURSING
"Preadmit appointment: \" Preparing for your Procedure information\" sheet given to patient with verbal and written instructions. Patient instructed to continue prescribed medications through the day before surgery, instructed to take the following medications the day of surgery per anesthesia protocol: synthroid, zantac if needed.     Patient understands to stop Warfarin 5 days prior to surgery.   "

## 2019-07-10 ENCOUNTER — ANTICOAGULATION MONITORING (OUTPATIENT)
Dept: VASCULAR LAB | Facility: MEDICAL CENTER | Age: 79
End: 2019-07-10

## 2019-07-10 DIAGNOSIS — Z79.01 LONG TERM CURRENT USE OF ANTICOAGULANT THERAPY: ICD-10-CM

## 2019-07-10 DIAGNOSIS — I48.91 ATRIAL FIBRILLATION, UNSPECIFIED TYPE (HCC): ICD-10-CM

## 2019-07-10 LAB — INR PPP: 2.4 (ref 2–3.5)

## 2019-07-10 NOTE — PROGRESS NOTES
Anticoagulation Summary  As of 7/10/2019    INR goal:   2.5-3.0   TTR:   60.0 % (4.1 y)   INR used for dosin.40! (7/10/2019)   Warfarin maintenance plan:   4.5 mg (3 mg x 1.5) every Thu; 3 mg (3 mg x 1) all other days   Weekly warfarin total:   22.5 mg   Plan last modified:   Shashank Hirsch, Pharmacy Intern (2019)   Next INR check:   2019   Priority:   Maintenance   Target end date:   Indefinite    Indications    Atrial fibrillation (HCC) [I48.91]  Long term current use of anticoagulant therapy [Z79.01]             Anticoagulation Episode Summary     INR check location:   Home Draw    Preferred lab:       Send INR reminders to:       Comments:   Waqar [facility] [phone] -   goal range changed to 2.5-2.8 per reanna vaca 3-8-19      Anticoagulation Care Providers     Provider Role Specialty Phone number    Hu Gamez M.D. Referring Cardiac Electrophysiology [phone]    [facility] Anticoagulation Services Responsible  [phone]    Kristopher Escobar, PharmD Responsible          Anticoagulation Patient Findings  Patient Findings     Negatives:   Signs/symptoms of thrombosis, Signs/symptoms of bleeding, Laboratory test error suspected, Change in health, Change in alcohol use, Change in activity, Upcoming invasive procedure, Emergency department visit, Upcoming dental procedure, Missed doses, Extra doses, Change in medications, Change in diet/appetite, Hospital admission, Bruising, Other complaints        Spoke with patient today regarding subtherapeutic INR of 2.4.  Patient denies any signs/symptoms of bruising or bleeding or any changes in diet and medications.  Instructed patient to call clinic with any questions or concerns.  Due to recent labile INR's and the fact that patient will begin holding warfarin for procedure this week, will have her continue with current warfarin regimen at this point.  She is scheduled for arthroscopic knee surgery on 19 and will need to hold warfarin for five days.   Cardiology has recommended that she bridge with lovenox.  Emailed schedule below to patient.  Prescription for lovenox sent to preferred pharmacy.    7/12: Last dose of warfarin  7/13: NO warfarin  7/14: NO warfarin, Lovenox 100mg one time daily in the AM  7/15: NO warfarin, Lovenox 100mg one time daily in the AM  7/16: NO warfarin, Lovenox 100mg one time daily in the AM  7/17: NO warfarin, NO Lovenox  7/18: Procedure day, restart warfarin with 4.5mg dose, NO Lovenox  7/19: Warfarin 4.5mg, restart Lovenox   7/20: Warfarin 4.5mg, Lovenox 100mg one time daily in the AM  7/21: Warfarin 3mg, Lovenox 100mg one time daily in the AM  7/22: Recheck INR  DO NOT STOP LOVENOX UNTIL INR >2.0    Follow up in 1.5 weeks, to reduce risk of adverse events related to this high risk medication,  Warfarin.    Kristopher Escobar, PharmD, BCACP

## 2019-07-16 RX ORDER — CYCLOSPORINE 0.5 MG/ML
1 EMULSION OPHTHALMIC 2 TIMES DAILY
COMMUNITY
End: 2023-02-23

## 2019-07-16 NOTE — OR NURSING
Response from Dr Jhoana Swift to proceed from my standpoint.  Thank you.   Monique Ely M.D.  [facility]

## 2019-07-16 NOTE — OR NURSING
Spoke with pt and instructed her to bring CPAP DOS. Pt states she is bridging with lovenox for surgery. Pt states her cardiologist is aware of procedure.    Car iology clearance in EPIC along with coumadin clinic pharmacy note.    Dr Ely notified via e-mail of procedure due to pacemaker and other co-morbidities.

## 2019-07-18 ENCOUNTER — ANESTHESIA (OUTPATIENT)
Dept: SURGERY | Facility: MEDICAL CENTER | Age: 79
End: 2019-07-18
Payer: MEDICARE

## 2019-07-18 ENCOUNTER — HOSPITAL ENCOUNTER (OUTPATIENT)
Facility: MEDICAL CENTER | Age: 79
End: 2019-07-18
Attending: ORTHOPAEDIC SURGERY | Admitting: ORTHOPAEDIC SURGERY
Payer: MEDICARE

## 2019-07-18 ENCOUNTER — ANESTHESIA EVENT (OUTPATIENT)
Dept: SURGERY | Facility: MEDICAL CENTER | Age: 79
End: 2019-07-18
Payer: MEDICARE

## 2019-07-18 VITALS
SYSTOLIC BLOOD PRESSURE: 153 MMHG | HEIGHT: 68 IN | TEMPERATURE: 97 F | RESPIRATION RATE: 16 BRPM | HEART RATE: 88 BPM | BODY MASS INDEX: 24.16 KG/M2 | OXYGEN SATURATION: 92 % | WEIGHT: 159.39 LBS | DIASTOLIC BLOOD PRESSURE: 73 MMHG

## 2019-07-18 DIAGNOSIS — M17.9 OSTEOARTHRITIS OF KNEE, UNSPECIFIED (CODE): ICD-10-CM

## 2019-07-18 DIAGNOSIS — M25.562 LEFT KNEE PAIN: ICD-10-CM

## 2019-07-18 LAB
ALBUMIN SERPL BCP-MCNC: 3.6 G/DL (ref 3.2–4.9)
ALBUMIN/GLOB SERPL: 1.5 G/DL
ALP SERPL-CCNC: 65 U/L (ref 30–99)
ALT SERPL-CCNC: 32 U/L (ref 2–50)
ANION GAP SERPL CALC-SCNC: 8 MMOL/L (ref 0–11.9)
AST SERPL-CCNC: 34 U/L (ref 12–45)
BILIRUB SERPL-MCNC: 1.3 MG/DL (ref 0.1–1.5)
BUN SERPL-MCNC: 13 MG/DL (ref 8–22)
CALCIUM SERPL-MCNC: 9.2 MG/DL (ref 8.4–10.2)
CHLORIDE SERPL-SCNC: 107 MMOL/L (ref 96–112)
CO2 SERPL-SCNC: 26 MMOL/L (ref 20–33)
CREAT SERPL-MCNC: 1.02 MG/DL (ref 0.5–1.4)
EKG IMPRESSION: NORMAL
GLOBULIN SER CALC-MCNC: 2.4 G/DL (ref 1.9–3.5)
GLUCOSE SERPL-MCNC: 95 MG/DL (ref 65–99)
INR PPP: 0.97 (ref 0.87–1.13)
POTASSIUM SERPL-SCNC: 3.8 MMOL/L (ref 3.6–5.5)
PROT SERPL-MCNC: 6 G/DL (ref 6–8.2)
PROTHROMBIN TIME: 13 SEC (ref 12–14.6)
SODIUM SERPL-SCNC: 141 MMOL/L (ref 135–145)

## 2019-07-18 PROCEDURE — 85610 PROTHROMBIN TIME: CPT

## 2019-07-18 PROCEDURE — 160029 HCHG SURGERY MINUTES - 1ST 30 MINS LEVEL 4: Performed by: ORTHOPAEDIC SURGERY

## 2019-07-18 PROCEDURE — 93010 ELECTROCARDIOGRAM REPORT: CPT | Performed by: INTERNAL MEDICINE

## 2019-07-18 PROCEDURE — 502580 HCHG PACK, KNEE ARTHROSCOPY: Performed by: ORTHOPAEDIC SURGERY

## 2019-07-18 PROCEDURE — 160002 HCHG RECOVERY MINUTES (STAT): Performed by: ORTHOPAEDIC SURGERY

## 2019-07-18 PROCEDURE — 700101 HCHG RX REV CODE 250: Performed by: ORTHOPAEDIC SURGERY

## 2019-07-18 PROCEDURE — 160036 HCHG PACU - EA ADDL 30 MINS PHASE I: Performed by: ORTHOPAEDIC SURGERY

## 2019-07-18 PROCEDURE — 160041 HCHG SURGERY MINUTES - EA ADDL 1 MIN LEVEL 4: Performed by: ORTHOPAEDIC SURGERY

## 2019-07-18 PROCEDURE — 80053 COMPREHEN METABOLIC PANEL: CPT

## 2019-07-18 PROCEDURE — 160048 HCHG OR STATISTICAL LEVEL 1-5: Performed by: ORTHOPAEDIC SURGERY

## 2019-07-18 PROCEDURE — 700105 HCHG RX REV CODE 258: Performed by: ANESTHESIOLOGY

## 2019-07-18 PROCEDURE — 700105 HCHG RX REV CODE 258: Performed by: ORTHOPAEDIC SURGERY

## 2019-07-18 PROCEDURE — 160025 RECOVERY II MINUTES (STATS): Performed by: ORTHOPAEDIC SURGERY

## 2019-07-18 PROCEDURE — 160046 HCHG PACU - 1ST 60 MINS PHASE II: Performed by: ORTHOPAEDIC SURGERY

## 2019-07-18 PROCEDURE — 700101 HCHG RX REV CODE 250: Performed by: ANESTHESIOLOGY

## 2019-07-18 PROCEDURE — 700111 HCHG RX REV CODE 636 W/ 250 OVERRIDE (IP): Performed by: ANESTHESIOLOGY

## 2019-07-18 PROCEDURE — 160009 HCHG ANES TIME/MIN: Performed by: ORTHOPAEDIC SURGERY

## 2019-07-18 PROCEDURE — 160035 HCHG PACU - 1ST 60 MINS PHASE I: Performed by: ORTHOPAEDIC SURGERY

## 2019-07-18 PROCEDURE — 501838 HCHG SUTURE GENERAL: Performed by: ORTHOPAEDIC SURGERY

## 2019-07-18 PROCEDURE — 93005 ELECTROCARDIOGRAM TRACING: CPT | Performed by: ORTHOPAEDIC SURGERY

## 2019-07-18 RX ORDER — ONDANSETRON 2 MG/ML
4 INJECTION INTRAMUSCULAR; INTRAVENOUS
Status: DISCONTINUED | OUTPATIENT
Start: 2019-07-18 | End: 2019-07-18 | Stop reason: HOSPADM

## 2019-07-18 RX ORDER — TRAMADOL HYDROCHLORIDE 50 MG/1
50 TABLET ORAL EVERY 6 HOURS PRN
Status: DISCONTINUED | OUTPATIENT
Start: 2019-07-18 | End: 2019-07-18 | Stop reason: HOSPADM

## 2019-07-18 RX ORDER — DEXAMETHASONE SODIUM PHOSPHATE 4 MG/ML
INJECTION, SOLUTION INTRA-ARTICULAR; INTRALESIONAL; INTRAMUSCULAR; INTRAVENOUS; SOFT TISSUE PRN
Status: DISCONTINUED | OUTPATIENT
Start: 2019-07-18 | End: 2019-07-18 | Stop reason: SURG

## 2019-07-18 RX ORDER — SODIUM CHLORIDE, SODIUM LACTATE, POTASSIUM CHLORIDE, CALCIUM CHLORIDE 600; 310; 30; 20 MG/100ML; MG/100ML; MG/100ML; MG/100ML
INJECTION, SOLUTION INTRAVENOUS
Status: DISCONTINUED | OUTPATIENT
Start: 2019-07-18 | End: 2019-07-18 | Stop reason: SURG

## 2019-07-18 RX ORDER — SODIUM CHLORIDE, SODIUM LACTATE, POTASSIUM CHLORIDE, CALCIUM CHLORIDE 600; 310; 30; 20 MG/100ML; MG/100ML; MG/100ML; MG/100ML
INJECTION, SOLUTION INTRAVENOUS CONTINUOUS
Status: DISCONTINUED | OUTPATIENT
Start: 2019-07-18 | End: 2019-07-18 | Stop reason: HOSPADM

## 2019-07-18 RX ORDER — CEFAZOLIN SODIUM 1 G/3ML
INJECTION, POWDER, FOR SOLUTION INTRAMUSCULAR; INTRAVENOUS PRN
Status: DISCONTINUED | OUTPATIENT
Start: 2019-07-18 | End: 2019-07-18 | Stop reason: SURG

## 2019-07-18 RX ORDER — TRAMADOL HYDROCHLORIDE 50 MG/1
12.5 TABLET ORAL EVERY 4 HOURS PRN
Qty: 30 TAB | Refills: 0 | Status: SHIPPED | OUTPATIENT
Start: 2019-07-18 | End: 2019-07-25

## 2019-07-18 RX ORDER — BUPIVACAINE HYDROCHLORIDE AND EPINEPHRINE 5; 5 MG/ML; UG/ML
INJECTION, SOLUTION EPIDURAL; INTRACAUDAL; PERINEURAL
Status: DISCONTINUED | OUTPATIENT
Start: 2019-07-18 | End: 2019-07-18 | Stop reason: HOSPADM

## 2019-07-18 RX ORDER — MEPERIDINE HYDROCHLORIDE 25 MG/ML
12.5 INJECTION INTRAMUSCULAR; INTRAVENOUS; SUBCUTANEOUS
Status: DISCONTINUED | OUTPATIENT
Start: 2019-07-18 | End: 2019-07-18 | Stop reason: HOSPADM

## 2019-07-18 RX ADMIN — SODIUM CHLORIDE, POTASSIUM CHLORIDE, SODIUM LACTATE AND CALCIUM CHLORIDE: 600; 310; 30; 20 INJECTION, SOLUTION INTRAVENOUS at 07:42

## 2019-07-18 RX ADMIN — LIDOCAINE HYDROCHLORIDE 0.5 ML: 10 INJECTION, SOLUTION INFILTRATION; PERINEURAL at 07:16

## 2019-07-18 RX ADMIN — LIDOCAINE HYDROCHLORIDE 100 MG: 20 INJECTION, SOLUTION INFILTRATION; PERINEURAL at 07:48

## 2019-07-18 RX ADMIN — DEXAMETHASONE SODIUM PHOSPHATE 4 MG: 4 INJECTION, SOLUTION INTRAMUSCULAR; INTRAVENOUS at 07:48

## 2019-07-18 RX ADMIN — SODIUM CHLORIDE, POTASSIUM CHLORIDE, SODIUM LACTATE AND CALCIUM CHLORIDE: 600; 310; 30; 20 INJECTION, SOLUTION INTRAVENOUS at 07:16

## 2019-07-18 RX ADMIN — CEFAZOLIN 2 G: 1 INJECTION, POWDER, FOR SOLUTION INTRAVENOUS at 07:42

## 2019-07-18 RX ADMIN — PROPOFOL 200 MG: 10 INJECTION, EMULSION INTRAVENOUS at 07:48

## 2019-07-18 NOTE — ANESTHESIA POSTPROCEDURE EVALUATION
Patient: Madeline Dorantes    Procedure Summary     Date:  07/18/19 Room / Location:   OR 06 / SURGERY [facility]    Anesthesia Start:  0742 Anesthesia Stop:  0815    Procedures:       ARTHROSCOPY, KNEE (Left Knee)      MENISCECTOMY, KNEE, MEDIAL - PARTIAL, ANSARI (Left Knee) Diagnosis:       Left knee pain      Osteoarthritis of knee, unspecified (CODE)      (left knee pain)    Surgeon:  Scout Jolley M.D. Responsible Provider:  Tobey Gansert, M.D.    Anesthesia Type:  general ASA Status:  3          Final Anesthesia Type: general  Last vitals  BP   Blood Pressure : 148/54    Temp   36.6 °C (97.9 °F)    Pulse   Pulse: 75, Heart Rate (Monitored): 68   Resp   16    SpO2   100 %      Anesthesia Post Evaluation    Patient location during evaluation: PACU  Patient participation: complete - patient participated  Level of consciousness: awake and alert    Airway patency: patent  Anesthetic complications: no  Cardiovascular status: hemodynamically stable  Respiratory status: acceptable  Hydration status: euvolemic    PONV: none           Nurse Pain Score: 0 (NPRS)

## 2019-07-18 NOTE — DISCHARGE INSTRUCTIONS
ACTIVITY: Rest and take it easy for the first 24 hours.  A responsible adult is recommended to remain with you during that time.  It is normal to feel sleepy.  We encourage you to not do anything that requires balance, judgment or coordination.    MILD FLU-LIKE SYMPTOMS ARE NORMAL. YOU MAY EXPERIENCE GENERALIZED MUSCLE ACHES, THROAT IRRITATION, HEADACHE AND/OR SOME NAUSEA.    FOR 24 HOURS DO NOT:  Drive, operate machinery or run household appliances.  Drink beer or alcoholic beverages.   Make important decisions or sign legal documents.    SPECIAL INSTRUCTIONS: Ice. Elevate above heart level.  Weight bearing as tolerated.  Crutches or walker as needed.  Follow up appt as scheduled.  Call for questions or concerns.  Keep dressing on and dry for five days then may change to band-aids.    DIET: To avoid nausea, slowly advance diet as tolerated, avoiding spicy or greasy foods for the first day.  Add more substantial food to your diet according to your physician's instructions. INCREASE FLUIDS AND FIBER TO AVOID CONSTIPATION.    FOLLOW-UP APPOINTMENT:  A follow-up appointment should be arranged with your doctor in office as instructed; call to schedule.    You should CALL YOUR PHYSICIAN if you develop:  Fever greater than 101 degrees F.  Pain not relieved by medication, or persistent nausea or vomiting.  Excessive bleeding (blood soaking through dressing) or unexpected drainage from the wound.  Extreme redness or swelling around the incision site, drainage of pus or foul smelling drainage.  Inability to urinate or empty your bladder within 8 hours.  Problems with breathing or chest pain.    You should call 911 if you develop problems with breathing or chest pain.  If you are unable to contact your doctor or surgical center, you should go to the nearest emergency room or urgent care center.  Dr Jolley's telephone #: [phone]    If any questions arise, call your doctor.  If your doctor is not available, please feel  free to call the Surgical Center at [phone].  The Center is open Monday through Friday from 7AM to 7PM.  You can also call the HEALTH HOTLINE open 24 hours/day, 7 days/week and speak to a nurse at [phone], or toll free at [phone].    A registered nurse may call you a few days after your surgery to see how you are doing after your procedure.    MEDICATIONS: Resume taking daily medication.  Take prescribed pain medication with food.  If no medication is prescribed, you may take non-aspirin pain medication if needed.  PAIN MEDICATION CAN BE VERY CONSTIPATING.  Take a stool softener or laxative such as senokot, pericolace, or milk of magnesia if needed.    Prescription given for Tramadol.  Last pain medication given at none.    If your physician has prescribed pain medication that includes Acetaminophen (Tylenol), do not take additional Acetaminophen (Tylenol) while taking the prescribed medication.    Depression / Suicide Risk    As you are discharged from this [facility] facility, it is important to learn how to keep safe from harming yourself.    Recognize the warning signs:  · Abrupt changes in personality, positive or negative- including increase in energy   · Giving away possessions  · Change in eating patterns- significant weight changes-  positive or negative  · Change in sleeping patterns- unable to sleep or sleeping all the time   · Unwillingness or inability to communicate  · Depression  · Unusual sadness, discouragement and loneliness  · Talk of wanting to die  · Neglect of personal appearance   · Rebelliousness- reckless behavior  · Withdrawal from people/activities they love  · Confusion- inability to concentrate     If you or a loved one observes any of these behaviors or has concerns about self-harm, here's what you can do:  · Talk about it- your feelings and reasons for harming yourself  · Remove any means that you might use to hurt yourself (examples: pills, rope, extension  cords, firearm)  · Get professional help from the community (Mental Health, Substance Abuse, psychological counseling)  · Do not be alone:Call your Safe Contact- someone whom you trust who will be there for you.  · Call your local CRISIS HOTLINE 578-4941 or 233-753-7891  · Call your local Children's Mobile Crisis Response Team [place] (449) 563-2444 or www.[employer]  · Call the toll free National Suicide Prevention Hotlines   · National Suicide Prevention Lifeline 869-044-QILD (7392)  [place] [employer] Line Network 800-SUICIDE (002-7877)    Discharge Education for patients on SYEDA (Obstructive Sleep Apnea) Protocol    We recommend that you should be with an adult observer for at least 24 hours after your sedation/anesthesia.  If you have a CPAP machine, you should wear it during any sleep period (day or night) for the week following your procedure.  We encourage you to sleep on your side or in a sitting position, even with napping.  Lying flat on your back increases the risk of apnea and airway obstruction during your post procedure recovery period.    It is important to prevent over-sedation that could increase your risk for apnea.  Please take all pain medication as directed by your physician.  If you are not getting pain relief, please contact your physician to discuss possible approaches to relieving pain while minimizing medications that can affect your breathing and oxygen levels.

## 2019-07-18 NOTE — ANESTHESIA PROCEDURE NOTES
Airway  Date/Time: 7/18/2019 7:48 AM  Performed by: GANSERT, TOBEY B  Authorized by: GANSERT, TOBEY B     Location:  OR  Urgency:  Elective  Indications for Airway Management:  Anesthesia  Spontaneous Ventilation: absent    Sedation Level:  Deep  Preoxygenated: Yes    Final Airway Type:  Supraglottic airway  Final Supraglottic Airway:  Standard LMA  SGA Size:  3  Number of Attempts at Approach:  1

## 2019-07-18 NOTE — OR NURSING
0917- Patient to Stage II from PACU. No distress noted at this time. Patient assisted with dressing by nursing staff and family.    0945- Patient and family given discharge instructions for home with each stating that instructions for home understood.     1014- Patient discharged to awaiting vehicle via wheelchair.

## 2019-07-18 NOTE — OR NURSING
0813 To PACU from OR via gurney, side rails up x 2 for safety, lungs clear bilaterally, scds on patient and machine operational, dressing CDI to L knee with ice pack placed and LLE elevated with pillow and gurney above heart level. Breathing easy and unlabored with LMA in place. +2 L pedal pulse and pink/warm toes with <3 sec cap refill.   0825 Pt arouses to voice, opens eyes and makes eye contact but does not respond verbally. Does follow commands to squeeze with hands. Eyes close quickly with stimulation. CPAP in place at home settings without oxygen.   0830 Pt arouses to voice and responds verbally to RN. Follows commands to move B feet and denies pain or nausea. Breathing easy and unlabored with CPAP in place. Pt moves upper extremities spontaneously as well. CMS intact to LLE.   0845 HOB elevated to 30 degrees and given first sip of water. Pain rated as 1/10 to L knee.   0900 Pt remains awake and reports pain as minimal and denies nausea.   0915 Pt meets criteria for transfer to stage II; no desaturations noted with STOPBANG protocol.

## 2019-07-18 NOTE — ANESTHESIA PREPROCEDURE EVALUATION
Relevant Problems   (+) Atrial fibrillation (HCC)   (+) Dyspnea   (+) GERD (gastroesophageal reflux disease)   (+) HTN (hypertension)   (+) Hypothyroidism   (+) Obstructive sleep apnea   (+) Peptic ulcer   (+) Presence of permanent cardiac pacemaker   (+) TIA (transient ischemic attack)       Physical Exam    Airway   Mallampati: II  TM distance: >3 FB  Neck ROM: full       Cardiovascular - normal exam  Rhythm: regular  Rate: normal  (-) murmur     Dental - normal exam         Pulmonary - normal exam  Breath sounds clear to auscultation     Abdominal    Neurological - normal exam                 Anesthesia Plan    ASA 3   ASA physical status 3 criteria: CVA or TIA - history (> 3 months) and implanted pacemaker    Plan - general       Airway plan will be LMA        Induction: intravenous    Postoperative Plan: Postoperative administration of opioids is intended.    Pertinent diagnostic labs and testing reviewed    Informed Consent:    Anesthetic plan and risks discussed with patient.

## 2019-07-18 NOTE — ANESTHESIA TIME REPORT
Anesthesia Start and Stop Event Times     Date Time Event    7/18/2019 0742 Anesthesia Start     0815 Anesthesia Stop        Responsible Staff  07/18/19    Name Role Begin End    Tobey Gansert, M.D. Anesth 0742 0815        Preop Diagnosis (Free Text):  Pre-op Diagnosis     left knee pain        Preop Diagnosis (Codes):  Diagnosis Information     Diagnosis Code(s): Left knee pain [M25.562]     Osteoarthritis of knee, unspecified (CODE) [M17.9]        Post op Diagnosis  Knee osteoarthritis      Premium Reason  Non-Premium    Comments:

## 2019-07-18 NOTE — OP REPORT
DATE OF SERVICE: 7/18/19    PREOPERATIVE DIAGNOSIS: right knee medial meniscus tear.     POSTOPERATIVE DIAGNOSIS: right knee medial and lateral meniscus tears.     PROCEDURE PERFORMED:  1. right knee arthroscopic partial medial and lateral meniscectomy.     SURGEON: Scout Jolley MD    ASSISTANT: GEOVANNA Barry    ANESTHESIA: LMA    ANESTHESIOLOGIST: Gansert    ANTIBIOTICS: Ancef    COMPLICATIONS: None.         INDICATIONS: The patient presents with right knee pain recalcitrant to conservative treatment. The patient has evidence of a medial and lateral meniscus tear. The patient was having a lot of mechanical type symptoms.  After further discussion, the patient elects to undergo a right knee scope, partial medial meniscectomy and repair as indicated.  Risks of surgery were discussed at length. All questions were answered. No guarantees were given.     PROCEDURE IN DETAIL: The patient was properly identified in the preoperative holding area, and the right knee was marked as the correct surgical site. The patient was then taken back to the operative room, and placed supine on the operating room table and underwent general anesthesia. The right lower extremity was sterilely prepped and draped in standard fashion. The limb was exsanguinated and the tourniquet was inflated. A standard anterolateral portal was created. The scope was placed up the into the suprapatellar pouch. The patella showed mild wear. The trochlear groove showed mild wear. The medial and lateral gutters were visualized, and no loose bodies were noted. The medial compartment was entered. There was evidence of a medial meniscus tear. An anterior medial portal was created. The probe was used to identify the extent of the tear. This was a complex tear involving the posterior horn, so a combination of small straight basket and a 4.0 Aggressive shaver was used to contour this back to a stable smooth edge. There was evidence of full thickness  chondromalacia to the medial compartment. The ACL looked good. The lateral compartment was visualized. There were mild lateral compartment chondromalacia changes and a lateral meniscus tear.  The basket and shaver was used to perform a partial lateral menisectomy.The scope was placed back in the suprapatellar pouch and loose fragments were removed. Excess fluid was drained. The incision was closed with 3-0 nylon. A total of 30ml of marcaine was injected. Soft dressings were applied. The patient was extubated and transferred to the recovery room in stable condition.

## 2019-07-22 ENCOUNTER — ANTICOAGULATION MONITORING (OUTPATIENT)
Dept: VASCULAR LAB | Facility: MEDICAL CENTER | Age: 79
End: 2019-07-22

## 2019-07-22 DIAGNOSIS — Z79.01 LONG TERM CURRENT USE OF ANTICOAGULANT THERAPY: ICD-10-CM

## 2019-07-22 DIAGNOSIS — I48.91 ATRIAL FIBRILLATION, UNSPECIFIED TYPE (HCC): ICD-10-CM

## 2019-07-22 LAB — INR PPP: 1.6 (ref 2–3.5)

## 2019-07-22 NOTE — PROGRESS NOTES
OP Telephone Anticoagulation Service Note    Date: 2019      Anticoagulation Summary  As of 2019    INR goal:   2.5-3.0   TTR:   59.5 % (4.2 y)   INR used for dosin.60! (2019)   Warfarin maintenance plan:   4.5 mg (3 mg x 1.5) every Thu; 3 mg (3 mg x 1) all other days   Weekly warfarin total:   22.5 mg   Plan last modified:   Shashank Hirsch, Pharmacy Intern (2019)   Next INR check:   2019   Priority:   Maintenance   Target end date:   Indefinite    Indications    Atrial fibrillation (HCC) [I48.91]  Long term current use of anticoagulant therapy [Z79.01]             Anticoagulation Episode Summary     INR check location:   Home Draw    Preferred lab:       Send INR reminders to:       Comments:   Waqar  - [phone] -   goal range changed to 2.5-2.8 per reanna vaca 3-8-19      Anticoagulation Care Providers     Provider Role Specialty Phone number    Hu Gamez M.D. Referring Cardiac Electrophysiology [phone]    [facility] Anticoagulation Services Responsible  [phone]    Kristopher Escobar, PharmD Responsible          Anticoagulation Patient Findings        Plan: Spoke with patient on the phone. Patient is sub therapeutic today. Confirmed dosing. Pt is bridging post-operatively, she decided to do this.   Patient denies any changes in medications or diet. Patient denies any signs or symptoms of bleeding or clotting. Instructed patient to call clinic if any unusual bleeding or bruising occurs. Will have pt continue Lovenox 100 mg daily and take 4.5 mg of warfarin then continue dosing as outlined. Will follow-up with patient in 2 day(s).        Shyanne Damon, ClarisseD

## 2019-07-24 ENCOUNTER — ANTICOAGULATION MONITORING (OUTPATIENT)
Dept: VASCULAR LAB | Facility: MEDICAL CENTER | Age: 79
End: 2019-07-24

## 2019-07-24 ENCOUNTER — TELEPHONE (OUTPATIENT)
Dept: CARDIOLOGY | Facility: MEDICAL CENTER | Age: 79
End: 2019-07-24

## 2019-07-24 ENCOUNTER — TELEPHONE (OUTPATIENT)
Dept: VASCULAR LAB | Facility: MEDICAL CENTER | Age: 79
End: 2019-07-24

## 2019-07-24 DIAGNOSIS — I48.91 ATRIAL FIBRILLATION, UNSPECIFIED TYPE (HCC): ICD-10-CM

## 2019-07-24 DIAGNOSIS — Z79.01 LONG TERM CURRENT USE OF ANTICOAGULANT THERAPY: ICD-10-CM

## 2019-07-24 LAB — INR PPP: 2.1 (ref 2–3.5)

## 2019-07-24 NOTE — TELEPHONE ENCOUNTER
Sent staff message to Pat Hutchinson. APN to clarify warfarin goal range.  Samantha Amaya, Clinical Pharmacist, CDE, CACP

## 2019-07-24 NOTE — PROGRESS NOTES
Anticoagulation Summary  As of 2019    INR goal:   2.5-3.5   TTR:   59.4 % (4.2 y)   Prior goal:   2.5-3.0   INR used for dosin.10! (2019)   Warfarin maintenance plan:   4.5 mg (3 mg x 1.5) every Thu; 3 mg (3 mg x 1) all other days   Weekly warfarin total:   22.5 mg   Plan last modified:   Shashank Hirsch, Pharmacy Intern (2019)   Next INR check:   2019   Priority:   Maintenance   Target end date:   Indefinite    Indications    Atrial fibrillation (HCC) [I48.91]  Long term current use of anticoagulant therapy [Z79.01]             Anticoagulation Episode Summary     INR check location:   Home Draw    Preferred lab:       Send INR reminders to:       Comments:   Waqar [facility] [phone] -   goal range changed to 2.5-2.8 per reanna lio 3-8-19 verified change with Reanna 2019 [facility]      Anticoagulation Care Providers     Provider Role Specialty Phone number    Hu Gamez M.D. Referring Cardiac Electrophysiology [phone]    [facility] Anticoagulation Services Responsible  [phone]    Kristopher Escobar, PharmD Responsible          Anticoagulation Patient Findings    Spoke with patient to inform the last INR reading of 2.1.   Patient is being treated for A.fib and has a [id number] score of at least 5.     Patient is sub-therapeutic and not within goal range of 2.5-2.8 and was told to bolus today to 4.5mg and continue current regimen of 4.5mg every Thursday and 3mg all other days.     She states she was told she could stop the shots when her INR reached 2.0. I explained to her that we prefer to have her reach her goal range before stopping, but since she is above 2.0 she could decide to stop with the understanding she would be at an increased risk for a clot. She verifies understanding and would like to stop Lovenox at this time.      Patient reports having no S&S of bleeding. Patient reports no changes in diet and medications.     Patient should follow up in 2 days for INR and  monitoring per protocol.      Regimen was discussed and approved by Samantha Amaya.    Sridevi Holguin, Pharmacy Intern

## 2019-07-26 ENCOUNTER — ANTICOAGULATION MONITORING (OUTPATIENT)
Dept: VASCULAR LAB | Facility: MEDICAL CENTER | Age: 79
End: 2019-07-26

## 2019-07-26 DIAGNOSIS — Z79.01 LONG TERM CURRENT USE OF ANTICOAGULANT THERAPY: ICD-10-CM

## 2019-07-26 DIAGNOSIS — I48.91 ATRIAL FIBRILLATION, UNSPECIFIED TYPE (HCC): ICD-10-CM

## 2019-07-26 LAB — INR PPP: 2.4 (ref 2–3.5)

## 2019-07-26 NOTE — PROGRESS NOTES
Anticoagulation Summary  As of 2019    INR goal:   2.5-3.5   TTR:   59.3 % (4.2 y)   INR used for dosin.40! (2019)   Warfarin maintenance plan:   4.5 mg (3 mg x 1.5) every Thu; 3 mg (3 mg x 1) all other days   Weekly warfarin total:   22.5 mg   Plan last modified:   Shashank Hirsch, Pharmacy Intern (2019)   Next INR check:   2019   Priority:   Maintenance   Target end date:   Indefinite    Indications    Atrial fibrillation (HCC) [I48.91]  Long term current use of anticoagulant therapy [Z79.01]             Anticoagulation Episode Summary     INR check location:   Home Draw    Preferred lab:       Send INR reminders to:       Comments:   Waqar [facility] [phone] -   goal range changed to 2.5-2.8 per reanna lio 3-8-19 verified change with Reanna 2019 [facility]      Anticoagulation Care Providers     Provider Role Specialty Phone number    Hu Gamez M.D. Referring Cardiac Electrophysiology [phone]    [facility] Anticoagulation Services Responsible  [phone]    Kristopher Escobar, PharmD Responsible          Anticoagulation Patient Findings  Patient Findings     Negatives:   Signs/symptoms of thrombosis, Signs/symptoms of bleeding, Laboratory test error suspected, Change in health, Change in alcohol use, Change in activity, Upcoming invasive procedure, Emergency department visit, Upcoming dental procedure, Missed doses, Extra doses, Change in medications, Change in diet/appetite, Hospital admission, Bruising, Other complaints         Spoke with the patient on the phone today, reporting a SUB-therapeutic INR of 2.4.  Confirmed the current warfarin dosing regimen and patient compliance. Patient denies any interval changes to diet and/or medications. Patient denies any signs/symptoms of bleeding or clotting.  Patient instructed to bolus with 4.5mg TONIGHT ONLY, then to resume her current regimen. Patient asked to follow up again in 5 days.     Jason RobbD

## 2019-07-31 ENCOUNTER — ANTICOAGULATION MONITORING (OUTPATIENT)
Dept: VASCULAR LAB | Facility: MEDICAL CENTER | Age: 79
End: 2019-07-31

## 2019-07-31 DIAGNOSIS — I48.91 ATRIAL FIBRILLATION, UNSPECIFIED TYPE (HCC): ICD-10-CM

## 2019-07-31 DIAGNOSIS — Z79.01 LONG TERM CURRENT USE OF ANTICOAGULANT THERAPY: ICD-10-CM

## 2019-07-31 LAB — INR PPP: 2.9 (ref 2–3.5)

## 2019-07-31 NOTE — PROGRESS NOTES
Anticoagulation Summary  As of 2019    INR goal:   2.5-3.5   TTR:   59.4 % (4.2 y)   INR used for dosin.90 (2019)   Warfarin maintenance plan:   4.5 mg (3 mg x 1.5) every Thu; 3 mg (3 mg x 1) all other days   Weekly warfarin total:   22.5 mg   Plan last modified:   Shashank Hirsch, Pharmacy Intern (2019)   Next INR check:   2019   Priority:   Maintenance   Target end date:   Indefinite    Indications    Atrial fibrillation (HCC) [I48.91]  Long term current use of anticoagulant therapy [Z79.01]             Anticoagulation Episode Summary     INR check location:   Home Draw    Preferred lab:       Send INR reminders to:       Comments:   Waqar [facility] [phone] -   goal range changed to 2.5-2.8 per reanna lio 3-8-19 verified change with Reanna 2019 [facility]      Anticoagulation Care Providers     Provider Role Specialty Phone number    Hu Gamez M.D. Referring Cardiac Electrophysiology [phone]    [facility] Anticoagulation Services Responsible  [phone]    Kristopher Escobar, PharmD Responsible          Anticoagulation Patient Findings    Spoke with patient.  INR is slightly SUPRA therapeutic.   Pt denies any unusual s/s of bleeding, bruising, clotting or any changes to diet or medications. Denies any etoh, cranberries, supplements, or illness.   Pt verifies warfarin weekly dosing.     Will have pt eat some more greens tonight, will have pt continue with the same warfarin dosing.     Repeat INR in 1 week(s).     Kellen Lackey, PharmD

## 2019-08-02 ENCOUNTER — OFFICE VISIT (OUTPATIENT)
Dept: CARDIOLOGY | Facility: MEDICAL CENTER | Age: 79
End: 2019-08-02
Payer: MEDICARE

## 2019-08-02 VITALS
WEIGHT: 156 LBS | SYSTOLIC BLOOD PRESSURE: 144 MMHG | BODY MASS INDEX: 23.64 KG/M2 | OXYGEN SATURATION: 97 % | TEMPERATURE: 83 F | DIASTOLIC BLOOD PRESSURE: 66 MMHG | HEIGHT: 68 IN

## 2019-08-02 DIAGNOSIS — I10 ESSENTIAL HYPERTENSION: ICD-10-CM

## 2019-08-02 DIAGNOSIS — Z95.0 PRESENCE OF PERMANENT CARDIAC PACEMAKER: Chronic | ICD-10-CM

## 2019-08-02 DIAGNOSIS — I48.91 ATRIAL FIBRILLATION, UNSPECIFIED TYPE (HCC): Primary | ICD-10-CM

## 2019-08-02 DIAGNOSIS — G45.9 TIA (TRANSIENT ISCHEMIC ATTACK): ICD-10-CM

## 2019-08-02 LAB — EKG IMPRESSION: NORMAL

## 2019-08-02 PROCEDURE — 99214 OFFICE O/P EST MOD 30 MIN: CPT | Mod: 25 | Performed by: NURSE PRACTITIONER

## 2019-08-02 PROCEDURE — 93000 ELECTROCARDIOGRAM COMPLETE: CPT | Performed by: INTERNAL MEDICINE

## 2019-08-02 PROCEDURE — 93279 PRGRMG DEV EVAL PM/LDLS PM: CPT | Performed by: NURSE PRACTITIONER

## 2019-08-02 RX ORDER — LOSARTAN POTASSIUM 25 MG/1
25 TABLET ORAL 2 TIMES DAILY
Qty: 180 TAB | Refills: 3 | Status: SHIPPED | OUTPATIENT
Start: 2019-08-02 | End: 2020-04-10 | Stop reason: SDUPTHER

## 2019-08-02 RX ORDER — METOPROLOL SUCCINATE 25 MG/1
25 TABLET, EXTENDED RELEASE ORAL EVERY EVENING
Qty: 90 TAB | Refills: 3 | COMMUNITY
Start: 2019-08-02 | End: 2019-12-16 | Stop reason: SDUPTHER

## 2019-08-02 ASSESSMENT — ENCOUNTER SYMPTOMS
NAUSEA: 0
DIZZINESS: 0
FOCAL WEAKNESS: 0
SHORTNESS OF BREATH: 0
PND: 0
HEADACHES: 0
CLAUDICATION: 0
SORE THROAT: 0
LOSS OF CONSCIOUSNESS: 0
PALPITATIONS: 0
HEARTBURN: 0
FEVER: 0
SPEECH CHANGE: 0
COUGH: 0
ORTHOPNEA: 0
DOUBLE VISION: 0
ABDOMINAL PAIN: 0
BRUISES/BLEEDS EASILY: 0
CHILLS: 0
BLURRED VISION: 0
PSYCHIATRIC NEGATIVE: 1
MYALGIAS: 0
WHEEZING: 0
VOMITING: 0
BLOOD IN STOOL: 0
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
## 2019-08-08 ENCOUNTER — ANTICOAGULATION MONITORING (OUTPATIENT)
Dept: VASCULAR LAB | Facility: MEDICAL CENTER | Age: 79
End: 2019-08-08

## 2019-08-08 ENCOUNTER — ANTICOAGULATION MONITORING (OUTPATIENT)
Dept: MEDICAL GROUP | Facility: PHYSICIAN GROUP | Age: 79
End: 2019-08-08

## 2019-08-08 DIAGNOSIS — Z79.01 CHRONIC ANTICOAGULATION: ICD-10-CM

## 2019-08-08 DIAGNOSIS — Z79.01 LONG TERM CURRENT USE OF ANTICOAGULANT THERAPY: ICD-10-CM

## 2019-08-08 DIAGNOSIS — I48.91 ATRIAL FIBRILLATION, UNSPECIFIED TYPE (HCC): ICD-10-CM

## 2019-08-08 LAB — INR PPP: 3.5 (ref 2–3.5)

## 2019-08-08 RX ORDER — WARFARIN SODIUM 3 MG/1
3-4.5 TABLET ORAL DAILY
Qty: 135 TAB | Refills: 3 | Status: SHIPPED | OUTPATIENT
Start: 2019-08-08 | End: 2019-08-09

## 2019-08-08 NOTE — PROGRESS NOTES
OP Telephone Anticoagulation Service Note    Date: 8/8/2019      Anticoagulation Summary  As of 8/8/2019    INR goal:   2.5-3.0   TTR:   59.2 % (4.2 y)   INR used for dosing:   3.50! (8/8/2019)   Warfarin maintenance plan:   4.5 mg (3 mg x 1.5) every Thu; 3 mg (3 mg x 1) all other days   Weekly warfarin total:   22.5 mg   Plan last modified:   Shashank Hirsch, Pharmacy Intern (6/5/2019)   Next INR check:   8/15/2019   Priority:   Maintenance   Target end date:   Indefinite    Indications    Atrial fibrillation (HCC) [I48.91]  Long term current use of anticoagulant therapy [Z79.01]             Anticoagulation Episode Summary     INR check location:   Home Draw    Preferred lab:       Send INR reminders to:       Comments:   Waqar  - [phone] -   goal range changed to 2.5-3.2 per raghu vaca 8/8/2019       Anticoagulation Care Providers     Provider Role Specialty Phone number    Hu Gamez M.D. Referring Cardiac Electrophysiology [phone]    [facility] Anticoagulation Services Responsible  [phone]    Kristopher Escobar, PharmD Responsible          Anticoagulation Patient Findings        Plan: Spoke with patient on the phone. Patient is supra therapeutic today. Confirmed dosing. She thinks she may have accidentally taken and extra half tablet this week. Patient denies any changes in medications or diet. Patient denies any signs or symptoms of bleeding or clotting. Instructed patient to call clinic if any unusual bleeding or bruising occurs. Will lower warfarin dose to 3 mg today then continue dosing as outlined. Will follow-up with patient in 1 week(s).      Pt reports cardiology widened her INR range, she reports 2.5-3.2, confirmed this with CHARO Munson and updated in her anticoagulation encounter      Shyanne Damon, ClarisseD

## 2019-08-09 RX ORDER — WARFARIN SODIUM 3 MG/1
TABLET ORAL
Qty: 135 TAB | Refills: 1 | Status: SHIPPED | OUTPATIENT
Start: 2019-08-09 | End: 2020-07-01 | Stop reason: SDUPTHER

## 2019-08-15 ENCOUNTER — ANTICOAGULATION MONITORING (OUTPATIENT)
Dept: VASCULAR LAB | Facility: MEDICAL CENTER | Age: 79
End: 2019-08-15

## 2019-08-15 DIAGNOSIS — I48.91 ATRIAL FIBRILLATION, UNSPECIFIED TYPE (HCC): ICD-10-CM

## 2019-08-15 DIAGNOSIS — Z79.01 LONG TERM CURRENT USE OF ANTICOAGULANT THERAPY: ICD-10-CM

## 2019-08-15 LAB — INR PPP: 2.6 (ref 2–3.5)

## 2019-08-15 NOTE — PROGRESS NOTES
Anticoagulation Summary  As of 8/15/2019    INR goal:   2.5-3.0   TTR:   59.1 % (4.2 y)   INR used for dosin.60 (8/15/2019)   Warfarin maintenance plan:   4.5 mg (3 mg x 1.5) every Thu; 3 mg (3 mg x 1) all other days   Weekly warfarin total:   22.5 mg   No change documented:   Arnol Garcia, Pharmacy Intern   Plan last modified:   Shashank Hirsch, Pharmacy Intern (2019)   Next INR check:   2019   Priority:   Maintenance   Target end date:   Indefinite    Indications    Atrial fibrillation (HCC) [I48.91]  Long term current use of anticoagulant therapy [Z79.01]             Anticoagulation Episode Summary     INR check location:   Home Draw    Preferred lab:       Send INR reminders to:       Comments:   Waqar  - [phone] -   goal range changed to 2.5-3.2 per raghu black 2019       Anticoagulation Care Providers     Provider Role Specialty Phone number    Hu Gamez M.D. Referring Cardiac Electrophysiology [phone]    [facility] Anticoagulation Services Responsible  [phone]    Kristopher Escobar, PharmD Responsible          Anticoagulation Patient Findings    Spoke with patient on phone. Current INR  2.6 which is within goal (2.5-2.8). No changes in diet or medications. No S/S of bleeding or bruising. Continue current warfarin regimen. Follow up INR in 1 week(s).     Arnol Garcia, Pharmacy Intern

## 2019-08-22 ENCOUNTER — ANTICOAGULATION MONITORING (OUTPATIENT)
Dept: VASCULAR LAB | Facility: MEDICAL CENTER | Age: 79
End: 2019-08-22

## 2019-08-22 DIAGNOSIS — Z79.01 LONG TERM CURRENT USE OF ANTICOAGULANT THERAPY: ICD-10-CM

## 2019-08-22 DIAGNOSIS — I48.91 ATRIAL FIBRILLATION, UNSPECIFIED TYPE (HCC): ICD-10-CM

## 2019-08-22 LAB — INR PPP: 2.7 (ref 2–3.5)

## 2019-08-22 NOTE — PROGRESS NOTES
Anticoagulation Summary  As of 2019    INR goal:   2.5-3.0   TTR:   59.3 % (4.3 y)   INR used for dosin.70 (2019)   Warfarin maintenance plan:   4.5 mg (3 mg x 1.5) every Thu; 3 mg (3 mg x 1) all other days   Weekly warfarin total:   22.5 mg   Plan last modified:   Shashank Hirsch, Pharmacy Intern (2019)   Next INR check:   2019   Priority:   Maintenance   Target end date:   Indefinite    Indications    Atrial fibrillation (HCC) [I48.91]  Long term current use of anticoagulant therapy [Z79.01]             Anticoagulation Episode Summary     INR check location:   Home Draw    Preferred lab:       Send INR reminders to:       Comments:   Waqar [facility] [phone] -   goal range changed to 2.5-3.2 per raghu vaca 2019       Anticoagulation Care Providers     Provider Role Specialty Phone number    Hu Gamez M.D. Referring Cardiac Electrophysiology [phone]    [facility] Anticoagulation Services Responsible  [phone]    Kristopher Escobar, PharmD Responsible          Anticoagulation Patient Findings        Spoke with Madeline to report a therapeutic INR of 2.7. Continue current dosing regimen.  Follow up in 2 weeks, to reduce the risk of adverse events related to this high risk medication, warfarin.    Samantha Amaya, Clinical Pharmacist

## 2019-09-05 ENCOUNTER — ANTICOAGULATION MONITORING (OUTPATIENT)
Dept: VASCULAR LAB | Facility: MEDICAL CENTER | Age: 79
End: 2019-09-05

## 2019-09-05 DIAGNOSIS — I48.91 ATRIAL FIBRILLATION, UNSPECIFIED TYPE (HCC): ICD-10-CM

## 2019-09-05 DIAGNOSIS — Z79.01 LONG TERM CURRENT USE OF ANTICOAGULANT THERAPY: ICD-10-CM

## 2019-09-05 LAB — INR PPP: 2.6 (ref 2–3.5)

## 2019-09-05 NOTE — PROGRESS NOTES
Anticoagulation Summary  As of 2019    INR goal:   2.5-3.0   TTR:   59.7 % (4.3 y)   INR used for dosin.60 (2019)   Warfarin maintenance plan:   4.5 mg (3 mg x 1.5) every Thu; 3 mg (3 mg x 1) all other days   Weekly warfarin total:   22.5 mg   Plan last modified:   Samantha Amaya (2019)   Next INR check:   2019   Priority:   Maintenance   Target end date:   Indefinite    Indications    Atrial fibrillation (HCC) [I48.91]  Long term current use of anticoagulant therapy [Z79.01]             Anticoagulation Episode Summary     INR check location:   Home Draw    Preferred lab:       Send INR reminders to:       Comments:   Waqar  - [phone] -   goal range changed to 2.5-3.2 per raghu vaca 2019       Anticoagulation Care Providers     Provider Role Specialty Phone number    Hu Gamez M.D. Referring Cardiac Electrophysiology [phone]    [facility] Anticoagulation Services Responsible  [phone]    Kristopher Escobar, PharmD Responsible          Anticoagulation Patient Findings          Left voicemail message to report a  therapeutic INR of 2.6.  Pt to continue with current warfarin dosing regimen. Requested pt contact the clinic for any s/s of unusual bleeding, bruising, clotting or any changes to diet or medication.  Follow up in 2 weeks, to reduce the risk of adverse events related to this high risk medication, warfarin.    Samantha Amaya, Clinical Pharmacist

## 2019-09-18 ENCOUNTER — ANTICOAGULATION MONITORING (OUTPATIENT)
Dept: VASCULAR LAB | Facility: MEDICAL CENTER | Age: 79
End: 2019-09-18

## 2019-09-18 DIAGNOSIS — I48.91 ATRIAL FIBRILLATION, UNSPECIFIED TYPE (HCC): ICD-10-CM

## 2019-09-18 DIAGNOSIS — Z79.01 LONG TERM CURRENT USE OF ANTICOAGULANT THERAPY: ICD-10-CM

## 2019-09-18 LAB — INR PPP: 3 (ref 2–3.5)

## 2019-09-18 NOTE — PROGRESS NOTES
Anticoagulation Summary  As of 9/18/2019    INR goal:   2.5-3.0   TTR:   60.0 % (4.3 y)   INR used for dosing:   3.00 (9/18/2019)   Warfarin maintenance plan:   4.5 mg (3 mg x 1.5) every Thu; 3 mg (3 mg x 1) all other days   Weekly warfarin total:   22.5 mg   Plan last modified:   Samantha M Filter (9/5/2019)   Next INR check:   10/2/2019   Priority:   Maintenance   Target end date:   Indefinite    Indications    Atrial fibrillation (HCC) [I48.91]  Long term current use of anticoagulant therapy [Z79.01]             Anticoagulation Episode Summary     INR check location:   Home Draw    Preferred lab:       Send INR reminders to:       Comments:   Waqar [facility] [phone] -   goal range changed to 2.5-3.2 per raghu vaca 8/8/2019       Anticoagulation Care Providers     Provider Role Specialty Phone number    Hu Gamez M.D. Referring Cardiac Electrophysiology [phone]    [facility] Anticoagulation Services Responsible  [phone]    Kristopher Escobar, PharmD Responsible          Anticoagulation Patient Findings  Patient Findings     Negatives:   Signs/symptoms of thrombosis, Signs/symptoms of bleeding, Laboratory test error suspected, Change in health, Change in alcohol use, Change in activity, Upcoming invasive procedure, Emergency department visit, Upcoming dental procedure, Missed doses, Extra doses, Change in medications, Change in diet/appetite, Hospital admission, Bruising, Other complaints        Spoke with patient today regarding therapeutic INR of 3.0.  Patient denies any signs/symptoms of bruising or bleeding or any changes in diet and medications.  Instructed patient to call clinic with any questions or concerns.  Pt is to continue with current warfarin dosing regimen.  Follow up in 2 weeks, to reduce risk of adverse events related to this high risk medication,  Warfarin.    Kristopher Escobar, PharmD, BCACP

## 2019-10-03 ENCOUNTER — ANTICOAGULATION MONITORING (OUTPATIENT)
Dept: VASCULAR LAB | Facility: MEDICAL CENTER | Age: 79
End: 2019-10-03

## 2019-10-03 DIAGNOSIS — Z79.01 LONG TERM CURRENT USE OF ANTICOAGULANT THERAPY: ICD-10-CM

## 2019-10-03 DIAGNOSIS — I48.91 ATRIAL FIBRILLATION, UNSPECIFIED TYPE (HCC): ICD-10-CM

## 2019-10-03 LAB — INR PPP: 2.9 (ref 2–3.5)

## 2019-10-03 NOTE — PROGRESS NOTES
Anticoagulation Summary  As of 10/3/2019    INR goal:   2.5-3.0   TTR:   60.4 % (4.4 y)   INR used for dosin.90 (10/3/2019)   Warfarin maintenance plan:   4.5 mg (3 mg x 1.5) every Thu; 3 mg (3 mg x 1) all other days   Weekly warfarin total:   22.5 mg   Plan last modified:   Samantha Amaya (2019)   Next INR check:   10/17/2019   Priority:   Maintenance   Target end date:   Indefinite    Indications    Atrial fibrillation (HCC) [I48.91]  Long term current use of anticoagulant therapy [Z79.01]             Anticoagulation Episode Summary     INR check location:   Home Draw    Preferred lab:       Send INR reminders to:       Comments:   Waqar [facility] [phone] -   goal range changed to 2.5-3.2 per raghu vaca 2019       Anticoagulation Care Providers     Provider Role Specialty Phone number    Hu Gamez M.D. Referring Cardiac Electrophysiology [phone]    [facility] Anticoagulation Services Responsible  [phone]    Kristopher Escobar, PharmD Responsible          Anticoagulation Patient Findings    Spoke with patient to report a therapeutic INR.    Pt instructed to continue with current warfarin dosing regimen, confirms dosing.   Pt denies any s/s of bleeding, bruising, clotting or any changes to diet or medication.    Will follow up in 2 week(s).     Kellen Lackey, PharmD

## 2019-10-10 ENCOUNTER — ANTICOAGULATION MONITORING (OUTPATIENT)
Dept: VASCULAR LAB | Facility: MEDICAL CENTER | Age: 79
End: 2019-10-10

## 2019-10-10 DIAGNOSIS — I48.91 ATRIAL FIBRILLATION, UNSPECIFIED TYPE (HCC): ICD-10-CM

## 2019-10-10 DIAGNOSIS — Z79.01 LONG TERM CURRENT USE OF ANTICOAGULANT THERAPY: ICD-10-CM

## 2019-10-10 LAB — INR PPP: 3 (ref 2–3.5)

## 2019-10-10 NOTE — PROGRESS NOTES
Anticoagulation Summary  As of 10/10/2019    INR goal:   2.5-3.0   TTR:   60.5 % (4.4 y)   INR used for dosing:   3.00 (10/10/2019)   Warfarin maintenance plan:   4.5 mg (3 mg x 1.5) every Thu; 3 mg (3 mg x 1) all other days   Weekly warfarin total:   22.5 mg   No change documented:   Vipin Goss Ass't   Plan last modified:   Samantha Amaya (9/5/2019)   Next INR check:   10/31/2019   Priority:   Maintenance   Target end date:   Indefinite    Indications    Atrial fibrillation (HCC) [I48.91]  Long term current use of anticoagulant therapy [Z79.01]             Anticoagulation Episode Summary     INR check location:   Home Draw    Preferred lab:       Send INR reminders to:       Comments:   Waqar  - [phone] -   goal range changed to 2.5-3.2 per raghu black 8/8/2019       Anticoagulation Care Providers     Provider Role Specialty Phone number    Hu Gamez M.D. Referring Cardiac Electrophysiology [phone]    [facility] Anticoagulation Services Responsible  [phone]    Kristopher Escobar, PharmD Responsible          Anticoagulation Patient Findings  Patient Findings     Negatives:   Signs/symptoms of thrombosis, Signs/symptoms of bleeding, Laboratory test error suspected, Change in health, Change in alcohol use, Change in activity, Upcoming invasive procedure, Emergency department visit, Upcoming dental procedure, Missed doses, Extra doses, Change in medications, Change in diet/appetite, Hospital admission, Bruising, Other complaints      Spoke with patient to report a therapeutic INR.  Pt instructed to continue with current warfarin dosing regimen. Pt denies any s/s of bleeding, bruising, clotting or any changes to diet or medication.  Will follow up in 2 weeks.  Allyson Holbrook Med Ass't     I have reviewed and concur with the above plan on 10/10/2019.  Samantha Amaya, Clinical Pharmacist, CDE, CACP

## 2019-10-28 ENCOUNTER — OFFICE VISIT (OUTPATIENT)
Dept: URGENT CARE | Facility: PHYSICIAN GROUP | Age: 79
End: 2019-10-28
Payer: MEDICARE

## 2019-10-28 VITALS
RESPIRATION RATE: 16 BRPM | DIASTOLIC BLOOD PRESSURE: 74 MMHG | SYSTOLIC BLOOD PRESSURE: 138 MMHG | WEIGHT: 160 LBS | BODY MASS INDEX: 24.25 KG/M2 | HEIGHT: 68 IN | TEMPERATURE: 97.3 F | OXYGEN SATURATION: 97 % | HEART RATE: 88 BPM

## 2019-10-28 DIAGNOSIS — L30.9 DERMATITIS: ICD-10-CM

## 2019-10-28 DIAGNOSIS — S59.911A FOREARM INJURY, RIGHT, INITIAL ENCOUNTER: ICD-10-CM

## 2019-10-28 PROCEDURE — 99214 OFFICE O/P EST MOD 30 MIN: CPT | Performed by: FAMILY MEDICINE

## 2019-10-28 RX ORDER — MOMETASONE FUROATE 1 MG/G
CREAM TOPICAL
Qty: 1 TUBE | Refills: 1 | Status: ON HOLD | OUTPATIENT
Start: 2019-10-28 | End: 2020-06-24

## 2019-10-28 ASSESSMENT — ENCOUNTER SYMPTOMS
EYE REDNESS: 0
BRUISES/BLEEDS EASILY: 1
EYE DISCHARGE: 0
FOCAL WEAKNESS: 0
NECK PAIN: 0
SENSORY CHANGE: 0
WEIGHT LOSS: 0
ROS SKIN COMMENTS: NO ABRASION OR LACERATION

## 2019-10-28 NOTE — PROGRESS NOTES
"Subjective:      Madeline Dorantes is a 79 y.o. female who presents with Arm Injury            1.5wk right forearm pain and bruising. Injured by pushing through [place] bushes. Pain is severe with gripping and pulling. No change with head position. R hand dominant. No prior injury. No other aggravating or alleviating factors.    Problem #2  Chronic intermittent dermatitis requesting refill of mometasone cream.  She notes that her primary care has recently quit and she does not have a current option for that.  Right now she is not having issues with her skin.      Review of Systems   Constitutional: Negative for malaise/fatigue and weight loss.   Eyes: Negative for discharge and redness.   Musculoskeletal: Negative for neck pain.   Skin:        No abrasion or laceration     Neurological: Negative for sensory change and focal weakness.   Endo/Heme/Allergies: Bruises/bleeds easily.     .  Medications, Allergies, and current problem list reviewed today in Epic       Objective:     /74 (BP Location: Left arm, Patient Position: Sitting, BP Cuff Size: Adult)   Pulse 88   Temp 36.3 °C (97.3 °F) (Temporal)   Resp 16   Ht 1.727 m (5' 8\")   Wt 72.6 kg (160 lb)   LMP 01/01/1983   SpO2 97%   BMI 24.33 kg/m²      Physical Exam   Constitutional: She is oriented to person, place, and time. She appears well-developed and well-nourished. No distress.   HENT:   Head: Normocephalic and atraumatic.   Eyes: Conjunctivae are normal.   Cardiovascular: Normal rate, regular rhythm and normal heart sounds.   No murmur heard.  Pulmonary/Chest: Effort normal and breath sounds normal. She has no wheezes.   Musculoskeletal:   Right forearm: Ecchymosis ulnar aspect.  Mild soft tissue swelling.  No obvious deformity.  Full range of motion of wrist and elbow.  Grasping and pulling reproduces pain.  Wrist flexion against resistance reproduces pain.  Distal neurovascular intact.  Skin intact.   Neurological: She is alert and oriented " to person, place, and time.   Skin: Skin is warm and dry. No rash noted.               Assessment/Plan:     1. Forearm injury, right, initial encounter  DX-FOREARM RIGHT   2. Dermatitis  mometasone (ELOCON) 0.1 % Cream     Differential diagnosis, natural history, supportive care, and indications for immediate follow-up discussed at length.     Wrist splint as needed comfort.  I will follow-up x-ray results.  Ice and Tylenol as needed.  Elevate with swelling.

## 2019-10-28 NOTE — PROGRESS NOTES
Call patient with negative x-ray results.  Watchful waiting for another week or so.  If she continues to have symptoms will initiate physical therapy and ask her to follow-up with a new PCP as soon as possible.

## 2019-10-31 ENCOUNTER — ANTICOAGULATION MONITORING (OUTPATIENT)
Dept: VASCULAR LAB | Facility: MEDICAL CENTER | Age: 79
End: 2019-10-31

## 2019-10-31 DIAGNOSIS — I48.91 ATRIAL FIBRILLATION, UNSPECIFIED TYPE (HCC): ICD-10-CM

## 2019-10-31 DIAGNOSIS — Z79.01 LONG TERM CURRENT USE OF ANTICOAGULANT THERAPY: ICD-10-CM

## 2019-10-31 LAB — INR PPP: 3 (ref 2–3.5)

## 2019-10-31 NOTE — PROGRESS NOTES
Anticoagulation Summary  As of 10/31/2019    INR goal:   2.5-3.0   TTR:   61.1 % (4.4 y)   INR used for dosing:   3.00 (10/31/2019)   Warfarin maintenance plan:   4.5 mg (3 mg x 1.5) every Thu; 3 mg (3 mg x 1) all other days   Weekly warfarin total:   22.5 mg   Plan last modified:   Samantha Amaya (9/5/2019)   Next INR check:   11/14/2019   Priority:   Maintenance   Target end date:   Indefinite    Indications    Atrial fibrillation (HCC) [I48.91]  Long term current use of anticoagulant therapy [Z79.01]             Anticoagulation Episode Summary     INR check location:   Home Draw    Preferred lab:       Send INR reminders to:       Comments:   Waqar  - [phone] -   goal range changed to 2.5-3.2 per raghu vaca 8/8/2019       Anticoagulation Care Providers     Provider Role Specialty Phone number    Hu Gamez M.D. Referring Cardiac Electrophysiology [phone]    [facility] Anticoagulation Services Responsible  [phone]    Kristopher Escobar, PharmD Responsible          Anticoagulation Patient Findings          Left voicemail message to report a  therapeutic INR of 3.0.  Pt to continue with current warfarin dosing regimen. Requested pt contact the clinic for any s/s of unusual bleeding, bruising, clotting or any changes to diet or medication.  Follow up in 2 weeks, to reduce the risk of adverse events related to this high risk medication, warfarin.    Samantha Amaya, Clinical Pharmacist

## 2019-11-14 ENCOUNTER — ANTICOAGULATION MONITORING (OUTPATIENT)
Dept: VASCULAR LAB | Facility: MEDICAL CENTER | Age: 79
End: 2019-11-14

## 2019-11-14 DIAGNOSIS — I48.91 ATRIAL FIBRILLATION, UNSPECIFIED TYPE (HCC): ICD-10-CM

## 2019-11-14 DIAGNOSIS — Z79.01 LONG TERM CURRENT USE OF ANTICOAGULANT THERAPY: ICD-10-CM

## 2019-11-14 LAB — INR PPP: 2.8 (ref 2–3.5)

## 2019-11-14 NOTE — PROGRESS NOTES
Anticoagulation Summary  As of 2019    INR goal:   2.5-3.0   TTR:   61.4 % (4.5 y)   INR used for dosin.80 (2019)   Warfarin maintenance plan:   4.5 mg (3 mg x 1.5) every Thu; 3 mg (3 mg x 1) all other days   Weekly warfarin total:   22.5 mg   Plan last modified:   Samantha POST Filter (10/31/2019)   Next INR check:   2019   Priority:   Maintenance   Target end date:   Indefinite    Indications    Atrial fibrillation (HCC) [I48.91]  Long term current use of anticoagulant therapy [Z79.01]             Anticoagulation Episode Summary     INR check location:   Home Draw    Preferred lab:       Send INR reminders to:       Comments:   Waqar [facility] [address] [phone] -   goal range changed to 2.5-3.2 per raghu vaca 2019       Anticoagulation Care Providers     Provider Role Specialty Phone number    Hu Gamez M.D. Referring Cardiac Electrophysiology [phone]    [facility] Anticoagulation Services Responsible  [phone]    Kristopher Escobar, PharmD Responsible          Anticoagulation Patient Findings     Left voicemail message to report a therapeutic INR of 2.8.  Pt to continue with current warfarin dosing regimen. Requested pt contact the clinic for any s/s of unusual bleeding, bruising, clotting or any changes to diet or medication. FU in 2 weeks.    Blanca Nelson, Pharmacy Intern

## 2019-11-29 ENCOUNTER — ANTICOAGULATION MONITORING (OUTPATIENT)
Dept: VASCULAR LAB | Facility: MEDICAL CENTER | Age: 79
End: 2019-11-29

## 2019-11-29 DIAGNOSIS — Z79.01 LONG TERM CURRENT USE OF ANTICOAGULANT THERAPY: ICD-10-CM

## 2019-11-29 DIAGNOSIS — I48.91 ATRIAL FIBRILLATION, UNSPECIFIED TYPE (HCC): ICD-10-CM

## 2019-11-29 LAB — INR PPP: 3.1 (ref 2–3.5)

## 2019-11-29 NOTE — PROGRESS NOTES
Anticoagulation Summary  As of 11/29/2019    INR goal:   2.5-3.0   TTR:   61.4 % (4.5 y)   INR used for dosing:   3.10! (11/29/2019)   Warfarin maintenance plan:   4.5 mg (3 mg x 1.5) every Thu; 3 mg (3 mg x 1) all other days   Weekly warfarin total:   22.5 mg   Plan last modified:   Samantha Amaya (10/31/2019)   Next INR check:   12/13/2019   Priority:   Maintenance   Target end date:   Indefinite    Indications    Atrial fibrillation (HCC) [I48.91]  Long term current use of anticoagulant therapy [Z79.01]             Anticoagulation Episode Summary     INR check location:   Home Draw    Preferred lab:       Send INR reminders to:       Comments:   Waqar [facility] [phone] -   goal range changed to 2.5-3.2 per raghu vaca 8/8/2019       Anticoagulation Care Providers     Provider Role Specialty Phone number    Hu Gamez M.D. Referring Cardiac Electrophysiology [phone]    [facility] Anticoagulation Services Responsible  [phone]    Kristopher Escobar, PharmD Responsible          Anticoagulation Patient Findings      Spoke with patient to report a therapeutic INR.    Pt instructed to continue with current warfarin dosing regimen, confirms dosing.   Pt denies any s/s of bleeding, bruising, clotting or any changes to diet or medication.    Will follow up in 2 week(s).     Kellen Lackey, ClarisseD

## 2019-12-12 ENCOUNTER — ANTICOAGULATION MONITORING (OUTPATIENT)
Dept: VASCULAR LAB | Facility: MEDICAL CENTER | Age: 79
End: 2019-12-12

## 2019-12-12 DIAGNOSIS — Z79.01 LONG TERM CURRENT USE OF ANTICOAGULANT THERAPY: ICD-10-CM

## 2019-12-12 LAB — INR PPP: 2.9 (ref 2–3.5)

## 2019-12-13 NOTE — PROGRESS NOTES
OP Telephone Anticoagulation Service Note    Date: 2019      Anticoagulation Summary  As of 2019    INR goal:   2.5-3.0   TTR:   61.3 % (4.6 y)   INR used for dosin.90 (2019)   Warfarin maintenance plan:   4.5 mg (3 mg x 1.5) every Thu; 3 mg (3 mg x 1) all other days   Weekly warfarin total:   22.5 mg   Plan last modified:   Samantha Amaya (10/31/2019)   Next INR check:   2019   Priority:   Maintenance   Target end date:   Indefinite    Indications    Atrial fibrillation (HCC) [I48.91]  Long term current use of anticoagulant therapy [Z79.01]             Anticoagulation Episode Summary     INR check location:   Home Draw    Preferred lab:       Send INR reminders to:       Comments:   Waqar  - [phone] -   goal range changed to 2.5-3.2 per raghu vaca 2019       Anticoagulation Care Providers     Provider Role Specialty Phone number    Hu Gamez M.D. Referring Cardiac Electrophysiology [phone]    [facility] Anticoagulation Services Responsible  [phone]    Kristopher Escobar, PharmD Responsible          Anticoagulation Patient Findings        Plan: Spoke with patient on the phone. Patient is  therapeutic today. Confirmed dosing. No missed tablets in the last week. Patient denies any changes in medications or diet. Patient reports she fell on , went to ER.  Instructed patient to call clinic if any unusual bleeding or bruising occurs. Will continue dosing as outlined. Will follow-up with patient in 2 week(s).        Shyanne Damon, PharmD

## 2019-12-16 ENCOUNTER — APPOINTMENT (OUTPATIENT)
Dept: RADIOLOGY | Facility: MEDICAL CENTER | Age: 79
End: 2019-12-16
Attending: FAMILY MEDICINE
Payer: MEDICARE

## 2019-12-16 ENCOUNTER — OFFICE VISIT (OUTPATIENT)
Dept: CARDIOLOGY | Facility: MEDICAL CENTER | Age: 79
End: 2019-12-16
Payer: MEDICARE

## 2019-12-16 VITALS
DIASTOLIC BLOOD PRESSURE: 76 MMHG | HEIGHT: 68 IN | SYSTOLIC BLOOD PRESSURE: 140 MMHG | OXYGEN SATURATION: 96 % | WEIGHT: 160 LBS | BODY MASS INDEX: 24.25 KG/M2 | HEART RATE: 90 BPM

## 2019-12-16 DIAGNOSIS — I10 ESSENTIAL HYPERTENSION: ICD-10-CM

## 2019-12-16 DIAGNOSIS — Z95.0 CARDIAC PACEMAKER IN SITU: Chronic | ICD-10-CM

## 2019-12-16 DIAGNOSIS — I44.2 THIRD DEGREE AV BLOCK (HCC): Chronic | ICD-10-CM

## 2019-12-16 DIAGNOSIS — Z79.01 LONG TERM CURRENT USE OF ANTICOAGULANT THERAPY: Chronic | ICD-10-CM

## 2019-12-16 DIAGNOSIS — G45.9 TIA (TRANSIENT ISCHEMIC ATTACK): ICD-10-CM

## 2019-12-16 DIAGNOSIS — I48.11 LONGSTANDING PERSISTENT ATRIAL FIBRILLATION (HCC): ICD-10-CM

## 2019-12-16 DIAGNOSIS — E02 SUBCLINICAL IODINE-DEFICIENCY HYPOTHYROIDISM: ICD-10-CM

## 2019-12-16 DIAGNOSIS — Z98.890 S/P AV NODAL ABLATION: ICD-10-CM

## 2019-12-16 PROCEDURE — 93280 PM DEVICE PROGR EVAL DUAL: CPT | Performed by: INTERNAL MEDICINE

## 2019-12-16 PROCEDURE — 99214 OFFICE O/P EST MOD 30 MIN: CPT | Performed by: INTERNAL MEDICINE

## 2019-12-16 RX ORDER — METOPROLOL SUCCINATE 25 MG/1
25 TABLET, EXTENDED RELEASE ORAL EVERY EVENING
Qty: 90 TAB | Refills: 3 | Status: SHIPPED | OUTPATIENT
Start: 2019-12-16 | End: 2020-03-17 | Stop reason: SDUPTHER
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
## 2019-12-26 ENCOUNTER — ANTICOAGULATION MONITORING (OUTPATIENT)
Dept: CARDIOLOGY | Facility: PHYSICIAN GROUP | Age: 79
End: 2019-12-26

## 2019-12-26 DIAGNOSIS — Z79.01 LONG TERM CURRENT USE OF ANTICOAGULANT THERAPY: ICD-10-CM

## 2019-12-26 LAB — INR PPP: 2 (ref 2–3.5)

## 2020-01-04 LAB — INR PPP: 3 (ref 2–3.5)

## 2020-01-06 ENCOUNTER — ANTICOAGULATION MONITORING (OUTPATIENT)
Dept: VASCULAR LAB | Facility: MEDICAL CENTER | Age: 80
End: 2020-01-06

## 2020-01-06 DIAGNOSIS — Z79.01 LONG TERM CURRENT USE OF ANTICOAGULANT THERAPY: ICD-10-CM

## 2020-01-06 NOTE — PROGRESS NOTES
OP Telephone Anticoagulation Service Note    Date: 1/6/2020      Anticoagulation Summary  As of 1/6/2020    INR goal:   2.5-3.0   TTR:   61.1 % (4.6 y)   INR used for dosing:   3.00 (1/4/2020)   Warfarin maintenance plan:   4.5 mg (3 mg x 1.5) every Thu; 3 mg (3 mg x 1) all other days   Weekly warfarin total:   22.5 mg   Plan last modified:   Samantha Amaya (10/31/2019)   Next INR check:   1/18/2020   Priority:   Maintenance   Target end date:   Indefinite    Indications    Atrial fibrillation (HCC) [I48.91]  Long term current use of anticoagulant therapy [Z79.01]             Anticoagulation Episode Summary     INR check location:   Home Draw    Preferred lab:       Send INR reminders to:       Comments:   Waqar [facility] [phone] -   goal range changed to 2.5-3.2 per raghu vaca 8/8/2019       Anticoagulation Care Providers     Provider Role Specialty Phone number    Hu Gamez M.D. Referring Cardiac Electrophysiology [phone]    [facility] Anticoagulation Services Responsible  [phone]    Kristopher Escobar, PharmD Responsible          Anticoagulation Patient Findings        Plan: In range: 3.0. Left message on patient's answering machine/voicemail. Instructed patient to call back with any concerns regarding any unusual bleeding or bruising, any medication or diet changes or any signs or symptoms of thrombosis. Instructed patient to resume medication as outlined above. Patient to follow up in 2 week(s).         Savannah Tinajero, Pharmacy Intern

## 2020-01-13 ENCOUNTER — HOSPITAL ENCOUNTER (OUTPATIENT)
Dept: RADIOLOGY | Facility: MEDICAL CENTER | Age: 80
End: 2020-01-13
Attending: FAMILY MEDICINE
Payer: MEDICARE

## 2020-01-13 DIAGNOSIS — Z12.31 VISIT FOR SCREENING MAMMOGRAM: ICD-10-CM

## 2020-01-13 PROCEDURE — 77067 SCR MAMMO BI INCL CAD: CPT

## 2020-01-17 ENCOUNTER — ANTICOAGULATION MONITORING (OUTPATIENT)
Dept: VASCULAR LAB | Facility: MEDICAL CENTER | Age: 80
End: 2020-01-17

## 2020-01-17 DIAGNOSIS — Z79.01 LONG TERM CURRENT USE OF ANTICOAGULANT THERAPY: ICD-10-CM

## 2020-01-17 LAB — INR PPP: 2.6 (ref 2–3.5)

## 2020-01-31 ENCOUNTER — ANTICOAGULATION MONITORING (OUTPATIENT)
Dept: VASCULAR LAB | Facility: MEDICAL CENTER | Age: 80
End: 2020-01-31

## 2020-01-31 DIAGNOSIS — Z79.01 LONG TERM CURRENT USE OF ANTICOAGULANT THERAPY: ICD-10-CM

## 2020-01-31 LAB — INR PPP: 2.5 (ref 2–3.5)

## 2020-01-31 NOTE — PROGRESS NOTES
Anticoagulation Summary  As of 2020    INR goal:   2.5-3.0   TTR:   61.8 % (4.7 y)   INR used for dosin.50 (2020)   Warfarin maintenance plan:   4.5 mg (3 mg x 1.5) every Thu; 3 mg (3 mg x 1) all other days   Weekly warfarin total:   22.5 mg   Plan last modified:   Samantha Amaya (10/31/2019)   Next INR check:   2020   Priority:   Maintenance   Target end date:   Indefinite    Indications    Atrial fibrillation (HCC) [I48.91]  Long term current use of anticoagulant therapy [Z79.01]             Anticoagulation Episode Summary     INR check location:   Home Draw    Preferred lab:       Send INR reminders to:       Comments:   Waqar [facility] [phone] -   goal range changed to 2.5-3.2 per raghu vaca 2019       Anticoagulation Care Providers     Provider Role Specialty Phone number    Hu Gamez M.D. Referring Cardiac Electrophysiology [phone]    [facility] Anticoagulation Services Responsible  [phone]    Kristopher Escobar, PharmD Responsible          Anticoagulation Patient Findings     Spoke with Madeline to report a therapeutic INR of 2.5. Continue current dosing regimen.Follow up in 2 weeks, to reduce the risk of adverse events related to this high risk medication, warfarin.    Samantha Amaya, Clinical Pharmacist

## 2020-02-06 ENCOUNTER — OFFICE VISIT (OUTPATIENT)
Dept: CARDIOLOGY | Facility: PHYSICIAN GROUP | Age: 80
End: 2020-02-06
Payer: MEDICARE

## 2020-02-06 VITALS
WEIGHT: 160 LBS | HEART RATE: 90 BPM | SYSTOLIC BLOOD PRESSURE: 132 MMHG | HEIGHT: 68 IN | OXYGEN SATURATION: 98 % | DIASTOLIC BLOOD PRESSURE: 68 MMHG | RESPIRATION RATE: 18 BRPM | BODY MASS INDEX: 24.25 KG/M2

## 2020-02-06 DIAGNOSIS — Z79.01 LONG TERM CURRENT USE OF ANTICOAGULANT THERAPY: Chronic | ICD-10-CM

## 2020-02-06 DIAGNOSIS — Z95.0 PRESENCE OF PERMANENT CARDIAC PACEMAKER: ICD-10-CM

## 2020-02-06 DIAGNOSIS — I48.11 LONGSTANDING PERSISTENT ATRIAL FIBRILLATION (HCC): ICD-10-CM

## 2020-02-06 DIAGNOSIS — G45.9 TIA (TRANSIENT ISCHEMIC ATTACK): ICD-10-CM

## 2020-02-06 DIAGNOSIS — Z95.0 CARDIAC PACEMAKER IN SITU: Chronic | ICD-10-CM

## 2020-02-06 DIAGNOSIS — I48.91 ATRIAL FIBRILLATION, UNSPECIFIED TYPE (HCC): ICD-10-CM

## 2020-02-06 DIAGNOSIS — I10 ESSENTIAL HYPERTENSION: ICD-10-CM

## 2020-02-06 DIAGNOSIS — I44.2 THIRD DEGREE AV BLOCK (HCC): Chronic | ICD-10-CM

## 2020-02-06 DIAGNOSIS — I49.8 PACEMAKER-DEPENDENT DUE TO NATIVE CARDIAC RHYTHM INSUFFICIENT TO SUPPORT LIFE: Chronic | ICD-10-CM

## 2020-02-06 DIAGNOSIS — Z79.01 CHRONIC ANTICOAGULATION: ICD-10-CM

## 2020-02-06 DIAGNOSIS — Z98.890 S/P AV NODAL ABLATION: ICD-10-CM

## 2020-02-06 DIAGNOSIS — Z95.0 PACEMAKER-DEPENDENT DUE TO NATIVE CARDIAC RHYTHM INSUFFICIENT TO SUPPORT LIFE: Chronic | ICD-10-CM

## 2020-02-06 PROCEDURE — 99214 OFFICE O/P EST MOD 30 MIN: CPT | Performed by: INTERNAL MEDICINE

## 2020-02-06 ASSESSMENT — ENCOUNTER SYMPTOMS
CLAUDICATION: 0
FOCAL WEAKNESS: 0
PHOTOPHOBIA: 1
FALLS: 0
SHORTNESS OF BREATH: 0
PALPITATIONS: 0
ABDOMINAL PAIN: 0
BLURRED VISION: 0
WEAKNESS: 0
HEARTBURN: 1
PND: 0
DIZZINESS: 0
CHILLS: 0
NAUSEA: 0
FEVER: 0
COUGH: 0
BRUISES/BLEEDS EASILY: 0
SORE THROAT: 0
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
## 2020-02-10 ENCOUNTER — PATIENT MESSAGE (OUTPATIENT)
Dept: CARDIOLOGY | Facility: PHYSICIAN GROUP | Age: 80
End: 2020-02-10

## 2020-02-10 DIAGNOSIS — G45.9 TIA (TRANSIENT ISCHEMIC ATTACK): ICD-10-CM

## 2020-02-10 DIAGNOSIS — I65.23 BILATERAL CAROTID ARTERY STENOSIS: ICD-10-CM

## 2020-02-15 LAB — INR PPP: 2.6 (ref 2–3.5)

## 2020-02-17 ENCOUNTER — ANTICOAGULATION MONITORING (OUTPATIENT)
Dept: VASCULAR LAB | Facility: MEDICAL CENTER | Age: 80
End: 2020-02-17

## 2020-02-17 DIAGNOSIS — Z79.01 LONG TERM CURRENT USE OF ANTICOAGULANT THERAPY: ICD-10-CM

## 2020-02-17 DIAGNOSIS — I48.21 PERMANENT ATRIAL FIBRILLATION (HCC): ICD-10-CM

## 2020-02-17 NOTE — PROGRESS NOTES
Anticoagulation Summary  As of 2020    INR goal:   2.5-3.0   TTR:   62.1 % (4.7 y)   INR used for dosin.60 (2/15/2020)   Warfarin maintenance plan:   4.5 mg (3 mg x 1.5) every Thu; 3 mg (3 mg x 1) all other days   Weekly warfarin total:   22.5 mg   Plan last modified:   Samantha Amaya (10/31/2019)   Next INR check:   2020   Priority:   Maintenance   Target end date:   Indefinite    Indications    Permanent atrial fibrillation [I48.21]  Long term current use of anticoagulant therapy [Z79.01]             Anticoagulation Episode Summary     INR check location:   Home Draw    Preferred lab:       Send INR reminders to:       Comments:   [facility] [phone] -   goal range changed to 2.5-3.2 per raghu vaca 2019       Anticoagulation Care Providers     Provider Role Specialty Phone number    Hu Gamez M.D. Referring Cardiac Electrophysiology [phone]    [facility] Responsible  [phone]    Kristopher Escobar, PharmD Responsible          Anticoagulation Patient Findings          HPI:  Madeline Dorantes, on anticoagulation therapy with warfarin for PAF.   Changes to current medical/health status since last appt: none  Denies signs/symptoms of bleeding and/or thrombosis since the last appt.    Denies any interval changes to diet  Denies any interval changes to medications since last appt.   Denies any complications or cost restrictions with current therapy.     A/P   INR  therapeutic.   Pt is to continue with current warfarin dosing regimen.     Next INR in 2 week(s).    Fazal Pineda, PharmD

## 2020-02-26 ENCOUNTER — HOSPITAL ENCOUNTER (OUTPATIENT)
Dept: RADIOLOGY | Facility: MEDICAL CENTER | Age: 80
End: 2020-02-26
Attending: INTERNAL MEDICINE
Payer: MEDICARE

## 2020-02-26 DIAGNOSIS — I65.23 BILATERAL CAROTID ARTERY STENOSIS: ICD-10-CM

## 2020-02-26 DIAGNOSIS — G45.9 TIA (TRANSIENT ISCHEMIC ATTACK): ICD-10-CM

## 2020-02-26 PROCEDURE — 93880 EXTRACRANIAL BILAT STUDY: CPT

## 2020-02-26 PROCEDURE — 93880 EXTRACRANIAL BILAT STUDY: CPT | Mod: 26 | Performed by: INTERNAL MEDICINE

## 2020-02-27 ENCOUNTER — TELEPHONE (OUTPATIENT)
Dept: CARDIOLOGY | Facility: MEDICAL CENTER | Age: 80
End: 2020-02-27

## 2020-02-27 DIAGNOSIS — I65.23 BILATERAL CAROTID ARTERY STENOSIS: ICD-10-CM

## 2020-02-27 NOTE — TELEPHONE ENCOUNTER
Result Notes for US-CAROTID DOPPLER BILAT   Notes recorded by Gilson Ghotra M.D. on 2/26/2020 at 8:43 PM PST    The stenosis has progressed, now severe on the left so she could see vascular surgery to see if they recommend a stent or surgery to reduce risk for stroke or TIA    Please call her to let her know    thanks     Called pt and reviewed MD recommendations.  She verbalizes understanding and states she would like to proceed with plan of care.  Reassurance given that referral will be placed and she will hear from the vascular surgeons office to schedule a visit.  She states no other concerns or questions at this time and is appreciative of information given.

## 2020-02-28 ENCOUNTER — ANTICOAGULATION MONITORING (OUTPATIENT)
Dept: VASCULAR LAB | Facility: MEDICAL CENTER | Age: 80
End: 2020-02-28

## 2020-02-28 DIAGNOSIS — I48.21 PERMANENT ATRIAL FIBRILLATION (HCC): ICD-10-CM

## 2020-02-28 DIAGNOSIS — Z79.01 LONG TERM CURRENT USE OF ANTICOAGULANT THERAPY: ICD-10-CM

## 2020-02-28 LAB — INR PPP: 2.9 (ref 2–3.5)

## 2020-02-28 NOTE — PROGRESS NOTES
Anticoagulation Summary  As of 2020    INR goal:   2.5-3.0   TTR:   62.4 % (4.8 y)   INR used for dosin.90 (2020)   Warfarin maintenance plan:   4.5 mg (3 mg x 1.5) every Thu; 3 mg (3 mg x 1) all other days   Weekly warfarin total:   22.5 mg   Plan last modified:   Samantha POST Filter (10/31/2019)   Next INR check:   3/13/2020   Priority:   Maintenance   Target end date:   Indefinite    Indications    Permanent atrial fibrillation [I48.21]  Long term current use of anticoagulant therapy [Z79.01]             Anticoagulation Episode Summary     INR check location:   Home Draw    Preferred lab:       Send INR reminders to:       Comments:   Waq[facility] [phone] -   goal range changed to 2.5-3.2 per raghu vaca 2019       Anticoagulation Care Providers     Provider Role Specialty Phone number    Hu Gamez M.D. Referring Cardiac Electrophysiology [phone]    [facility] Responsible  [phone]    Clarisse BellD Responsible          Anticoagulation Patient Findings        Spoke to patient on the phone.   INR  therapeutic.   Denies signs/symptoms of bleeding and/or thrombosis.   Denies changes to diet or medications.   Follow up appointment in 2 week(s).    Continue weekly warfarin dose as noted      Skip Gonzalez, Yesenia, MS, BCACP, LCC    This note was created using voice recognition software (Dragon). The accuracy of the dictation is limited by the abilities of the software. I have reviewed the note prior to signing, however some errors in grammar and context are still possible. If you have any questions related to this note please do not hesitate to contact our office.

## 2020-03-13 ENCOUNTER — ANTICOAGULATION MONITORING (OUTPATIENT)
Dept: VASCULAR LAB | Facility: MEDICAL CENTER | Age: 80
End: 2020-03-13

## 2020-03-13 DIAGNOSIS — I48.21 PERMANENT ATRIAL FIBRILLATION (HCC): ICD-10-CM

## 2020-03-13 DIAGNOSIS — Z79.01 LONG TERM CURRENT USE OF ANTICOAGULANT THERAPY: ICD-10-CM

## 2020-03-13 LAB — INR PPP: 2.8 (ref 2–3.5)

## 2020-03-13 NOTE — PROGRESS NOTES
Anticoagulation Summary  As of 3/13/2020    INR goal:   2.5-3.0   TTR:   62.7 % (4.8 y)   INR used for dosin.80 (3/13/2020)   Warfarin maintenance plan:   4.5 mg (3 mg x 1.5) every Thu; 3 mg (3 mg x 1) all other days   Weekly warfarin total:   22.5 mg   Plan last modified:   Samantha Amaya (10/31/2019)   Next INR check:   3/27/2020   Priority:   Maintenance   Target end date:   Indefinite    Indications    Permanent atrial fibrillation [I48.21]  Long term current use of anticoagulant therapy [Z79.01]             Anticoagulation Episode Summary     INR check location:   Home Draw    Preferred lab:       Send INR reminders to:       Comments:   Waqar [facility] [phone] -   goal range changed to 2.5-3.2 per raghu vaca 2019       Anticoagulation Care Providers     Provider Role Specialty Phone number    Hu Gamez M.D. Referring Cardiac Electrophysiology [phone]    [facility] Anticoagulation Services Responsible  [phone]    Kristopher Escobar, PharmD Responsible          Anticoagulation Patient Findings    Left a message on the patient's voicemail today, informing the patient of a therapeutic INR of 2.8. Instructed the patient to call the clinic with any changes to diet or medications, with any signs/sx of bleeding or clotting or with any questions or concerns.     Patient instructed to continue with the current warfarin dosing regimen, and asked to follow up again in 2 weeks.     Jason RobbD

## 2020-03-17 DIAGNOSIS — I10 ESSENTIAL HYPERTENSION: ICD-10-CM

## 2020-03-17 RX ORDER — METOPROLOL SUCCINATE 25 MG/1
25 TABLET, EXTENDED RELEASE ORAL EVERY EVENING
Qty: 90 TAB | Refills: 3 | Status: SHIPPED | OUTPATIENT
Start: 2020-03-17 | End: 2021-04-22 | Stop reason: SDUPTHER

## 2020-03-27 LAB — INR PPP: 3 (ref 2–3.5)

## 2020-03-30 ENCOUNTER — ANTICOAGULATION MONITORING (OUTPATIENT)
Dept: VASCULAR LAB | Facility: MEDICAL CENTER | Age: 80
End: 2020-03-30

## 2020-03-30 DIAGNOSIS — I48.21 PERMANENT ATRIAL FIBRILLATION (HCC): ICD-10-CM

## 2020-03-30 DIAGNOSIS — Z79.01 LONG TERM CURRENT USE OF ANTICOAGULANT THERAPY: ICD-10-CM

## 2020-03-30 NOTE — PROGRESS NOTES
Anticoagulation Summary  As of 3/30/2020    INR goal:   2.5-3.0   TTR:   63.0 % (4.9 y)   INR used for dosing:   3.00 (3/27/2020)   Warfarin maintenance plan:   4.5 mg (3 mg x 1.5) every Thu; 3 mg (3 mg x 1) all other days   Weekly warfarin total:   22.5 mg   Plan last modified:   Samantha M Filter (10/31/2019)   Next INR check:   4/13/2020   Priority:   Maintenance   Target end date:   Indefinite    Indications    Permanent atrial fibrillation (HCC) [I48.21]  Long term current use of anticoagulant therapy [Z79.01]             Anticoagulation Episode Summary     INR check location:   Home Draw    Preferred lab:       Send INR reminders to:       Comments:   Waq[facility]  - [phone] -   goal range changed to 2.5-3.2 per raghu vaca 8/8/2019       Anticoagulation Care Providers     Provider Role Specialty Phone number    Hu Gamez M.D. Referring Cardiac Electrophysiology [phone]    [facility] Anticoagulation Services Responsible  [phone]    Clarisse BellD Responsible          Anticoagulation Patient Findings        Spoke to patient on the phone.   INR  therapeutic.   Denies signs/symptoms of bleeding and/or thrombosis.   Denies changes to diet or medications.   Follow up appointment in 2 week(s).    Continue weekly warfarin dose as noted      Skip Gonzalez, PharmD, MS, BCACP, LCC    This note was created using voice recognition software (Dragon). The accuracy of the dictation is limited by the abilities of the software. I have reviewed the note prior to signing, however some errors in grammar and context are still possible. If you have any questions related to this note please do not hesitate to contact our office.

## 2020-04-09 ENCOUNTER — PATIENT MESSAGE (OUTPATIENT)
Dept: CARDIOLOGY | Facility: PHYSICIAN GROUP | Age: 80
End: 2020-04-09

## 2020-04-10 RX ORDER — LOSARTAN POTASSIUM 25 MG/1
25 TABLET ORAL 2 TIMES DAILY
Qty: 180 TAB | Refills: 3 | Status: SHIPPED | OUTPATIENT
Start: 2020-04-10 | End: 2020-12-03

## 2020-04-14 ENCOUNTER — ANTICOAGULATION MONITORING (OUTPATIENT)
Dept: VASCULAR LAB | Facility: MEDICAL CENTER | Age: 80
End: 2020-04-14

## 2020-04-14 DIAGNOSIS — Z79.01 LONG TERM CURRENT USE OF ANTICOAGULANT THERAPY: ICD-10-CM

## 2020-04-14 DIAGNOSIS — I48.21 PERMANENT ATRIAL FIBRILLATION (HCC): ICD-10-CM

## 2020-04-14 LAB — INR PPP: 3.1 (ref 2–3.5)

## 2020-04-14 NOTE — PROGRESS NOTES
Anticoagulation Summary  As of 4/14/2020    INR goal:   2.5-3.0   TTR:   62.3 % (4.9 y)   INR used for dosing:   3.10! (4/14/2020)   Warfarin maintenance plan:   3 mg (3 mg x 1) every day   Weekly warfarin total:   21 mg   Plan last modified:   Skip Gonzalez PharmD (4/14/2020)   Next INR check:      Priority:   Maintenance   Target end date:   Indefinite    Indications    Permanent atrial fibrillation (HCC) [I48.21]  Long term current use of anticoagulant therapy [Z79.01]             Anticoagulation Episode Summary     INR check location:   Home Draw    Preferred lab:       Send INR reminders to:       Comments:   Waq[facility] [phone] -   goal range changed to 2.5-3.2 per raghu black 8/8/2019       Anticoagulation Care Providers     Provider Role Specialty Phone number    Hu Gamez M.D. Referring Cardiac Electrophysiology [phone]    [facility] Responsible  [phone]    Clarisse BellD Responsible          Anticoagulation Patient Findings        Spoke to patient on the phone.   INR  supra-therapeutic.   Denies signs/symptoms of bleeding and/or thrombosis.   Denies changes to diet or medications.   Follow up appointment in 2 week(s).    Decrease weekly warfarin dose as noted    Skip Gonzalez, PharmD, MS, BCACP, LCC    This note was created using voice recognition software (Dragon). The accuracy of the dictation is limited by the abilities of the software. I have reviewed the note prior to signing, however some errors in grammar and context are still possible. If you have any questions related to this note please do not hesitate to contact our office.

## 2020-04-23 ENCOUNTER — PATIENT MESSAGE (OUTPATIENT)
Dept: CARDIOLOGY | Facility: PHYSICIAN GROUP | Age: 80
End: 2020-04-23

## 2020-04-30 ENCOUNTER — ANTICOAGULATION MONITORING (OUTPATIENT)
Dept: VASCULAR LAB | Facility: MEDICAL CENTER | Age: 80
End: 2020-04-30

## 2020-04-30 ENCOUNTER — TELEPHONE (OUTPATIENT)
Dept: VASCULAR LAB | Facility: MEDICAL CENTER | Age: 80
End: 2020-04-30

## 2020-04-30 DIAGNOSIS — Z79.01 LONG TERM CURRENT USE OF ANTICOAGULANT THERAPY: ICD-10-CM

## 2020-04-30 DIAGNOSIS — I48.21 PERMANENT ATRIAL FIBRILLATION (HCC): ICD-10-CM

## 2020-04-30 LAB — INR PPP: 2.2 (ref 2–3.5)

## 2020-04-30 NOTE — PROGRESS NOTES
Anticoagulation Summary  As of 2020    INR goal:   2.5-3.0   TTR:   62.3 % (4.9 y)   INR used for dosin.20! (2020)   Warfarin maintenance plan:   3 mg (3 mg x 1) every day   Weekly warfarin total:   21 mg   Plan last modified:   Skip Gonzalez, PharmD (2020)   Next INR check:   2020   Priority:   Maintenance   Target end date:   Indefinite    Indications    Permanent atrial fibrillation (HCC) [I48.21]  Long term current use of anticoagulant therapy [Z79.01]             Anticoagulation Episode Summary     INR check location:   Home Draw    Preferred lab:       Send INR reminders to:       Comments:   Waq[facility] [phone] -   goal range changed to 2.5-3.2 per raghu vaca 2019       Anticoagulation Care Providers     Provider Role Specialty Phone number    Hu Gamez M.D. Referring Cardiac Electrophysiology [phone]    [facility] Anticoagulation Services Responsible  [phone]    Kristopher Escobar, PharmD Responsible          Anticoagulation Patient Findings        Spoke to patient on the phone.   INR  sub-therapeutic.   Denies signs/symptoms of bleeding and/or thrombosis.   Denies changes to diet or medications.   Follow up appointment in 2 week(s).    4.5 mg today then resume normal dose.

## 2020-05-13 ENCOUNTER — ANTICOAGULATION MONITORING (OUTPATIENT)
Dept: VASCULAR LAB | Facility: MEDICAL CENTER | Age: 80
End: 2020-05-13

## 2020-05-13 DIAGNOSIS — I48.21 PERMANENT ATRIAL FIBRILLATION (HCC): ICD-10-CM

## 2020-05-13 DIAGNOSIS — Z79.01 LONG TERM CURRENT USE OF ANTICOAGULANT THERAPY: ICD-10-CM

## 2020-05-13 LAB — INR PPP: 2.6 (ref 2–3.5)

## 2020-05-13 NOTE — PROGRESS NOTES
Anticoagulation Summary  As of 2020    INR goal:   2.5-3.0   TTR:   62.0 % (5 y)   INR used for dosin.60 (2020)   Warfarin maintenance plan:   3 mg (3 mg x 1) every day   Weekly warfarin total:   21 mg   Plan last modified:   Skip Gonzalze, PharmD (2020)   Next INR check:   2020   Priority:   Maintenance   Target end date:   Indefinite    Indications    Permanent atrial fibrillation (HCC) [I48.21]  Long term current use of anticoagulant therapy [Z79.01]             Anticoagulation Episode Summary     INR check location:   Home Draw    Preferred lab:       Send INR reminders to:       Comments:   W[facility]  - [phone] -   goal range changed to 2.5-3.2 per raghu vaca 2019       Anticoagulation Care Providers     Provider Role Specialty Phone number    Hu Gamez M.D. Referring Cardiac Electrophysiology [phone]    [facility] Anticoagulation Services Responsible  [phone]    Kristopher Escobar, PharmD Responsible          Anticoagulation Patient Findings          Spoke with Madeline to report a therapeutic INR of 2.6.Continue current dosing regimen.  Follow up in 2 weeks, to reduce the risk of adverse events related to this high risk medication, warfarin.    Samantha Amaya, Clinical Pharmacist

## 2020-05-28 ENCOUNTER — ANTICOAGULATION MONITORING (OUTPATIENT)
Dept: MEDICAL GROUP | Facility: PHYSICIAN GROUP | Age: 80
End: 2020-05-28

## 2020-05-28 DIAGNOSIS — Z79.01 LONG TERM CURRENT USE OF ANTICOAGULANT THERAPY: ICD-10-CM

## 2020-05-28 DIAGNOSIS — I48.21 PERMANENT ATRIAL FIBRILLATION (HCC): ICD-10-CM

## 2020-05-28 LAB — INR PPP: 2.4 (ref 2–3.5)

## 2020-05-28 NOTE — PROGRESS NOTES
Anticoagulation Summary  As of 2020    INR goal:   2.5-3.0   TTR:   61.9 % (5 y)   INR used for dosin.40! (2020)   Warfarin maintenance plan:   3 mg (3 mg x 1) every day   Weekly warfarin total:   21 mg   Plan last modified:   Skip Gonzalez PharmD (2020)   Next INR check:   2020   Priority:   Maintenance   Target end date:   Indefinite    Indications    Permanent atrial fibrillation (HCC) [I48.21]  Long term current use of anticoagulant therapy [Z79.01]             Anticoagulation Episode Summary     INR check location:   Home Draw    Preferred lab:       Send INR reminders to:       Comments:   W[facility]  - [phone] -   goal range changed to 2.5-3.2 per raghu vaca 2019       Anticoagulation Care Providers     Provider Role Specialty Phone number    Hu Gamez M.D. Referring Cardiac Electrophysiology [phone]    [facility] Responsible  [phone]    Clarisse BellD Responsible          Anticoagulation Patient Findings        Spoke to patient on the phone.   INR  sub-therapeutic.   Denies signs/symptoms of bleeding and/or thrombosis.   Denies changes to diet or medications.   Follow up appointment in 2 week(s).    4.5 mg today then resume    Skip Gonzalez PharmD, MS, BCACP, LCC    This note was created using voice recognition software (Dragon). The accuracy of the dictation is limited by the abilities of the software. I have reviewed the note prior to signing, however some errors in grammar and context are still possible. If you have any questions related to this note please do not hesitate to contact our office.     Addendum:  Pt is scheduled for endarterectomy 2020 and will likely need lovenox bridging  Samantha Filter, Clinical Pharmacist, CDE, CACP

## 2020-06-12 ENCOUNTER — ANTICOAGULATION MONITORING (OUTPATIENT)
Dept: VASCULAR LAB | Facility: MEDICAL CENTER | Age: 80
End: 2020-06-12

## 2020-06-12 DIAGNOSIS — Z79.01 LONG TERM CURRENT USE OF ANTICOAGULANT THERAPY: ICD-10-CM

## 2020-06-12 DIAGNOSIS — I48.21 PERMANENT ATRIAL FIBRILLATION (HCC): ICD-10-CM

## 2020-06-12 LAB — INR PPP: 3.4 (ref 2–3.5)

## 2020-06-12 NOTE — PROGRESS NOTES
OP   Telephone Anticoagulation Service Note      Anticoagulation Summary  As of 6/12/2020    INR goal:   2.5-3.0   TTR:   61.8 % (5.1 y)   INR used for dosing:   3.40! (6/12/2020)   Warfarin maintenance plan:   3 mg (3 mg x 1) every day   Weekly warfarin total:   21 mg   Plan last modified:   Skip Gonzalez, PharmD (4/14/2020)   Next INR check:   6/29/2020   Priority:   Maintenance   Target end date:   Indefinite    Indications    Permanent atrial fibrillation (HCC) [I48.21]  Long term current use of anticoagulant therapy [Z79.01]             Anticoagulation Episode Summary     INR check location:   Home Draw    Preferred lab:       Send INR reminders to:       Comments:   Waqar  - [phone] -   goal range changed to 2.5-3.2 per raghu vaca 8/8/2019       Anticoagulation Care Providers     Provider Role Specialty Phone number    Hu Gamez M.D. Referring Cardiac Electrophysiology [phone]    [facility]ticoagulation [facility] Responsible  [phone]    Kristopher Escobar, PharmD Responsible          Anticoagulation Patient Findings  Patient Findings     Negatives:   Signs/symptoms of thrombosis, Signs/symptoms of bleeding, Laboratory test error suspected, Change in health, Change in alcohol use, Change in activity, Upcoming invasive procedure, Emergency department visit, Upcoming dental procedure, Missed doses, Extra doses, Change in medications, Change in diet/appetite, Hospital admission, Bruising, Other complaints        Spoke with the patient on the phone today, reporting a SUPRA-therapeutic INR of 3.4. Confirmed the current warfarin dosing regimen and patient compliance. Patient did have a cranberry muffin, which may account for elevated INR. Patient denies any interval changes to overall diet and/or medications. Patient denies any signs/symptoms of bleeding or clotting.  Patient instructed to reduce dose to 1.5mg TONIGHT ONLY, then to resume her 3mg QD. Patient asked to retest in 2 weeks.     Patient  then reported having a procedure coming up on 6/24/20 in which she will need to be off warfarin for 5 days.   Patient has bridged in the past and is aware of the process. Pt weight ~72kg   Lovenox 100mg QD ordered to [facility] in [place] per pt request.     Pt to have procedure on 6/24/20. Due to Pt history lovenox bridging is indicated.   Pt to follow instructions as below, after procedure to ask operating MD when safe to resume anticoagulation, if no instructions given, pt will follow as below.         Date  Warfarin dosing PM Lovenox   5 Days before procedure FRI  6/19/20 0mg NONE   4 Days before procedure SAT  6/20/20 0mg Lovenox injection   3 Days before procedure SUN  6/21/20 0mg Lovenox injection   2 Days before procedure MON  6/22/20 0mg Lovenox injection   1 Days before procedure TUES  6/23/20 0mg NONE   PROCEDURE WED  6/24/20 3MG NONE   1 Day after procedure THURS  6/25/20 3MG Restart Lovenox injections      2 Days after procedure FRI  6/26/20 3MG Lovenox injection   3 Days after procedure SAT  6/27/20 3MG Lovenox injection   4 Days after procedure SUN  6/28/30 3mg Lovenox injection   5 Days after procedure MON  6/29/20 3MG GET INR checked      Patient to retest INR on Monday, June 29, 2020.    Jason Yi  PharmD

## 2020-06-16 ENCOUNTER — TELEPHONE (OUTPATIENT)
Dept: CARDIOLOGY | Facility: MEDICAL CENTER | Age: 80
End: 2020-06-16

## 2020-06-16 NOTE — TELEPHONE ENCOUNTER
Received fax from Elvira Bell -  for Dr. Scout Carreon - [facility] (P: [phone] F: [phone]) requesting cardiac clearance for Carotid Endarterectomy on 06/24/2020.      Clearance request relayed to CARMEN WEEMS for advise as MD is out of the office this week.

## 2020-06-16 NOTE — TELEPHONE ENCOUNTER
Spoke with pt's daughter in regards to her mother's surgery next week. Concerns regarding pacemaker under a magnet during procedure without a medtronic rep. Advised how magnet works, and Juanita made medtronic rep aware of patients concerns. Pt's daughter appreciative for the call. Let her know to call again if they have any other questions.

## 2020-06-17 NOTE — TELEPHONE ENCOUNTER
Patient Call   Allyson Mejia P.A.-C.  You 3 hours ago (11:05 AM)       Rena,     I wrote a clearance letter for this lady.   I can print and sign it later this afternoon and get it to you.     Thanks,   Allyson Mejia PA-C   6/17/2020    Message text      Letter printed that was drafted by PAC.  Letter placed in PAC folder awaiting signature.    Called pt and reviewed findings.  She verbalizes understanding and states no other concerns or questions at this time and is appreciative of information given.    Called Elvira, [phone], unable to reach.  Left detailed voicemail regarding update and reassurance given that once signature is received, letter will be faxed to provided fax number.

## 2020-06-19 ENCOUNTER — OFFICE VISIT (OUTPATIENT)
Dept: ADMISSIONS | Facility: MEDICAL CENTER | Age: 80
DRG: 038 | End: 2020-06-19
Attending: SURGERY
Payer: MEDICARE

## 2020-06-19 DIAGNOSIS — Z01.812 PRE-OPERATIVE LABORATORY EXAMINATION: ICD-10-CM

## 2020-06-19 DIAGNOSIS — Z01.810 PRE-OPERATIVE CARDIOVASCULAR EXAMINATION: ICD-10-CM

## 2020-06-19 LAB
ANION GAP SERPL CALC-SCNC: 12 MMOL/L (ref 7–16)
BUN SERPL-MCNC: 15 MG/DL (ref 8–22)
CALCIUM SERPL-MCNC: 9.9 MG/DL (ref 8.5–10.5)
CHLORIDE SERPL-SCNC: 104 MMOL/L (ref 96–112)
CO2 SERPL-SCNC: 25 MMOL/L (ref 20–33)
COVID ORDER STATUS COVID19: NORMAL
CREAT SERPL-MCNC: 0.85 MG/DL (ref 0.5–1.4)
ERYTHROCYTE [DISTWIDTH] IN BLOOD BY AUTOMATED COUNT: 41.6 FL (ref 35.9–50)
GLUCOSE SERPL-MCNC: 95 MG/DL (ref 65–99)
HCT VFR BLD AUTO: 41.8 % (ref 37–47)
HGB BLD-MCNC: 13.7 G/DL (ref 12–16)
MCH RBC QN AUTO: 28.7 PG (ref 27–33)
MCHC RBC AUTO-ENTMCNC: 32.8 G/DL (ref 33.6–35)
MCV RBC AUTO: 87.4 FL (ref 81.4–97.8)
PLATELET # BLD AUTO: 200 K/UL (ref 164–446)
PMV BLD AUTO: 10.8 FL (ref 9–12.9)
POTASSIUM SERPL-SCNC: 4.1 MMOL/L (ref 3.6–5.5)
RBC # BLD AUTO: 4.78 M/UL (ref 4.2–5.4)
SODIUM SERPL-SCNC: 141 MMOL/L (ref 135–145)
WBC # BLD AUTO: 4.8 K/UL (ref 4.8–10.8)

## 2020-06-19 PROCEDURE — 85027 COMPLETE CBC AUTOMATED: CPT

## 2020-06-19 PROCEDURE — C9803 HOPD COVID-19 SPEC COLLECT: HCPCS

## 2020-06-19 PROCEDURE — U0004 COV-19 TEST NON-CDC HGH THRU: HCPCS

## 2020-06-19 PROCEDURE — 93005 ELECTROCARDIOGRAM TRACING: CPT

## 2020-06-19 PROCEDURE — 80048 BASIC METABOLIC PNL TOTAL CA: CPT

## 2020-06-19 PROCEDURE — U0003 INFECTIOUS AGENT DETECTION BY NUCLEIC ACID (DNA OR RNA); SEVERE ACUTE RESPIRATORY SYNDROME CORONAVIRUS 2 (SARS-COV-2) (CORONAVIRUS DISEASE [COVID-19]), AMPLIFIED PROBE TECHNIQUE, MAKING USE OF HIGH THROUGHPUT TECHNOLOGIES AS DESCRIBED BY CMS-2020-01-R: HCPCS

## 2020-06-19 PROCEDURE — 36415 COLL VENOUS BLD VENIPUNCTURE: CPT

## 2020-06-19 RX ORDER — MULTIVITAMIN WITH IRON
500 TABLET ORAL
COMMUNITY

## 2020-06-19 RX ORDER — OMEPRAZOLE 20 MG/1
20 CAPSULE, DELAYED RELEASE ORAL EVERY MORNING
COMMUNITY
End: 2020-12-03

## 2020-06-19 NOTE — TELEPHONE ENCOUNTER
Received signed clearance response from PAC.    [place] [facility] [place] [phone], completed status.    Fax sent to scanning for reference.

## 2020-06-20 LAB — EKG IMPRESSION: NORMAL

## 2020-06-20 PROCEDURE — 93010 ELECTROCARDIOGRAM REPORT: CPT | Performed by: INTERNAL MEDICINE

## 2020-06-21 LAB
SARS-COV-2 RNA RESP QL NAA+PROBE: NOTDETECTED
SPECIMEN SOURCE: NORMAL

## 2020-06-23 NOTE — OR NURSING
COVID-19 Pre-surgery screenin. Do you have an undiagnosed respiratory illness or symptoms such as coughing or sneezing? no (Yes/No)  a. Onset of Sx no  b. Acute vs. chronic respiratory illness no    2. Do you have an unexplained fever greater than 100.4 degrees Fahrenheit or 38 degrees Celsius?     no (Yes/No)    3. Have you had direct exposure to a patient who tested positive for Covid-19?    no (Yes/No)    4. Have you traveled outside [facility] in the last 14 days? no    5. Have you had any loss of your sense of taste or smell? Have you had N/V or sore throat? no    Patient has been informed of visitor policy and asked to wear a mask upon entering the hospital  yes(Yes/No)

## 2020-06-24 ENCOUNTER — ANESTHESIA (OUTPATIENT)
Dept: SURGERY | Facility: MEDICAL CENTER | Age: 80
DRG: 038 | End: 2020-06-24
Payer: MEDICARE

## 2020-06-24 ENCOUNTER — ANESTHESIA EVENT (OUTPATIENT)
Dept: SURGERY | Facility: MEDICAL CENTER | Age: 80
DRG: 038 | End: 2020-06-24
Payer: MEDICARE

## 2020-06-24 ENCOUNTER — HOSPITAL ENCOUNTER (INPATIENT)
Facility: MEDICAL CENTER | Age: 80
LOS: 1 days | DRG: 038 | End: 2020-06-25
Attending: SURGERY | Admitting: SURGERY
Payer: MEDICARE

## 2020-06-24 DIAGNOSIS — G89.18 POST-OP PAIN: ICD-10-CM

## 2020-06-24 LAB
ABO + RH BLD: NORMAL
ABO GROUP BLD: NORMAL
BLD GP AB SCN SERPL QL: NORMAL
INR PPP: 0.93 (ref 0.87–1.13)
PROTHROMBIN TIME: 12.8 SEC (ref 12–14.6)
RH BLD: NORMAL

## 2020-06-24 PROCEDURE — 03CL0ZZ EXTIRPATION OF MATTER FROM LEFT INTERNAL CAROTID ARTERY, OPEN APPROACH: ICD-10-PCS | Performed by: SURGERY

## 2020-06-24 PROCEDURE — 700101 HCHG RX REV CODE 250: Performed by: ANESTHESIOLOGY

## 2020-06-24 PROCEDURE — 500368 HCHG DRAIN, 7MM FLAT-FLUTED: Performed by: SURGERY

## 2020-06-24 PROCEDURE — 700101 HCHG RX REV CODE 250: Performed by: NURSE PRACTITIONER

## 2020-06-24 PROCEDURE — 86901 BLOOD TYPING SEROLOGIC RH(D): CPT

## 2020-06-24 PROCEDURE — 700101 HCHG RX REV CODE 250: Performed by: SURGERY

## 2020-06-24 PROCEDURE — 770006 HCHG ROOM/CARE - MED/SURG/GYN SEMI*

## 2020-06-24 PROCEDURE — 501445 HCHG STAPLER, SKIN DISP: Performed by: SURGERY

## 2020-06-24 PROCEDURE — 95940 IONM IN OPERATNG ROOM 15 MIN: CPT | Performed by: SURGERY

## 2020-06-24 PROCEDURE — 700111 HCHG RX REV CODE 636 W/ 250 OVERRIDE (IP): Performed by: SURGERY

## 2020-06-24 PROCEDURE — 95938 SOMATOSENSORY TESTING: CPT | Performed by: SURGERY

## 2020-06-24 PROCEDURE — A9270 NON-COVERED ITEM OR SERVICE: HCPCS | Performed by: NURSE PRACTITIONER

## 2020-06-24 PROCEDURE — 160035 HCHG PACU - 1ST 60 MINS PHASE I: Performed by: SURGERY

## 2020-06-24 PROCEDURE — 95955 EEG DURING SURGERY: CPT | Performed by: SURGERY

## 2020-06-24 PROCEDURE — 160002 HCHG RECOVERY MINUTES (STAT): Performed by: SURGERY

## 2020-06-24 PROCEDURE — 160009 HCHG ANES TIME/MIN: Performed by: SURGERY

## 2020-06-24 PROCEDURE — 160041 HCHG SURGERY MINUTES - EA ADDL 1 MIN LEVEL 4: Performed by: SURGERY

## 2020-06-24 PROCEDURE — 160048 HCHG OR STATISTICAL LEVEL 1-5: Performed by: SURGERY

## 2020-06-24 PROCEDURE — 700105 HCHG RX REV CODE 258: Performed by: ANESTHESIOLOGY

## 2020-06-24 PROCEDURE — 160029 HCHG SURGERY MINUTES - 1ST 30 MINS LEVEL 4: Performed by: SURGERY

## 2020-06-24 PROCEDURE — 500257: Performed by: SURGERY

## 2020-06-24 PROCEDURE — 500389 HCHG DRAIN, RESERVOIR SUCT JP 100CC: Performed by: SURGERY

## 2020-06-24 PROCEDURE — 110454 HCHG SHELL REV 250: Performed by: SURGERY

## 2020-06-24 PROCEDURE — C1768 GRAFT, VASCULAR: HCPCS | Performed by: SURGERY

## 2020-06-24 PROCEDURE — 86850 RBC ANTIBODY SCREEN: CPT

## 2020-06-24 PROCEDURE — 03UL0KZ SUPPLEMENT LEFT INTERNAL CAROTID ARTERY WITH NONAUTOLOGOUS TISSUE SUBSTITUTE, OPEN APPROACH: ICD-10-PCS | Performed by: SURGERY

## 2020-06-24 PROCEDURE — A6402 STERILE GAUZE <= 16 SQ IN: HCPCS | Performed by: SURGERY

## 2020-06-24 PROCEDURE — 110372 HCHG SHELL REV 278: Performed by: SURGERY

## 2020-06-24 PROCEDURE — 700111 HCHG RX REV CODE 636 W/ 250 OVERRIDE (IP): Performed by: ANESTHESIOLOGY

## 2020-06-24 PROCEDURE — 85610 PROTHROMBIN TIME: CPT

## 2020-06-24 PROCEDURE — 501837 HCHG SUTURE CV: Performed by: SURGERY

## 2020-06-24 PROCEDURE — 86900 BLOOD TYPING SEROLOGIC ABO: CPT

## 2020-06-24 PROCEDURE — 700105 HCHG RX REV CODE 258: Performed by: SURGERY

## 2020-06-24 PROCEDURE — 501838 HCHG SUTURE GENERAL: Performed by: SURGERY

## 2020-06-24 PROCEDURE — 160036 HCHG PACU - EA ADDL 30 MINS PHASE I: Performed by: SURGERY

## 2020-06-24 PROCEDURE — 700102 HCHG RX REV CODE 250 W/ 637 OVERRIDE(OP): Performed by: NURSE PRACTITIONER

## 2020-06-24 PROCEDURE — 03UJ0KZ SUPPLEMENT LEFT COMMON CAROTID ARTERY WITH NONAUTOLOGOUS TISSUE SUBSTITUTE, OPEN APPROACH: ICD-10-PCS | Performed by: SURGERY

## 2020-06-24 DEVICE — PATCH .8X8CM XENOSURE BIOLOGIC VASCULAR---ORDER IN MULTIPLES OF 5---: Type: IMPLANTABLE DEVICE | Site: NECK | Status: FUNCTIONAL

## 2020-06-24 RX ORDER — KETOROLAC TROMETHAMINE 30 MG/ML
15 INJECTION, SOLUTION INTRAMUSCULAR; INTRAVENOUS ONCE
Status: COMPLETED | OUTPATIENT
Start: 2020-06-24 | End: 2020-06-24

## 2020-06-24 RX ORDER — MEPERIDINE HYDROCHLORIDE 25 MG/ML
12.5 INJECTION INTRAMUSCULAR; INTRAVENOUS; SUBCUTANEOUS
Status: DISCONTINUED | OUTPATIENT
Start: 2020-06-24 | End: 2020-06-24 | Stop reason: HOSPADM

## 2020-06-24 RX ORDER — SODIUM CHLORIDE, SODIUM LACTATE, POTASSIUM CHLORIDE, CALCIUM CHLORIDE 600; 310; 30; 20 MG/100ML; MG/100ML; MG/100ML; MG/100ML
1000 INJECTION, SOLUTION INTRAVENOUS ONCE
Status: COMPLETED | OUTPATIENT
Start: 2020-06-24 | End: 2020-06-24

## 2020-06-24 RX ORDER — HYDRALAZINE HYDROCHLORIDE 20 MG/ML
10 INJECTION INTRAMUSCULAR; INTRAVENOUS EVERY 4 HOURS PRN
Status: DISCONTINUED | OUTPATIENT
Start: 2020-06-24 | End: 2020-06-25 | Stop reason: HOSPADM

## 2020-06-24 RX ORDER — SODIUM CHLORIDE, SODIUM LACTATE, POTASSIUM CHLORIDE, CALCIUM CHLORIDE 600; 310; 30; 20 MG/100ML; MG/100ML; MG/100ML; MG/100ML
INJECTION, SOLUTION INTRAVENOUS CONTINUOUS
Status: DISCONTINUED | OUTPATIENT
Start: 2020-06-24 | End: 2020-06-24 | Stop reason: HOSPADM

## 2020-06-24 RX ORDER — BUPIVACAINE HYDROCHLORIDE AND EPINEPHRINE 5; 5 MG/ML; UG/ML
INJECTION, SOLUTION EPIDURAL; INTRACAUDAL; PERINEURAL
Status: DISCONTINUED | OUTPATIENT
Start: 2020-06-24 | End: 2020-06-24 | Stop reason: HOSPADM

## 2020-06-24 RX ORDER — REMIFENTANIL HYDROCHLORIDE 1 MG/ML
INJECTION, POWDER, LYOPHILIZED, FOR SOLUTION INTRAVENOUS
Status: DISCONTINUED | OUTPATIENT
Start: 2020-06-24 | End: 2020-06-24 | Stop reason: SURG

## 2020-06-24 RX ORDER — LIOTHYRONINE SODIUM 5 UG/1
10 TABLET ORAL
Status: DISCONTINUED | OUTPATIENT
Start: 2020-06-24 | End: 2020-06-25 | Stop reason: HOSPADM

## 2020-06-24 RX ORDER — DEXAMETHASONE SODIUM PHOSPHATE 4 MG/ML
4 INJECTION, SOLUTION INTRA-ARTICULAR; INTRALESIONAL; INTRAMUSCULAR; INTRAVENOUS; SOFT TISSUE
Status: DISCONTINUED | OUTPATIENT
Start: 2020-06-24 | End: 2020-06-25 | Stop reason: HOSPADM

## 2020-06-24 RX ORDER — TRAMADOL HYDROCHLORIDE 50 MG/1
50 TABLET ORAL EVERY 6 HOURS PRN
Status: DISCONTINUED | OUTPATIENT
Start: 2020-06-24 | End: 2020-06-25 | Stop reason: HOSPADM

## 2020-06-24 RX ORDER — ONDANSETRON 4 MG/1
4 TABLET, ORALLY DISINTEGRATING ORAL EVERY 6 HOURS PRN
Qty: 10 TAB | Refills: 0 | Status: SHIPPED | OUTPATIENT
Start: 2020-06-24 | End: 2020-12-03

## 2020-06-24 RX ORDER — LEVOTHYROXINE SODIUM 88 UG/1
88 TABLET ORAL
Status: DISCONTINUED | OUTPATIENT
Start: 2020-06-25 | End: 2020-06-25 | Stop reason: HOSPADM

## 2020-06-24 RX ORDER — ONDANSETRON 2 MG/ML
INJECTION INTRAMUSCULAR; INTRAVENOUS PRN
Status: DISCONTINUED | OUTPATIENT
Start: 2020-06-24 | End: 2020-06-24 | Stop reason: SURG

## 2020-06-24 RX ORDER — HYDRALAZINE HYDROCHLORIDE 20 MG/ML
5 INJECTION INTRAMUSCULAR; INTRAVENOUS
Status: DISCONTINUED | OUTPATIENT
Start: 2020-06-24 | End: 2020-06-24 | Stop reason: HOSPADM

## 2020-06-24 RX ORDER — LEVOTHYROXINE SODIUM 88 UG/1
88 TABLET ORAL
Status: DISCONTINUED | OUTPATIENT
Start: 2020-06-24 | End: 2020-06-24

## 2020-06-24 RX ORDER — LEVOTHYROXINE SODIUM 0.05 MG/1
100 TABLET ORAL
Status: DISCONTINUED | OUTPATIENT
Start: 2020-06-26 | End: 2020-06-25 | Stop reason: HOSPADM

## 2020-06-24 RX ORDER — SODIUM CHLORIDE, SODIUM LACTATE, POTASSIUM CHLORIDE, CALCIUM CHLORIDE 600; 310; 30; 20 MG/100ML; MG/100ML; MG/100ML; MG/100ML
INJECTION, SOLUTION INTRAVENOUS
Status: DISCONTINUED | OUTPATIENT
Start: 2020-06-24 | End: 2020-06-24 | Stop reason: SURG

## 2020-06-24 RX ORDER — LABETALOL HYDROCHLORIDE 5 MG/ML
10 INJECTION, SOLUTION INTRAVENOUS EVERY 4 HOURS PRN
Status: DISCONTINUED | OUTPATIENT
Start: 2020-06-24 | End: 2020-06-25 | Stop reason: HOSPADM

## 2020-06-24 RX ORDER — MORPHINE SULFATE 4 MG/ML
1-5 INJECTION, SOLUTION INTRAMUSCULAR; INTRAVENOUS
Status: DISCONTINUED | OUTPATIENT
Start: 2020-06-24 | End: 2020-06-25 | Stop reason: HOSPADM

## 2020-06-24 RX ORDER — HEPARIN SODIUM,PORCINE 1000/ML
VIAL (ML) INJECTION PRN
Status: DISCONTINUED | OUTPATIENT
Start: 2020-06-24 | End: 2020-06-24 | Stop reason: SURG

## 2020-06-24 RX ORDER — LIDOCAINE HYDROCHLORIDE 20 MG/ML
INJECTION, SOLUTION EPIDURAL; INFILTRATION; INTRACAUDAL; PERINEURAL PRN
Status: DISCONTINUED | OUTPATIENT
Start: 2020-06-24 | End: 2020-06-24 | Stop reason: SURG

## 2020-06-24 RX ORDER — ONDANSETRON 2 MG/ML
4 INJECTION INTRAMUSCULAR; INTRAVENOUS EVERY 4 HOURS PRN
Status: DISCONTINUED | OUTPATIENT
Start: 2020-06-24 | End: 2020-06-25 | Stop reason: HOSPADM

## 2020-06-24 RX ORDER — ROCURONIUM BROMIDE 10 MG/ML
INJECTION, SOLUTION INTRAVENOUS PRN
Status: DISCONTINUED | OUTPATIENT
Start: 2020-06-24 | End: 2020-06-24 | Stop reason: SURG

## 2020-06-24 RX ORDER — DEXAMETHASONE SODIUM PHOSPHATE 4 MG/ML
INJECTION, SOLUTION INTRA-ARTICULAR; INTRALESIONAL; INTRAMUSCULAR; INTRAVENOUS; SOFT TISSUE PRN
Status: DISCONTINUED | OUTPATIENT
Start: 2020-06-24 | End: 2020-06-24 | Stop reason: SURG

## 2020-06-24 RX ORDER — LOSARTAN POTASSIUM 25 MG/1
25 TABLET ORAL 2 TIMES DAILY
Status: DISCONTINUED | OUTPATIENT
Start: 2020-06-24 | End: 2020-06-25 | Stop reason: HOSPADM

## 2020-06-24 RX ORDER — DEXTROSE MONOHYDRATE, SODIUM CHLORIDE, AND POTASSIUM CHLORIDE 50; 1.49; 4.5 G/1000ML; G/1000ML; G/1000ML
INJECTION, SOLUTION INTRAVENOUS EVERY 6 HOURS
Status: COMPLETED | OUTPATIENT
Start: 2020-06-24 | End: 2020-06-24

## 2020-06-24 RX ORDER — LABETALOL HYDROCHLORIDE 5 MG/ML
5 INJECTION, SOLUTION INTRAVENOUS
Status: DISCONTINUED | OUTPATIENT
Start: 2020-06-24 | End: 2020-06-24 | Stop reason: HOSPADM

## 2020-06-24 RX ORDER — PHENYLEPHRINE HYDROCHLORIDE 10 MG/ML
INJECTION, SOLUTION INTRAMUSCULAR; INTRAVENOUS; SUBCUTANEOUS PRN
Status: DISCONTINUED | OUTPATIENT
Start: 2020-06-24 | End: 2020-06-24 | Stop reason: SURG

## 2020-06-24 RX ORDER — OMEPRAZOLE 20 MG/1
20 CAPSULE, DELAYED RELEASE ORAL EVERY MORNING
Status: DISCONTINUED | OUTPATIENT
Start: 2020-06-24 | End: 2020-06-25 | Stop reason: HOSPADM

## 2020-06-24 RX ORDER — LEVOTHYROXINE SODIUM 0.1 MG/1
100 TABLET ORAL
Status: DISCONTINUED | OUTPATIENT
Start: 2020-06-24 | End: 2020-06-24

## 2020-06-24 RX ORDER — PROTAMINE SULFATE 10 MG/ML
INJECTION, SOLUTION INTRAVENOUS PRN
Status: DISCONTINUED | OUTPATIENT
Start: 2020-06-24 | End: 2020-06-24 | Stop reason: SURG

## 2020-06-24 RX ORDER — DIPHENHYDRAMINE HYDROCHLORIDE 50 MG/ML
12.5 INJECTION INTRAMUSCULAR; INTRAVENOUS
Status: DISCONTINUED | OUTPATIENT
Start: 2020-06-24 | End: 2020-06-24 | Stop reason: HOSPADM

## 2020-06-24 RX ORDER — CLONIDINE HYDROCHLORIDE 0.1 MG/1
0.1 TABLET ORAL
Status: DISCONTINUED | OUTPATIENT
Start: 2020-06-24 | End: 2020-06-25 | Stop reason: HOSPADM

## 2020-06-24 RX ORDER — DIPHENHYDRAMINE HYDROCHLORIDE 50 MG/ML
25 INJECTION INTRAMUSCULAR; INTRAVENOUS EVERY 6 HOURS PRN
Status: DISCONTINUED | OUTPATIENT
Start: 2020-06-24 | End: 2020-06-25 | Stop reason: HOSPADM

## 2020-06-24 RX ORDER — METOPROLOL SUCCINATE 25 MG/1
25 TABLET, EXTENDED RELEASE ORAL EVERY EVENING
Status: DISCONTINUED | OUTPATIENT
Start: 2020-06-24 | End: 2020-06-25 | Stop reason: HOSPADM

## 2020-06-24 RX ORDER — HALOPERIDOL 5 MG/ML
1 INJECTION INTRAMUSCULAR EVERY 6 HOURS PRN
Status: DISCONTINUED | OUTPATIENT
Start: 2020-06-24 | End: 2020-06-25 | Stop reason: HOSPADM

## 2020-06-24 RX ORDER — SCOLOPAMINE TRANSDERMAL SYSTEM 1 MG/1
1 PATCH, EXTENDED RELEASE TRANSDERMAL
Status: DISCONTINUED | OUTPATIENT
Start: 2020-06-24 | End: 2020-06-25 | Stop reason: HOSPADM

## 2020-06-24 RX ORDER — OXYCODONE HYDROCHLORIDE 5 MG/1
2.5 TABLET ORAL
Status: DISCONTINUED | OUTPATIENT
Start: 2020-06-24 | End: 2020-06-25 | Stop reason: HOSPADM

## 2020-06-24 RX ORDER — CLONIDINE HYDROCHLORIDE 0.1 MG/1
0.2 TABLET ORAL
Status: DISCONTINUED | OUTPATIENT
Start: 2020-06-24 | End: 2020-06-25 | Stop reason: HOSPADM

## 2020-06-24 RX ORDER — CEFAZOLIN SODIUM 1 G/3ML
INJECTION, POWDER, FOR SOLUTION INTRAMUSCULAR; INTRAVENOUS PRN
Status: DISCONTINUED | OUTPATIENT
Start: 2020-06-24 | End: 2020-06-24 | Stop reason: SURG

## 2020-06-24 RX ORDER — HYDROCODONE BITARTRATE AND ACETAMINOPHEN 5; 325 MG/1; MG/1
1-2 TABLET ORAL EVERY 4 HOURS PRN
Qty: 10 TAB | Refills: 0 | Status: SHIPPED | OUTPATIENT
Start: 2020-06-24 | End: 2020-06-26

## 2020-06-24 RX ORDER — ACETAMINOPHEN 500 MG
1000 TABLET ORAL EVERY 4 HOURS PRN
Status: DISCONTINUED | OUTPATIENT
Start: 2020-06-24 | End: 2020-06-24 | Stop reason: HOSPADM

## 2020-06-24 RX ADMIN — ONDANSETRON 4 MG: 2 INJECTION INTRAMUSCULAR; INTRAVENOUS at 10:26

## 2020-06-24 RX ADMIN — CEFAZOLIN 2 G: 330 INJECTION, POWDER, FOR SOLUTION INTRAMUSCULAR; INTRAVENOUS at 09:39

## 2020-06-24 RX ADMIN — METOPROLOL SUCCINATE 25 MG: 25 TABLET, EXTENDED RELEASE ORAL at 17:36

## 2020-06-24 RX ADMIN — HEPARIN SODIUM 7000 UNITS: 1000 INJECTION, SOLUTION INTRAVENOUS; SUBCUTANEOUS at 10:01

## 2020-06-24 RX ADMIN — MEPERIDINE HYDROCHLORIDE 12.5 MG: 25 INJECTION INTRAMUSCULAR; INTRAVENOUS; SUBCUTANEOUS at 11:11

## 2020-06-24 RX ADMIN — PHENYLEPHRINE HYDROCHLORIDE 100 MCG: 10 INJECTION INTRAVENOUS at 10:14

## 2020-06-24 RX ADMIN — SODIUM CHLORIDE, POTASSIUM CHLORIDE, SODIUM LACTATE AND CALCIUM CHLORIDE 1000 ML: 600; 310; 30; 20 INJECTION, SOLUTION INTRAVENOUS at 08:48

## 2020-06-24 RX ADMIN — DEXAMETHASONE SODIUM PHOSPHATE 4 MG: 4 INJECTION, SOLUTION INTRA-ARTICULAR; INTRALESIONAL; INTRAMUSCULAR; INTRAVENOUS; SOFT TISSUE at 09:39

## 2020-06-24 RX ADMIN — PROPOFOL 100 MG: 10 INJECTION, EMULSION INTRAVENOUS at 09:33

## 2020-06-24 RX ADMIN — PROTAMINE SULFATE 30 MG: 10 INJECTION, SOLUTION INTRAVENOUS at 10:21

## 2020-06-24 RX ADMIN — SUGAMMADEX 200 MG: 100 INJECTION, SOLUTION INTRAVENOUS at 10:26

## 2020-06-24 RX ADMIN — PHENYLEPHRINE HYDROCHLORIDE 100 MCG: 10 INJECTION INTRAVENOUS at 10:05

## 2020-06-24 RX ADMIN — LABETALOL HYDROCHLORIDE 5 MG: 5 INJECTION, SOLUTION INTRAVENOUS at 11:21

## 2020-06-24 RX ADMIN — LOSARTAN POTASSIUM 25 MG: 25 TABLET, FILM COATED ORAL at 17:37

## 2020-06-24 RX ADMIN — REMIFENTANIL HYDROCHLORIDE 0.1 MCG/KG/MIN: 1 INJECTION, POWDER, LYOPHILIZED, FOR SOLUTION INTRAVENOUS at 09:41

## 2020-06-24 RX ADMIN — KETOROLAC TROMETHAMINE 15 MG: 30 INJECTION, SOLUTION INTRAMUSCULAR at 12:28

## 2020-06-24 RX ADMIN — FENTANYL CITRATE 25 MCG: 50 INJECTION INTRAMUSCULAR; INTRAVENOUS at 10:25

## 2020-06-24 RX ADMIN — LIDOCAINE HYDROCHLORIDE 0.5 ML: 10 INJECTION, SOLUTION EPIDURAL; INFILTRATION; INTRACAUDAL at 08:48

## 2020-06-24 RX ADMIN — ROCURONIUM BROMIDE 50 MG: 10 INJECTION, SOLUTION INTRAVENOUS at 09:34

## 2020-06-24 RX ADMIN — LIOTHYRONINE SODIUM 10 MCG: 5 TABLET ORAL at 20:47

## 2020-06-24 RX ADMIN — SODIUM CHLORIDE, POTASSIUM CHLORIDE, SODIUM LACTATE AND CALCIUM CHLORIDE: 600; 310; 30; 20 INJECTION, SOLUTION INTRAVENOUS at 09:00

## 2020-06-24 RX ADMIN — POTASSIUM CHLORIDE, DEXTROSE MONOHYDRATE AND SODIUM CHLORIDE: 150; 5; 450 INJECTION, SOLUTION INTRAVENOUS at 12:27

## 2020-06-24 RX ADMIN — FENTANYL CITRATE 75 MCG: 50 INJECTION INTRAMUSCULAR; INTRAVENOUS at 09:33

## 2020-06-24 RX ADMIN — PROPOFOL 25 MCG/KG/MIN: 10 INJECTION, EMULSION INTRAVENOUS at 09:41

## 2020-06-24 RX ADMIN — LIDOCAINE HYDROCHLORIDE 40 MG: 20 INJECTION, SOLUTION EPIDURAL; INFILTRATION; INTRACAUDAL at 09:33

## 2020-06-24 SDOH — HEALTH STABILITY: MENTAL HEALTH: HOW OFTEN DO YOU HAVE A DRINK CONTAINING ALCOHOL?: NEVER

## 2020-06-24 SDOH — HEALTH STABILITY: MENTAL HEALTH: HOW OFTEN DO YOU HAVE 6 OR MORE DRINKS ON ONE OCCASION?: NEVER

## 2020-06-24 ASSESSMENT — LIFESTYLE VARIABLES
AVERAGE NUMBER OF DAYS PER WEEK YOU HAVE A DRINK CONTAINING ALCOHOL: 0
ALCOHOL_USE: NO
EVER HAD A DRINK FIRST THING IN THE MORNING TO STEADY YOUR NERVES TO GET RID OF A HANGOVER: NO
TOTAL SCORE: 0
EVER_SMOKED: NEVER
CONSUMPTION TOTAL: NEGATIVE
HAVE YOU EVER FELT YOU SHOULD CUT DOWN ON YOUR DRINKING: NO
TOTAL SCORE: 0
EVER FELT BAD OR GUILTY ABOUT YOUR DRINKING: NO
ON A TYPICAL DAY WHEN YOU DRINK ALCOHOL HOW MANY DRINKS DO YOU HAVE: 0
HOW MANY TIMES IN THE PAST YEAR HAVE YOU HAD 5 OR MORE DRINKS IN A DAY: 0
HAVE PEOPLE ANNOYED YOU BY CRITICIZING YOUR DRINKING: NO
DOES PATIENT WANT TO STOP DRINKING: NO
TOTAL SCORE: 0

## 2020-06-24 ASSESSMENT — FIBROSIS 4 INDEX: FIB4 SCORE: 2.43

## 2020-06-24 ASSESSMENT — COGNITIVE AND FUNCTIONAL STATUS - GENERAL
DRESSING REGULAR LOWER BODY CLOTHING: A LITTLE
SUGGESTED CMS G CODE MODIFIER DAILY ACTIVITY: CI
CLIMB 3 TO 5 STEPS WITH RAILING: A LITTLE
MOBILITY SCORE: 23
SUGGESTED CMS G CODE MODIFIER MOBILITY: CI
DAILY ACTIVITIY SCORE: 23

## 2020-06-24 ASSESSMENT — PATIENT HEALTH QUESTIONNAIRE - PHQ9
SUM OF ALL RESPONSES TO PHQ9 QUESTIONS 1 AND 2: 0
2. FEELING DOWN, DEPRESSED, IRRITABLE, OR HOPELESS: NOT AT ALL
1. LITTLE INTEREST OR PLEASURE IN DOING THINGS: NOT AT ALL
2. FEELING DOWN, DEPRESSED, IRRITABLE, OR HOPELESS: NOT AT ALL
1. LITTLE INTEREST OR PLEASURE IN DOING THINGS: NOT AT ALL
SUM OF ALL RESPONSES TO PHQ9 QUESTIONS 1 AND 2: 0

## 2020-06-24 NOTE — OR NURSING
Demerol IV given for post-op shivering. Shivering subsided after minutes of demerol administration

## 2020-06-24 NOTE — OR NURSING
1145-Anterior neck dressing CDI. No hematoma. SALVATORE patent draining sanguinous. No neuro deficits or changes. Neck circumference remains at 35.5 cm    1200-Anterior neck dressing CDI. No hematoma. SALVATORE patent draining sanguinous. No neuro deficits or changes. Neck circumference remains at 35.5 cm      1230-Anterior neck dressing CDI. No hematoma. SALVATORE patent draining sanguinous. No neuro deficits or changes. Neck circumference remains at 35.5 cm

## 2020-06-24 NOTE — ANESTHESIA TIME REPORT
Anesthesia Start and Stop Event Times     Date Time Event    6/24/2020 0908 Ready for Procedure     0921 Anesthesia Start     1047 Anesthesia Stop        Responsible Staff  06/24/20    Name Role Begin End    Ammon Turner M.D. Anesth 0921 1047        Preop Diagnosis (Free Text):  Pre-op Diagnosis     LEFT CAROTID ARTERY STENOSIS        Preop Diagnosis (Codes):    Post op Diagnosis  Carotid stenosis, left      Premium Reason  Non-Premium    Comments:

## 2020-06-24 NOTE — ANESTHESIA PROCEDURE NOTES
Airway    Date/Time: 6/24/2020 9:35 AM  Performed by: Ammon Tunrer M.D.  Authorized by: Ammon Turner M.D.     Location:  OR  Urgency:  Elective  Difficult Airway: No    Indications for Airway Management:  Anesthesia      Spontaneous Ventilation: absent    Sedation Level:  Deep  Preoxygenated: Yes    Patient Position:  Sniffing  Mask Difficulty Assessment:  1 - vent by mask  Final Airway Type:  Endotracheal airway  Final Endotracheal Airway:  ETT  Cuffed: Yes    Technique Used for Successful ETT Placement:  Direct laryngoscopy    Insertion Site:  Oral  Blade Type:  Espinal  Laryngoscope Blade/Videolaryngoscope Blade Size:  2  ETT Size (mm):  7.0  Measured from:  Teeth  ETT to Teeth (cm):  21  Placement Verified by: auscultation and capnometry    Cormack-Lehane Classification:  Grade I - full view of glottis  Number of Attempts at Approach:  1

## 2020-06-24 NOTE — OR NURSING
Neck circumference is 35.5 cm Dressing anterior neck CDI. SALVATORE patent to bulb suction draining sanguinous.Follows commands equal strength bilaterally, See neuro assess.
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
## 2020-06-24 NOTE — OR NURSING
Family updated by phone.Spoke with pt, Belongings taken from locker and placed on gurney with pt. 1-, black and white duffle bag and 1 CPAP machine.

## 2020-06-24 NOTE — OR NURSING
1115-Anterior neck dressing CDI. Neck circumference 35,5 CM No hematoma. SALVATORE patent to bulb suction draining sanguinous. Neuro intact. Drinking juices and water.    1130-Anterior neck dressing CDI. Neck circumference 35,5 CM No hematoma. SALVATORE patent to bulb suction draining sanguinous. Neuro intact. Drinking juices and water.

## 2020-06-24 NOTE — PROGRESS NOTES
Patient in pre-op, assessment completed, patient and daughter rafael updated on plan of care, all questions answered, no further needs at this time, call light within reach.

## 2020-06-24 NOTE — PROCEDURES
06/24/20    OPERATIVE NOTE    PRE-OPERATIVE DIAGNOSIS - Severe Left Carotid Artery Stenosis     POST-OPERATIVE DIAGNOSIS - Same    PROCEDURE PERFORMED - Left Carotid Endarterectomy     SURGEON - Scout Carreon M.D.    ASSISTANT - GEOVANNA Villanueva    TYPE OF ANESTHESIA - General endotracheal with EEG monitoring    ANESTHESIOLOGIST  - Dr. Turner      SPECIMENS - None    PROCEDURE IN DETAIL -     Patient was taken to the operating suite placed in the supine position.  After adequate anesthesia the patient's left neck was prepped and draped in sterile fashion.  Half percent Marcaine with epinephrine was infiltrated into the subcutaneous tissue along the anterior border of the sternocleidomastoid muscle.  The incision was carried down along that anterior border down through the platysma layer.  Dissection took place along the anterior aspect of the sternocleidomastoid muscle retracting that muscle laterally.  Jugular vein was identified dissection took place along the anterior aspect of the jugular vein with multiple tributaries being taken down including the facial vein.  The jugular vein was retracted laterally exposing the common carotid artery.  The common carotid proximal internal carotid and external carotid arteries were circumferentially dissected and isolated.  A Richard tourniquet was placed around the common carotid artery.  7000 units of heparin was administered and after an appropriate period of time the vessels were sequentially clamped starting with the internal.  There was no EEG change at the time of carotid crossclamping therefore shunting was not employed.  A standard endarterectomy was performed of the distal common carotid and proximal internal carotid arteries.  An eversion endarterectomy was performed on the external carotid.  After the artery was free of all atheromatous debris the distal endpoint was tacked using a 6-0 Prolene suture.  A bovine patch was then sewn in circumferentially using 5-0  Prolene sutures in a running fashion.  Just before completing the patch all vessels were vigorously flushed and irrigated.  The patch was completed and flow was established first to the external carotid and subsequently to the internal carotid artery.  Hemostasis was assured.  30 mg of protamine was administered.  7 flat fluted Gopal-Rodas drain was left within the neck and brought out through a separate stab incision and secured to the skin.  2-0 Vicryl interrupted sutures were used for the platysma layer.  A 4-0 strata fix suture was then used to reapproximate the skin incision.  Sterile dressings were applied and the patient was taken to the recovery room in stable condition.      Scout Carreon M.D.

## 2020-06-24 NOTE — OR NURSING
Anterior neck dressing CDI. Neck circumference 35,5 CM No hematoma. SALVATORE patent to bulb suction draining sanguinous. Neuro intact. Drinking juices and water    The pt is awake and oriented. Respirations are regular and easy. Pain is controlled, the pt is comfortable. .
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
## 2020-06-24 NOTE — ANESTHESIA PROCEDURE NOTES
Arterial Line  Performed by: Ammon Turner M.D.  Authorized by: Ammon Turner M.D.     Localization: surface landmarks    Patient Location:  OR  Indication: continuous blood pressure monitoring        Catheter Size:  20 G  Seldinger Technique?: Yes    Laterality:  Left  Site:  Radial artery  Line Secured:  Antimicrobial disc, tape and transparent dressing  Events: patient tolerated procedure well with no complications

## 2020-06-24 NOTE — ANESTHESIA POSTPROCEDURE EVALUATION
Patient: Madeline Dorantes    Procedure Summary     Date:  06/24/20 Room / Location:  [facility] OR 08 / SURGERY [facility]    Anesthesia Start:  0921 Anesthesia Stop:  1047    Procedures:       ENDARTERECTOMY, CAROTID (Left Neck)      EEG (ELECTROENCEPHALOGRAM) (N/A Head) Diagnosis:  (LEFT CAROTID ARTERY STENOSIS)    Surgeon:  Scout Carreon M.D. Responsible Provider:  Ammon Turner M.D.    Anesthesia Type:  general ASA Status:  3          Final Anesthesia Type: general  Last vitals  BP   Blood Pressure : 148/62, Arterial BP: (!) 6/4    Temp   36.2 °C (97.2 °F)    Pulse   Pulse: 74   Resp   (!) 24    SpO2   98 %      Anesthesia Post Evaluation    Patient location during evaluation: PACU  Patient participation: complete - patient participated  Level of consciousness: sleepy but conscious    Airway patency: patent  Anesthetic complications: no  Cardiovascular status: hemodynamically stable  Respiratory status: acceptable and face mask  Hydration status: balanced    PONV: none           Nurse Pain Score: 0 (NPRS)

## 2020-06-25 VITALS
TEMPERATURE: 97.6 F | OXYGEN SATURATION: 92 % | WEIGHT: 160.5 LBS | BODY MASS INDEX: 25.19 KG/M2 | DIASTOLIC BLOOD PRESSURE: 82 MMHG | HEART RATE: 95 BPM | SYSTOLIC BLOOD PRESSURE: 140 MMHG | HEIGHT: 67 IN | RESPIRATION RATE: 16 BRPM

## 2020-06-25 PROCEDURE — 700102 HCHG RX REV CODE 250 W/ 637 OVERRIDE(OP): Performed by: SURGERY

## 2020-06-25 PROCEDURE — 700102 HCHG RX REV CODE 250 W/ 637 OVERRIDE(OP): Performed by: NURSE PRACTITIONER

## 2020-06-25 PROCEDURE — 94760 N-INVAS EAR/PLS OXIMETRY 1: CPT

## 2020-06-25 PROCEDURE — A9270 NON-COVERED ITEM OR SERVICE: HCPCS | Performed by: NURSE PRACTITIONER

## 2020-06-25 PROCEDURE — A9270 NON-COVERED ITEM OR SERVICE: HCPCS | Performed by: SURGERY

## 2020-06-25 RX ADMIN — OMEPRAZOLE 20 MG: 20 CAPSULE, DELAYED RELEASE ORAL at 04:45

## 2020-06-25 RX ADMIN — LOSARTAN POTASSIUM 25 MG: 25 TABLET, FILM COATED ORAL at 04:45

## 2020-06-25 RX ADMIN — LEVOTHYROXINE SODIUM 88 MCG: 88 TABLET ORAL at 04:45

## 2020-06-25 NOTE — PROGRESS NOTES
Patient rested well throughout shift  AAOx4  CPAP on while sleeping  No complaints of pain, just slight discomfort. Declines pain medication  Ambulating in hallway independently  No other complaints  SALVATORE drain mid neck in place, bloody output  Neuro checks performed, no deficits, no changes  Safety maintained, call light within reach.

## 2020-06-25 NOTE — PROGRESS NOTES
SALVATORE drain removed at 0950 as patient is discharging. Minimal amount of serosanguinous drainage in reservoir. Patient tolerated well and clean dressing/tegaderm in place.

## 2020-06-25 NOTE — RESPIRATORY CARE
Oxygen Rounds      Patient found on    O2 L/m:  0  Oxygen device:  Room air  Spo2: 97%        Respiratory device skin site inspection completed.

## 2020-06-25 NOTE — PROGRESS NOTES
Patient discharged to home. All lines removed. Discharge instructions and prescription reviewed and understanding verbalized. Patient states they will fill prescriptions. Patient states they will return to emergency if discussed symptoms arise. Patient left via wheelchair and car with daughter. Medications from drawer removed and sent back to pharmacy or disposed of per protocol. Chart given to unit clerk.

## 2020-06-25 NOTE — DISCHARGE INSTRUCTIONS
Discharge Instructions    Discharged to home by car with friend. Discharged via wheelchair, hospital escort: Yes.  Special equipment needed: Not Applicable    Be sure to schedule a follow-up appointment with your primary care doctor or any specialists as instructed.     Discharge Plan:        I understand that a diet low in cholesterol, fat, and sodium is recommended for good health. Unless I have been given specific instructions below for another diet, I accept this instruction as my diet prescription.   Other diet: regular    Special Instructions: Remove bandage after 48 hours   May shower after bandage removed     · Is patient discharged on Warfarin / Coumadin?   No       Carotid Stenosis and Carotid Endarterectomy  The carotid arteries are the large arteries in the neck that supply blood to the brain. Carotid stenosis is the narrowing of the carotid arteries. This narrowing can put you at risk for a stroke because it decreases blood flow to the brain (ischemic stroke or transient ischemic attack [TIA]). Carotid stenosis is also called carotid artery disease.  CAUSES   Carotid stenosis is usually caused by a buildup of plaque in the arteries. Plaque is also called atherosclerosis. Plaque can build up in any of your arteries. This commonly occurs as we grow older.  SYMPTOMS   Symptoms of decreased blood to your brain include:   · Feeling faint.  · Numbness or weakness in the arms or legs.  · Difficulty with movements.  · Difficulty speaking.  · Vision problems.  If the arteries are left untreated, you are at risk of having a stroke or TIA.  TREATMENT   Nonsurgical Treatment  If appropriate, your caregiver will suggest medical options other than surgery. These include taking aspirin or other medicines that thin your blood (anticoagulants). Your caregiver can help you decide if these are acceptable treatment methods for you.  Surgical Treatment: Carotid Endarterectomy  A carotid endarterectomy is a surgical  procedure to clear blockages in the carotid artery.  RISKS AND COMPLICATIONS  · Bleeding.  · Infection.  · Stroke or TIA.  BEFORE THE PROCEDURE   · Do not eat or drink for as long as directed by your caregiver before the procedure.  · Your caregiver will advise you if there are any medicines that need to be withheld before the surgery, and for how long.  PROCEDURE   You will be given a drug to make you sleep (general anesthesia). After you receive the anesthetic, the surgeon will make a small cut (incision) in your neck to expose the artery. An incision is made in the artery and the plaque is removed. The artery is then repaired and the incision is closed in your neck with stitches (sutures).  AFTER THE PROCEDURE   You will stay in the hospital right after surgery. Recovery time varies, depending on your age, condition, general health, and other factors. You are usually able to return to a normal lifestyle within a few weeks.  HOME CARE INSTRUCTIONS   · It is normal to be sore for a couple weeks after surgery. See your caregiver if this seems to be getting worse rather than better.  · Take showers, not baths, for a few days after surgery or until instructed otherwise by your caregiver. Do not take a bath or swim until directed by your surgeon.  · Only take over-the-counter or prescription medicines for pain, discomfort, or fever as directed by your caregiver.  · An anticoagulant may be prescribed after surgery. This medicine should be taken exactly as directed.  · Change bandages (dressings) as directed by your caregiver.  · Resume your normal activities as directed by your caregiver.  · Avoid lifting until you are instructed otherwise.  · Make an appointment to see your caregiver for suture or staple removal when instructed.  · Stop smoking if you smoke. This is a grave risk factor.  · Stop taking the pill (oral contraceptives) unless your caregiver recommends otherwise.  · Maintain good blood pressure  control.  · Exercise regularly or as instructed.  · Lower blood lipids (cholesterol and triglycerides).  · Eat a heart-healthy diet. Manage heart problems if they are contributing to your risk.  SEEK MEDICAL CARE IF:   · There is increased bleeding from the wound.  · You notice redness, swelling, or increasing pain in the wound.  · You notice swelling in your neck or have difficulty breathing or talking.  · You notice a bad smell or pus coming from the wound or dressing.  · You have an oral temperature above 102° F (38.9° C).  SEEK IMMEDIATE MEDICAL CARE IF:   · Your initial symptoms are getting worse instead of better.  · You develop any abnormal bruising or bleeding.  · You develop a rash.  · You have difficulty breathing.  · You develop any reaction or side effects to medicine given.  · You develop chest pain, shortness of breath, or pain or swelling in your legs.  · You have a return of symptoms or problems that caused you to have this surgery.  · You develop a temporary loss of vision.  · You develop temporary numbness on one side.  · You develop a temporary inability to speak (aphasia).  · You develop temporary areas of weakness.  · You have problems or concerns that have not been answered.  MAKE SURE YOU:   · Understand these instructions.  · Will watch your condition.  · Will get help right away if you are not doing well or get worse.  Document Released: 12/15/2001 Document Revised: 03/11/2013 Document Reviewed: 06/25/2010  ExitCare® Patient Information ©2014 [employer].    Depression / Suicide Risk    As you are discharged from this [facility] facility, it is important to learn how to keep safe from harming yourself.    Recognize the warning signs:  · Abrupt changes in personality, positive or negative- including increase in energy   · Giving away possessions  · Change in eating patterns- significant weight changes-  positive or negative  · Change in sleeping patterns- unable to sleep or sleeping all  the time   · Unwillingness or inability to communicate  · Depression  · Unusual sadness, discouragement and loneliness  · Talk of wanting to die  · Neglect of personal appearance   · Rebelliousness- reckless behavior  · Withdrawal from people/activities they love  · Confusion- inability to concentrate     If you or a loved one observes any of these behaviors or has concerns about self-harm, here's what you can do:  · Talk about it- your feelings and reasons for harming yourself  · Remove any means that you might use to hurt yourself (examples: pills, rope, extension cords, firearm)  · Get professional help from the community (Mental Health, Substance Abuse, psychological counseling)  · Do not be alone:Call your Safe Contact- someone whom you trust who will be there for you.  · Call your local CRISIS HOTLINE 012-0360 or 330-564-1383  · Call your local Children's Mobile Crisis Response Team [place] (141) 786-4330 or www.[employer]verse  · Call the toll free National Suicide Prevention Hotlines   · National Suicide Prevention Lifeline 497-029-QAUU (5006)  · National Hope Line Network 800-SUICIDE (092-6456)

## 2020-06-25 NOTE — CARE PLAN
Problem: Safety  Goal: Will remain free from injury  Outcome: PROGRESSING AS EXPECTED  Intervention: Provide assistance with mobility  Note: Ambulating independently       Problem: Knowledge Deficit  Goal: Knowledge of disease process/condition, treatment plan, diagnostic tests, and medications will improve  Outcome: PROGRESSING AS EXPECTED  Intervention: Explain information regarding disease process/condition, treatment plan, diagnostic tests, and medications and document in education  Note: Education provided. Comprehension verbalized     Problem: Urinary Elimination:  Goal: Ability to reestablish a normal urinary elimination pattern will improve  Outcome: PROGRESSING AS EXPECTED  Intervention: Assess and monitor for signs and symptoms of urinary retention  Note: Patient voiding well

## 2020-06-25 NOTE — PROGRESS NOTES
Bedside report received.  Assessment complete.  A&O x 4. Patient calls appropriately.  Patient ambulates independently.  Denies N&V. Tolerating regular diet.  Surgical dressing clean and intact.  Patient denies SOB or pain.  Patient free from injury at this time. Safety precautions in place. Hourly rounding in place.

## 2020-06-29 LAB — INR PPP: 1.2 (ref 2–3.5)

## 2020-06-30 ENCOUNTER — ANTICOAGULATION MONITORING (OUTPATIENT)
Dept: MEDICAL GROUP | Facility: PHYSICIAN GROUP | Age: 80
End: 2020-06-30

## 2020-06-30 DIAGNOSIS — I48.21 PERMANENT ATRIAL FIBRILLATION (HCC): ICD-10-CM

## 2020-06-30 DIAGNOSIS — Z79.01 LONG TERM CURRENT USE OF ANTICOAGULANT THERAPY: ICD-10-CM

## 2020-06-30 NOTE — PROGRESS NOTES
OP   Telephone Anticoagulation Service Note      Anticoagulation Summary  As of 2020    INR goal:   2.5-3.0   TTR:   61.8 % (5.1 y)   INR used for dosin.20! (2020)   Warfarin maintenance plan:   3 mg (3 mg x 1) every day   Weekly warfarin total:   21 mg   Plan last modified:   Skip Gonzalez, PharmD (2020)   Next INR check:   2020   Priority:   Maintenance   Target end date:   Indefinite    Indications    Permanent atrial fibrillation (HCC) [I48.21]  Long term current use of anticoagulant therapy [Z79.01]             Anticoagulation Episode Summary     INR check location:   Home Draw    Preferred lab:       Send INR reminders to:       Comments:   Waqar  - [phone] -   goal range changed to 2.5-3.2 per raghu vaca 2019       Anticoagulation Care Providers     Provider Role Specialty Phone number    Hu Gamez M.D. Referring Cardiac Electrophysiology [phone]    [facility] Responsible  [phone]    Kristopher Escobar, PharmD Responsible          Anticoagulation Patient Findings  Patient Findings     Negatives:   Signs/symptoms of thrombosis, Signs/symptoms of bleeding, Laboratory test error suspected, Change in health, Change in alcohol use, Change in activity, Upcoming invasive procedure, Emergency department visit, Upcoming dental procedure, Missed doses, Extra doses, Change in medications, Change in diet/appetite, Hospital admission, Bruising, Other complaints        Spoke with the patient on the phone today, reporting a SUB-therapeutic INR of 1.2. Confirmed the current warfarin dosing regimen and patient compliance. Patient currently bridging with lovenox after being off warfarin for procedure. Patient denies any interval changes to diet and/or medications. Patient denies any signs/symptoms of bleeding or clotting.  Patient instructed to bolus with 6mg x 2 days, and to continue with lovenox, and to retest again in 2 days.     Jason Yi  PharmD

## 2020-07-01 DIAGNOSIS — Z79.01 LONG TERM CURRENT USE OF ANTICOAGULANT THERAPY: ICD-10-CM

## 2020-07-01 RX ORDER — WARFARIN SODIUM 3 MG/1
TABLET ORAL
Qty: 135 TAB | Refills: 1 | Status: SHIPPED | OUTPATIENT
Start: 2020-07-01 | End: 2020-07-02 | Stop reason: SDUPTHER

## 2020-07-02 ENCOUNTER — ANTICOAGULATION MONITORING (OUTPATIENT)
Dept: VASCULAR LAB | Facility: MEDICAL CENTER | Age: 80
End: 2020-07-02

## 2020-07-02 DIAGNOSIS — Z79.01 LONG TERM CURRENT USE OF ANTICOAGULANT THERAPY: Primary | ICD-10-CM

## 2020-07-02 DIAGNOSIS — I48.21 PERMANENT ATRIAL FIBRILLATION (HCC): ICD-10-CM

## 2020-07-02 LAB — INR PPP: 1.9 (ref 2–3.5)

## 2020-07-02 RX ORDER — WARFARIN SODIUM 3 MG/1
TABLET ORAL
Qty: 135 TAB | Refills: 2 | Status: SHIPPED | OUTPATIENT
Start: 2020-07-02 | End: 2021-08-20

## 2020-07-02 NOTE — PROGRESS NOTES
Anticoagulation Summary  As of 2020    INR goal:   2.5-3.0   TTR:   61.7 % (5.1 y)   INR used for dosin.90! (2020)   Warfarin maintenance plan:   3 mg (3 mg x 1) every day   Weekly warfarin total:   21 mg   Plan last modified:   Clarisse Jean BaptisteD (2020)   Next INR check:   2020   Priority:   Maintenance   Target end date:   Indefinite    Indications    Permanent atrial fibrillation (HCC) [I48.21]  Long term current use of anticoagulant therapy [Z79.01]             Anticoagulation Episode Summary     INR check location:   Home Draw    Preferred lab:       Send INR reminders to:       Comments:   Waqar  - [phone] -   goal range changed to 2.5-3.2 per raghu vaca 2019       Anticoagulation Care Providers     Provider Role Specialty Phone number    Hu Gamez M.D. Referring Cardiac Electrophysiology [phone]    [facility] Responsible  [phone]    Clarisse BellD Responsible          Anticoagulation Patient Findings      Spoke with patient.  INR is SUB therapeutic.   Pt denies any unusual s/s of bleeding, bruising, clotting or any changes to diet or medications. Denies any etoh, cranberries, supplements, or illness.   Pt verifies warfarin weekly dosing.     Will have pt continue lovenox injections. Pt to take warfarin 6mg x1 today and then 4.5mg x1 tomorrow. Recheck INR on 20. [facility] will f/u with pt.     Repeat INR in 2 days.     Kellen Lackey, PharmD

## 2020-07-04 ENCOUNTER — ANTICOAGULATION MONITORING (OUTPATIENT)
Dept: VASCULAR LAB | Facility: MEDICAL CENTER | Age: 80
End: 2020-07-04

## 2020-07-04 DIAGNOSIS — I48.21 PERMANENT ATRIAL FIBRILLATION (HCC): ICD-10-CM

## 2020-07-04 DIAGNOSIS — Z79.01 LONG TERM CURRENT USE OF ANTICOAGULANT THERAPY: ICD-10-CM

## 2020-07-04 LAB — INR PPP: 2.8 (ref 2–3.5)

## 2020-07-04 NOTE — PROGRESS NOTES
OP   Telephone Anticoagulation Service Note      Anticoagulation Summary  As of 2020    INR goal:   2.5-3.0   TTR:   61.7 % (5.1 y)   INR used for dosin.80 (2020)   Warfarin maintenance plan:   3 mg (3 mg x 1) every day   Weekly warfarin total:   21 mg   No change documented:   Emily Yi   Plan last modified:   Skip Gonzalez, PharmD (2020)   Next INR check:   7/15/2020   Priority:   Maintenance   Target end date:   Indefinite    Indications    Permanent atrial fibrillation (HCC) [I48.21]  Long term current use of anticoagulant therapy [Z79.01]             Anticoagulation Episode Summary     INR check location:   Home Draw    Preferred lab:       Send INR reminders to:       Comments:   [facility] [phone] -   goal range changed to 2.5-3.2 per raghu vaca 2019       Anticoagulation Care Providers     Provider Role Specialty Phone number    Hu Gamez M.D. Referring Cardiac Electrophysiology [phone]    [facility] Anticoagulation Services Responsible  [phone]    Kristopher Escobar, PharmD Responsible          Anticoagulation Patient Findings  Patient Findings     Positives:   Bruising    Negatives:   Signs/symptoms of thrombosis, Signs/symptoms of bleeding, Laboratory test error suspected, Change in health, Change in alcohol use, Change in activity, Upcoming invasive procedure, Emergency department visit, Upcoming dental procedure, Missed doses, Extra doses, Change in medications, Change in diet/appetite, Hospital admission, Other complaints    Comments:   Patient reports bruising at lovenox injections sites        Spoke with the patient on the phone today, reporting a therapeutic INR of 2.8. Confirmed the current warfarin dosing regimen and patient compliance. Patient denies any interval changes to diet and/or medications. Patient denies any signs/symptoms of bleeding or clotting.  Patient instructed to continue with the current warfarin dosing regimen, and asked to  follow up again in 1.5 weeks. (per pt preference).    Jason RobbD

## 2020-07-07 NOTE — PROGRESS NOTES
Discharge summary    Date of admission- 6-24-20    Date of discharge- 6-25-20    Admitting diagnosis- Severe Left Carotid Artery Stenosis     Discharge diagnosis-same    Procedures performed in the hospital-  Left carotid endarterectomy    Hospital course-    Patient came in electively for a left carotid endarterectomy for severe left carotid stenosis.  That procedure was performed without complication.  The patient remained neurologically intact postoperatively her incision remained clean dry and intact.  The patient was discharged the day following surgery and did well.  She was scheduled to follow-up with me in the office in 1 week.

## 2020-07-15 ENCOUNTER — ANTICOAGULATION MONITORING (OUTPATIENT)
Dept: VASCULAR LAB | Facility: MEDICAL CENTER | Age: 80
End: 2020-07-15

## 2020-07-15 DIAGNOSIS — I48.21 PERMANENT ATRIAL FIBRILLATION (HCC): ICD-10-CM

## 2020-07-15 DIAGNOSIS — Z79.01 LONG TERM CURRENT USE OF ANTICOAGULANT THERAPY: ICD-10-CM

## 2020-07-15 LAB — INR PPP: 1.9 (ref 2–3.5)

## 2020-07-15 NOTE — PROGRESS NOTES
Anticoagulation Summary  As of 7/15/2020    INR goal:   2.5-3.0   TTR:   61.5 % (5.1 y)   INR used for dosin.90! (7/15/2020)   Warfarin maintenance plan:   4.5 mg (3 mg x 1.5) every Wed; 3 mg (3 mg x 1) all other days   Weekly warfarin total:   22.5 mg   Plan last modified:   Vandana Crabtree, Yesenia (7/15/2020)   Next INR check:   2020   Priority:   Maintenance   Target end date:   Indefinite    Indications    Permanent atrial fibrillation (HCC) [I48.21]  Long term current use of anticoagulant therapy [Z79.01]             Anticoagulation Episode Summary     INR check location:   Home Draw    Preferred lab:       Send INR reminders to:       Comments:   [facility] [phone] -   goal range changed to 2.5-3.2 per raghu vaca 2019       Anticoagulation Care Providers     Provider Role Specialty Phone number    Hu Gamez M.D. Referring Cardiac Electrophysiology [phone]    [facility] Anticoagulation Services Responsible  [phone]    Kristopher Escobar, PharmD Responsible          Anticoagulation Patient Findings  Patient Findings     Positives:   Change in diet/appetite (increased intake in vit K)    Negatives:   Signs/symptoms of thrombosis, Signs/symptoms of bleeding, Laboratory test error suspected, Change in health, Change in alcohol use, Change in activity, Upcoming invasive procedure, Emergency department visit, Upcoming dental procedure, Missed doses, Extra doses, Change in medications, Hospital admission, Bruising, Other complaints          Spoke with pt.  INR is sub therapeutic.   Pt denies any unusual s/s of bleeding, bruising, clotting or any changes to medications. Denies any etoh, cranberries, supplements, or illness.   Pt verifies warfarin weekly dosing.     Will have pt increase weekly regimen    Repeat INR in 2 week(s).     Vandana Crabtree, PharmD

## 2020-07-27 ENCOUNTER — OFFICE VISIT (OUTPATIENT)
Dept: URGENT CARE | Facility: PHYSICIAN GROUP | Age: 80
End: 2020-07-27
Payer: MEDICARE

## 2020-07-27 VITALS
RESPIRATION RATE: 16 BRPM | WEIGHT: 160 LBS | HEIGHT: 68 IN | DIASTOLIC BLOOD PRESSURE: 70 MMHG | SYSTOLIC BLOOD PRESSURE: 124 MMHG | TEMPERATURE: 97.8 F | OXYGEN SATURATION: 96 % | BODY MASS INDEX: 24.25 KG/M2 | HEART RATE: 84 BPM

## 2020-07-27 DIAGNOSIS — N64.4 BREAST PAIN, LEFT: ICD-10-CM

## 2020-07-27 PROCEDURE — 99214 OFFICE O/P EST MOD 30 MIN: CPT | Performed by: PHYSICIAN ASSISTANT

## 2020-07-27 ASSESSMENT — FIBROSIS 4 INDEX: FIB4 SCORE: 2.46
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
## 2020-07-29 ENCOUNTER — SLEEP CENTER VISIT (OUTPATIENT)
Dept: SLEEP MEDICINE | Facility: MEDICAL CENTER | Age: 80
End: 2020-07-29
Payer: MEDICARE

## 2020-07-29 VITALS
HEART RATE: 88 BPM | BODY MASS INDEX: 24.1 KG/M2 | WEIGHT: 159 LBS | HEIGHT: 68 IN | OXYGEN SATURATION: 94 % | SYSTOLIC BLOOD PRESSURE: 142 MMHG | DIASTOLIC BLOOD PRESSURE: 72 MMHG

## 2020-07-29 DIAGNOSIS — I48.21 PERMANENT ATRIAL FIBRILLATION (HCC): Chronic | ICD-10-CM

## 2020-07-29 DIAGNOSIS — Z79.01 LONG TERM CURRENT USE OF ANTICOAGULANT THERAPY: Chronic | ICD-10-CM

## 2020-07-29 DIAGNOSIS — I49.8 PACEMAKER-DEPENDENT DUE TO NATIVE CARDIAC RHYTHM INSUFFICIENT TO SUPPORT LIFE: Chronic | ICD-10-CM

## 2020-07-29 DIAGNOSIS — I10 ESSENTIAL HYPERTENSION: ICD-10-CM

## 2020-07-29 DIAGNOSIS — G47.33 OBSTRUCTIVE SLEEP APNEA: ICD-10-CM

## 2020-07-29 DIAGNOSIS — Z95.0 PACEMAKER-DEPENDENT DUE TO NATIVE CARDIAC RHYTHM INSUFFICIENT TO SUPPORT LIFE: Chronic | ICD-10-CM

## 2020-07-29 DIAGNOSIS — K21.9 GASTROESOPHAGEAL REFLUX DISEASE, ESOPHAGITIS PRESENCE NOT SPECIFIED: ICD-10-CM

## 2020-07-29 PROCEDURE — 99213 OFFICE O/P EST LOW 20 MIN: CPT | Performed by: NURSE PRACTITIONER

## 2020-07-29 ASSESSMENT — FIBROSIS 4 INDEX: FIB4 SCORE: 2.46
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
## 2020-07-30 ENCOUNTER — HOSPITAL ENCOUNTER (OUTPATIENT)
Dept: RADIOLOGY | Facility: MEDICAL CENTER | Age: 80
End: 2020-07-30
Attending: PHYSICIAN ASSISTANT
Payer: MEDICARE

## 2020-07-30 DIAGNOSIS — N64.4 BREAST PAIN, LEFT: ICD-10-CM

## 2020-07-30 LAB — INR PPP: 2.9 (ref 2–3.5)

## 2020-07-30 PROCEDURE — 76642 ULTRASOUND BREAST LIMITED: CPT | Mod: LT

## 2020-07-30 PROCEDURE — G0279 TOMOSYNTHESIS, MAMMO: HCPCS | Mod: LT

## 2020-07-31 ENCOUNTER — ANTICOAGULATION MONITORING (OUTPATIENT)
Dept: VASCULAR LAB | Facility: MEDICAL CENTER | Age: 80
End: 2020-07-31

## 2020-07-31 DIAGNOSIS — I48.21 PERMANENT ATRIAL FIBRILLATION (HCC): ICD-10-CM

## 2020-07-31 DIAGNOSIS — Z79.01 LONG TERM CURRENT USE OF ANTICOAGULANT THERAPY: ICD-10-CM

## 2020-07-31 NOTE — PROGRESS NOTES
Anticoagulation Summary  As of 2020    INR goal:   2.5-3.0   TTR:   61.3 % (5.1 y)   INR used for dosin.90 (2020)   Warfarin maintenance plan:   4.5 mg (3 mg x 1.5) every Wed; 3 mg (3 mg x 1) all other days   Weekly warfarin total:   22.5 mg   Plan last modified:   Vandana Crabtree, PharmD (7/15/2020)   Next INR check:   2020   Priority:   Maintenance   Target end date:   Indefinite    Indications    Permanent atrial fibrillation (HCC) [I48.21]  Long term current use of anticoagulant therapy [Z79.01]             Anticoagulation Episode Summary     INR check location:   Home Draw    Preferred lab:       Send INR reminders to:       Comments:   Waqar [facility] [phone] -   goal range changed to 2.5-3.2 per raghu vaca 2019       Anticoagulation Care Providers     Provider Role Specialty Phone number    Hu Gamez M.D. Referring Cardiac Electrophysiology [phone]    [facility] Anticoagulation Services Responsible  [phone]    Kristopher Escobar, PharmD Responsible          Anticoagulation Patient Findings      Left voicemail message to report a therapeutic INR of 2.90.    Pt to continue with current warfarin dosing regimen. Requested pt contact the clinic for any s/s of unusual bleeding, bruising, clotting or any changes to diet or medication.    FU INR in 2 week(s).    Amadou Ramos, Pharmacy Intern

## 2020-08-04 ENCOUNTER — TELEPHONE (OUTPATIENT)
Dept: URGENT CARE | Facility: PHYSICIAN GROUP | Age: 80
End: 2020-08-04

## 2020-08-04 NOTE — TELEPHONE ENCOUNTER
----- Message from Romy Moura P.A.-C. sent at 8/2/2020  9:08 AM PDT -----  Please let patient know there are no acute changes in her breast ultrasound.  They recommend following up with her annual mammogram in January 2021.

## 2020-08-06 ENCOUNTER — NON-PROVIDER VISIT (OUTPATIENT)
Dept: CARDIOLOGY | Facility: PHYSICIAN GROUP | Age: 80
End: 2020-08-06
Payer: MEDICARE

## 2020-08-06 VITALS
DIASTOLIC BLOOD PRESSURE: 72 MMHG | OXYGEN SATURATION: 98 % | BODY MASS INDEX: 23.79 KG/M2 | SYSTOLIC BLOOD PRESSURE: 156 MMHG | WEIGHT: 157 LBS | HEIGHT: 68 IN | HEART RATE: 74 BPM

## 2020-08-06 DIAGNOSIS — Z79.01 LONG TERM CURRENT USE OF ANTICOAGULANT THERAPY: Chronic | ICD-10-CM

## 2020-08-06 DIAGNOSIS — Z98.890 S/P AV NODAL ABLATION: ICD-10-CM

## 2020-08-06 DIAGNOSIS — I44.2 THIRD DEGREE AV BLOCK (HCC): Chronic | ICD-10-CM

## 2020-08-06 DIAGNOSIS — I48.21 PERMANENT ATRIAL FIBRILLATION (HCC): Chronic | ICD-10-CM

## 2020-08-06 DIAGNOSIS — Z95.0 CARDIAC PACEMAKER IN SITU: Chronic | ICD-10-CM

## 2020-08-06 PROCEDURE — 93279 PRGRMG DEV EVAL PM/LDLS PM: CPT | Performed by: NURSE PRACTITIONER

## 2020-08-06 ASSESSMENT — FIBROSIS 4 INDEX: FIB4 SCORE: 2.46

## 2020-08-06 NOTE — PROGRESS NOTES
Device is working normally. Underlying rhythm is permanent atrial fibrillation with high AV block; she is PM dependent, and she is anticoagulated. No ventricular high rate episodes.  Stable capture of RV lead; stable impedance. Battery longevity is 4.5 years.  No changes are made today.    FU in 4 months for next PM check with me.    Collaborating MD: Claudio

## 2020-08-13 ENCOUNTER — ANTICOAGULATION MONITORING (OUTPATIENT)
Dept: VASCULAR LAB | Facility: MEDICAL CENTER | Age: 80
End: 2020-08-13

## 2020-08-13 DIAGNOSIS — Z79.01 LONG TERM CURRENT USE OF ANTICOAGULANT THERAPY: ICD-10-CM

## 2020-08-13 DIAGNOSIS — I48.21 PERMANENT ATRIAL FIBRILLATION (HCC): ICD-10-CM

## 2020-08-13 LAB — INR PPP: 2.7 (ref 2–3.5)

## 2020-08-13 NOTE — PROGRESS NOTES
Anticoagulation Summary  As of 2020    INR goal:  2.5-3.0   TTR:  61.6 % (5.2 y)   INR used for dosin.70 (2020)   Warfarin maintenance plan:  4.5 mg (3 mg x 1.5) every Wed; 3 mg (3 mg x 1) all other days   Weekly warfarin total:  22.5 mg   Plan last modified:  Vandana Crabtree PharmD (7/15/2020)   Next INR check:  2020   Priority:  Maintenance   Target end date:  Indefinite    Indications    Permanent atrial fibrillation (HCC) [I48.21]  Long term current use of anticoagulant therapy [Z79.01]             Anticoagulation Episode Summary     INR check location:  Home Draw    Preferred lab:      Send INR reminders to:      Comments:  Waqar [facility] [phone] -   goal range changed to 2.5-3.2 per raghu vaca 2019       Anticoagulation Care Providers     Provider Role Specialty Phone number    Hu Gamez M.D. Referring Cardiac Electrophysiology [phone]    [facility] Anticoagulation Services Responsible  [phone]    Clarisse BellD Responsible          Anticoagulation Patient Findings      INR  therapeutic.   Left a voice message for the patient, asked patient to please call the anticoagulation clinic if they have any signs/symptoms of bleeding and/or thrombosis or any changes to diet or medications.    Follow up appointment in 2 week(s)    Continue weekly warfarin dose as noted      Skip Gonzalez, PharmD, MS, BCACP, LCC    This note was created using voice recognition software (Dragon). The accuracy of the dictation is limited by the abilities of the software. I have reviewed the note prior to signing, however some errors in grammar and context are still possible. If you have any questions related to this note please do not hesitate to contact our office.

## 2020-08-25 ENCOUNTER — ANTICOAGULATION MONITORING (OUTPATIENT)
Dept: VASCULAR LAB | Facility: MEDICAL CENTER | Age: 80
End: 2020-08-25

## 2020-08-25 DIAGNOSIS — Z79.01 LONG TERM CURRENT USE OF ANTICOAGULANT THERAPY: ICD-10-CM

## 2020-08-25 DIAGNOSIS — I48.21 PERMANENT ATRIAL FIBRILLATION (HCC): ICD-10-CM

## 2020-08-25 LAB — INR PPP: 2.8 (ref 2–3.5)

## 2020-08-25 NOTE — PROGRESS NOTES
Anticoagulation Summary  As of 2020    INR goal:  2.5-3.0   TTR:  61.9 % (5.2 y)   INR used for dosin.80 (2020)   Warfarin maintenance plan:  4.5 mg (3 mg x 1.5) every Wed; 3 mg (3 mg x 1) all other days   Weekly warfarin total:  22.5 mg   Plan last modified:  Skip Gonzalez, PharmD (2020)   Next INR check:  2020   Priority:  Maintenance   Target end date:  Indefinite    Indications    Permanent atrial fibrillation (HCC) [I48.21]  Long term current use of anticoagulant therapy [Z79.01]             Anticoagulation Episode Summary     INR check location:  Home Draw    Preferred lab:      Send INR reminders to:      Comments:  Waqar [facility] [phone] -   goal range changed to 2.5-3.2 per raghu vaca 2019       Anticoagulation Care Providers     Provider Role Specialty Phone number    Hu Gamez M.D. Referring Cardiac Electrophysiology [phone]    [facility] Anticoagulation Services Responsible  [phone]    Kristopher Escobar, PharmD Responsible             Spoke with patient today regarding therapeutic INR of 2.8.  Patient denies any signs/symptoms of bruising or bleeding or any changes in diet and medications.  Instructed patient to call clinic with any questions or concerns.    Pt is to continue with current warfarin dosing regimen.    Follow up in 2 weeks, to reduce risk of adverse events related to this high risk medication,  Warfarin.    Vinny Mendoza, Pharmacy Intern

## 2020-09-10 LAB — INR PPP: 4.2 (ref 2–3.5)

## 2020-09-11 ENCOUNTER — ANTICOAGULATION MONITORING (OUTPATIENT)
Dept: VASCULAR LAB | Facility: MEDICAL CENTER | Age: 80
End: 2020-09-11

## 2020-09-11 DIAGNOSIS — I48.21 PERMANENT ATRIAL FIBRILLATION (HCC): ICD-10-CM

## 2020-09-11 DIAGNOSIS — Z79.01 LONG TERM CURRENT USE OF ANTICOAGULANT THERAPY: ICD-10-CM

## 2020-09-11 NOTE — PROGRESS NOTES
Anticoagulation Summary  As of 2020    INR goal:  2.5-3.0   TTR:  61.5 % (5.3 y)   INR used for dosin.20 (9/10/2020)   Warfarin maintenance plan:  4.5 mg (3 mg x 1.5) every Wed; 3 mg (3 mg x 1) all other days   Weekly warfarin total:  22.5 mg   Plan last modified:  Samantha Amaya (2020)   Next INR check:  2020   Priority:  Maintenance   Target end date:  Indefinite    Indications    Permanent atrial fibrillation (HCC) [I48.21]  Long term current use of anticoagulant therapy [Z79.01]             Anticoagulation Episode Summary     INR check location:  Home Draw    Preferred lab:      Send INR reminders to:      Comments:  Waqar  - [phone] -   goal range changed to 2.5-3.2 per raghu vaca 2019       Anticoagulation Care Providers     Provider Role Specialty Phone number    Hu Gamez M.D. Referring Cardiac Electrophysiology [phone]    [facility] Anticoagulation Services Responsible  [phone]    Kristopher Escobar, PharmD Responsible          Anticoagulation Patient Findings          Spoke with Mary to report a supra therapeutic INR of 4.2.  Will HOLD dose tonight, then resume current dosing regimen. Follow up in 1 weeks, to reduce the risk of adverse events related to this high risk medication, warfarin.    Samantha Amaya, Clinical Pharmacist

## 2020-09-16 ENCOUNTER — ANTICOAGULATION MONITORING (OUTPATIENT)
Dept: VASCULAR LAB | Facility: MEDICAL CENTER | Age: 80
End: 2020-09-16

## 2020-09-16 DIAGNOSIS — I48.21 PERMANENT ATRIAL FIBRILLATION (HCC): ICD-10-CM

## 2020-09-16 DIAGNOSIS — Z79.01 LONG TERM CURRENT USE OF ANTICOAGULANT THERAPY: ICD-10-CM

## 2020-09-16 LAB — INR PPP: 2.5 (ref 2–3.5)

## 2020-09-16 NOTE — PROGRESS NOTES
Anticoagulation Summary  As of 2020    INR goal:  2.5-3.0   TTR:  61.4 % (5.3 y)   INR used for dosin.50 (2020)   Warfarin maintenance plan:  4.5 mg (3 mg x 1.5) every Wed; 3 mg (3 mg x 1) all other days   Weekly warfarin total:  22.5 mg   Plan last modified:  Samantha Amaya (2020)   Next INR check:  2020   Priority:  Maintenance   Target end date:  Indefinite    Indications    Permanent atrial fibrillation (HCC) [I48.21]  Long term current use of anticoagulant therapy [Z79.01]             Anticoagulation Episode Summary     INR check location:  Home Draw    Preferred lab:      Send INR reminders to:      Comments:  Waqar [facility] [phone] -   goal range changed to 2.5-3.2 per raghu vaca 2019       Anticoagulation Care Providers     Provider Role Specialty Phone number    Hu Gamez M.D. Referring Cardiac Electrophysiology [phone]    [facility] Anticoagulation Services Responsible  [phone]    Kristopher Escobar, PharmD Responsible          Anticoagulation Patient Findings      Left voicemail message to report a therapeutic INR of 2.5.      Pt to continue with current warfarin dosing regimen. Requested pt contact the clinic for any s/s of unusual bleeding, bruising, clotting or any changes to diet or medication.    FU INR in 2 week(s).    Vandana Crabtree, PharmD

## 2020-09-30 ENCOUNTER — ANTICOAGULATION MONITORING (OUTPATIENT)
Dept: VASCULAR LAB | Facility: MEDICAL CENTER | Age: 80
End: 2020-09-30

## 2020-09-30 DIAGNOSIS — I48.21 PERMANENT ATRIAL FIBRILLATION (HCC): ICD-10-CM

## 2020-09-30 DIAGNOSIS — Z79.01 LONG TERM CURRENT USE OF ANTICOAGULANT THERAPY: ICD-10-CM

## 2020-09-30 LAB — INR PPP: 2.8 (ref 2–3.5)

## 2020-09-30 NOTE — PROGRESS NOTES
Anticoagulation Summary  As of 2020    INR goal:  2.5-3.0   TTR:  61.6 % (5.3 y)   INR used for dosin.80 (2020)   Warfarin maintenance plan:  4.5 mg (3 mg x 1.5) every Wed; 3 mg (3 mg x 1) all other days   Weekly warfarin total:  22.5 mg   Plan last modified:  Samantha POST Filter (2020)   Next INR check:  10/14/2020   Priority:  Maintenance   Target end date:  Indefinite    Indications    Permanent atrial fibrillation (HCC) [I48.21]  Long term current use of anticoagulant therapy [Z79.01]             Anticoagulation Episode Summary     INR check location:  Home Draw    Preferred lab:      Send INR reminders to:      Comments:  Waqar  - [phone] -   goal range changed to 2.5-3.2 per raghu vaca 2019       Anticoagulation Care Providers     Provider Role Specialty Phone number    Hu Gamez M.D. Referring Cardiac Electrophysiology [phone]    [facility] Anticoagulation Services Responsible  [phone]    Kristopher Escobar, PharmD Responsible          Anticoagulation Patient Findings     Spoke with patient today regarding therapeutic INR of 2.8.  Patient denies any signs/symptoms of bruising or any changes in diet and medications. Pt stated she had noticed eye vessels popping occasionally but they resolve on their own. She was instructed to call us if it becomes more frequent or if they do not resolve.  Instructed patient to call clinic with any questions or concerns.    Pt is to continue with current warfarin dosing regimen.     Follow up in 2 weeks, to reduce risk of adverse events related to this high risk medication,  Warfarin.    Madeline Frias, Pharmacy Intern

## 2020-10-15 ENCOUNTER — ANTICOAGULATION MONITORING (OUTPATIENT)
Dept: VASCULAR LAB | Facility: MEDICAL CENTER | Age: 80
End: 2020-10-15

## 2020-10-15 DIAGNOSIS — I48.21 PERMANENT ATRIAL FIBRILLATION (HCC): ICD-10-CM

## 2020-10-15 DIAGNOSIS — Z79.01 LONG TERM CURRENT USE OF ANTICOAGULANT THERAPY: ICD-10-CM

## 2020-10-15 LAB — INR PPP: 3.3 (ref 2–3.5)

## 2020-10-15 NOTE — PROGRESS NOTES
Anticoagulation Summary  As of 10/15/2020    INR goal:  2.5-3.0   TTR:  61.5 % (5.4 y)   INR used for dosing:  3.30 (10/15/2020)   Warfarin maintenance plan:  4.5 mg (3 mg x 1.5) every Wed; 3 mg (3 mg x 1) all other days   Weekly warfarin total:  22.5 mg   Plan last modified:  Samantha Amaya (9/11/2020)   Next INR check:     Priority:  Maintenance   Target end date:  Indefinite    Indications    Permanent atrial fibrillation (HCC) [I48.21]  Long term current use of anticoagulant therapy [Z79.01]             Anticoagulation Episode Summary     INR check location:  Home Draw    Preferred lab:      Send INR reminders to:      Comments:  Waqar  - [phone] -   goal range changed to 2.5-3.2 per raghu vaca 8/8/2019       Anticoagulation Care Providers     Provider Role Specialty Phone number    Hu Gamez M.D. Referring Cardiac Electrophysiology [phone]    [facility] Anticoagulation Services Responsible  [phone]    Kristopher Escobar, PharmD Responsible          Anticoagulation Patient Findings  Patient Findings     Negatives:  Signs/symptoms of thrombosis, Signs/symptoms of bleeding, Laboratory test error suspected, Change in health, Change in alcohol use, Change in activity, Upcoming invasive procedure, Emergency department visit, Upcoming dental procedure, Missed doses, Extra doses, Change in medications, Change in diet/appetite, Hospital admission, Bruising, Other complaints            spoke with Mary to report a supra therapeutic INR of 3.3. Will reduce tonight's dose to 1.5mg then resume current dosing reigmen. Follow up in 1 weeks, to reduce the risk of adverse events related to this high risk medication, warfarin.    Samantha Amaya, Clinical Pharmacist

## 2020-10-22 ENCOUNTER — ANTICOAGULATION MONITORING (OUTPATIENT)
Dept: VASCULAR LAB | Facility: MEDICAL CENTER | Age: 80
End: 2020-10-22

## 2020-10-22 DIAGNOSIS — Z79.01 LONG TERM CURRENT USE OF ANTICOAGULANT THERAPY: ICD-10-CM

## 2020-10-22 DIAGNOSIS — I48.21 PERMANENT ATRIAL FIBRILLATION (HCC): ICD-10-CM

## 2020-10-22 LAB — INR PPP: 3.7 (ref 2–3.5)

## 2020-10-22 NOTE — PROGRESS NOTES
Anticoagulation Summary  As of 10/22/2020    INR goal:  2.5-3.0   TTR:  61.3 % (5.4 y)   INR used for dosing:  3.70 (10/22/2020)   Warfarin maintenance plan:  3 mg (3 mg x 1) every day   Weekly warfarin total:  21 mg   Plan last modified:  Madeline Frias, Pharmacy Intern (10/22/2020)   Next INR check:  10/29/2020   Priority:  Maintenance   Target end date:  Indefinite    Indications    Permanent atrial fibrillation (HCC) [I48.21]  Long term current use of anticoagulant therapy [Z79.01]             Anticoagulation Episode Summary     INR check location:  Home Draw    Preferred lab:      Send INR reminders to:      Comments:  Waqar  - [phone] -   goal range changed to 2.5-3.2 per raghu vaca 8/8/2019       Anticoagulation Care Providers     Provider Role Specialty Phone number    Hu Gamez M.D. Referring Cardiac Electrophysiology [phone]    [facility] Anticoagulation Services Responsible  [phone]    Kristopher Escobar, PharmD Responsible          Anticoagulation Patient Findings     Spoke with patient today regarding supra therapeutic INR of 3.7.  Patient denies any signs/symptoms of bruising or bleeding or any changes in diet. She recently started lamotrigine on 10/12. Pt is worried lamotrigine is interacting with warfarin. Ran interaction check, no interaction found between medications. Instructed patient to call clinic with any questions or concerns.    Instructed pt to hold x 1 TONIGHT and decrease weekly dose to 3 mg QD    Follow up in 1 weeks to reduce risk of adverse events related to this high risk medication,  Warfarin.    Madeline Frias, Pharmacy Intern

## 2020-10-30 LAB — INR PPP: 3.1 (ref 2–3.5)

## 2020-11-02 ENCOUNTER — ANTICOAGULATION MONITORING (OUTPATIENT)
Dept: VASCULAR LAB | Facility: MEDICAL CENTER | Age: 80
End: 2020-11-02

## 2020-11-02 DIAGNOSIS — I48.21 PERMANENT ATRIAL FIBRILLATION (HCC): ICD-10-CM

## 2020-11-02 DIAGNOSIS — Z79.01 LONG TERM CURRENT USE OF ANTICOAGULANT THERAPY: ICD-10-CM

## 2020-11-03 NOTE — PROGRESS NOTES
Anticoagulation Summary  As of 11/2/2020    INR goal:  2.5-3.0   TTR:  61.0 % (5.4 y)   INR used for dosing:  3.10 (10/30/2020)   Warfarin maintenance plan:  3 mg (3 mg x 1) every day   Weekly warfarin total:  21 mg   Plan last modified:  Madeline Frias, Pharmacy Intern (10/22/2020)   Next INR check:  11/6/2020   Priority:  Maintenance   Target end date:  Indefinite    Indications    Permanent atrial fibrillation (HCC) [I48.21]  Long term current use of anticoagulant therapy [Z79.01]             Anticoagulation Episode Summary     INR check location:  Home Draw    Preferred lab:      Send INR reminders to:      Comments:  Waqar  - [phone] -   goal range changed to 2.5-3.2 per raghu vaca 8/8/2019       Anticoagulation Care Providers     Provider Role Specialty Phone number    Hu Gamez M.D. Referring Cardiac Electrophysiology [phone]    [facility] Anticoagulation Services Responsible  [phone]        Anticoagulation Patient Findings      Spoke with patient to report a therapeutic INR.    Pt instructed to continue with current warfarin dosing regimen, confirms dosing.   Pt denies any s/s of bleeding, bruising, clotting or any changes to diet or medication.    Will follow up in 4 days.     Vandana Crabtree, ClarisseD

## 2020-11-06 ENCOUNTER — ANTICOAGULATION MONITORING (OUTPATIENT)
Dept: VASCULAR LAB | Facility: MEDICAL CENTER | Age: 80
End: 2020-11-06

## 2020-11-06 DIAGNOSIS — Z79.01 LONG TERM CURRENT USE OF ANTICOAGULANT THERAPY: ICD-10-CM

## 2020-11-06 DIAGNOSIS — I48.21 PERMANENT ATRIAL FIBRILLATION (HCC): ICD-10-CM

## 2020-11-06 LAB — INR PPP: 3.7 (ref 2–3.5)

## 2020-11-06 NOTE — PROGRESS NOTES
OP   Telephone Anticoagulation Service Note      Anticoagulation Summary  As of 11/6/2020    INR goal:  2.5-3.0   TTR:  60.8 % (5.4 y)   INR used for dosing:  3.70 (11/6/2020)   Warfarin maintenance plan:  1.5 mg (3 mg x 0.5) every Tue, Fri; 3 mg (3 mg x 1) all other days   Weekly warfarin total:  18 mg   Plan last modified:  Emily Yi (11/6/2020)   Next INR check:  11/13/2020   Priority:  Maintenance   Target end date:  Indefinite    Indications    Permanent atrial fibrillation (HCC) [I48.21]  Long term current use of anticoagulant therapy [Z79.01]             Anticoagulation Episode Summary     INR check location:  Home Draw    Preferred lab:      Send INR reminders to:      Comments:  Waqar [facility] [phone] -   goal range changed to 2.5-3.2 per raghu vaca 8/8/2019       Anticoagulation Care Providers     Provider Role Specialty Phone number    Hu Gamez M.D. Referring Cardiac Electrophysiology [phone]    [facility] Anticoagulation Services Responsible  [phone]        Anticoagulation Patient Findings    Left a message on the patient's voicemail today, informing the patient of a SUPRA-therapeutic INR of 3.7. Instructed the patient to call the clinic with any changes to diet or medications, with any signs/sx of bleeding or clotting or with any questions or concerns.     Patient instructed to begin reduced weekly regimen to 1.5mg on Tues and Fri and 3mg ROW. Patient will retest again in 1 week.     Jason RobbD

## 2020-11-13 ENCOUNTER — ANTICOAGULATION MONITORING (OUTPATIENT)
Dept: VASCULAR LAB | Facility: MEDICAL CENTER | Age: 80
End: 2020-11-13

## 2020-11-13 DIAGNOSIS — Z79.01 LONG TERM CURRENT USE OF ANTICOAGULANT THERAPY: ICD-10-CM

## 2020-11-13 DIAGNOSIS — I48.21 PERMANENT ATRIAL FIBRILLATION (HCC): ICD-10-CM

## 2020-11-13 LAB — INR PPP: 2.5 (ref 2–3.5)

## 2020-11-14 NOTE — PROGRESS NOTES
OP   Telephone Anticoagulation Service Note      Anticoagulation Summary  As of 2020    INR goal:  2.5-3.0   TTR:  60.7 % (5.4 y)   INR used for dosin.50 (2020)   Warfarin maintenance plan:  1.5 mg (3 mg x 0.5) every Tue, Fri; 3 mg (3 mg x 1) all other days   Weekly warfarin total:  18 mg   Plan last modified:  Emily Yi (2020)   Next INR check:  2020   Priority:  Maintenance   Target end date:  Indefinite    Indications    Permanent atrial fibrillation (HCC) [I48.21]  Long term current use of anticoagulant therapy [Z79.01]             Anticoagulation Episode Summary     INR check location:  Home Draw    Preferred lab:      Send INR reminders to:      Comments:  Waqar [facility] [phone] -   goal range changed to 2.5-3.2 per raghu vaca 2019       Anticoagulation Care Providers     Provider Role Specialty Phone number    Hu Gamez M.D. Referring Cardiac Electrophysiology [phone]    [facility] Anticoagulation Services Responsible  [phone]        Anticoagulation Patient Findings     Spoke with the patient on the phone today, reporting a therapeutic INR of 2.5.   Confirmed the current warfarin dosing regimen and patient compliance.  Patient denies any interval changes to diet and/or medications. Patient denies any signs/symptoms of bleeding or clotting.  Patient instructed to continue with the current warfarin dosing regimen, and asked to follow up again in 1 week.     Jason RobbD

## 2020-11-20 ENCOUNTER — ANTICOAGULATION MONITORING (OUTPATIENT)
Dept: MEDICAL GROUP | Facility: PHYSICIAN GROUP | Age: 80
End: 2020-11-20

## 2020-11-20 DIAGNOSIS — Z79.01 LONG TERM CURRENT USE OF ANTICOAGULANT THERAPY: ICD-10-CM

## 2020-11-20 DIAGNOSIS — I48.21 PERMANENT ATRIAL FIBRILLATION (HCC): ICD-10-CM

## 2020-11-20 LAB — INR PPP: 2.8 (ref 2–3.5)

## 2020-11-21 NOTE — PROGRESS NOTES
Anticoagulation Summary  As of 2020    INR goal:  2.5-3.0   TTR:  60.9 % (5.5 y)   INR used for dosin.80 (2020)   Warfarin maintenance plan:  1.5 mg (3 mg x 0.5) every Tue, Fri; 3 mg (3 mg x 1) all other days   Weekly warfarin total:  18 mg   Plan last modified:  Emily Yi (2020)   Next INR check:  2020   Priority:  Maintenance   Target end date:  Indefinite    Indications    Permanent atrial fibrillation (HCC) [I48.21]  Long term current use of anticoagulant therapy [Z79.01]             Anticoagulation Episode Summary     INR check location:  Home Draw    Preferred lab:      Send INR reminders to:      Comments:  Waqar [facility] [phone] -   goal range changed to 2.5-3.2 per raghu vaca 2019       Anticoagulation Care Providers     Provider Role Specialty Phone number    Hu Gamez M.D. Referring Cardiac Electrophysiology [phone]    [facility] Anticoagulation Services Responsible  [phone]        Anticoagulation Patient Findings  Patient Findings     Negatives:  Signs/symptoms of thrombosis, Signs/symptoms of bleeding, Laboratory test error suspected, Change in health, Change in alcohol use, Change in activity, Upcoming invasive procedure, Emergency department visit, Upcoming dental procedure, Missed doses, Extra doses, Change in medications, Change in diet/appetite, Hospital admission, Bruising, Other complaints        Spoke with patient today regarding therapeutic INR of 2.8.  Patient denies any signs/symptoms of bruising or bleeding or any changes in diet and medications.  Instructed patient to call clinic with any questions or concerns.  Pt is to continue with current warfarin dosing regimen.  Follow up in 2 weeks, to reduce risk of adverse events related to this high risk medication,  Warfarin.    Kristopher Escobar, PharmD, BCACP

## 2020-11-27 LAB — INR PPP: 2.4 (ref 2–3.5)

## 2020-11-30 ENCOUNTER — ANTICOAGULATION MONITORING (OUTPATIENT)
Dept: VASCULAR LAB | Facility: MEDICAL CENTER | Age: 80
End: 2020-11-30

## 2020-11-30 DIAGNOSIS — Z79.01 LONG TERM CURRENT USE OF ANTICOAGULANT THERAPY: ICD-10-CM

## 2020-11-30 DIAGNOSIS — I48.21 PERMANENT ATRIAL FIBRILLATION (HCC): ICD-10-CM

## 2020-11-30 NOTE — PROGRESS NOTES
Anticoagulation Summary  As of 2020    INR goal:  2.5-3.0   TTR:  60.9 % (5.5 y)   INR used for dosin.40 (2020)   Warfarin maintenance plan:  1.5 mg (3 mg x 0.5) every Tue, Fri; 3 mg (3 mg x 1) all other days   Weekly warfarin total:  18 mg   Plan last modified:  mEily Yi (2020)   Next INR check:  2020   Priority:  Maintenance   Target end date:  Indefinite    Indications    Permanent atrial fibrillation (HCC) [I48.21]  Long term current use of anticoagulant therapy [Z79.01]             Anticoagulation Episode Summary     INR check location:  Home Draw    Preferred lab:      Send INR reminders to:      Comments:  Waqar [facility] [phone] -   goal range changed to 2.5-3.2 per raghu vaca 2019       Anticoagulation Care Providers     Provider Role Specialty Phone number    Hu Gamez M.D. Referring Cardiac Electrophysiology [phone]    [facility] Anticoagulation Services Responsible  [phone]        Anticoagulation Patient Findings      Spoke with patient to report a therapeutic INR. Pt confirms dosing, and denies bleeding or any changes to medication or diet. Denies missed dose and can't recall anything that could have affected INR beside green beans she had.    Will bolus 4.5 mg x 1 today then continue current regimen.      Will follow up in 4 days.     Krystina Winston, Pharmacy Intern

## 2020-12-03 ENCOUNTER — NON-PROVIDER VISIT (OUTPATIENT)
Dept: CARDIOLOGY | Facility: PHYSICIAN GROUP | Age: 80
End: 2020-12-03
Payer: MEDICARE

## 2020-12-03 VITALS
BODY MASS INDEX: 22.73 KG/M2 | OXYGEN SATURATION: 97 % | SYSTOLIC BLOOD PRESSURE: 152 MMHG | HEART RATE: 80 BPM | DIASTOLIC BLOOD PRESSURE: 58 MMHG | HEIGHT: 68 IN | WEIGHT: 150 LBS

## 2020-12-03 DIAGNOSIS — Z98.890 S/P AV NODAL ABLATION: ICD-10-CM

## 2020-12-03 DIAGNOSIS — Z95.0 CARDIAC PACEMAKER IN SITU: Chronic | ICD-10-CM

## 2020-12-03 DIAGNOSIS — I10 ESSENTIAL HYPERTENSION: ICD-10-CM

## 2020-12-03 DIAGNOSIS — I44.2 THIRD DEGREE AV BLOCK (HCC): Chronic | ICD-10-CM

## 2020-12-03 DIAGNOSIS — I48.21 PERMANENT ATRIAL FIBRILLATION (HCC): Chronic | ICD-10-CM

## 2020-12-03 DIAGNOSIS — Z79.01 LONG TERM CURRENT USE OF ANTICOAGULANT THERAPY: Chronic | ICD-10-CM

## 2020-12-03 PROCEDURE — 93279 PRGRMG DEV EVAL PM/LDLS PM: CPT | Performed by: NURSE PRACTITIONER

## 2020-12-03 RX ORDER — LOSARTAN POTASSIUM 100 MG/1
100 TABLET ORAL DAILY
Qty: 90 TAB | Refills: 3
Start: 2020-12-03 | End: 2022-03-23

## 2020-12-03 RX ORDER — AMLODIPINE BESYLATE 5 MG/1
5 TABLET ORAL DAILY
COMMUNITY
End: 2021-03-12

## 2020-12-03 ASSESSMENT — FIBROSIS 4 INDEX: FIB4 SCORE: 2.46

## 2020-12-03 NOTE — PROGRESS NOTES
Device is working normally. Presenting rhythm is atrial fibrillation (known), with high AV block; she is anticoagulated with Coumadin. No ventricular high rate episodes.  Normal capture of RV lead; unable to measure intrinsic R waves; stable impedance. Battery longevity is 4.5 years.  No changes are made today.    She has been having some episodes of elevated BP; her PCP increased Losartan from 25mg to 100mg once daily; she is now also only Amlodipine 5mg once daily (for only for the last 3 days). To keep track of BP, and keep FU with PCP.    FU in 4 months for next PM check with me.

## 2020-12-12 LAB — INR PPP: 2.3 (ref 2–3.5)

## 2020-12-14 ENCOUNTER — ANTICOAGULATION MONITORING (OUTPATIENT)
Dept: MEDICAL GROUP | Facility: PHYSICIAN GROUP | Age: 80
End: 2020-12-14

## 2020-12-14 DIAGNOSIS — Z79.01 LONG TERM CURRENT USE OF ANTICOAGULANT THERAPY: ICD-10-CM

## 2020-12-14 DIAGNOSIS — I48.21 PERMANENT ATRIAL FIBRILLATION (HCC): ICD-10-CM

## 2020-12-15 NOTE — PROGRESS NOTES
OP   Telephone Anticoagulation Service Note      Anticoagulation Summary  As of 2020    INR goal:  2.5-3.0   TTR:  60.5 % (5.5 y)   INR used for dosin.30 (2020)   Warfarin maintenance plan:  1.5 mg (3 mg x 0.5) every e, Fri; 3 mg (3 mg x 1) all other days   Weekly warfarin total:  18 mg   Plan last modified:  Emily Yi (2020)   Next INR check:  2020   Priority:  Maintenance   Target end date:  Indefinite    Indications    Permanent atrial fibrillation (HCC) [I48.21]  Long term current use of anticoagulant therapy [Z79.01]             Anticoagulation Episode Summary     INR check location:  Home Draw    Preferred lab:      Send INR reminders to:      Comments:  Waqar [facility] [phone] -   goal range changed to 2.5-3.2 per raghu vaca 2019       Anticoagulation Care Providers     Provider Role Specialty Phone number    Hu Gamez M.D. Referring Cardiac Electrophysiology [phone]    [facility] Anticoagulation Services Responsible  [phone]        Anticoagulation Patient Findings     Spoke with the patient on the phone today, reporting a SUB-therapeutic INR of 2.3.   Confirmed the current warfarin dosing regimen and patient compliance.   Patient denies any interval changes to diet and/or medications. Patient denies any signs/symptoms of bleeding or clotting.  Patient instructed to begin increased weekly regimen of 1.5mg on  and 3mg ROW. Patient will retest again in 1 week.     Jason RobbD

## 2020-12-18 ENCOUNTER — ANTICOAGULATION MONITORING (OUTPATIENT)
Dept: VASCULAR LAB | Facility: MEDICAL CENTER | Age: 80
End: 2020-12-18

## 2020-12-18 DIAGNOSIS — I48.21 PERMANENT ATRIAL FIBRILLATION (HCC): ICD-10-CM

## 2020-12-18 DIAGNOSIS — Z79.01 LONG TERM CURRENT USE OF ANTICOAGULANT THERAPY: ICD-10-CM

## 2020-12-18 LAB — INR PPP: 3.2 (ref 2–3.5)

## 2020-12-18 NOTE — PROGRESS NOTES
OP   Telephone Anticoagulation Service Note      Anticoagulation Summary  As of 12/18/2020    INR goal:  2.5-3.0   TTR:  60.5 % (5.5 y)   INR used for dosing:  3.20 (12/18/2020)   Warfarin maintenance plan:  1.5 mg (3 mg x 0.5) every Tue, Fri; 3 mg (3 mg x 1) all other days   Weekly warfarin total:  18 mg   Plan last modified:  Emily Yi (11/6/2020)   Next INR check:  12/23/2020   Priority:  Maintenance   Target end date:  Indefinite    Indications    Permanent atrial fibrillation (HCC) [I48.21]  Long term current use of anticoagulant therapy [Z79.01]             Anticoagulation Episode Summary     INR check location:  Home Draw    Preferred lab:      Send INR reminders to:      Comments:  Waqar [facility] [phone] -   goal range changed to 2.5-3.2 per raghu vaca 8/8/2019       Anticoagulation Care Providers     Provider Role Specialty Phone number    Hu Gamez M.D. Referring Cardiac Electrophysiology [phone]    [facility] Anticoagulation Services Responsible  [phone]        Anticoagulation Patient Findings  Patient Findings     Negatives:  Signs/symptoms of thrombosis, Signs/symptoms of bleeding, Laboratory test error suspected, Change in health, Change in alcohol use, Change in activity, Upcoming invasive procedure, Emergency department visit, Upcoming dental procedure, Missed doses, Extra doses, Change in medications, Change in diet/appetite, Hospital admission, Bruising, Other complaints         Spoke with the patient on the phone today, reporting a SUPRA-therapeutic INR of 3.2.   Confirmed the current warfarin dosing regimen and patient compliance.  Patient denies any interval changes to diet and/or medications. Patient denies any signs/symptoms of bleeding or clotting.  Patient will HOLD dose TONIGHT, as a one time adjustment, then resume her current regimen. Patient will retest again in 5 days.     Jason RobbD

## 2020-12-31 ENCOUNTER — ANTICOAGULATION MONITORING (OUTPATIENT)
Dept: VASCULAR LAB | Facility: MEDICAL CENTER | Age: 80
End: 2020-12-31

## 2020-12-31 DIAGNOSIS — I48.21 PERMANENT ATRIAL FIBRILLATION (HCC): ICD-10-CM

## 2020-12-31 DIAGNOSIS — Z79.01 LONG TERM CURRENT USE OF ANTICOAGULANT THERAPY: ICD-10-CM

## 2020-12-31 LAB — INR PPP: 2.3 (ref 2–3.5)

## 2020-12-31 NOTE — PROGRESS NOTES
OP   Telephone Anticoagulation Service Note      Anticoagulation Summary  As of 2020    INR goal:  2.5-3.0   TTR:  60.4 % (5.6 y)   INR used for dosin.30 (2020)   Warfarin maintenance plan:  1.5 mg (3 mg x 0.5) every Tue, Fri; 3 mg (3 mg x 1) all other days   Weekly warfarin total:  18 mg   Plan last modified:  Emily Yi (2020)   Next INR check:  2021   Priority:  Maintenance   Target end date:  Indefinite    Indications    Permanent atrial fibrillation (HCC) [I48.21]  Long term current use of anticoagulant therapy [Z79.01]             Anticoagulation Episode Summary     INR check location:  Home Draw    Preferred lab:      Send INR reminders to:      Comments:  Waqar [facility] [phone] -   goal range changed to 2.5-3.2 per raghu vaca 2019       Anticoagulation Care Providers     Provider Role Specialty Phone number    Hu Gamez M.D. Referring Cardiac Electrophysiology [phone]    [facility] Anticoagulation Services Responsible  [phone]        Anticoagulation Patient Findings  Patient Findings     Negatives:  Signs/symptoms of thrombosis, Signs/symptoms of bleeding, Laboratory test error suspected, Change in health, Change in alcohol use, Change in activity, Upcoming invasive procedure, Emergency department visit, Upcoming dental procedure, Missed doses, Extra doses, Change in medications, Change in diet/appetite, Hospital admission, Bruising, Other complaints         Spoke with the patient on the phone today, reporting a SUB-therapeutic INR of 2.3.   Confirmed the current warfarin dosing regimen and patient compliance.  Patient denies any missed doses.   Patient denies any interval changes to diet and/or medications. Patient denies any signs/symptoms of bleeding or clotting.  Patient will take 3mg TOMORROW instead of her usual 1.5mg and will then resume her current dosing regimen. Patient asked to retest again in 2 weeks.     Jason RobbD

## 2021-01-14 ENCOUNTER — HOSPITAL ENCOUNTER (OUTPATIENT)
Dept: RADIOLOGY | Facility: MEDICAL CENTER | Age: 81
End: 2021-01-14
Attending: FAMILY MEDICINE
Payer: MEDICARE

## 2021-01-14 DIAGNOSIS — Z12.31 VISIT FOR SCREENING MAMMOGRAM: ICD-10-CM

## 2021-01-14 PROCEDURE — 77063 BREAST TOMOSYNTHESIS BI: CPT

## 2021-01-15 ENCOUNTER — ANTICOAGULATION MONITORING (OUTPATIENT)
Dept: VASCULAR LAB | Facility: MEDICAL CENTER | Age: 81
End: 2021-01-15

## 2021-01-15 DIAGNOSIS — I48.21 PERMANENT ATRIAL FIBRILLATION (HCC): ICD-10-CM

## 2021-01-15 DIAGNOSIS — Z79.01 LONG TERM CURRENT USE OF ANTICOAGULANT THERAPY: ICD-10-CM

## 2021-01-15 LAB — INR PPP: 1.9 (ref 2–3.5)

## 2021-01-15 NOTE — PROGRESS NOTES
OP   Telephone Anticoagulation Service Note      Anticoagulation Summary  As of 1/15/2021    INR goal:  2.5-3.0   TTR:  60.0 % (5.6 y)   INR used for dosin.90 (1/15/2021)   Warfarin maintenance plan:  1.5 mg (3 mg x 0.5) every Tue, Fri; 3 mg (3 mg x 1) all other days   Weekly warfarin total:  18 mg   Plan last modified:  Emily Yi (2020)   Next INR check:  2021   Priority:  Maintenance   Target end date:  Indefinite    Indications    Permanent atrial fibrillation (HCC) [I48.21]  Long term current use of anticoagulant therapy [Z79.01]             Anticoagulation Episode Summary     INR check location:  Home Draw    Preferred lab:      Send INR reminders to:      Comments:  Waqar [facility] [phone] -   goal range changed to 2.5-3.2 per raghu vaca 2019       Anticoagulation Care Providers     Provider Role Specialty Phone number    Hu Gamez M.D. Referring Cardiac Electrophysiology [phone]    [facility] Anticoagulation Services Responsible  [phone]        Anticoagulation Patient Findings  Patient Findings     Positives:  Missed doses        Spoke with the patient on the phone today, reporting a SUB-therapeutic INR of 1.9.  Confirmed the current warfarin dosing regimen and patient compliance.  Patient reports she missed her dose few days ago.   Patient denies any interval changes to diet and/or medications. Patient denies any signs/symptoms of bleeding or clotting.  Patient will bolus with 4.5mg TONIGHT ONLY, as a one time boost dose, then to resume her current dosing regimen.   Patient asked to retest again in 1 week.     Jason Yi  PharmD

## 2021-01-22 ENCOUNTER — ANTICOAGULATION MONITORING (OUTPATIENT)
Dept: VASCULAR LAB | Facility: MEDICAL CENTER | Age: 81
End: 2021-01-22

## 2021-01-22 DIAGNOSIS — Z79.01 LONG TERM CURRENT USE OF ANTICOAGULANT THERAPY: ICD-10-CM

## 2021-01-22 DIAGNOSIS — I48.21 PERMANENT ATRIAL FIBRILLATION (HCC): ICD-10-CM

## 2021-01-22 LAB — INR PPP: 2.1 (ref 2–3.5)

## 2021-01-22 NOTE — PROGRESS NOTES
OP Anticoagulation Service Note    Date: 2021    Anticoagulation Summary  As of 2021    INR goal:  2.5-3.0   TTR:  59.8 % (5.6 y)   INR used for dosin.10 (2021)   Warfarin maintenance plan:  1.5 mg (3 mg x 0.5) every Fri; 3 mg (3 mg x 1) all other days   Weekly warfarin total:  19.5 mg   Plan last modified:  Swapnil Bean, Pharmacy Intern (2021)   Next INR check:  2021   Priority:  Maintenance   Target end date:  Indefinite    Indications    Permanent atrial fibrillation (HCC) [I48.21]  Long term current use of anticoagulant therapy [Z79.01]             Anticoagulation Episode Summary     INR check location:  Home Draw    Preferred lab:      Send INR reminders to:      Comments:  Waqar  - [phone] -   goal range changed to 2.5-3.2 per raghu black 2019       Anticoagulation Care Providers     Provider Role Specialty Phone number    Hu Gamez M.D. Referring Cardiac Electrophysiology [phone]    [facility] Anticoagulation Services Responsible  [phone]        Anticoagulation Patient Findings  Patient Findings     Positives:  Change in diet/appetite (had more salad than usual)    Negatives:  Signs/symptoms of thrombosis, Signs/symptoms of bleeding, Laboratory test error suspected, Change in health, Change in alcohol use, Change in activity, Upcoming invasive procedure, Emergency department visit, Upcoming dental procedure, Missed doses, Extra doses, Change in medications, Hospital admission, Bruising, Other complaints          HPI:     This appointment was conducted over the phone today.    The reason for today's call is to prevent morbidity and mortality from a blood clot and/or stroke and to reduce the risk of bleeding while on a anticoagulant.     PCP:  Jagruti Heath P.A.-C.  [address] [facility] [address]      Assessment:     • INR  sub-therapeutic.   • Confirmed warfarin dosing regimen: Yes  • Interval Changes with foods rich in vitamin K: Yes, had more  salad than usual  • Interval Changes in ETOH or cranberries:   No  • Interval Changes in smoking status:  No  • S/S of bleeding or bruising:  No  • Signs/symptoms  thrombosis since the last appt:  No  • Any upcoming procedures that require stopping warfarin and/or using bridge therapy: None  • Interval Changes in medication:  No       Current Outpatient Medications:   •  amLODIPine, 5 mg, Oral, DAILY  •  losartan, 100 mg, Oral, DAILY  •  warfarin, Take one to one and one-half tablets by mouth one time daily or as directed by coumadin clinic  •  Magnesium, 2 Tab, Oral, QHS  •  Melatonin, 1 Cap, Oral, QHS  •  metoprolol SR, 25 mg, Oral, Q EVENING  •  cyclosporin, 1 Drop, Both Eyes, BID  •  cyanocobalamin, 1,000 mcg by Intramuscular route. Once a week  •  Carboxymethylcellulose Sodium (REFRESH CELLUVISC OP), 1 Drop, Ophthalmic, QDAY PRN  •  levothyroxine, Take 88 mcg by mouth. Every other day (Tuesday, Thursday, Saturday)  Indications: Underactive Thyroid  •  estradiol, Insert  in vagina. 3 times week  •  levothyroxine, Take 100 mcg by mouth. Every other day (Monday, Wednesday, Friday)  Indications: Underactive Thyroid  •  liothyronine, 10 mcg, Oral, QHS  •  CALCIUM 600-D PO, 600 mg, Oral, BID  •  POTASSIUM ACETATE, 440 mg, Oral, Q EVENING      Plan:     • Instructed patient to increase weekly dosing regimen to 3mg every day until recheck on Friday.    Follow-up:     • Our protocol suggests we test in 1 week.       • This decision was made using shared decision making with the pt and benefits vs risks were discussed.      Additional information discussed with patient:     • Pt educated to contact our clinic with any changes in medications or s/s of bleeding or thrombosis.  • Education was provided today regarding tips to reduce their bleed risk and dietary constraints while on a anticoagulant.    National recommendations regarding anticoagulation therapy:     The CHEST guidelines recommends frequent INR monitoring at  regular intervals (a few days up to a max of 12 weeks) to ensure patients are on the proper dose of warfarin, and patients are not having any complications from therapy.  INRs can dramatically change over a short time period due to diet, medications, and medical conditions.     Swapnil Bean, Pharmacy Intern  [facility]  Phone [phone] fax [phone]    This note was created using voice recognition software (Dragon). The accuracy of the dictation is limited by the abilities of the software. I have reviewed the note prior to signing, however some errors in grammar and context are still possible. If you have any questions related to this note please do not hesitate to contact our office.

## 2021-01-29 ENCOUNTER — ANTICOAGULATION MONITORING (OUTPATIENT)
Dept: VASCULAR LAB | Facility: MEDICAL CENTER | Age: 81
End: 2021-01-29

## 2021-01-29 DIAGNOSIS — I48.21 PERMANENT ATRIAL FIBRILLATION (HCC): ICD-10-CM

## 2021-01-29 DIAGNOSIS — Z79.01 LONG TERM CURRENT USE OF ANTICOAGULANT THERAPY: ICD-10-CM

## 2021-01-29 LAB — INR PPP: 2.6 (ref 2–3.5)

## 2021-01-30 NOTE — PROGRESS NOTES
Anticoagulation Summary  As of 2021    INR goal:  2.5-3.0   TTR:  59.6 % (5.6 y)   INR used for dosin.60 (2021)   Warfarin maintenance plan:  1.5 mg (3 mg x 0.5) every Wed; 3 mg (3 mg x 1) all other days   Weekly warfarin total:  19.5 mg   Plan last modified:  Kristopher Escobar PharmD (2021)   Next INR check:  2021   Priority:  Maintenance   Target end date:  Indefinite    Indications    Permanent atrial fibrillation (HCC) [I48.21]  Long term current use of anticoagulant therapy [Z79.01]             Anticoagulation Episode Summary     INR check location:  Home Draw    Preferred lab:      Send INR reminders to:      Comments:  Waqar [facility] [phone] -   goal range changed to 2.5-3.2 per raghu vaca 2019       Anticoagulation Care Providers     Provider Role Specialty Phone number    Hu Gamez M.D. Referring Cardiac Electrophysiology [phone]    [facility] Anticoagulation Services Responsible  [phone]        Anticoagulation Patient Findings    Spoke with patient today regarding therapeutic INR of 2.6.  Patient denies any signs/symptoms of bruising or bleeding or any changes in diet and medications.  Instructed patient to call clinic with any questions or concerns.  Instructed patient to increase weekly warfarin regimen as detailed above.  Follow up in 1 weeks, to reduce risk of adverse events related to this high risk medication,  Warfarin.    Kristopher Escobar, ClarisseD, BCACP

## 2021-02-05 ENCOUNTER — ANTICOAGULATION MONITORING (OUTPATIENT)
Dept: MEDICAL GROUP | Facility: PHYSICIAN GROUP | Age: 81
End: 2021-02-05

## 2021-02-05 DIAGNOSIS — Z79.01 LONG TERM CURRENT USE OF ANTICOAGULANT THERAPY: ICD-10-CM

## 2021-02-05 DIAGNOSIS — I48.21 PERMANENT ATRIAL FIBRILLATION (HCC): ICD-10-CM

## 2021-02-05 LAB — INR PPP: 2.2 (ref 2–3.5)

## 2021-02-06 NOTE — PROGRESS NOTES
OP Anticoagulation Service Note    Date: 2021    Anticoagulation Summary  As of 2021    INR goal:  2.5-3.0   TTR:  59.5 % (5.7 y)   INR used for dosin.20 (2021)   Warfarin maintenance plan:  3 mg (3 mg x 1) every day   Weekly warfarin total:  21 mg   Plan last modified:  Swapnil Bean, Pharmacy Intern (2021)   Next INR check:  2021   Priority:  Maintenance   Target end date:  Indefinite    Indications    Permanent atrial fibrillation (HCC) [I48.21]  Long term current use of anticoagulant therapy [Z79.01]             Anticoagulation Episode Summary     INR check location:  Home Draw    Preferred lab:      Send INR reminders to:      Comments:  Waqar  - [phone] -   goal range changed to 2.5-3.2 per raghu vaca 2019       Anticoagulation Care Providers     Provider Role Specialty Phone number    Hu Gamez M.D. Referring Cardiac Electrophysiology [phone]    [facility] Anticoagulation Services Responsible  [phone]        Anticoagulation Patient Findings      This appointment was conducted over the phone.     HPI:   The reason for today's call is to prevent morbidity and mortality from a blood clot and/or stroke and to reduce the risk of bleeding while on a anticoagulant.     PCP:  Jagruti Heath P.A.-C.  [address] [facility] [place] [address]    Assessment:     • INR  sub-therapeutic.       Current Outpatient Medications:   •  amLODIPine, 5 mg, Oral, DAILY  •  losartan, 100 mg, Oral, DAILY  •  warfarin, Take one to one and one-half tablets by mouth one time daily or as directed by coumadin clinic  •  Magnesium, 2 Tab, Oral, QHS  •  Melatonin, 1 Cap, Oral, QHS  •  metoprolol SR, 25 mg, Oral, Q EVENING  •  cyclosporin, 1 Drop, Both Eyes, BID  •  cyanocobalamin, 1,000 mcg by Intramuscular route. Once a week  •  Carboxymethylcellulose Sodium (REFRESH CELLUVISC OP), 1 Drop, Ophthalmic, QDAY PRN  •  levothyroxine, Take 88 mcg by mouth. Every other day (Tuesday, Thursday,  Saturday)  Indications: Underactive Thyroid  •  estradiol, Insert  in vagina. 3 times week  •  levothyroxine, Take 100 mcg by mouth. Every other day (Monday, Wednesday, Friday)  Indications: Underactive Thyroid  •  liothyronine, 10 mcg, Oral, QHS  •  CALCIUM 600-D PO, 600 mg, Oral, BID  •  POTASSIUM ACETATE, 440 mg, Oral, Q EVENING      Plan:     • LV instructing patient to bolus with 4.5mg tonight and increase weekly dose to 3mg every day.  • Plan was discussed with Elliot mcgowan PharmD      Follow-up:     • Our protocol suggests we test in 1 week.        Additional information discussed with patient:     • Asked patient to please call the anticoagulation clinic if they have any signs/symptoms of bleeding and/or thrombosis or any changes to diet or medications.      National recommendations regarding anticoagulation therapy:     The CHEST guidelines recommends frequent INR monitoring at regular intervals (a few days up to a max of 12 weeks) to ensure patients are on the proper dose of warfarin, and patients are not having any complications from therapy.  INRs can dramatically change over a short time period due to diet, medications, and medical conditions.     Swapnil Bean, Pharmacy Intern  [facility]  Phone [phone] fax [phone]    This note was created using voice recognition software (Dragon). The accuracy of the dictation is limited by the abilities of the software. I have reviewed the note prior to signing, however some errors in grammar and context are still possible. If you have any questions related to this note please do not hesitate to contact our office.

## 2021-02-12 ENCOUNTER — ANTICOAGULATION MONITORING (OUTPATIENT)
Dept: VASCULAR LAB | Facility: MEDICAL CENTER | Age: 81
End: 2021-02-12

## 2021-02-12 DIAGNOSIS — Z79.01 LONG TERM CURRENT USE OF ANTICOAGULANT THERAPY: ICD-10-CM

## 2021-02-12 DIAGNOSIS — I48.21 PERMANENT ATRIAL FIBRILLATION (HCC): ICD-10-CM

## 2021-02-12 LAB — INR PPP: 2.1 (ref 2–3.5)

## 2021-02-13 NOTE — PROGRESS NOTES
OP Anticoagulation Telephone Note    Date: 2021       Plan:  Spoke with pt on the phone. Pt is subtherapeutic today.  She thinks that instead of taking 1.5 tablets last wk on Friday, as instructed, she only took 1/2 tablet.  Denies any changes in medications or diet. Denies any s/sx of bleeding or clotting. Will continue warfarin dosing as outlined below and follow-up with pt in 1wk.      FRI: 4.5mg    Anticoagulation Summary  As of 2021    INR goal:  2.5-3.0   TTR:  59.3 % (5.7 y)   INR used for dosin.10 (2021)   Warfarin maintenance plan:  4.5 mg (3 mg x 1.5) every Fri; 3 mg (3 mg x 1) all other days   Weekly warfarin total:  22.5 mg   Plan last modified:  Rosangela Mcnamara AAnithaPAnithaNAnitha (2021)   Next INR check:  2021   Priority:  Maintenance   Target end date:  Indefinite    Indications    Permanent atrial fibrillation (HCC) [I48.21]  Long term current use of anticoagulant therapy [Z79.01]             Anticoagulation Episode Summary     INR check location:  Home Draw    Preferred lab:      Send INR reminders to:      Comments:  Waqar [facility] [phone] -   goal range changed to 2.5-3.2 per raghu vaca 2019       Anticoagulation Care Providers     Provider Role Specialty Phone number    Hu Gamez M.D. Referring Cardiac Electrophysiology [phone]    [facility] Anticoagulation Services Responsible  [phone]              CHARO Delgado  [place] [facility]

## 2021-02-17 ENCOUNTER — ANTICOAGULATION MONITORING (OUTPATIENT)
Dept: VASCULAR LAB | Facility: MEDICAL CENTER | Age: 81
End: 2021-02-17

## 2021-02-17 DIAGNOSIS — Z79.01 LONG TERM CURRENT USE OF ANTICOAGULANT THERAPY: ICD-10-CM

## 2021-02-17 DIAGNOSIS — I48.21 PERMANENT ATRIAL FIBRILLATION (HCC): ICD-10-CM

## 2021-02-17 LAB — INR PPP: 2.2 (ref 2–3.5)

## 2021-02-19 ENCOUNTER — ANTICOAGULATION MONITORING (OUTPATIENT)
Dept: VASCULAR LAB | Facility: MEDICAL CENTER | Age: 81
End: 2021-02-19

## 2021-02-19 DIAGNOSIS — Z79.01 LONG TERM CURRENT USE OF ANTICOAGULANT THERAPY: ICD-10-CM

## 2021-02-19 DIAGNOSIS — I48.21 PERMANENT ATRIAL FIBRILLATION (HCC): ICD-10-CM

## 2021-02-19 LAB — INR PPP: 3.2 (ref 2–3.5)

## 2021-02-19 NOTE — PROGRESS NOTES
OP   Telephone Anticoagulation Service Note      Anticoagulation Summary  As of 2/19/2021    INR goal:  2.5-3.0   TTR:  59.3 % (5.7 y)   INR used for dosing:  3.20 (2/19/2021)   Warfarin maintenance plan:  4.5 mg (3 mg x 1.5) every Fri; 3 mg (3 mg x 1) all other days   Weekly warfarin total:  22.5 mg   Plan last modified:  BERE ChoPAnithaNAnitha (2/12/2021)   Next INR check:  2/26/2021   Priority:  Maintenance   Target end date:  Indefinite    Indications    Permanent atrial fibrillation (HCC) [I48.21]  Long term current use of anticoagulant therapy [Z79.01]             Anticoagulation Episode Summary     INR check location:  Home Draw    Preferred lab:      Send INR reminders to:      Comments:  Waqar  - [phone] -   goal range changed to 2.5-3.2 per raghu vaca 8/8/2019       Anticoagulation Care Providers     Provider Role Specialty Phone number    Hu Gamez M.D. Referring Cardiac Electrophysiology [phone]    [facility] Anticoagulation Services Responsible  [phone]        Anticoagulation Patient Findings     Spoke with the patient on the phone today, reporting a SUPRA-therapeutic INR of 3.2.  Confirmed the current warfarin dosing regimen and patient compliance.  Patient denies any interval changes to diet and/or medications. Patient denies any signs/symptoms of bleeding or clotting.  Patient instructed to reduce dose to 3mg TONIGHT, as a one time adjustment, then to resume her current dosing regimen.   Patient asked to retest again in 1 week.     Jason RobbD

## 2021-02-26 ENCOUNTER — ANTICOAGULATION MONITORING (OUTPATIENT)
Dept: VASCULAR LAB | Facility: MEDICAL CENTER | Age: 81
End: 2021-02-26

## 2021-02-26 DIAGNOSIS — I48.21 PERMANENT ATRIAL FIBRILLATION (HCC): ICD-10-CM

## 2021-02-26 DIAGNOSIS — Z79.01 LONG TERM CURRENT USE OF ANTICOAGULANT THERAPY: ICD-10-CM

## 2021-02-26 LAB — INR PPP: 2.2 (ref 2–3.5)

## 2021-02-27 NOTE — PROGRESS NOTES
Anticoagulation Summary  As of 2021    INR goal:  2.5-3.0   TTR:  59.2 % (5.7 y)   INR used for dosin.20 (2021)   Warfarin maintenance plan:  4.5 mg (3 mg x 1.5) every Fri; 3 mg (3 mg x 1) all other days   Weekly warfarin total:  22.5 mg   Plan last modified:  Rosangela Mcnamara AAnithaPAnithaNAnitha (2021)   Next INR check:  3/5/2021   Priority:  Maintenance   Target end date:  Indefinite    Indications    Permanent atrial fibrillation (HCC) [I48.21]  Long term current use of anticoagulant therapy [Z79.01]             Anticoagulation Episode Summary     INR check location:  Home Draw    Preferred lab:      Send INR reminders to:      Comments:  Waqar [facility] [phone] -   goal range changed to 2.5-3.2 per raghu vaca 2019       Anticoagulation Care Providers     Provider Role Specialty Phone number    Hu Gamez M.D. Referring Cardiac Electrophysiology [phone]    [facility] Anticoagulation Services Responsible  [phone]        Anticoagulation Patient Findings          HPI:  Madeline Dorantes, on anticoagulation therapy with warfarin for PAf.   Changes to current medical/health status since last appt: none  Denies signs/symptoms of bleeding and/or thrombosis since the last appt.    Denies any interval changes to diet  Denies any interval changes to medications since last appt.   Denies any complications or cost restrictions with current therapy.     A/P   INR  SUB-therapeutic.   Begin increased regimen.     Next INR in 1 week(s).    Fazal Pineda, PharmD

## 2021-03-05 ENCOUNTER — ANTICOAGULATION MONITORING (OUTPATIENT)
Dept: VASCULAR LAB | Facility: MEDICAL CENTER | Age: 81
End: 2021-03-05

## 2021-03-05 DIAGNOSIS — Z79.01 LONG TERM CURRENT USE OF ANTICOAGULANT THERAPY: ICD-10-CM

## 2021-03-05 DIAGNOSIS — I48.21 PERMANENT ATRIAL FIBRILLATION (HCC): ICD-10-CM

## 2021-03-05 LAB — INR PPP: 3 (ref 2–3.5)

## 2021-03-05 NOTE — PROGRESS NOTES
Anticoagulation Summary  As of 3/5/2021    INR goal:  2.5-3.0   TTR:  59.3 % (5.7 y)   INR used for dosing:  3.00 (3/5/2021)   Warfarin maintenance plan:  4.5 mg (3 mg x 1.5) every Mon, Wed; 3 mg (3 mg x 1) all other days   Weekly warfarin total:  24 mg   Plan last modified:  Santy Prescott Pharmacy Intern (3/5/2021)   Next INR check:  3/12/2021   Priority:  Maintenance   Target end date:  Indefinite    Indications    Permanent atrial fibrillation (HCC) [I48.21]  Long term current use of anticoagulant therapy [Z79.01]             Anticoagulation Episode Summary     INR check location:  Home Draw    Preferred lab:      Send INR reminders to:      Comments:  Waqar [facility] [phone] -   goal range changed to 2.5-3.2 per raghu vaca 8/8/2019       Anticoagulation Care Providers     Provider Role Specialty Phone number    Hu Gamez M.D. Referring Cardiac Electrophysiology [phone]    [facility] Anticoagulation Services Responsible  [phone]        Anticoagulation Patient Findings  Patient Findings     Negatives:  Signs/symptoms of thrombosis, Signs/symptoms of bleeding, Laboratory test error suspected, Change in health, Change in alcohol use, Change in activity, Upcoming invasive procedure, Emergency department visit, Upcoming dental procedure, Missed doses, Extra doses, Change in medications, Change in diet/appetite, Hospital admission, Bruising, Other complaints        Spoke with patient today regarding therapeutic INR of 3.00.    Pt confirmed current dosing regimen.  Patient denies any signs/symptoms of bruising or bleeding or any changes in diet and medications.    Instructed patient to call clinic with any questions or concerns.  Pt is to continue with current warfarin dosing regimen.  Follow up in 1 week, to reduce risk of adverse events related to this high risk medication,  Warfarin.    Santy Prescott, Pharmacy Intern

## 2021-03-12 ENCOUNTER — OFFICE VISIT (OUTPATIENT)
Dept: URGENT CARE | Facility: PHYSICIAN GROUP | Age: 81
End: 2021-03-12
Payer: MEDICARE

## 2021-03-12 ENCOUNTER — ANTICOAGULATION MONITORING (OUTPATIENT)
Dept: VASCULAR LAB | Facility: MEDICAL CENTER | Age: 81
End: 2021-03-12

## 2021-03-12 ENCOUNTER — HOSPITAL ENCOUNTER (OUTPATIENT)
Facility: MEDICAL CENTER | Age: 81
End: 2021-03-12
Attending: PHYSICIAN ASSISTANT
Payer: MEDICARE

## 2021-03-12 VITALS
OXYGEN SATURATION: 96 % | SYSTOLIC BLOOD PRESSURE: 132 MMHG | BODY MASS INDEX: 23.11 KG/M2 | TEMPERATURE: 98 F | DIASTOLIC BLOOD PRESSURE: 68 MMHG | HEART RATE: 79 BPM | RESPIRATION RATE: 16 BRPM | WEIGHT: 152.5 LBS | HEIGHT: 68 IN

## 2021-03-12 DIAGNOSIS — I48.21 PERMANENT ATRIAL FIBRILLATION (HCC): ICD-10-CM

## 2021-03-12 DIAGNOSIS — N30.01 ACUTE CYSTITIS WITH HEMATURIA: ICD-10-CM

## 2021-03-12 DIAGNOSIS — R35.0 URINARY FREQUENCY: ICD-10-CM

## 2021-03-12 DIAGNOSIS — Z79.01 LONG TERM CURRENT USE OF ANTICOAGULANT THERAPY: ICD-10-CM

## 2021-03-12 LAB
APPEARANCE UR: CLEAR
BILIRUB UR STRIP-MCNC: NEGATIVE MG/DL
COLOR UR AUTO: ABNORMAL
GLUCOSE UR STRIP.AUTO-MCNC: NEGATIVE MG/DL
INR PPP: 3.7 (ref 2–3.5)
KETONES UR STRIP.AUTO-MCNC: NEGATIVE MG/DL
LEUKOCYTE ESTERASE UR QL STRIP.AUTO: ABNORMAL
NITRITE UR QL STRIP.AUTO: NEGATIVE
PH UR STRIP.AUTO: 6 [PH] (ref 5–8)
PROT UR QL STRIP: NEGATIVE MG/DL
RBC UR QL AUTO: ABNORMAL
SP GR UR STRIP.AUTO: 1.02
UROBILINOGEN UR STRIP-MCNC: 0.2 MG/DL

## 2021-03-12 PROCEDURE — 87086 URINE CULTURE/COLONY COUNT: CPT

## 2021-03-12 PROCEDURE — 81002 URINALYSIS NONAUTO W/O SCOPE: CPT | Performed by: PHYSICIAN ASSISTANT

## 2021-03-12 PROCEDURE — 99214 OFFICE O/P EST MOD 30 MIN: CPT | Performed by: PHYSICIAN ASSISTANT

## 2021-03-12 RX ORDER — OMEPRAZOLE 20 MG/1
20 CAPSULE, DELAYED RELEASE ORAL EVERY MORNING
COMMUNITY
Start: 2021-01-22

## 2021-03-12 RX ORDER — NITROFURANTOIN 25; 75 MG/1; MG/1
100 CAPSULE ORAL EVERY 12 HOURS
Qty: 10 CAPSULE | Refills: 0 | Status: SHIPPED | OUTPATIENT
Start: 2021-03-12 | End: 2021-03-17

## 2021-03-12 RX ORDER — OLOPATADINE HYDROCHLORIDE 1 MG/ML
SOLUTION/ DROPS OPHTHALMIC PRN
Status: ON HOLD | COMMUNITY
Start: 2020-12-31 | End: 2021-06-16

## 2021-03-12 ASSESSMENT — FIBROSIS 4 INDEX: FIB4 SCORE: 2.46

## 2021-03-12 NOTE — PROGRESS NOTES
OP Anticoagulation Service Note    Date: 3/12/2021    Anticoagulation Summary  As of 3/12/2021    INR goal:  2.5-3.0   TTR:  59.1 % (5.8 y)   INR used for dosing:  3.70 (3/12/2021)   Warfarin maintenance plan:  4.5 mg (3 mg x 1.5) every Sun; 3 mg (3 mg x 1) all other days   Weekly warfarin total:  22.5 mg   Plan last modified:  Skip Gonzalez, PharmD (3/12/2021)   Next INR check:  3/19/2021   Priority:  Maintenance   Target end date:  Indefinite    Indications    Permanent atrial fibrillation (HCC) [I48.21]  Long term current use of anticoagulant therapy [Z79.01]             Anticoagulation Episode Summary     INR check location:  Home Draw    Preferred lab:      Send INR reminders to:      Comments:  Waqar  - [phone] -   goal range changed to 2.5-3.2 per raghu vaca 8/8/2019       Anticoagulation Care Providers     Provider Role Specialty Phone number    Hu Gamez M.D. Referring Cardiac Electrophysiology [phone]    [facility] Anticoagulation Services Responsible  [phone]        Anticoagulation Patient Findings      This appointment was conducted over the phone.     HPI:   The reason for today's call is to prevent morbidity and mortality from a blood clot and/or stroke and to reduce the risk of bleeding while on a anticoagulant.     PCP:  Jagruti Heath P.A.-C.  [address] [facility] [id number]  [facility] [address]    Assessment:     • INR  supra-therapeutic.       Current Outpatient Medications:   •  amLODIPine, 5 mg, Oral, DAILY  •  losartan, 100 mg, Oral, DAILY  •  warfarin, Take one to one and one-half tablets by mouth one time daily or as directed by coumadin clinic  •  Magnesium, 2 tablet, Oral, QHS  •  Melatonin, 1 capsule, Oral, QHS  •  metoprolol SR, 25 mg, Oral, Q EVENING  •  cyclosporin, 1 Drop, Both Eyes, BID  •  cyanocobalamin, 1,000 mcg by Intramuscular route. Once a week  •  Carboxymethylcellulose Sodium (REFRESH CELLUVISC OP), 1 Drop, Ophthalmic, QDAY PRN  •  levothyroxine, Take  88 mcg by mouth. Every other day (Tuesday, Thursday, Saturday)  Indications: Underactive Thyroid  •  estradiol, Insert  in vagina. 3 times week  •  levothyroxine, Take 100 mcg by mouth. Every other day (Monday, Wednesday, Friday)  Indications: Underactive Thyroid  •  liothyronine, 10 mcg, Oral, QHS  •  CALCIUM 600-D PO, 600 mg, Oral, BID  •  POTASSIUM ACETATE, 440 mg, Oral, Q EVENING      Plan:     • 1.5mg today then continue the same warfarin dose, as noted above.       Follow-up:     • Our protocol suggests we test in 1 weeks.        Additional information discussed with patient:     • Asked patient to please call the anticoagulation clinic if they have any signs/symptoms of bleeding and/or thrombosis or any changes to diet or medications.      National recommendations regarding anticoagulation therapy:     The CHEST guidelines recommends frequent INR monitoring at regular intervals (a few days up to a max of 12 weeks) to ensure patients are on the proper dose of warfarin, and patients are not having any complications from therapy.  INRs can dramatically change over a short time period due to diet, medications, and medical conditions.       Skip Gonzalez, PharmD, MS, BCACP, [facility]  Phone [phone] fax [phone]    This note was created using voice recognition software (Dragon). The accuracy of the dictation is limited by the abilities of the software. I have reviewed the note prior to signing, however some errors in grammar and context are still possible. If you have any questions related to this note please do not hesitate to contact our office.

## 2021-03-13 DIAGNOSIS — N30.01 ACUTE CYSTITIS WITH HEMATURIA: ICD-10-CM
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
## 2021-03-15 LAB
BACTERIA UR CULT: NORMAL
SIGNIFICANT IND 70042: NORMAL
SITE SITE: NORMAL
SOURCE SOURCE: NORMAL

## 2021-03-18 ENCOUNTER — ANTICOAGULATION MONITORING (OUTPATIENT)
Dept: VASCULAR LAB | Facility: MEDICAL CENTER | Age: 81
End: 2021-03-18

## 2021-03-18 DIAGNOSIS — I48.21 PERMANENT ATRIAL FIBRILLATION (HCC): ICD-10-CM

## 2021-03-18 DIAGNOSIS — Z79.01 LONG TERM CURRENT USE OF ANTICOAGULANT THERAPY: ICD-10-CM

## 2021-03-18 LAB — INR PPP: 2.6 (ref 2–3.5)

## 2021-03-18 NOTE — PROGRESS NOTES
Anticoagulation Summary  As of 3/18/2021    INR goal:  2.5-3.0   TTR:  59.0 % (5.8 y)   INR used for dosin.60 (3/18/2021)   Warfarin maintenance plan:  4.5 mg (3 mg x 1.5) every Sun; 3 mg (3 mg x 1) all other days   Weekly warfarin total:  22.5 mg   Plan last modified:  Skip Gonzalez, PharmD (3/12/2021)   Next INR check:  2021   Priority:  Maintenance   Target end date:  Indefinite    Indications    Permanent atrial fibrillation (HCC) [I48.21]  Long term current use of anticoagulant therapy [Z79.01]             Anticoagulation Episode Summary     INR check location:  Home Draw    Preferred lab:      Send INR reminders to:      Comments:  Waqar [facility] [phone] -   goal range changed to 2.5-3.2 per raghu vaca 2019       Anticoagulation Care Providers     Provider Role Specialty Phone number    Hu Gamez M.D. Referring Cardiac Electrophysiology [phone]    [facility] Anticoagulation Services Responsible  [phone]        Anticoagulation Patient Findings    Left patient a message to report a Therapeutic INR of 2.60.  Pt to continue current dosing regimen noted above. Unable to verbally verify dosing and understanding with patient but requested patient to contact the clinic at [phone] for any s/s of unusual bleeding/bruising/clotting, any changes to diet or medications, or any questions and/or concerns. Follow up INR in 2  week(s).    Karma Givens, Pharmacy Intern

## 2021-04-05 ENCOUNTER — ANTICOAGULATION MONITORING (OUTPATIENT)
Dept: VASCULAR LAB | Facility: MEDICAL CENTER | Age: 81
End: 2021-04-05

## 2021-04-05 DIAGNOSIS — Z79.01 LONG TERM CURRENT USE OF ANTICOAGULANT THERAPY: ICD-10-CM

## 2021-04-05 DIAGNOSIS — I48.21 PERMANENT ATRIAL FIBRILLATION (HCC): ICD-10-CM

## 2021-04-05 LAB — INR PPP: 3.2 (ref 2–3.5)

## 2021-04-05 NOTE — PROGRESS NOTES
Anticoagulation Summary  As of 4/5/2021    INR goal:  2.5-3.0   TTR:  59.1 % (5.8 y)   INR used for dosing:  3.20 (4/5/2021)   Warfarin maintenance plan:  4.5 mg (3 mg x 1.5) every Sun; 3 mg (3 mg x 1) all other days   Weekly warfarin total:  22.5 mg   Plan last modified:  Skip Gonzalez, PharmD (3/12/2021)   Next INR check:     Priority:  Maintenance   Target end date:  Indefinite    Indications    Permanent atrial fibrillation (HCC) [I48.21]  Long term current use of anticoagulant therapy [Z79.01]             Anticoagulation Episode Summary     INR check location:  Home Draw    Preferred lab:      Send INR reminders to:      Comments:  Waqar [facility] [phone] -   goal range changed to 2.5-3.2 per raghu vaca 8/8/2019       Anticoagulation Care Providers     Provider Role Specialty Phone number    Hu Gamez M.D. Referring Cardiac Electrophysiology [phone]    [facility] Anticoagulation Services Responsible  [phone]        Anticoagulation Patient Findings     Spoke with patient today regarding therapeutic INR of 3.20.  Patient denies any signs/symptoms of bruising or bleeding or any changes in diet and medications.  Instructed patient to call clinic with any questions or concerns.  Follow up in 2 weeks, to reduce risk of adverse events related to this high risk medication,  Warfarin.    Diane Andrews - Student Pharmacist

## 2021-04-07 ENCOUNTER — HOSPITAL ENCOUNTER (OUTPATIENT)
Dept: HOSPITAL 8 - CFH | Age: 81
Discharge: HOME | End: 2021-04-07
Attending: PHYSICIAN ASSISTANT
Payer: MEDICARE

## 2021-04-07 DIAGNOSIS — M85.80: ICD-10-CM

## 2021-04-07 DIAGNOSIS — G31.89: Primary | ICD-10-CM

## 2021-04-07 DIAGNOSIS — R27.0: ICD-10-CM

## 2021-04-07 DIAGNOSIS — G43.109: ICD-10-CM

## 2021-04-07 DIAGNOSIS — Z78.0: ICD-10-CM

## 2021-04-07 DIAGNOSIS — R41.3: ICD-10-CM

## 2021-04-07 PROCEDURE — 77080 DXA BONE DENSITY AXIAL: CPT

## 2021-04-07 PROCEDURE — 70470 CT HEAD/BRAIN W/O & W/DYE: CPT

## 2021-04-08 ENCOUNTER — NON-PROVIDER VISIT (OUTPATIENT)
Dept: CARDIOLOGY | Facility: PHYSICIAN GROUP | Age: 81
End: 2021-04-08
Payer: MEDICARE

## 2021-04-08 VITALS
OXYGEN SATURATION: 99 % | HEART RATE: 87 BPM | BODY MASS INDEX: 22.88 KG/M2 | HEIGHT: 68 IN | SYSTOLIC BLOOD PRESSURE: 132 MMHG | DIASTOLIC BLOOD PRESSURE: 60 MMHG | WEIGHT: 151 LBS

## 2021-04-08 DIAGNOSIS — I44.2 THIRD DEGREE AV BLOCK (HCC): Chronic | ICD-10-CM

## 2021-04-08 DIAGNOSIS — Z95.0 CARDIAC PACEMAKER IN SITU: Chronic | ICD-10-CM

## 2021-04-08 DIAGNOSIS — Z98.890 S/P AV NODAL ABLATION: ICD-10-CM

## 2021-04-08 DIAGNOSIS — Z95.0 PACEMAKER-DEPENDENT DUE TO NATIVE CARDIAC RHYTHM INSUFFICIENT TO SUPPORT LIFE: Chronic | ICD-10-CM

## 2021-04-08 DIAGNOSIS — I49.8 PACEMAKER-DEPENDENT DUE TO NATIVE CARDIAC RHYTHM INSUFFICIENT TO SUPPORT LIFE: Chronic | ICD-10-CM

## 2021-04-08 DIAGNOSIS — I48.21 PERMANENT ATRIAL FIBRILLATION (HCC): Chronic | ICD-10-CM

## 2021-04-08 PROCEDURE — 93279 PRGRMG DEV EVAL PM/LDLS PM: CPT | Performed by: NURSE PRACTITIONER

## 2021-04-08 RX ORDER — LAMOTRIGINE 25 MG/1
25 TABLET ORAL
COMMUNITY
Start: 2021-02-18

## 2021-04-08 ASSESSMENT — FIBROSIS 4 INDEX: FIB4 SCORE: 2.46

## 2021-04-08 NOTE — PROGRESS NOTES
Device is working normally. Presenting rhythm is permanent atrial fibrillation with high AV block; she is PM dependent. Stable capture of RV lead; unable to measure intrinsic R waves; stable impedance. Battery longevity is 4 years.  No changes are made today.    FU in 6 months for next PM check with me.

## 2021-04-21 ENCOUNTER — HOSPITAL ENCOUNTER (OUTPATIENT)
Dept: RADIOLOGY | Facility: MEDICAL CENTER | Age: 81
End: 2021-04-21
Attending: SURGERY
Payer: MEDICARE

## 2021-04-21 DIAGNOSIS — I65.23 CAROTID STENOSIS, BILATERAL: ICD-10-CM

## 2021-04-21 PROCEDURE — 93880 EXTRACRANIAL BILAT STUDY: CPT | Mod: 26 | Performed by: INTERNAL MEDICINE

## 2021-04-21 PROCEDURE — 93880 EXTRACRANIAL BILAT STUDY: CPT

## 2021-04-22 ENCOUNTER — OFFICE VISIT (OUTPATIENT)
Dept: CARDIOLOGY | Facility: PHYSICIAN GROUP | Age: 81
End: 2021-04-22
Payer: MEDICARE

## 2021-04-22 VITALS
BODY MASS INDEX: 22.88 KG/M2 | SYSTOLIC BLOOD PRESSURE: 128 MMHG | WEIGHT: 151 LBS | HEART RATE: 81 BPM | OXYGEN SATURATION: 97 % | HEIGHT: 68 IN | DIASTOLIC BLOOD PRESSURE: 62 MMHG

## 2021-04-22 DIAGNOSIS — G45.9 TIA (TRANSIENT ISCHEMIC ATTACK): ICD-10-CM

## 2021-04-22 DIAGNOSIS — I10 ESSENTIAL HYPERTENSION: ICD-10-CM

## 2021-04-22 DIAGNOSIS — I49.8 PACEMAKER-DEPENDENT DUE TO NATIVE CARDIAC RHYTHM INSUFFICIENT TO SUPPORT LIFE: Chronic | ICD-10-CM

## 2021-04-22 DIAGNOSIS — I48.21 PERMANENT ATRIAL FIBRILLATION (HCC): Chronic | ICD-10-CM

## 2021-04-22 DIAGNOSIS — Z98.890 S/P AV NODAL ABLATION: ICD-10-CM

## 2021-04-22 DIAGNOSIS — Z95.0 CARDIAC PACEMAKER IN SITU: Chronic | ICD-10-CM

## 2021-04-22 DIAGNOSIS — Z95.0 PACEMAKER-DEPENDENT DUE TO NATIVE CARDIAC RHYTHM INSUFFICIENT TO SUPPORT LIFE: Chronic | ICD-10-CM

## 2021-04-22 DIAGNOSIS — Z79.01 LONG TERM CURRENT USE OF ANTICOAGULANT THERAPY: Chronic | ICD-10-CM

## 2021-04-22 LAB — INR PPP: 2.9 (ref 2–3.5)

## 2021-04-22 PROCEDURE — 99214 OFFICE O/P EST MOD 30 MIN: CPT | Performed by: INTERNAL MEDICINE

## 2021-04-22 RX ORDER — METOPROLOL SUCCINATE 25 MG/1
25 TABLET, EXTENDED RELEASE ORAL EVERY EVENING
Qty: 90 TABLET | Refills: 3 | Status: SHIPPED | OUTPATIENT
Start: 2021-04-22 | End: 2022-05-23 | Stop reason: SDUPTHER

## 2021-04-22 ASSESSMENT — ENCOUNTER SYMPTOMS
COUGH: 0
BLURRED VISION: 0
WEAKNESS: 0
NAUSEA: 0
DIZZINESS: 0
CHILLS: 0
PALPITATIONS: 0
SHORTNESS OF BREATH: 0
FEVER: 0
FOCAL WEAKNESS: 0
FALLS: 0
BRUISES/BLEEDS EASILY: 0
CLAUDICATION: 0
PND: 0
SORE THROAT: 0
ABDOMINAL PAIN: 0

## 2021-04-22 ASSESSMENT — FIBROSIS 4 INDEX: FIB4 SCORE: 2.46
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
## 2021-04-23 ENCOUNTER — ANTICOAGULATION MONITORING (OUTPATIENT)
Dept: VASCULAR LAB | Facility: MEDICAL CENTER | Age: 81
End: 2021-04-23

## 2021-04-23 DIAGNOSIS — I48.21 PERMANENT ATRIAL FIBRILLATION (HCC): ICD-10-CM

## 2021-04-23 DIAGNOSIS — Z79.01 LONG TERM CURRENT USE OF ANTICOAGULANT THERAPY: ICD-10-CM

## 2021-04-23 NOTE — PROGRESS NOTES
Anticoagulation Summary  As of 2021    INR goal:  2.5-3.0   TTR:  58.9 % (5.9 y)   INR used for dosin.90 (2021)   Warfarin maintenance plan:  4.5 mg (3 mg x 1.5) every Sun; 3 mg (3 mg x 1) all other days   Weekly warfarin total:  22.5 mg   Plan last modified:  Skip Gonzalez, PharmD (3/12/2021)   Next INR check:  2021   Priority:  Maintenance   Target end date:  Indefinite    Indications    Permanent atrial fibrillation (HCC) [I48.21]  Long term current use of anticoagulant therapy [Z79.01]             Anticoagulation Episode Summary     INR check location:  Home Draw    Preferred lab:      Send INR reminders to:      Comments:  [facility] [phone] -   goal range changed to 2.5-3.2 per raghu vaca 2019       Anticoagulation Care Providers     Provider Role Specialty Phone number    Hu Gamez M.D. Referring Cardiac Electrophysiology [phone]    [facility] Anticoagulation Services Responsible  [phone]        Anticoagulation Patient Findings       Spoke with patient today regarding therapeutic INR of 2.90.  Patient denies any signs/symptoms of bruising or bleeding or any changes in diet and medications.  Instructed patient to call clinic with any questions or concerns.  Follow up in 2 weeks, to reduce risk of adverse events related to this high risk medication,  Warfarin.    Diane Andrews - Student Pharmacist

## 2021-05-07 ENCOUNTER — HOSPITAL ENCOUNTER (OUTPATIENT)
Facility: MEDICAL CENTER | Age: 81
End: 2021-05-07
Attending: SURGERY | Admitting: SURGERY
Payer: MEDICARE

## 2021-05-07 ENCOUNTER — ANTICOAGULATION MONITORING (OUTPATIENT)
Dept: VASCULAR LAB | Facility: MEDICAL CENTER | Age: 81
End: 2021-05-07

## 2021-05-07 DIAGNOSIS — Z79.01 LONG TERM CURRENT USE OF ANTICOAGULANT THERAPY: ICD-10-CM

## 2021-05-07 DIAGNOSIS — I48.21 PERMANENT ATRIAL FIBRILLATION (HCC): ICD-10-CM

## 2021-05-07 LAB — INR PPP: 2.9 (ref 2–3.5)

## 2021-05-10 ENCOUNTER — PRE-ADMISSION TESTING (OUTPATIENT)
Dept: ADMISSIONS | Facility: MEDICAL CENTER | Age: 81
End: 2021-05-10
Attending: SURGERY
Payer: MEDICARE

## 2021-05-10 NOTE — PROGRESS NOTES
Anticoagulation Summary  As of 2021    INR goal:  2.5-3.0   TTR:  59.1 % (5.9 y)   INR used for dosin.90 (2021)   Warfarin maintenance plan:  4.5 mg (3 mg x 1.5) every Sun; 3 mg (3 mg x 1) all other days   Weekly warfarin total:  22.5 mg   Plan last modified:  Clarisse Jean BaptisteD (3/12/2021)   Next INR check:  2021   Priority:  Maintenance   Target end date:  Indefinite    Indications    Permanent atrial fibrillation (HCC) [I48.21]  Long term current use of anticoagulant therapy [Z79.01]             Anticoagulation Episode Summary     INR check location:  Home Draw    Preferred lab:      Send INR reminders to:      Comments:  Waqar [facility] [phone] -   goal range changed to 2.5-3.2 per raghu vaca 2019       Anticoagulation Care Providers     Provider Role Specialty Phone number    Hu Gamez M.D. Referring Cardiac Electrophysiology [phone]    [facility] Anticoagulation Services Responsible  [phone]        Anticoagulation Patient Findings      Spoke with patient to report a therapeutic INR.      Pt denies any s/s of bleeding, bruising, clotting or any changes to diet or medication.    Pt has upcoming procedure tentatively on  - pt's provider will reach out to us regarding periprocedural anticoagulation instructions    Pt instructed to continue with current warfarin dosing regimen, confirms dosing.   Will follow up in 2 week(s).     Vandana Crabtree, PharmD

## 2021-05-12 ENCOUNTER — ANTICOAGULATION MONITORING (OUTPATIENT)
Dept: MEDICAL GROUP | Facility: MEDICAL CENTER | Age: 81
End: 2021-05-12

## 2021-05-12 DIAGNOSIS — Z79.01 LONG TERM CURRENT USE OF ANTICOAGULANT THERAPY: ICD-10-CM

## 2021-05-12 DIAGNOSIS — I48.21 PERMANENT ATRIAL FIBRILLATION (HCC): ICD-10-CM

## 2021-05-12 NOTE — PROGRESS NOTES
"Received message below:     warfarin around procedure  Received: Today  LOUIS Cho  From CHARO Calzada in IR-     \"Pt is on warfarin, scheduled for angiogram with possible carotid artery stent on 5/19. Our MD needs her INR at 1.7 or less due to arterial sheath access size, and we will need her on DAPT w/ASA and Plavix 5 days prior to procedure and for 6 months after if a stent is actually placed. I'm trying to coordinate her procedure prep and medication plan. Thank you.\"     ---------------------------------------------------------------------------      S/w pt - instructed her to hold warfarin 3 days prior to procedure.  Pt is on warfarin for atrial fibrillation; [id number] = 5 with hx of TIA  Will forgo bridging with Lovenox d/t IR team wanting pt to be placed on DAPT with ASA/clopidogrel 5 days prior to procedure. However pt informed me that d/t hx of duodenal ulcer, pt's GI doctor did not want pt to be on any antiplatelets.    Will reach out to CHARO Calzada regarding DAPT.    Pt to check INR on 5/18.    Vandana Crabtree, ClarisseD    "

## 2021-05-12 NOTE — Clinical Note
Patrick Jensen!  FYI - I just instructed pt to hold warfarin 3 days prior to her IR procedure on 5/19. When I mentioned DAPT with ASA and clopidogrel, she mentioned that she has a duodenal ulcer and her GI doctor (unsure who it is) advised her against any antiplatelet products.   Thanks,  Vandana Crabtree, ClarisseD

## 2021-05-14 ENCOUNTER — TELEPHONE (OUTPATIENT)
Dept: RADIOLOGY | Facility: MEDICAL CENTER | Age: 81
End: 2021-05-14

## 2021-05-14 NOTE — TELEPHONE ENCOUNTER
"Patient contacted regarding below concerns. Due to instructions from GI, she is unable to take clopidogrel and aspirin (DAPT) for planned stent. Explained that anti platelet medication is imperative for stent placement, and we are attempting to contact Dr. Carreon to review her options, which include endarterectomy versus returning to gastroenterologist for evaluation / workup for clearance for DAPT therapy for 6 months post stent placement. Therefore, we are canceling the planned left ICA stent placement on 5/19 in order to re-evaluate her options. Ms. Dorantes was instructed to follow up with Dr. Carreon if she does not hear from their office by mid week.          ----- Message from Madeline Dorantes sent at 5/13/2021  4:44 PM PDT -----  Regarding: Procedure Question  Contact: [phone]  Patrick Jensen: Will you please call my mom, Madeline \"Mary\" Jayna at [phone] (H) or [phone] (cell) and discuss what to do about her coumadin. She did get a call from the Coumadin nurse who explained to her how to go off the coumadin ,but said she didn't know anything about the Plavix and ASA . She is concerned that she isn't using the LOVENOX while off the coumadin and that you would want her to be on ASA when she has been told not to take it due to an ulcer perforation in the recent past. (~4 years ago). If you want her to be on Lovenox, please send it to: [facility] - [place], [place] - [address], [facility] [address] [phone] They close early on Fridays. Thanks Meme Barber (DTR and POA) [phone](w)    "

## 2021-05-15 ENCOUNTER — PATIENT MESSAGE (OUTPATIENT)
Dept: CARDIOLOGY | Facility: PHYSICIAN GROUP | Age: 81
End: 2021-05-15

## 2021-05-17 NOTE — PROGRESS NOTES
Received message:     Message  Received: 3 days ago  DESTINY Rizzo.  Vandana Crabtree, Yesenia  Thank you, I called the patient. We are canceling her procedure for now and I instructed her not to stop warfarin.

## 2021-05-19 ENCOUNTER — APPOINTMENT (OUTPATIENT)
Dept: RADIOLOGY | Facility: MEDICAL CENTER | Age: 81
End: 2021-05-19
Attending: SURGERY
Payer: MEDICARE

## 2021-05-21 NOTE — PATIENT COMMUNICATION
Non-Urgent Medical Question  Juanita Irizarry  You Yesterday (11:20 AM)     Patrick HANSON,   I am so glad she asked first! Please advise not to use the headband with magnets.   Thx       Message text      MyChart message sent to pt regarding 's recommendations

## 2021-05-26 ENCOUNTER — ANTICOAGULATION MONITORING (OUTPATIENT)
Dept: VASCULAR LAB | Facility: MEDICAL CENTER | Age: 81
End: 2021-05-26

## 2021-05-26 DIAGNOSIS — I48.21 PERMANENT ATRIAL FIBRILLATION (HCC): ICD-10-CM

## 2021-05-26 DIAGNOSIS — Z79.01 LONG TERM CURRENT USE OF ANTICOAGULANT THERAPY: ICD-10-CM

## 2021-05-26 LAB — INR PPP: 3.2 (ref 2–3.5)

## 2021-05-26 NOTE — PROGRESS NOTES
Anticoagulation Summary  As of 5/26/2021    INR goal:  2.5-3.0   TTR:  58.9 % (6 y)   INR used for dosing:  3.20 (5/26/2021)   Warfarin maintenance plan:  4.5 mg (3 mg x 1.5) every Sun; 3 mg (3 mg x 1) all other days   Weekly warfarin total:  22.5 mg   Plan last modified:  Skip Gonzalez, PharmD (3/12/2021)   Next INR check:  6/9/2021   Priority:  Maintenance   Target end date:  Indefinite    Indications    Permanent atrial fibrillation (HCC) [I48.21]  Long term current use of anticoagulant therapy [Z79.01]             Anticoagulation Episode Summary     INR check location:  Home Draw    Preferred lab:      Send INR reminders to:      Comments:  Waqar [facility] [phone] -   goal range changed to 2.5-3.2 per raghu vaca 8/8/2019       Anticoagulation Care Providers     Provider Role Specialty Phone number    Hu Gamez M.D. Referring Cardiac Electrophysiology [phone]    [facility] Anticoagulation Services Responsible  [phone]        Anticoagulation Patient Findings      Spoke with patient to report a therapeutic INR of 3.2 (goal range 2.5-3.2).      Pt denies any s/s of bleeding, bruising, clotting or any changes to diet or medication.      Pt instructed to continue with current warfarin dosing regimen, confirms dosing.   Will follow up in 2 week(s).     Dayana Iraheta, Pharmacy Intern

## 2021-06-09 ENCOUNTER — ANTICOAGULATION MONITORING (OUTPATIENT)
Dept: VASCULAR LAB | Facility: MEDICAL CENTER | Age: 81
End: 2021-06-09

## 2021-06-09 DIAGNOSIS — I48.21 PERMANENT ATRIAL FIBRILLATION (HCC): ICD-10-CM

## 2021-06-09 DIAGNOSIS — Z79.01 LONG TERM CURRENT USE OF ANTICOAGULANT THERAPY: ICD-10-CM

## 2021-06-09 LAB — INR PPP: 2.5 (ref 2–3.5)

## 2021-06-09 NOTE — PROGRESS NOTES
Anticoagulation Summary  As of 2021    INR goal:  2.5-3.0   TTR:  59.0 % (6 y)   INR used for dosin.50 (2021)   Warfarin maintenance plan:  4.5 mg (3 mg x 1.5) every Sun; 3 mg (3 mg x 1) all other days   Weekly warfarin total:  22.5 mg   Plan last modified:  Skip Gonzalez, PharmD (3/12/2021)   Next INR check:  2021   Priority:  Maintenance   Target end date:  Indefinite    Indications    Permanent atrial fibrillation (HCC) [I48.21]  Long term current use of anticoagulant therapy [Z79.01]             Anticoagulation Episode Summary     INR check location:  Home Draw    Preferred lab:      Send INR reminders to:      Comments:  W[facility] [phone] -   goal range changed to 2.5-3.2 per raghu vaca 2019       Anticoagulation Care Providers     Provider Role Specialty Phone number    Hu Gamez M.D. Referring Cardiac Electrophysiology [phone]    [facility] Anticoagulation Services Responsible  [phone]        Anticoagulation Patient Findings  Patient Findings     Negatives:  Signs/symptoms of thrombosis, Signs/symptoms of bleeding, Laboratory test error suspected, Change in health, Change in alcohol use, Change in activity, Upcoming invasive procedure, Emergency department visit, Upcoming dental procedure, Missed doses, Extra doses, Change in medications, Change in diet/appetite, Hospital admission, Bruising, Other complaints              Spoke with patient today regarding therapeutic INR of 2.5.  Patient denies any signs/symptoms of bruising or bleeding or any changes in diet and medications.  Instructed patient to call clinic with any questions or concerns.    Pt is to continue with current warfarin dosing regimen.    Follow up in 2 weeks, to reduce risk of adverse events related to this high risk medication,  Warfarin.    Will Hogan, Pharmacy Intern
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
## 2021-06-14 ENCOUNTER — PRE-ADMISSION TESTING (OUTPATIENT)
Dept: ADMISSIONS | Facility: MEDICAL CENTER | Age: 81
DRG: 035 | End: 2021-06-14
Attending: RADIOLOGY
Payer: MEDICARE

## 2021-06-14 ENCOUNTER — HOSPITAL ENCOUNTER (OUTPATIENT)
Dept: RADIOLOGY | Facility: MEDICAL CENTER | Age: 81
End: 2021-06-14
Attending: SURGERY
Payer: MEDICARE

## 2021-06-14 DIAGNOSIS — Z01.812 PRE-OPERATIVE LABORATORY EXAMINATION: ICD-10-CM

## 2021-06-14 DIAGNOSIS — Z01.810 PRE-OPERATIVE CARDIOVASCULAR EXAMINATION: ICD-10-CM

## 2021-06-14 DIAGNOSIS — I65.22 RECURRENT CAROTID STENOSIS, LEFT: ICD-10-CM

## 2021-06-14 DIAGNOSIS — I65.29 STENOSIS OF CAROTID ARTERY, UNSPECIFIED LATERALITY: ICD-10-CM

## 2021-06-14 LAB
ABO GROUP BLD: NORMAL
ANION GAP SERPL CALC-SCNC: 11 MMOL/L (ref 7–16)
BLD GP AB SCN SERPL QL: NORMAL
BUN SERPL-MCNC: 16 MG/DL (ref 8–22)
CALCIUM SERPL-MCNC: 9.6 MG/DL (ref 8.5–10.5)
CHLORIDE SERPL-SCNC: 105 MMOL/L (ref 96–112)
CO2 SERPL-SCNC: 24 MMOL/L (ref 20–33)
CREAT SERPL-MCNC: 0.88 MG/DL (ref 0.5–1.4)
EKG IMPRESSION: NORMAL
ERYTHROCYTE [DISTWIDTH] IN BLOOD BY AUTOMATED COUNT: 43.9 FL (ref 35.9–50)
GLUCOSE SERPL-MCNC: 84 MG/DL (ref 65–99)
HCT VFR BLD AUTO: 43.8 % (ref 37–47)
HGB BLD-MCNC: 14.1 G/DL (ref 12–16)
MCH RBC QN AUTO: 28.2 PG (ref 27–33)
MCHC RBC AUTO-ENTMCNC: 32.2 G/DL (ref 33.6–35)
MCV RBC AUTO: 87.6 FL (ref 81.4–97.8)
PLATELET # BLD AUTO: 192 K/UL (ref 164–446)
PMV BLD AUTO: 11.6 FL (ref 9–12.9)
POTASSIUM SERPL-SCNC: 3.9 MMOL/L (ref 3.6–5.5)
RBC # BLD AUTO: 5 M/UL (ref 4.2–5.4)
RH BLD: NORMAL
SODIUM SERPL-SCNC: 140 MMOL/L (ref 135–145)
WBC # BLD AUTO: 5.3 K/UL (ref 4.8–10.8)

## 2021-06-14 PROCEDURE — 86850 RBC ANTIBODY SCREEN: CPT

## 2021-06-14 PROCEDURE — 85027 COMPLETE CBC AUTOMATED: CPT

## 2021-06-14 PROCEDURE — 86901 BLOOD TYPING SEROLOGIC RH(D): CPT

## 2021-06-14 PROCEDURE — 86900 BLOOD TYPING SEROLOGIC ABO: CPT

## 2021-06-14 PROCEDURE — 80048 BASIC METABOLIC PNL TOTAL CA: CPT

## 2021-06-14 PROCEDURE — 93005 ELECTROCARDIOGRAM TRACING: CPT

## 2021-06-14 PROCEDURE — 36415 COLL VENOUS BLD VENIPUNCTURE: CPT

## 2021-06-14 PROCEDURE — U0005 INFEC AGEN DETEC AMPLI PROBE: HCPCS

## 2021-06-14 PROCEDURE — U0003 INFECTIOUS AGENT DETECTION BY NUCLEIC ACID (DNA OR RNA); SEVERE ACUTE RESPIRATORY SYNDROME CORONAVIRUS 2 (SARS-COV-2) (CORONAVIRUS DISEASE [COVID-19]), AMPLIFIED PROBE TECHNIQUE, MAKING USE OF HIGH THROUGHPUT TECHNOLOGIES AS DESCRIBED BY CMS-2020-01-R: HCPCS

## 2021-06-14 PROCEDURE — 93010 ELECTROCARDIOGRAM REPORT: CPT | Performed by: INTERNAL MEDICINE

## 2021-06-14 PROCEDURE — C9803 HOPD COVID-19 SPEC COLLECT: HCPCS

## 2021-06-14 ASSESSMENT — ENCOUNTER SYMPTOMS
SPEECH CHANGE: 0
FOCAL WEAKNESS: 0
CONSTITUTIONAL NEGATIVE: 1
SENSORY CHANGE: 0
HEMOPTYSIS: 0
BRUISES/BLEEDS EASILY: 1
WEAKNESS: 0
BLOOD IN STOOL: 0

## 2021-06-14 ASSESSMENT — LIFESTYLE VARIABLES: SUBSTANCE_ABUSE: 0

## 2021-06-15 ENCOUNTER — PRE-ADMISSION TESTING (OUTPATIENT)
Dept: ADMISSIONS | Facility: MEDICAL CENTER | Age: 81
DRG: 035 | End: 2021-06-15
Attending: RADIOLOGY
Payer: MEDICARE

## 2021-06-15 LAB
SARS-COV-2 RNA RESP QL NAA+PROBE: NOTDETECTED
SPECIMEN SOURCE: NORMAL

## 2021-06-16 ENCOUNTER — ANESTHESIA EVENT (OUTPATIENT)
Dept: RADIOLOGY | Facility: MEDICAL CENTER | Age: 81
DRG: 035 | End: 2021-06-16
Payer: MEDICARE

## 2021-06-16 ENCOUNTER — APPOINTMENT (OUTPATIENT)
Dept: RADIOLOGY | Facility: MEDICAL CENTER | Age: 81
DRG: 035 | End: 2021-06-16
Attending: RADIOLOGY
Payer: MEDICARE

## 2021-06-16 ENCOUNTER — HOSPITAL ENCOUNTER (INPATIENT)
Facility: MEDICAL CENTER | Age: 81
LOS: 1 days | DRG: 035 | End: 2021-06-17
Attending: RADIOLOGY | Admitting: RADIOLOGY
Payer: MEDICARE

## 2021-06-16 ENCOUNTER — ANESTHESIA (OUTPATIENT)
Dept: RADIOLOGY | Facility: MEDICAL CENTER | Age: 81
DRG: 035 | End: 2021-06-16
Payer: MEDICARE

## 2021-06-16 DIAGNOSIS — I65.22 LEFT CAROTID STENOSIS: ICD-10-CM

## 2021-06-16 LAB
GLUCOSE BLD-MCNC: 97 MG/DL (ref 65–99)
INR PPP: 2.37 (ref 0.87–1.13)
PROTHROMBIN TIME: 25.2 SEC (ref 12–14.6)

## 2021-06-16 PROCEDURE — 700111 HCHG RX REV CODE 636 W/ 250 OVERRIDE (IP): Performed by: ANESTHESIOLOGY

## 2021-06-16 PROCEDURE — 85610 PROTHROMBIN TIME: CPT

## 2021-06-16 PROCEDURE — 700101 HCHG RX REV CODE 250: Performed by: ANESTHESIOLOGY

## 2021-06-16 PROCEDURE — B3141ZZ FLUOROSCOPY OF LEFT COMMON CAROTID ARTERY USING LOW OSMOLAR CONTRAST: ICD-10-PCS | Performed by: RADIOLOGY

## 2021-06-16 PROCEDURE — A9270 NON-COVERED ITEM OR SERVICE: HCPCS

## 2021-06-16 PROCEDURE — 82962 GLUCOSE BLOOD TEST: CPT

## 2021-06-16 PROCEDURE — 37215 TRANSCATH STENT CCA W/EPS: CPT

## 2021-06-16 PROCEDURE — 99291 CRITICAL CARE FIRST HOUR: CPT | Performed by: STUDENT IN AN ORGANIZED HEALTH CARE EDUCATION/TRAINING PROGRAM

## 2021-06-16 PROCEDURE — A9270 NON-COVERED ITEM OR SERVICE: HCPCS | Performed by: STUDENT IN AN ORGANIZED HEALTH CARE EDUCATION/TRAINING PROGRAM

## 2021-06-16 PROCEDURE — 770022 HCHG ROOM/CARE - ICU (200)

## 2021-06-16 PROCEDURE — X2AJ336 CEREBRAL EMBOLIC FILTRATION, EXTRACORPOREAL FLOW REVERSAL CIRCUIT FROM LEFT COMMON CAROTID ARTERY, PERCUTANEOUS APPROACH, NEW TECHNOLOGY GROUP 6: ICD-10-PCS | Performed by: RADIOLOGY

## 2021-06-16 PROCEDURE — 700102 HCHG RX REV CODE 250 W/ 637 OVERRIDE(OP)

## 2021-06-16 PROCEDURE — 160002 HCHG RECOVERY MINUTES (STAT)

## 2021-06-16 PROCEDURE — 700101 HCHG RX REV CODE 250: Performed by: STUDENT IN AN ORGANIZED HEALTH CARE EDUCATION/TRAINING PROGRAM

## 2021-06-16 PROCEDURE — 037L3DZ DILATION OF LEFT INTERNAL CAROTID ARTERY WITH INTRALUMINAL DEVICE, PERCUTANEOUS APPROACH: ICD-10-PCS | Performed by: RADIOLOGY

## 2021-06-16 PROCEDURE — 700117 HCHG RX CONTRAST REV CODE 255: Performed by: RADIOLOGY

## 2021-06-16 PROCEDURE — B3171ZZ FLUOROSCOPY OF LEFT INTERNAL CAROTID ARTERY USING LOW OSMOLAR CONTRAST: ICD-10-PCS | Performed by: RADIOLOGY

## 2021-06-16 PROCEDURE — 700102 HCHG RX REV CODE 250 W/ 637 OVERRIDE(OP): Performed by: STUDENT IN AN ORGANIZED HEALTH CARE EDUCATION/TRAINING PROGRAM

## 2021-06-16 PROCEDURE — 700105 HCHG RX REV CODE 258: Performed by: RADIOLOGY

## 2021-06-16 PROCEDURE — 700111 HCHG RX REV CODE 636 W/ 250 OVERRIDE (IP): Performed by: STUDENT IN AN ORGANIZED HEALTH CARE EDUCATION/TRAINING PROGRAM

## 2021-06-16 RX ORDER — HALOPERIDOL 5 MG/ML
1 INJECTION INTRAMUSCULAR
Status: DISCONTINUED | OUTPATIENT
Start: 2021-06-16 | End: 2021-06-16 | Stop reason: HOSPADM

## 2021-06-16 RX ORDER — LIOTHYRONINE SODIUM 5 UG/1
5 TABLET ORAL EVERY MORNING
Status: DISCONTINUED | OUTPATIENT
Start: 2021-06-17 | End: 2021-06-17 | Stop reason: HOSPADM

## 2021-06-16 RX ORDER — SODIUM CHLORIDE, SODIUM LACTATE, POTASSIUM CHLORIDE, CALCIUM CHLORIDE 600; 310; 30; 20 MG/100ML; MG/100ML; MG/100ML; MG/100ML
INJECTION, SOLUTION INTRAVENOUS CONTINUOUS
Status: ACTIVE | OUTPATIENT
Start: 2021-06-16 | End: 2021-06-16

## 2021-06-16 RX ORDER — LABETALOL HYDROCHLORIDE 5 MG/ML
5 INJECTION, SOLUTION INTRAVENOUS
Status: DISCONTINUED | OUTPATIENT
Start: 2021-06-16 | End: 2021-06-16

## 2021-06-16 RX ORDER — ONDANSETRON 2 MG/ML
4 INJECTION INTRAMUSCULAR; INTRAVENOUS EVERY 4 HOURS PRN
Status: DISCONTINUED | OUTPATIENT
Start: 2021-06-16 | End: 2021-06-17 | Stop reason: HOSPADM

## 2021-06-16 RX ORDER — HYDROMORPHONE HYDROCHLORIDE 1 MG/ML
0.4 INJECTION, SOLUTION INTRAMUSCULAR; INTRAVENOUS; SUBCUTANEOUS
Status: DISCONTINUED | OUTPATIENT
Start: 2021-06-16 | End: 2021-06-16 | Stop reason: HOSPADM

## 2021-06-16 RX ORDER — NITROGLYCERIN 40 MG/1
1 PATCH TRANSDERMAL DAILY
Status: DISCONTINUED | OUTPATIENT
Start: 2021-06-16 | End: 2021-06-17

## 2021-06-16 RX ORDER — CYCLOSPORINE 0.5 MG/ML
1 EMULSION OPHTHALMIC 2 TIMES DAILY
Status: DISCONTINUED | OUTPATIENT
Start: 2021-06-16 | End: 2021-06-16

## 2021-06-16 RX ORDER — ASPIRIN 81 MG/1
81 TABLET, CHEWABLE ORAL DAILY
Status: DISCONTINUED | OUTPATIENT
Start: 2021-06-16 | End: 2021-06-17 | Stop reason: HOSPADM

## 2021-06-16 RX ORDER — WARFARIN SODIUM 3 MG/1
3 TABLET ORAL
Status: DISCONTINUED | OUTPATIENT
Start: 2021-06-16 | End: 2021-06-17 | Stop reason: HOSPADM

## 2021-06-16 RX ORDER — LOSARTAN POTASSIUM 50 MG/1
100 TABLET ORAL DAILY
Refills: 3 | Status: DISCONTINUED | OUTPATIENT
Start: 2021-06-16 | End: 2021-06-16

## 2021-06-16 RX ORDER — LEVOTHYROXINE SODIUM 0.03 MG/1
100 TABLET ORAL
Status: DISCONTINUED | OUTPATIENT
Start: 2021-06-18 | End: 2021-06-17 | Stop reason: HOSPADM

## 2021-06-16 RX ORDER — SODIUM CHLORIDE, SODIUM LACTATE, POTASSIUM CHLORIDE, CALCIUM CHLORIDE 600; 310; 30; 20 MG/100ML; MG/100ML; MG/100ML; MG/100ML
INJECTION, SOLUTION INTRAVENOUS CONTINUOUS
Status: DISCONTINUED | OUTPATIENT
Start: 2021-06-16 | End: 2021-06-16 | Stop reason: HOSPADM

## 2021-06-16 RX ORDER — MIDAZOLAM HYDROCHLORIDE 1 MG/ML
1 INJECTION INTRAMUSCULAR; INTRAVENOUS
Status: DISCONTINUED | OUTPATIENT
Start: 2021-06-16 | End: 2021-06-16 | Stop reason: HOSPADM

## 2021-06-16 RX ORDER — HYDRALAZINE HYDROCHLORIDE 20 MG/ML
5 INJECTION INTRAMUSCULAR; INTRAVENOUS
Status: DISCONTINUED | OUTPATIENT
Start: 2021-06-16 | End: 2021-06-16 | Stop reason: HOSPADM

## 2021-06-16 RX ORDER — HYDROMORPHONE HYDROCHLORIDE 1 MG/ML
0.2 INJECTION, SOLUTION INTRAMUSCULAR; INTRAVENOUS; SUBCUTANEOUS
Status: DISCONTINUED | OUTPATIENT
Start: 2021-06-16 | End: 2021-06-16 | Stop reason: HOSPADM

## 2021-06-16 RX ORDER — CARBOXYMETHYLCELLULOSE SODIUM 5 MG/ML
1-2 SOLUTION/ DROPS OPHTHALMIC
Status: DISCONTINUED | OUTPATIENT
Start: 2021-06-16 | End: 2021-06-17 | Stop reason: HOSPADM

## 2021-06-16 RX ORDER — HYDRALAZINE HYDROCHLORIDE 20 MG/ML
20 INJECTION INTRAMUSCULAR; INTRAVENOUS EVERY 6 HOURS PRN
Status: DISCONTINUED | OUTPATIENT
Start: 2021-06-16 | End: 2021-06-17 | Stop reason: HOSPADM

## 2021-06-16 RX ORDER — LAMOTRIGINE 25 MG/1
25 TABLET ORAL
Status: DISCONTINUED | OUTPATIENT
Start: 2021-06-16 | End: 2021-06-17 | Stop reason: HOSPADM

## 2021-06-16 RX ORDER — HYDRALAZINE HYDROCHLORIDE 20 MG/ML
5 INJECTION INTRAMUSCULAR; INTRAVENOUS
Status: DISCONTINUED | OUTPATIENT
Start: 2021-06-16 | End: 2021-06-16

## 2021-06-16 RX ORDER — OMEPRAZOLE 20 MG/1
20 CAPSULE, DELAYED RELEASE ORAL 2 TIMES DAILY
Status: DISCONTINUED | OUTPATIENT
Start: 2021-06-16 | End: 2021-06-17 | Stop reason: HOSPADM

## 2021-06-16 RX ORDER — ASPIRIN 81 MG/1
81 TABLET, CHEWABLE ORAL DAILY
Status: DISCONTINUED | OUTPATIENT
Start: 2021-06-17 | End: 2021-06-16

## 2021-06-16 RX ORDER — DIPHENHYDRAMINE HYDROCHLORIDE 50 MG/ML
12.5 INJECTION INTRAMUSCULAR; INTRAVENOUS
Status: DISCONTINUED | OUTPATIENT
Start: 2021-06-16 | End: 2021-06-16 | Stop reason: HOSPADM

## 2021-06-16 RX ORDER — NITROGLYCERIN 40 MG/1
PATCH TRANSDERMAL
Status: COMPLETED
Start: 2021-06-16 | End: 2021-06-17

## 2021-06-16 RX ORDER — MEPERIDINE HYDROCHLORIDE 25 MG/ML
6.25 INJECTION INTRAMUSCULAR; INTRAVENOUS; SUBCUTANEOUS
Status: DISCONTINUED | OUTPATIENT
Start: 2021-06-16 | End: 2021-06-16 | Stop reason: HOSPADM

## 2021-06-16 RX ORDER — ONDANSETRON 2 MG/ML
4 INJECTION INTRAMUSCULAR; INTRAVENOUS
Status: DISCONTINUED | OUTPATIENT
Start: 2021-06-16 | End: 2021-06-16 | Stop reason: HOSPADM

## 2021-06-16 RX ORDER — LABETALOL HYDROCHLORIDE 5 MG/ML
10 INJECTION, SOLUTION INTRAVENOUS EVERY 4 HOURS PRN
Status: DISCONTINUED | OUTPATIENT
Start: 2021-06-16 | End: 2021-06-17 | Stop reason: HOSPADM

## 2021-06-16 RX ORDER — CHOLECALCIFEROL (VITAMIN D3) 125 MCG
2.5 CAPSULE ORAL NIGHTLY
Status: DISCONTINUED | OUTPATIENT
Start: 2021-06-16 | End: 2021-06-17 | Stop reason: HOSPADM

## 2021-06-16 RX ORDER — CHOLECALCIFEROL (VITAMIN D3) 125 MCG
2.5 CAPSULE ORAL ONCE
Status: DISCONTINUED | OUTPATIENT
Start: 2021-06-16 | End: 2021-06-16

## 2021-06-16 RX ORDER — LEVOTHYROXINE SODIUM 88 UG/1
88 TABLET ORAL
Status: DISCONTINUED | OUTPATIENT
Start: 2021-06-17 | End: 2021-06-17 | Stop reason: HOSPADM

## 2021-06-16 RX ORDER — HYDROMORPHONE HYDROCHLORIDE 1 MG/ML
0.1 INJECTION, SOLUTION INTRAMUSCULAR; INTRAVENOUS; SUBCUTANEOUS
Status: DISCONTINUED | OUTPATIENT
Start: 2021-06-16 | End: 2021-06-16 | Stop reason: HOSPADM

## 2021-06-16 RX ORDER — LABETALOL HYDROCHLORIDE 5 MG/ML
5 INJECTION, SOLUTION INTRAVENOUS
Status: DISCONTINUED | OUTPATIENT
Start: 2021-06-16 | End: 2021-06-16 | Stop reason: HOSPADM

## 2021-06-16 RX ADMIN — MEPERIDINE HYDROCHLORIDE 6.25 MG: 25 INJECTION INTRAMUSCULAR; INTRAVENOUS; SUBCUTANEOUS at 16:29

## 2021-06-16 RX ADMIN — LAMOTRIGINE 25 MG: 25 TABLET ORAL at 20:45

## 2021-06-16 RX ADMIN — LABETALOL HYDROCHLORIDE 5 MG: 5 INJECTION, SOLUTION INTRAVENOUS at 16:18

## 2021-06-16 RX ADMIN — Medication 2.5 MG: at 20:22

## 2021-06-16 RX ADMIN — HYDRALAZINE HYDROCHLORIDE 5 MG: 20 INJECTION INTRAMUSCULAR; INTRAVENOUS at 16:34

## 2021-06-16 RX ADMIN — WARFARIN SODIUM 3 MG: 3 TABLET ORAL at 20:44

## 2021-06-16 RX ADMIN — LABETALOL HYDROCHLORIDE 10 MG: 5 INJECTION, SOLUTION INTRAVENOUS at 17:30

## 2021-06-16 RX ADMIN — IOHEXOL 70 ML: 300 INJECTION, SOLUTION INTRAVENOUS at 15:53

## 2021-06-16 RX ADMIN — MEPERIDINE HYDROCHLORIDE 6.25 MG: 25 INJECTION INTRAMUSCULAR; INTRAVENOUS; SUBCUTANEOUS at 16:37

## 2021-06-16 RX ADMIN — NITROGLYCERIN 1 PATCH: 0.2 PATCH TRANSDERMAL at 14:39

## 2021-06-16 RX ADMIN — SODIUM CHLORIDE, POTASSIUM CHLORIDE, SODIUM LACTATE AND CALCIUM CHLORIDE: 600; 310; 30; 20 INJECTION, SOLUTION INTRAVENOUS at 11:07

## 2021-06-16 RX ADMIN — GLYCOPYRROLATE 0.2 MG: 0.2 INJECTION INTRAMUSCULAR; INTRAVENOUS at 15:12

## 2021-06-16 RX ADMIN — NITROGLYCERIN 1 PATCH: 40 PATCH TRANSDERMAL at 14:39

## 2021-06-16 RX ADMIN — FENTANYL CITRATE 50 MCG: 50 INJECTION, SOLUTION INTRAMUSCULAR; INTRAVENOUS at 14:10

## 2021-06-16 RX ADMIN — PROPOFOL 75 MCG/KG/MIN: 10 INJECTION, EMULSION INTRAVENOUS at 14:11

## 2021-06-16 RX ADMIN — HYDRALAZINE HYDROCHLORIDE 20 MG: 20 INJECTION INTRAMUSCULAR; INTRAVENOUS at 18:31

## 2021-06-16 RX ADMIN — OMEPRAZOLE 20 MG: 20 CAPSULE, DELAYED RELEASE ORAL at 18:32

## 2021-06-16 RX ADMIN — ASPIRIN 81 MG: 81 TABLET, CHEWABLE ORAL at 20:05

## 2021-06-16 RX ADMIN — FENTANYL CITRATE 50 MCG: 50 INJECTION, SOLUTION INTRAMUSCULAR; INTRAVENOUS at 14:14

## 2021-06-16 ASSESSMENT — PATIENT HEALTH QUESTIONNAIRE - PHQ9
1. LITTLE INTEREST OR PLEASURE IN DOING THINGS: NOT AT ALL
SUM OF ALL RESPONSES TO PHQ9 QUESTIONS 1 AND 2: 0
2. FEELING DOWN, DEPRESSED, IRRITABLE, OR HOPELESS: NOT AT ALL

## 2021-06-16 ASSESSMENT — CHA2DS2 SCORE
CHF OR LEFT VENTRICULAR DYSFUNCTION: NO
AGE 75 OR GREATER: YES
HYPERTENSION: YES
AGE 65 TO 74: NO
SEX: FEMALE
PRIOR STROKE OR TIA OR THROMBOEMBOLISM: YES
VASCULAR DISEASE: YES
DIABETES: NO
CHA2DS2 VASC SCORE: 7

## 2021-06-16 ASSESSMENT — COGNITIVE AND FUNCTIONAL STATUS - GENERAL
MOBILITY SCORE: 24
SUGGESTED CMS G CODE MODIFIER DAILY ACTIVITY: CH
DAILY ACTIVITIY SCORE: 24
SUGGESTED CMS G CODE MODIFIER MOBILITY: CH

## 2021-06-16 ASSESSMENT — LIFESTYLE VARIABLES
EVER HAD A DRINK FIRST THING IN THE MORNING TO STEADY YOUR NERVES TO GET RID OF A HANGOVER: NO
DOES PATIENT WANT TO STOP DRINKING: NO
ALCOHOL_USE: NO
EVER FELT BAD OR GUILTY ABOUT YOUR DRINKING: NO
TOTAL SCORE: 0
ON A TYPICAL DAY WHEN YOU DRINK ALCOHOL HOW MANY DRINKS DO YOU HAVE: 0
AVERAGE NUMBER OF DAYS PER WEEK YOU HAVE A DRINK CONTAINING ALCOHOL: 0
HAVE YOU EVER FELT YOU SHOULD CUT DOWN ON YOUR DRINKING: NO
TOTAL SCORE: 0
TOTAL SCORE: 0
HAVE PEOPLE ANNOYED YOU BY CRITICIZING YOUR DRINKING: NO
CONSUMPTION TOTAL: NEGATIVE
HOW MANY TIMES IN THE PAST YEAR HAVE YOU HAD 5 OR MORE DRINKS IN A DAY: 0

## 2021-06-16 ASSESSMENT — ENCOUNTER SYMPTOMS
SHORTNESS OF BREATH: 0
NAUSEA: 0
SEIZURES: 0
MYALGIAS: 0
COUGH: 0
SINUS PAIN: 0
ABDOMINAL PAIN: 0
HALLUCINATIONS: 0
CHILLS: 0
LOSS OF CONSCIOUSNESS: 0
NERVOUS/ANXIOUS: 0
WEAKNESS: 0
EYE PAIN: 0
FEVER: 0
PALPITATIONS: 0
PHOTOPHOBIA: 0
VOMITING: 0

## 2021-06-16 ASSESSMENT — PAIN DESCRIPTION - PAIN TYPE
TYPE: SURGICAL PAIN
TYPE: SURGICAL PAIN

## 2021-06-16 ASSESSMENT — FIBROSIS 4 INDEX
FIB4 SCORE: 2.57
FIB4 SCORE: 2.57

## 2021-06-16 NOTE — OR SURGEON
Immediate Post- Operative Note        PostOp Diagnosis: Left Carotid stenosis,       Procedure(s): Left ICA stent placement      Estimated Blood Loss: Less than 30 ml    Complications: None    Access: Rt CFA 8 Fr sheath closed with Angioseal.    Discussed with patient, patients family and Dr. Salas, ICU physician    Post procedure care:    Keep SBP strictly between 120-140 mm Hg.  Monitor access site  Continue Coumadin and Aspirin 81mg daily      6/16/2021     3:52 PM     Santi Yeboah M.D.

## 2021-06-16 NOTE — ANESTHESIA TIME REPORT
Anesthesia Start and Stop Event Times     Date Time Event    6/16/2021 1357 Ready for Procedure     1407 Anesthesia Start     1556 Anesthesia Stop        Responsible Staff  06/16/21    Name Role Begin End    Jose Miguel Patel M.D. Anesth 1407 1556        Preop Diagnosis (Free Text):  Pre-op Diagnosis             Preop Diagnosis (Codes):    Post op Diagnosis  Left carotid stenosis      Premium Reason  A. 3PM - 7AM    Comments:

## 2021-06-16 NOTE — ANESTHESIA PREPROCEDURE EVALUATION
Relevant Problems   ANESTHESIA   (positive) Obstructive sleep apnea      NEURO   (positive) History of migraine headaches   (positive) TIA (transient ischemic attack)      CARDIAC   (positive) Cardiac pacemaker - Medtronic   (positive) Essential hypertension   (positive) Pacemaker-dependent - s/p AVN ablation   (positive) Permanent atrial fibrillation (HCC)   (positive) Third degree AV block (HCC)      GI   (positive) GERD (gastroesophageal reflux disease)   (positive) Peptic ulcer      ENDO   (positive) Hypothyroidism       Physical Exam    Airway   Mallampati: II  TM distance: >3 FB  Neck ROM: full       Cardiovascular - normal exam  Rhythm: regular  Rate: normal  (-) murmur     Dental - normal exam           Pulmonary - normal exam  Breath sounds clear to auscultation     Abdominal    Neurological - normal exam                 Anesthesia Plan    ASA 3   ASA physical status 3 criteria: hypertension - poorly controlled    Plan - MAC               Induction: intravenous      Pertinent diagnostic labs and testing reviewed    Informed Consent:    Anesthetic plan and risks discussed with patient.

## 2021-06-16 NOTE — PROGRESS NOTES
Patient underwent a carotid angiogram with left carotid stent placement by Dr. Yeboah, assisted by Meli RT and Kiran RT. Patient signed consent prior to sedation medication. MD Yeboah aware that patients INR is elevated at 2.37. MD Patel from anesthesia present for case.    Left neck site was marked by MD with time out pen. Patient was placed in a supine position. MD Patel sedated patient. Time Out taken per safety protocol. MD Yeboah accessed the right femoral artery. Vitals, medications and sedation managed by MD Patel. An angioseal was deployed in the right femoral artery with hemostasis, then a gauze and tegaderm dressing was placed over right groin surgical site. Report called to Lorenza WOODWARD. Pt transported by rLamont with this ACLS RN and MD Patel to [facility] PACU [place] [facility]. Patient appeared to tolerate procedure well. During bedside handoff with Lorenza WOODWARD, patient awakens to voice, neuros intact, right groin site C/D/I     A[employer]ott XACT Carotid Stent System placed at left carotid bifurcation   Ref# [id number] Lot# [id number] Exp Date 03/31/24     Terumo Angio Seal Vascular Closure Device 8Fr placed in right femoral artery   Ref# [id number] Lot# [id number] Exp Date 03/31/2022

## 2021-06-16 NOTE — ASSESSMENT & PLAN NOTE
BP goal 120-140 per IR  Home regimen held to given her BP goals, restart when appropriate.  IV labetalol and hydralazine as needed.

## 2021-06-16 NOTE — OR NURSING
1550- Pt arrives to PACU from IR on 6L of oxygen via mask. Report received. Pt neurologically intact, R groin site CDI. 2+ pedal pulses. Pt denies pain.     1626- Attempt to call pt daughter Meme, no answer at this time.     1456- Report called to Monica WOODWARD, signed and held orders discussed.     1710- Pt taken to RICU by this RN and CNA on monitor and 2L of oxygen (tank full). Bedside handoff given to Monica WOODWARD.
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
## 2021-06-17 VITALS
WEIGHT: 151.68 LBS | RESPIRATION RATE: 21 BRPM | HEART RATE: 72 BPM | TEMPERATURE: 98 F | OXYGEN SATURATION: 95 % | SYSTOLIC BLOOD PRESSURE: 118 MMHG | HEIGHT: 67 IN | DIASTOLIC BLOOD PRESSURE: 58 MMHG | BODY MASS INDEX: 23.81 KG/M2

## 2021-06-17 DIAGNOSIS — I65.29 STENOSIS OF CAROTID ARTERY, UNSPECIFIED LATERALITY: ICD-10-CM

## 2021-06-17 LAB
ANION GAP SERPL CALC-SCNC: 9 MMOL/L (ref 7–16)
BASOPHILS # BLD AUTO: 0.5 % (ref 0–1.8)
BASOPHILS # BLD: 0.03 K/UL (ref 0–0.12)
BUN SERPL-MCNC: 17 MG/DL (ref 8–22)
CALCIUM SERPL-MCNC: 8.8 MG/DL (ref 8.5–10.5)
CHLORIDE SERPL-SCNC: 103 MMOL/L (ref 96–112)
CO2 SERPL-SCNC: 23 MMOL/L (ref 20–33)
CREAT SERPL-MCNC: 0.85 MG/DL (ref 0.5–1.4)
EOSINOPHIL # BLD AUTO: 0.02 K/UL (ref 0–0.51)
EOSINOPHIL NFR BLD: 0.3 % (ref 0–6.9)
ERYTHROCYTE [DISTWIDTH] IN BLOOD BY AUTOMATED COUNT: 44.2 FL (ref 35.9–50)
GLUCOSE SERPL-MCNC: 102 MG/DL (ref 65–99)
HCT VFR BLD AUTO: 35.9 % (ref 37–47)
HGB BLD-MCNC: 11.6 G/DL (ref 12–16)
IMM GRANULOCYTES # BLD AUTO: 0.02 K/UL (ref 0–0.11)
IMM GRANULOCYTES NFR BLD AUTO: 0.3 % (ref 0–0.9)
INR PPP: 2.55 (ref 0.87–1.13)
LYMPHOCYTES # BLD AUTO: 1.05 K/UL (ref 1–4.8)
LYMPHOCYTES NFR BLD: 15.8 % (ref 22–41)
MCH RBC QN AUTO: 28 PG (ref 27–33)
MCHC RBC AUTO-ENTMCNC: 32.3 G/DL (ref 33.6–35)
MCV RBC AUTO: 86.5 FL (ref 81.4–97.8)
MONOCYTES # BLD AUTO: 0.77 K/UL (ref 0–0.85)
MONOCYTES NFR BLD AUTO: 11.6 % (ref 0–13.4)
NEUTROPHILS # BLD AUTO: 4.75 K/UL (ref 2–7.15)
NEUTROPHILS NFR BLD: 71.5 % (ref 44–72)
NRBC # BLD AUTO: 0 K/UL
NRBC BLD-RTO: 0 /100 WBC
PLATELET # BLD AUTO: 152 K/UL (ref 164–446)
PMV BLD AUTO: 10.7 FL (ref 9–12.9)
POTASSIUM SERPL-SCNC: 3.8 MMOL/L (ref 3.6–5.5)
PROTHROMBIN TIME: 26.7 SEC (ref 12–14.6)
RBC # BLD AUTO: 4.15 M/UL (ref 4.2–5.4)
SODIUM SERPL-SCNC: 135 MMOL/L (ref 135–145)
WBC # BLD AUTO: 6.6 K/UL (ref 4.8–10.8)

## 2021-06-17 PROCEDURE — 700102 HCHG RX REV CODE 250 W/ 637 OVERRIDE(OP): Performed by: NURSE PRACTITIONER

## 2021-06-17 PROCEDURE — 85025 COMPLETE CBC W/AUTO DIFF WBC: CPT

## 2021-06-17 PROCEDURE — 99239 HOSP IP/OBS DSCHRG MGMT >30: CPT | Performed by: NURSE PRACTITIONER

## 2021-06-17 PROCEDURE — 700102 HCHG RX REV CODE 250 W/ 637 OVERRIDE(OP): Performed by: STUDENT IN AN ORGANIZED HEALTH CARE EDUCATION/TRAINING PROGRAM

## 2021-06-17 PROCEDURE — A9270 NON-COVERED ITEM OR SERVICE: HCPCS | Performed by: NURSE PRACTITIONER

## 2021-06-17 PROCEDURE — A9270 NON-COVERED ITEM OR SERVICE: HCPCS | Performed by: STUDENT IN AN ORGANIZED HEALTH CARE EDUCATION/TRAINING PROGRAM

## 2021-06-17 PROCEDURE — 80048 BASIC METABOLIC PNL TOTAL CA: CPT

## 2021-06-17 PROCEDURE — 85610 PROTHROMBIN TIME: CPT

## 2021-06-17 RX ORDER — LOSARTAN POTASSIUM 50 MG/1
100 TABLET ORAL
Status: DISCONTINUED | OUTPATIENT
Start: 2021-06-17 | End: 2021-06-17 | Stop reason: HOSPADM

## 2021-06-17 RX ORDER — METOPROLOL SUCCINATE 25 MG/1
25 TABLET, EXTENDED RELEASE ORAL EVERY EVENING
Status: DISCONTINUED | OUTPATIENT
Start: 2021-06-17 | End: 2021-06-17 | Stop reason: HOSPADM

## 2021-06-17 RX ORDER — ASPIRIN 81 MG/1
81 TABLET, CHEWABLE ORAL DAILY
Qty: 100 TABLET | Refills: 0 | Status: SHIPPED
Start: 2021-06-18 | End: 2022-03-23

## 2021-06-17 RX ADMIN — LOSARTAN POTASSIUM 100 MG: 50 TABLET, FILM COATED ORAL at 10:57

## 2021-06-17 RX ADMIN — LIOTHYRONINE SODIUM 5 MCG: 5 TABLET ORAL at 05:08

## 2021-06-17 RX ADMIN — OMEPRAZOLE 20 MG: 20 CAPSULE, DELAYED RELEASE ORAL at 05:08

## 2021-06-17 RX ADMIN — LEVOTHYROXINE SODIUM 88 MCG: 0.09 TABLET ORAL at 05:08

## 2021-06-17 ASSESSMENT — PAIN DESCRIPTION - PAIN TYPE: TYPE: ACUTE PAIN

## 2021-06-17 NOTE — CARE PLAN
Problem: Knowledge Deficit - Standard  Goal: Patient and family/care givers will demonstrate understanding of plan of care, disease process/condition, diagnostic tests and medications  Outcome: Progressing     Problem: Neuro Status  Goal: Neuro status will remain stable or improve  Outcome: Progressing     Problem: Self Care  Goal: Patient will have the ability to perform ADLs independently or with assistance (bathe, groom, dress, toilet and feed)  Outcome: Progressing     Problem: Mobility  Goal: Patient's capacity to carry out activities will improve  Outcome: Progressing   The patient is Stable - Low risk of patient condition declining or worsening    Shift Goals  Clinical Goals: Maintain BP  Patient Goals: Discharge home  Family Goals: MONICA    Progress made toward(s) clinical / shift goals:  Patient mobilizing with plans to discharge home today. All goals progressing as expected.

## 2021-06-17 NOTE — PROGRESS NOTES
Critical care progress note    Seen per myself and seen/discussed with APRN.  Agree with findings except as noted in my note.  Please see my note below for further details.    80 year old presented for scheduled left internal carotid artery stent placement.  Post procedure admitted to ICU and ICU team consulted for critical care management.  Goal sbp 120-140 and q 1 hour neuro checks.  She was continued on coumadin and aspirin. No bleeding/hematoma at right femoral site.  No events overnight. No neurological deficits.  Past medical history includes left internal carotid stenosis status post endarterectomy in 06/2020, prior CVA/TIA, permanent atrial fibrillation on warfarin, sick sinus syndrome and third-degree heart block status post AV claudette ablation now pacemaker dependent, hypertension, hyperlipidemia, peptic ulcer disease with prior perforated ulcer in 2017, coronary artery disease, GERD, SYEDA.   Per IR patient ok to be discharged home regarding stent placement and management. There are currently no medical issues that would require her to remain in the ICU and no medical issues that would require continued hospitalization if cleared by procedural team regarding stent placement. She did bump her left arm on doorway at home recently and there is ecchymosis with likely hematoma.  Painful on palpation but limited per patient.  No bone deformities on physical exam of arm.   Patient deferred in patient xray and preferred to follow up with her physician if pain worsens or worrisome for possible fracture.  Hemoglobin 11.6, prior 14.1 but baseline 12-14.  No other signs bleeding other than noted above.  Plt 152 and less than prior plt on cbc 3 days prior so h/h likely baseline.  See discharge summary for details.  Follow up with IR outpatient per their recommendations.     See APRN note for billing.

## 2021-06-17 NOTE — ASSESSMENT & PLAN NOTE
Pacemaker dependent s/p AVN ablation.  Will hold metoprolol given BP target is 120-140.  Continue warfarin managed per pharmacy with INR goal 2-3

## 2021-06-17 NOTE — PROGRESS NOTES
S/p uncomplicated endovascular neurointervention on 6/16/21 by Kishan Yeboah MD ([facility], x [phone]): LEFT carotid artery stent placement. Admitted to ICU for post procedure neurologic monitoring.     Today, the patient complains of dry mouth and tiredness. Denies vision change, headache, weakness, nausea, and pain. Left shoulder bruising noted by pt prior to hospitalization but denies major bleeding incidents on warfarin + ASA. Wants to go home.     Awake, oriented x 4. Face symmetric, speech fluent. No drift. RIGHT femoral angio site WNL, scant old blood on dressing. DP/ PT pulses 2+.     From a NeuroInterventional Service standpoint, OK to discharge to home when medically cleared by Hospitalist Service. Patient to follow up in our clinic in 2 -6 weeks, our office to arrange appointment. Continue warfarin per anticoagulation clinic and aspirin 81 mg for 6 weeks. Take aspirin as discussed with GI MD and in [facility] clinic (dissove in water, take with food, take omeprazole BID). Carotid ultrasound in 3 months or per Dr. Carreon's surveillance plan.    For femoral arterial access:  Post-angioseal instructions: no lifting greater than 5 lbs and no baths/ swimming/ soaking in tub for 5 days. Shower OK. OK to change dressings to band aid as needed.

## 2021-06-17 NOTE — PROGRESS NOTES
Inpatient Anticoagulation Service Note    Date: 6/17/2021    Reason for Anticoagulation: Atrial Fibrillation   Target INR: 2.0 to 3.0  [id number] VASc Score: 7  HAS-BLED Score: 4   Hemoglobin Value: (!) 11.6  Hematocrit Value: (!) 35.9  Lab Platelet Value: (!) 152    INR from last 7 days     Date/Time INR Value    06/17/21 03:07:01  (!) 2.55    06/16/21 1051  (!) 2.37        Dose from last 7 days     Date/Time Dose (mg)    06/17/21 0957  3    06/16/21 1813  3        Average Dose (mg): 3.2  Significant Interactions: Antiplatelet Medications, Thyroid Medications  Bridge Therapy: No     Comments: INR remains therapeutic on pt's home warfarin dosing, will continue same.    Education Material Provided?: No (chronic therapy)  Pharmacist suggested discharge dosing: warfarin 4.5 mg Sundays and 3 mg all other days     Alfonso Ly, PharmD

## 2021-06-17 NOTE — ASSESSMENT & PLAN NOTE
Left ICA with severe restenosis S/p Left ICA stent placement 6/16/21 with Dr. Yeboah.  Keep SBP between 120-140 mm Hg.  Monitor access site  Continue Coumadin and Aspirin 81mg daily.  Her GI requested her aspirin be dissolved in water and given with BID PPI given h/o PUD and perforated peptic ulcer 2017.  Patient intolerant to statin therapy.

## 2021-06-17 NOTE — CARE PLAN
"The patient is Stable - Low risk of patient condition declining or worsening    Shift Goals  Clinical Goals: BP<140, maintain neurological status  Patient Goals: rest  Family Goals: \"get better\"    Progress made toward(s) clinical / shift goals:      Patient is not progressing towards the following goals:      "

## 2021-06-17 NOTE — PROGRESS NOTES
Orders for patient to discharge home. Went over discharge instructions with patient. Discussed follow up appointments and medication changes. Copy of discharge instructions given to patient and daugther. Patient and family had no further questions. Patient and family verified all belongings are leaving with the patient. Pt discharged by walking at 1234.

## 2021-06-17 NOTE — PROGRESS NOTES
2 RN skin check complete with isaias Mccurdy RN.   Devices in place: bilateral calf SCDs, blood pressure cuff, silicone nasal cannula.  Skin assessed under devices .    No new or confirmed pressure ulcers. Skin grossly intact.   The following interventions in place: pillows floated on heels, patient encouraged to turn, devices rotated. .

## 2021-06-17 NOTE — DISCHARGE SUMMARY
Discharge Summary    CHIEF COMPLAINT ON ADMISSION  No chief complaint on file.      Reason for Admission  Elective left internal carotid artery stent placement    Admission Date  6/16/2021    CODE STATUS  Full Code    HPI & HOSPITAL COURSE   80 year old presented for elective left internal carotid artery stent placement via right femoral access for restenosis of left ICA. She was admitted to ICU for critical care management/every hour neurological checks with systolic blood pressure parameters of 120-140.  The patient has an extensive PMH;left internal carotid stenosis status post endarterectomy in 06/2020, prior CVA/TIA, permanent atrial fibrillation on warfarin, sick sinus syndrome and third-degree heart block status post AV claudette ablation now pacemaker dependent, hypertension, hyperlipidemia, peptic ulcer disease with prior perforated ulcer in 2017, coronary artery disease, GERD, SYEDA.  She tolerated procedure well and was without any complications. She has been neurologically intact and at her baseline following the procedure. Her speech has remained clear and she has remained alert, oriented, without any neurological changes.  Her blood pressure remained within parameters of 120-140 SBP without the use of IVP PRN antihypertensives needed      Therefore, she is discharged in good and stable condition to home with close outpatient follow-up.    The patient recovered much more quickly than anticipated on admission.    Discharge Date  6/17/21    FOLLOW UP ITEMS POST DISCHARGE  Patient to follow up in IR clinic in 2 -6 weeks. Neurointerventioal service will call to schedule appointment.Carotid ultrasound in 3 months or per Dr. Carreon's surveillance plan. Continue warfarin per anticoagulation clinic and aspirin 81 mg for 6 weeks. Take aspirin as discussed with GI MD and in [facility].  For femoral arterial access-Post-angioseal instructions: no lifting greater than 5 lbs and no baths/ swimming/ soaking in tub for 5  days. Shower OK. OK to change dressings to band aid as needed.    DISCHARGE DIAGNOSES  Principal Problem:    Stenosis of left carotid artery POA: Yes  Active Problems:    Permanent atrial fibrillation (HCC) (Chronic) POA: Yes    Hypothyroidism POA: Yes    Essential hypertension POA: Yes    Long term current use of anticoagulant therapy (Chronic) POA: Yes      Overview: ICD-10 transition    Peptic ulcer (Chronic) POA: Yes      Overview: 2/27/08: Treated by Dr. Caro.                Pacemaker-dependent - s/p AVN ablation (Chronic) POA: Yes  Resolved Problems:    * No resolved hospital problems. *      FOLLOW UP  Future Appointments   Date Time Provider Department Center   8/2/2021 10:00 AM ANTOINE Garcia [facility] None   10/14/2021 10:15 AM LOUIS Mcrae [facility] None     No follow-up provider specified.    MEDICATIONS ON DISCHARGE     Medication List      ASK your doctor about these medications      Instructions   aspirin EC 81 MG Tbec  Commonly known as: ECOTRIN   Take 162 mg by mouth 2 times a day.  Dose: 162 mg     CALCIUM 600-D PO   Take 1,200 mg by mouth every day. 1200MG total  Dose: 1,200 mg     cyanocobalamin 1000 MCG/ML Soln  Commonly known as: VITAMIN B-12   Inject 1,000 mcg into the shoulder, thigh, or buttocks every 7 days. Once a week  Dose: 1,000 mcg     ESTRACE VAGINAL 0.1 MG/GM vaginal cream  Generic drug: estradiol   Insert  into the vagina see administration instructions. 3 times week  Indications: Vulvovaginal Atrophy     lamoTRIgine 25 MG Tabs  Commonly known as: LAMICTAL   Take 25 mg by mouth at bedtime. Occular migraines  Dose: 25 mg     liothyronine 5 MCG Tabs  Commonly known as: CYTOMEL   Take 5 mcg by mouth every morning. Indications: Underactive Thyroid  Dose: 5 mcg     losartan 100 MG Tabs  Commonly known as: COZAAR   Take 1 Tab by mouth every day.  Dose: 100 mg     Magnesium 250 MG Tabs   Take 2 Tabs by mouth every bedtime.  Dose: 2 tablet     Melatonin 3 MG Caps   Take 2  "Capsules by mouth at bedtime.  Dose: 2 capsule     metoprolol SR 25 MG Tb24  Commonly known as: TOPROL XL   Take 1 tablet by mouth every evening.  Dose: 25 mg     omeprazole 20 MG delayed-release capsule  Commonly known as: PRILOSEC   Take 20 mg by mouth every morning. Indications: Gastroesophageal Reflux Disease  Dose: 20 mg     POTASSIUM ACETATE   Take 550 mg by mouth every evening.  Dose: 550 mg     PREVAGEN PO   Take 1 tablet by mouth every day.  Dose: 1 tablet     REFRESH CELLUVISC OP   Administer 1 Drop into affected eye(s) as needed. Indications: Dry Eyes  Dose: 1 Drop     Restasis 0.05 % ophthalmic emulsion  Generic drug: cyclosporin   Place 1 Drop in both eyes 2 times a day.  Dose: 1 Drop     * Synthroid 100 MCG Tabs  Generic drug: levothyroxine   Take 100 mcg by mouth every morning on an empty stomach. Every other day (Monday, Wednesday, Friday)  Indications: Underactive Thyroid  Dose: 100 mcg     * levothyroxine 88 MCG Tabs  Commonly known as: SYNTHROID   Take 88 mcg by mouth every morning on an empty stomach. Every other day (Tuesday, Thursday, Saturday)  Indications: Underactive Thyroid  Dose: 88 mcg     warfarin 3 MG Tabs  Commonly known as: COUMADIN   Doctor's comments: This rx was submitted by a pharmacist working under a collaborative practice agreement.  Take one to one and one-half tablets by mouth one time daily or as directed by coumadin clinic         * This list has 2 medication(s) that are the same as other medications prescribed for you. Read the directions carefully, and ask your doctor or other care provider to review them with you.                Allergies  Allergies   Allergen Reactions   • Atorvastatin      rhabdomyolysis   • Cefdinir Diarrhea     c-diff   • Hmg-Coa-R Inhibitors      rhabdomyolysis   • Lisinopril      Angioedema 1/2014   • Betadine [Povidone Iodine] Rash     Topical Vaginal application caused rash   • Ciprofloxacin      Severe headache   • Fosamax      \" didn't feel " "well\", nausea    • Percocet [Oxycodone-Acetaminophen]      N/V   • Sulfa Drugs Hives   • Tape      Adhesive tape hives, blisters. Paper tape okay.        DIET  Orders Placed This Encounter   Procedures   • Diet Order Diet: Regular     Standing Status:   Standing     Number of Occurrences:   1     Order Specific Question:   Diet:     Answer:   Regular [1]       ACTIVITY  Light duty.  5-lb lifting restriction for 5 days. no baths/ swimming/ soaking in tub for 5 days. Shower OK. OK to change dressings to band aid as needed     CONSULTATIONS  ICU- Intensivist    PROCEDURES  Left carotid stent placement    LABORATORY  Lab Results   Component Value Date    SODIUM 135 06/17/2021    POTASSIUM 3.8 06/17/2021    CHLORIDE 103 06/17/2021    CO2 23 06/17/2021    GLUCOSE 102 (H) 06/17/2021    BUN 17 06/17/2021    CREATININE 0.85 06/17/2021    CREATININE 1.0 02/18/2009        Lab Results   Component Value Date    WBC 6.6 06/17/2021    HEMOGLOBIN 11.6 (L) 06/17/2021    HEMATOCRIT 35.9 (L) 06/17/2021    PLATELETCT 152 (L) 06/17/2021        Total time of the discharge process exceeds 35  minutes.  "

## 2021-06-17 NOTE — PROGRESS NOTES
Inpatient Anticoagulation Service Note    Date: 6/16/2021    Reason for Anticoagulation: Atrial Fibrillation   Target INR: 2.0 to 3.0  [id number] VASc Score: 7  HAS-BLED Score: 4        INR from last 7 days     Date/Time INR Value    06/16/21 1051  (!) 2.37        Dose from last 7 days     Date/Time Dose (mg)    06/16/21 1813  3        Average Dose (mg): 3.2 (PTA dose: 4.5 mg every Sunday, 3 mg all other days)  Significant Interactions: Antiplatelet Medications, Thyroid Medications  Bridge Therapy: No    Reversal Agent Administered: Not Applicable  Comments: Warfarin continued for afib s/p stent placement. H/H and plts WNL. INR therapeutic.    Plan:  Continue home dose starting tonight, INR with AM labs  Education Material Provided?: No (chronic therapy)  Pharmacist suggested discharge dosing: Continue home dose of 4.5 mg every Sunday, 3 mg all other days.     Hosea Mariee, PharmD

## 2021-06-17 NOTE — DISCHARGE INSTRUCTIONS
Discharge Instructions    Discharged to home by car with relative. Discharged via walking, hospital escort: Refused.  Special equipment needed: Not Applicable    Be sure to schedule a follow-up appointment with your primary care doctor or any specialists as instructed.     Discharge Plan:        I understand that a diet low in cholesterol, fat, and sodium is recommended for good health. Unless I have been given specific instructions below for another diet, I accept this instruction as my diet prescription.   Other diet: Regular    Special Instructions: Patient to follow up in IR clinic in 2-6 weeks. Neurointerventioal service will call to schedule appointment. Carotid ultrasound in 3 months or per Dr. Carreon's surveillance plan. Continue warfarin per anticoagulation clinic and aspirin 81 mg for 6 weeks. Take aspirin as discussed with GI MD and in [facility] clinic.    For femoral arterial access-Post-angioseal instructions: no lifting greater than 5 lbs and no baths/ swimming/ soaking in tub for 5 days. Shower OK. OK to change dressings to band aid as needed.    · Is patient discharged on Warfarin / Coumadin?     You are receiving the drug warfarin. Please understand the importance of monitoring warfarin with scheduled PT/INR blood draws.    IMPORTANT: HOW TO USE THIS INFORMATION:  This is a summary and does NOT have all possible information about this product. This information does not assure that this product is safe, effective, or appropriate for you. This information is not individual medical advice and does not substitute for the advice of your health care professional. Always ask your health care professional for complete information about this product and your specific health needs.      WARFARIN - ORAL (WARF-uh-rin)      COMMON BRAND NAME(S): Coumadin      WARNING:  Warfarin can cause very serious (possibly fatal) bleeding. This is more likely to occur when you first start taking this medication or if you take too  "much warfarin. To decrease your risk for bleeding, your doctor or other health care provider will monitor you closely and check your lab results (INR test) to make sure you are not taking too much warfarin. Keep all medical and laboratory appointments. Tell your doctor right away if you notice any signs of serious bleeding. See also Side Effects section.      USES:  This medication is used to treat blood clots (such as in deep vein thrombosis-DVT or pulmonary embolus-PE) and/or to prevent new clots from forming in your body. Preventing harmful blood clots helps to reduce the risk of a stroke or heart attack. Conditions that increase your risk of developing blood clots include a certain type of irregular heart rhythm (atrial fibrillation), heart valve replacement, recent heart attack, and certain surgeries (such as hip/knee replacement). Warfarin is commonly called a \"blood thinner,\" but the more correct term is \"anticoagulant.\" It helps to keep blood flowing smoothly in your body by decreasing the amount of certain substances (clotting proteins) in your blood.      HOW TO USE:  Read the Medication Guide provided by your pharmacist before you start taking warfarin and each time you get a refill. If you have any questions, ask your doctor or pharmacist. Take this medication by mouth with or without food as directed by your doctor or other health care professional, usually once a day. It is very important to take it exactly as directed. Do not increase the dose, take it more frequently, or stop using it unless directed by your doctor. Dosage is based on your medical condition, laboratory tests (such as INR), and response to treatment. Your doctor or other health care provider will monitor you closely while you are taking this medication to determine the right dose for you. Use this medication regularly to get the most benefit from it. To help you remember, take it at the same time each day. It is important to eat a " balanced, consistent diet while taking warfarin. Some foods can affect how warfarin works in your body and may affect your treatment and dose. Avoid sudden large increases or decreases in your intake of foods high in vitamin K (such as broccoli, cauliflower, cabbage, brussels sprouts, kale, spinach, and other green leafy vegetables, liver, green tea, certain vitamin supplements). If you are trying to lose weight, check with your doctor before you try to go on a diet. Cranberry products may also affect how your warfarin works. Limit the amount of cranberry juice (16 ounces/480 milliliters a day) or other cranberry products you may drink or eat.      SIDE EFFECTS:  Nausea, loss of appetite, or stomach/abdominal pain may occur. If any of these effects persist or worsen, tell your doctor or pharmacist promptly. Remember that your doctor has prescribed this medication because he or she has judged that the benefit to you is greater than the risk of side effects. Many people using this medication do not have serious side effects. This medication can cause serious bleeding if it affects your blood clotting proteins too much (shown by unusually high INR lab results). Even if your doctor stops your medication, this risk of bleeding can continue for up to a week. Tell your doctor right away if you have any signs of serious bleeding, including: unusual pain/swelling/discomfort, unusual/easy bruising, prolonged bleeding from cuts or gums, persistent/frequent nosebleeds, unusually heavy/prolonged menstrual flow, pink/dark urine, coughing up blood, vomit that is bloody or looks like coffee grounds, severe headache, dizziness/fainting, unusual or persistent tiredness/weakness, bloody/black/tarry stools, chest pain, shortness of breath, difficulty swallowing. Tell your doctor right away if any of these unlikely but serious side effects occur: persistent nausea/vomiting, severe stomach/abdominal pain, yellowing eyes/skin. This drug  rarely has caused very serious (possibly fatal) problems if its effects lead to small blood clots (usually at the beginning of treatment). This can lead to severe skin/tissue damage that may require surgery or amputation if left untreated. Patients with certain blood conditions (protein C or S deficiency) may be at greater risk. Get medical help right away if any of these rare but serious side effects occur: painful/red/purplish patches on the skin (such as on the toe, breast, abdomen), change in the amount of urine, vision changes, confusion, slurred speech, weakness on one side of the body. A very serious allergic reaction to this drug is rare. However, get medical help right away if you notice any symptoms of a serious allergic reaction, including: rash, itching/swelling (especially of the face/tongue/throat), severe dizziness, trouble breathing. This is not a complete list of possible side effects. If you notice other effects not listed above, contact your doctor or pharmacist. In the [place] - Call your doctor for medical advice about side effects. You may report side effects to FDA at 6-110-JQJ-5918. In [place] - Call your doctor for medical advice about side effects. You may report side effects to [facility] [place] at 1-585.907.5970.      PRECAUTIONS:  Before taking warfarin, tell your doctor or pharmacist if you are allergic to it; or if you have any other allergies. This product may contain inactive ingredients, which can cause allergic reactions or other problems. Talk to your pharmacist for more details. Before using this medication, tell your doctor or pharmacist your medical history, especially of: blood disorders (such as anemia, hemophilia), bleeding problems (such as bleeding of the stomach/intestines, bleeding in the brain), blood vessel disorders (such as aneurysms), recent major injury/surgery, liver disease, alcohol use, mental/mood disorders (including memory problems), frequent falls/injuries. It is  important that all your doctors and dentists know that you take warfarin. Before having surgery or any medical/dental procedures, tell your doctor or dentist that you are taking this medication and about all the products you use (including prescription drugs, nonprescription drugs, and herbal products). Avoid getting injections into the muscles. If you must have an injection into a muscle (for example, a flu shot), it should be given in the arm. This way, it will be easier to check for bleeding and/or apply pressure bandages. This medication may cause stomach bleeding. Daily use of alcohol while using this medicine will increase your risk for stomach bleeding and may also affect how this medication works. Limit or avoid alcoholic beverages. If you have not been eating well, if you have an illness or infection that causes fever, vomiting, or diarrhea for more than 2 days, or if you start using any antibiotic medications, contact your doctor or pharmacist immediately because these conditions can affect how warfarin works. This medication can cause heavy bleeding. To lower the chance of getting cut, bruised, or injured, use great caution with sharp objects like safety razors and nail cutters. Use an electric razor when shaving and a soft toothbrush when brushing your teeth. Avoid activities such as contact sports. If you fall or injure yourself, especially if you hit your head, call your doctor immediately. Your doctor may need to check you. The Food & Drug Administration has stated that generic warfarin products are interchangeable. However, consult your doctor or pharmacist before switching warfarin products. Be careful not to take more than one medication that contains warfarin unless specifically directed by the doctor or health care provider who is monitoring your warfarin treatment. Older adults may be at greater risk for bleeding while using this drug. This medication is not recommended for use during pregnancy  "because of serious (possibly fatal) harm to an unborn baby. Discuss the use of reliable forms of birth control with your doctor. If you become pregnant or think you may be pregnant, tell your doctor immediately. If you are planning pregnancy, discuss a plan for managing your condition with your doctor before you become pregnant. Your doctor may switch the type of medication you use during pregnancy. Very small amounts of this medication may pass into breast milk but is unlikely to harm a nursing infant. Consult your doctor before breast-feeding.      DRUG INTERACTIONS:  Drug interactions may change how your medications work or increase your risk for serious side effects. This document does not contain all possible drug interactions. Keep a list of all the products you use (including prescription/nonprescription drugs and herbal products) and share it with your doctor and pharmacist. Do not start, stop, or change the dosage of any medicines without your doctor's approval. Warfarin interacts with many prescription, nonprescription, vitamin, and herbal products. This includes medications that are applied to the skin or inside the vagina or rectum. The interactions with warfarin usually result in an increase or decrease in the \"blood-thinning\" (anticoagulant) effect. Your doctor or other health care professional should closely monitor you to prevent serious bleeding or clotting problems. While taking warfarin, it is very important to tell your doctor or pharmacist of any changes in medications, vitamins, or herbal products that you are taking. Some products that may interact with this drug include: capecitabine, imatinib, mifepristone. Aspirin, aspirin-like drugs (salicylates), and nonsteroidal anti-inflammatory drugs (NSAIDs such as ibuprofen, naproxen, celecoxib) may have effects similar to warfarin. These drugs may increase the risk of bleeding problems if taken during treatment with warfarin. Carefully check all " prescription/nonprescription product labels (including drugs applied to the skin such as pain-relieving creams) since the products may contain NSAIDs or salicylates. Talk to your doctor about using a different medication (such as acetaminophen) to treat pain/fever. Low-dose aspirin and related drugs (such as clopidogrel, ticlopidine) should be continued if prescribed by your doctor for specific medical reasons such as heart attack or stroke prevention. Consult your doctor or pharmacist for more details. Many herbal products interact with warfarin. Tell your doctor before taking any herbal products, especially bromelains, coenzyme Q10, cranberry, danshen, dong quai, fenugreek, garlic, ginkgo biloba, ginseng, and Beaumont's wort, among others. This medication may interfere with a certain laboratory test to measure theophylline levels, possibly causing false test results. Make sure laboratory personnel and all your doctors know you use this drug.      OVERDOSE:  If overdose is suspected, contact a poison control center or emergency room immediately. [place] residents can call the [place] National Poison Hotline at 1-922.401.5420. [place] residents can call a provincial poison control center. Symptoms of overdose may include: bloody/black/tarry stools, pink/dark urine, unusual/prolonged bleeding.      NOTES:  Do not share this medication with others. Laboratory and/or medical tests (such as INR, complete blood count) must be performed periodically to monitor your progress or check for side effects. Consult your doctor for more details.      MISSED DOSE:  For the best possible benefit, do not miss any doses. If you do miss a dose and remember on the same day, take it as soon as you remember. If you remember on the next day, skip the missed dose and resume your usual dosing schedule. Do not double the dose to catch up because this could increase your risk for bleeding. Keep a record of missed doses to give to your doctor or  pharmacist. Contact your doctor or pharmacist if you miss 2 or more doses in a row.      STORAGE:  Store at room temperature away from light and moisture. Do not store in the bathroom. Keep all medications away from children and pets. Do not flush medications down the toilet or pour them into a drain unless instructed to do so. Properly discard this product when it is  or no longer needed. Consult your pharmacist or local waste disposal company for more details about how to safely discard your product.      MEDICAL ALERT:  Your condition and medication can cause complications in a medical emergency. For information about enrolling in MedicAlert, call 1-863.950.3112 ([place]) or 1-209.348.7240 ([place]).      Information last revised 2010 Copyright(c) 2010 [employer]             Depression / Suicide Risk    As you are discharged from this [facility] facility, it is important to learn how to keep safe from harming yourself.    Recognize the warning signs:  · Abrupt changes in personality, positive or negative- including increase in energy   · Giving away possessions  · Change in eating patterns- significant weight changes-  positive or negative  · Change in sleeping patterns- unable to sleep or sleeping all the time   · Unwillingness or inability to communicate  · Depression  · Unusual sadness, discouragement and loneliness  · Talk of wanting to die  · Neglect of personal appearance   · Rebelliousness- reckless behavior  · Withdrawal from people/activities they love  · Confusion- inability to concentrate     If you or a loved one observes any of these behaviors or has concerns about self-harm, here's what you can do:  · Talk about it- your feelings and reasons for harming yourself  · Remove any means that you might use to hurt yourself (examples: pills, rope, extension cords, firearm)  · Get professional help from the community (Mental Health, Substance Abuse, psychological counseling)  · Do not  be alone:Call your Safe Contact- someone whom you trust who will be there for you.  · Call your local CRISIS HOTLINE 764-9567 or 350-464-6349  · Call your local Children's Mobile Crisis Response Team [place] (845) 172-8137 or www.Devtoo  · Call the toll free National Suicide Prevention Hotlines   · National Suicide Prevention Lifeline 457-516-EYCQ (4078)  · National [place] Network 800-SUICIDE (483-7212)

## 2021-06-20 ASSESSMENT — PAIN SCALES - GENERAL: PAIN_LEVEL: 0

## 2021-06-20 NOTE — ANESTHESIA POSTPROCEDURE EVALUATION
Patient: Madeline Dorantes    Procedure Summary     Date: 06/16/21 Room / Location: [facility] - INTERVENTIONAL - [facility]AL MEDICAL [facility]    Anesthesia Start: 1407 Anesthesia Stop: 1556    Procedure: IR-CERV CAROTID STENT WITH PROTECTION Diagnosis:       Left carotid stenosis      (left ICA stenosis)      (radial access, non nickel stent if possible)    Scheduled Providers: Santi Yeboah M.D.; Westley William D.O.; Jose Miguel Patel M.D. Responsible Provider: Jose Miguel Patel M.D.    Anesthesia Type: MAC ASA Status: 3          Final Anesthesia Type: MAC  Last vitals  /59       Temp 36.1C       Pulse 67      Resp 22      SpO2 97%         Anesthesia Post Evaluation    Patient location during evaluation: PACU  Patient participation: complete - patient participated  Level of consciousness: awake and alert  Pain score: 0    Airway patency: patent  Anesthetic complications: no  Cardiovascular status: hemodynamically stable  Respiratory status: acceptable  Hydration status: euvolemic    PONV: none          No complications documented.     Nurse Pain Score: 0 (NPRS)

## 2021-06-25 ENCOUNTER — ANTICOAGULATION MONITORING (OUTPATIENT)
Dept: VASCULAR LAB | Facility: MEDICAL CENTER | Age: 81
End: 2021-06-25

## 2021-06-25 DIAGNOSIS — I48.21 PERMANENT ATRIAL FIBRILLATION (HCC): ICD-10-CM

## 2021-06-25 DIAGNOSIS — Z79.01 LONG TERM CURRENT USE OF ANTICOAGULANT THERAPY: ICD-10-CM

## 2021-06-25 LAB — INR PPP: 1.9 (ref 2–3.5)

## 2021-06-25 NOTE — PROGRESS NOTES
Anticoagulation Summary  As of 2021    INR goal:  2.5-3.0   TTR:  59.0 % (6 y)   INR used for dosin.90 (2021)   Warfarin maintenance plan:  4.5 mg (3 mg x 1.5) every Sun; 3 mg (3 mg x 1) all other days   Weekly warfarin total:  22.5 mg   Plan last modified:  Clarisse Jean BaptisteD (3/12/2021)   Next INR check:     Priority:  Maintenance   Target end date:  Indefinite    Indications    Permanent atrial fibrillation (HCC) [I48.21]  Long term current use of anticoagulant therapy [Z79.01]             Anticoagulation Episode Summary     INR check location:  Home Draw    Preferred lab:      Send INR reminders to:      Comments:  Waqar [facility] [phone] -   goal range changed to 2.5-3.2 per raghu vaca 2019       Anticoagulation Care Providers     Provider Role Specialty Phone number    Hu Gamez M.D. Referring Cardiac Electrophysiology [phone]    [facility] Anticoagulation Services Responsible  [phone]        Anticoagulation Patient Findings          Left voicemail for patient today regarding subtherapeutic INR of 1.9. Instructed patient to call clinic with any questions or concerns.    PT is to bolus today (total 4.5 mg) and then continue with current regimen as detailed above.     Follow up in 1 weeks, to reduce risk of adverse events related to this high risk medication,  Warfarin.    Will Hogan, Pharmacy Intern      Discussed with Kristopher RobbD

## 2021-06-30 ENCOUNTER — HOSPITAL ENCOUNTER (OUTPATIENT)
Dept: RADIOLOGY | Facility: MEDICAL CENTER | Age: 81
End: 2021-06-30
Attending: NURSE PRACTITIONER
Payer: MEDICARE

## 2021-06-30 DIAGNOSIS — I65.29 STENOSIS OF CAROTID ARTERY, UNSPECIFIED LATERALITY: ICD-10-CM

## 2021-06-30 ASSESSMENT — ENCOUNTER SYMPTOMS
HEMOPTYSIS: 0
FOCAL WEAKNESS: 0
CONSTITUTIONAL NEGATIVE: 1
SENSORY CHANGE: 0
BLOOD IN STOOL: 0
WEAKNESS: 0
BRUISES/BLEEDS EASILY: 1
SPEECH CHANGE: 0

## 2021-06-30 ASSESSMENT — LIFESTYLE VARIABLES: SUBSTANCE_ABUSE: 0
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
## 2021-07-01 LAB — INR PPP: 2.6 (ref 2–3.5)

## 2021-07-02 ENCOUNTER — ANTICOAGULATION MONITORING (OUTPATIENT)
Dept: VASCULAR LAB | Facility: MEDICAL CENTER | Age: 81
End: 2021-07-02

## 2021-07-02 DIAGNOSIS — Z79.01 LONG TERM CURRENT USE OF ANTICOAGULANT THERAPY: ICD-10-CM

## 2021-07-02 DIAGNOSIS — I48.21 PERMANENT ATRIAL FIBRILLATION (HCC): ICD-10-CM

## 2021-07-02 NOTE — PROGRESS NOTES
Anticoagulation Summary  As of 2021    INR goal:  2.5-3.0   TTR:  58.9 % (6 y)   INR used for dosin.60 (2021)   Warfarin maintenance plan:  4.5 mg (3 mg x 1.5) every Sun; 3 mg (3 mg x 1) all other days   Weekly warfarin total:  22.5 mg   Plan last modified:  Clarisse Jean BaptisteD (3/12/2021)   Next INR check:  2021   Priority:  Maintenance   Target end date:  Indefinite    Indications    Permanent atrial fibrillation (HCC) [I48.21]  Long term current use of anticoagulant therapy [Z79.01]             Anticoagulation Episode Summary     INR check location:  Home Draw    Preferred lab:      Send INR reminders to:      Comments:  Waqar [facility] [phone] -   goal range changed to 2.5-3.2 per raghu black 2019   Pt on ASA due to artery stent.  Evaluate again 2022.      Anticoagulation Care Providers     Provider Role Specialty Phone number    Hu Gamez M.D. Referring Cardiac Electrophysiology [phone]    [facility] Anticoagulation Services Responsible  [phone]        Anticoagulation Patient Findings    Left voicemail message to report a therapeutic INR of 2.6.  Pt to continue with current warfarin dosing regimen. Requested pt contact the clinic for any s/s of unusual bleeding, bruising, clotting or any changes to diet or medication. FU 1 weeks.  Kristopher Escobar, ClarisseD, BCACP

## 2021-07-08 ENCOUNTER — ANTICOAGULATION MONITORING (OUTPATIENT)
Dept: VASCULAR LAB | Facility: MEDICAL CENTER | Age: 81
End: 2021-07-08

## 2021-07-08 DIAGNOSIS — I48.21 PERMANENT ATRIAL FIBRILLATION (HCC): ICD-10-CM

## 2021-07-08 DIAGNOSIS — Z79.01 LONG TERM CURRENT USE OF ANTICOAGULANT THERAPY: ICD-10-CM

## 2021-07-08 LAB — INR PPP: 3 (ref 2–3.5)

## 2021-07-08 NOTE — PROGRESS NOTES
Anticoagulation Summary  As of 7/8/2021      INR goal:  2.5-3.0   TTR:  59.0 % (6 y)   INR used for dosing:  3.00 (7/8/2021)   Warfarin maintenance plan:  4.5 mg (3 mg x 1.5) every Sun; 3 mg (3 mg x 1) all other days   Weekly warfarin total:  22.5 mg   Plan last modified:  Skip Gonzalez, PharmD (3/12/2021)   Next INR check:  7/22/2021   Priority:  Maintenance   Target end date:  Indefinite    Indications    Permanent atrial fibrillation (HCC) [I48.21]  Long term current use of anticoagulant therapy [Z79.01]                 Anticoagulation Episode Summary       INR check location:  Home Draw    Preferred lab:      Send INR reminders to:      Comments:  Waqar [facility] [phone] -   goal range changed to 2.5-3.2 per raghu black 8/8/2019   Pt on ASA due to artery stent.  Evaluate again June 2022.          Anticoagulation Care Providers       Provider Role Specialty Phone number    Hu Gamez M.D. Referring Cardiac Electrophysiology [phone]    [facility] Anticoagulation Services Responsible  [phone]          Anticoagulation Patient Findings     Left voicemail message to report a therapeutic INR of 3.00.  Pt to continue with current warfarin dosing regimen. Requested pt contact the clinic for any s/s of unusual bleeding, bruising, clotting or any changes to diet or medication. FU 2 weeks.       Davina Santiago, Pharmacy Intern

## 2021-07-22 ENCOUNTER — ANTICOAGULATION MONITORING (OUTPATIENT)
Dept: VASCULAR LAB | Facility: MEDICAL CENTER | Age: 81
End: 2021-07-22

## 2021-07-22 DIAGNOSIS — Z79.01 LONG TERM CURRENT USE OF ANTICOAGULANT THERAPY: ICD-10-CM

## 2021-07-22 DIAGNOSIS — I48.21 PERMANENT ATRIAL FIBRILLATION (HCC): ICD-10-CM

## 2021-07-22 LAB — INR PPP: 3.4 (ref 2–3.5)

## 2021-07-23 NOTE — PROGRESS NOTES
OP Anticoagulation Service Note    Date: 7/22/2021    Anticoagulation Summary  As of 7/22/2021    INR goal:  2.5-3.0   TTR:  58.6 % (6.1 y)   INR used for dosing:  3.40 (7/22/2021)   Warfarin maintenance plan:  4.5 mg (3 mg x 1.5) every Sun; 3 mg (3 mg x 1) all other days   Weekly warfarin total:  22.5 mg   Plan last modified:  Skip Gonzalez, PharmD (3/12/2021)   Next INR check:     Priority:  Maintenance   Target end date:  Indefinite    Indications    Permanent atrial fibrillation (HCC) [I48.21]  Long term current use of anticoagulant therapy [Z79.01]             Anticoagulation Episode Summary     INR check location:  Home Draw    Preferred lab:      Send INR reminders to:      Comments:  Waqar  - [phone] -   goal range changed to 2.5-3.2 per raghu black 8/8/2019   Pt on ASA due to artery stent.  Evaluate again June 2022.      Anticoagulation Care Providers     Provider Role Specialty Phone number    Hu Gamez M.D. Referring Cardiac Electrophysiology [phone]    [facility] Anticoagulation Services Responsible  [phone]        Anticoagulation Patient Findings      HPI:   The reason for today's call is to prevent morbidity and mortality from a blood clot and/or stroke and to reduce the risk of bleeding while on a anticoagulant.     PCP:  Jagruti Heath P.A.-C.  [address] [facility] [id number]  [facility] [address]    Assessment:     • INR  supra-therapeutic.       Current Outpatient Medications:   •  aspirin, 81 mg, Oral, DAILY  •  metoprolol SR, 25 mg, Oral, Q EVENING  •  lamoTRIgine, 25 mg, Oral, QHS  •  Apoaequorin (PREVAGEN PO), 1 tablet, Oral, DAILY  •  omeprazole, 20 mg, Oral, QAM  •  losartan, 100 mg, Oral, DAILY  •  warfarin, Take one to one and one-half tablets by mouth one time daily or as directed by coumadin clinic  •  Magnesium, 2 tablet, Oral, QHS  •  Melatonin, 2 capsule, Oral, QHS  •  cyclosporin, 1 Drop, Both Eyes, BID  •  cyanocobalamin, 1,000 mcg, Intramuscular, Q7 DAYS  •   Carboxymethylcellulose Sodium (REFRESH CELLUVISC OP), 1 Drop, Ophthalmic, PRN  •  levothyroxine, 88 mcg, Oral, AM ES  •  estradiol, Insert  into the vagina see administration instructions. 3 times week  Indications: Vulvovaginal Atrophy  •  levothyroxine, 100 mcg, Oral, AM ES  •  liothyronine, 5 mcg, Oral, QAM  •  CALCIUM 600-D PO, 1,200 mg, Oral, DAILY  •  POTASSIUM ACETATE, 550 mg, Oral, Q EVENING      Plan:     • Hold today then Continue the same warfarin dose, as noted above.       Follow-up:     • Our protocol suggests we test in 2 weeks.        Additional information discussed with patient:     • Asked patient to please call the anticoagulation clinic if they have any signs/symptoms of bleeding and/or thrombosis or any changes to diet or medications.      National recommendations regarding anticoagulation therapy:     The CHEST guidelines recommends frequent INR monitoring at regular intervals (a few days up to a max of 12 weeks) to ensure patients are on the proper dose of warfarin, and patients are not having any complications from therapy.  INRs can dramatically change over a short time period due to diet, medications, and medical conditions.       Skip Gonzalez, PharmD, MS, BCACP, [facility]  Phone [phone] fax [phone]    This note was created using voice recognition software (Dragon). The accuracy of the dictation is limited by the abilities of the software. I have reviewed the note prior to signing, however some errors in grammar and context are still possible. If you have any questions related to this note please do not hesitate to contact our office.

## 2021-08-02 ENCOUNTER — SLEEP CENTER VISIT (OUTPATIENT)
Dept: SLEEP MEDICINE | Facility: MEDICAL CENTER | Age: 81
End: 2021-08-02
Payer: MEDICARE

## 2021-08-02 VITALS
OXYGEN SATURATION: 97 % | SYSTOLIC BLOOD PRESSURE: 130 MMHG | HEIGHT: 67 IN | BODY MASS INDEX: 23.54 KG/M2 | WEIGHT: 150 LBS | RESPIRATION RATE: 16 BRPM | DIASTOLIC BLOOD PRESSURE: 60 MMHG | HEART RATE: 87 BPM

## 2021-08-02 DIAGNOSIS — I10 ESSENTIAL HYPERTENSION: ICD-10-CM

## 2021-08-02 DIAGNOSIS — Z95.0 CARDIAC PACEMAKER IN SITU: Chronic | ICD-10-CM

## 2021-08-02 DIAGNOSIS — Z87.891 FORMER SMOKER: ICD-10-CM

## 2021-08-02 DIAGNOSIS — G47.33 OBSTRUCTIVE SLEEP APNEA: ICD-10-CM

## 2021-08-02 DIAGNOSIS — I48.21 PERMANENT ATRIAL FIBRILLATION (HCC): Chronic | ICD-10-CM

## 2021-08-02 PROCEDURE — 99213 OFFICE O/P EST LOW 20 MIN: CPT | Performed by: NURSE PRACTITIONER
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
## 2021-08-13 LAB — INR PPP: 2.3 (ref 2–3.5)

## 2021-08-16 ENCOUNTER — ANTICOAGULATION MONITORING (OUTPATIENT)
Dept: MEDICAL GROUP | Facility: MEDICAL CENTER | Age: 81
End: 2021-08-16

## 2021-08-16 DIAGNOSIS — Z79.01 LONG TERM CURRENT USE OF ANTICOAGULANT THERAPY: ICD-10-CM

## 2021-08-16 DIAGNOSIS — I48.21 PERMANENT ATRIAL FIBRILLATION (HCC): ICD-10-CM

## 2021-08-16 NOTE — PROGRESS NOTES
Anticoagulation Summary  As of 2021    INR goal:  2.5-3.0   TTR:  58.5 % (6.1 y)   INR used for dosin.30 (2021)   Warfarin maintenance plan:  4.5 mg (3 mg x 1.5) every Sun; 3 mg (3 mg x 1) all other days   Weekly warfarin total:  22.5 mg   Plan last modified:  Skip Gonzalez, PharmD (3/12/2021)   Next INR check:  2021   Priority:  Maintenance   Target end date:  Indefinite    Indications    Permanent atrial fibrillation (HCC) [I48.21]  Long term current use of anticoagulant therapy [Z79.01]             Anticoagulation Episode Summary     INR check location:  Home Draw    Preferred lab:      Send INR reminders to:      Comments:  Waqar  - [phone] -   goal range changed to 2.5-3.2 per raghu black 2019   Pt on ASA due to artery stent.  Evaluate again 2022.      Anticoagulation Care Providers     Provider Role Specialty Phone number    Hu Gamez M.D. Referring Cardiac Electrophysiology [phone]    [facility] Anticoagulation Services Responsible  [phone]        Anticoagulation Patient Findings     Left voicemail message to report a subtherapeutic INR of 2.3.  Pt was advised to take an extra half a tablet (1.5mg) today if she has not done so already over the weekend, and then continue her regular scheduled regimen. Requested pt contact the clinic for any s/s of unusual bleeding, bruising, clotting or any changes to diet or medication. FU 2 weeks.    Keyon España, Pharmacy Intern

## 2021-08-20 DIAGNOSIS — Z79.01 LONG TERM CURRENT USE OF ANTICOAGULANT THERAPY: ICD-10-CM

## 2021-08-20 RX ORDER — WARFARIN SODIUM 3 MG/1
TABLET ORAL
Qty: 135 TABLET | Refills: 1 | Status: SHIPPED | OUTPATIENT
Start: 2021-08-20 | End: 2022-03-23 | Stop reason: SDUPTHER

## 2021-09-01 ENCOUNTER — ANTICOAGULATION MONITORING (OUTPATIENT)
Dept: VASCULAR LAB | Facility: MEDICAL CENTER | Age: 81
End: 2021-09-01

## 2021-09-01 DIAGNOSIS — Z79.01 LONG TERM CURRENT USE OF ANTICOAGULANT THERAPY: ICD-10-CM

## 2021-09-01 DIAGNOSIS — I48.21 PERMANENT ATRIAL FIBRILLATION (HCC): ICD-10-CM

## 2021-09-01 LAB — INR PPP: 4.5 (ref 2–3.5)

## 2021-09-01 NOTE — PROGRESS NOTES
OP Telephone Anticoagulation Service Note    Date: 2021      Anticoagulation Summary  As of 2021    INR goal:  2.5-3.0   TTR:  58.2 % (6.2 y)   INR used for dosin.50 (2021)   Warfarin maintenance plan:  4.5 mg (3 mg x 1.5) every Sun; 3 mg (3 mg x 1) all other days   Weekly warfarin total:  22.5 mg   Plan last modified:  Skip Gonzalez, PharmD (3/12/2021)   Next INR check:  2021   Priority:  Maintenance   Target end date:  Indefinite    Indications    Permanent atrial fibrillation (HCC) [I48.21]  Long term current use of anticoagulant therapy [Z79.01]             Anticoagulation Episode Summary     INR check location:  Home Draw    Preferred lab:      Send INR reminders to:      Comments:  Waqar  - [phone] -   goal range changed to 2.5-3.2 per raghu black 2019   Pt on ASA due to artery stent.  Evaluate again 2022.      Anticoagulation Care Providers     Provider Role Specialty Phone number    Hu Gamez M.D. Referring Cardiac Electrophysiology [phone]    [facility] Anticoagulation Services Responsible  [phone]        Anticoagulation Patient Findings  Patient Findings     Negatives:  Signs/symptoms of thrombosis, Signs/symptoms of bleeding, Laboratory test error suspected, Change in health, Change in alcohol use, Change in activity, Upcoming invasive procedure, Emergency department visit, Upcoming dental procedure, Missed doses, Extra doses, Change in medications, Change in diet/appetite, Hospital admission, Bruising, Other complaints          INR supratherapeutic at 4.5.  Spoke w/ pt on phone.  Verified regimen w/ pt.  Instructed pt to hold today's dose x1 day, then continue current warfarin regimen.  NO s/s bleeding reported per pt.  NO changes in diet reported per pt.  NO changes in medications reported per pt.  Check INR in 1 week(s).  Instructed pt to call clinic at [phone] if there are any questions.  Pt stated understanding.    Pt is not on antiplatelet  therapy.    Larisa Chung, Pharmacy Intern.  Discussed with Yesenia Ross.

## 2021-09-02 ENCOUNTER — HOSPITAL ENCOUNTER (OUTPATIENT)
Dept: RADIOLOGY | Facility: MEDICAL CENTER | Age: 81
End: 2021-09-02
Attending: NURSE PRACTITIONER
Payer: MEDICARE

## 2021-09-02 DIAGNOSIS — I65.23 BILATERAL CAROTID ARTERY STENOSIS: ICD-10-CM

## 2021-09-02 PROCEDURE — 93880 EXTRACRANIAL BILAT STUDY: CPT

## 2021-09-08 ENCOUNTER — ANTICOAGULATION MONITORING (OUTPATIENT)
Dept: VASCULAR LAB | Facility: MEDICAL CENTER | Age: 81
End: 2021-09-08

## 2021-09-08 DIAGNOSIS — I48.21 PERMANENT ATRIAL FIBRILLATION (HCC): ICD-10-CM

## 2021-09-08 DIAGNOSIS — Z79.01 LONG TERM CURRENT USE OF ANTICOAGULANT THERAPY: ICD-10-CM

## 2021-09-08 LAB — INR PPP: 3.1 (ref 2–3.5)

## 2021-09-08 NOTE — PROGRESS NOTES
Anticoagulation Summary  As of 9/8/2021    INR goal:  2.5-3.0   TTR:  58.0 % (6.2 y)   INR used for dosing:  3.10 (9/8/2021)   Warfarin maintenance plan:  4.5 mg (3 mg x 1.5) every Sun; 3 mg (3 mg x 1) all other days   Weekly warfarin total:  22.5 mg   Plan last modified:  Skip Gonzalez, PharmD (3/12/2021)   Next INR check:  9/15/2021   Priority:  Maintenance   Target end date:  Indefinite    Indications    Permanent atrial fibrillation (HCC) [I48.21]  Long term current use of anticoagulant therapy [Z79.01]             Anticoagulation Episode Summary     INR check location:  Home Draw    Preferred lab:      Send INR reminders to:      Comments:  [facility]aq[facility] [phone] -   goal range changed to 2.5-3.2 per raghu black 8/8/2019   Pt on ASA due to artery stent.  Evaluate again June 2022.      Anticoagulation Care Providers     Provider Role Specialty Phone number    Hu Gamez M.D. Referring Internal Medicine Clinical Cardiac Electrophysiology [phone]    [facility] Anticoagulation Services Responsible  [phone]        Anticoagulation Patient Findings  Patient Findings     Negatives:  Signs/symptoms of thrombosis, Signs/symptoms of bleeding, Laboratory test error suspected, Change in health, Change in alcohol use, Change in activity, Upcoming invasive procedure, Emergency department visit, Upcoming dental procedure, Missed doses, Extra doses, Change in medications, Change in diet/appetite, Hospital admission, Bruising, Other complaints        Spoke with patient today regarding supratherapeutic INR of 3.1.  Patient denies any signs/symptoms of bruising or bleeding or any changes in diet and medications.  Instructed patient to call clinic with any questions or concerns.     Instructed pt to decrease today's dose to 1.5 mg, then continue with regular regimen thereafter.    Follow up in 1 week, to reduce risk of adverse events related to this high risk medication,  Warfarin.    Dr. Escobar appreciated my  recommendation.     Keyon España, Pharmacy Intern

## 2021-09-15 ENCOUNTER — ANTICOAGULATION MONITORING (OUTPATIENT)
Dept: VASCULAR LAB | Facility: MEDICAL CENTER | Age: 81
End: 2021-09-15

## 2021-09-15 DIAGNOSIS — I48.21 PERMANENT ATRIAL FIBRILLATION (HCC): ICD-10-CM

## 2021-09-15 DIAGNOSIS — Z79.01 LONG TERM CURRENT USE OF ANTICOAGULANT THERAPY: ICD-10-CM

## 2021-09-15 LAB — INR PPP: 2.8 (ref 2–3.5)

## 2021-09-16 NOTE — PROGRESS NOTES
Anticoagulation Summary  As of 9/15/2021    INR goal:  2.5-3.0   TTR:  58.0 % (6.2 y)   INR used for dosin.80 (9/15/2021)   Warfarin maintenance plan:  1.5 mg (3 mg x 0.5) every Wed; 3 mg (3 mg x 1) all other days   Weekly warfarin total:  19.5 mg   Plan last modified:  Oscar Chen PharmD (9/15/2021)   Next INR check:  2021   Priority:  Maintenance   Target end date:  Indefinite    Indications    Permanent atrial fibrillation (HCC) [I48.21]  Long term current use of anticoagulant therapy [Z79.01]             Anticoagulation Episode Summary     INR check location:  Home Draw    Preferred lab:      Send INR reminders to:      Comments:  Waqar [facility] [phone] -   goal range changed to 2.5-3.2 per raghu black 2019   Pt on ASA due to artery stent.  Evaluate again 2022.      Anticoagulation Care Providers     Provider Role Specialty Phone number    Hu Gamez M.D. Referring Internal Medicine Clinical Cardiac Electrophysiology [phone]    [facility] Anticoagulation Services Responsible  [phone]          Refer to Anticoagulation Patient Findings for HPI  Patient Findings     Negatives:  Signs/symptoms of thrombosis, Signs/symptoms of bleeding, Laboratory test error suspected, Change in health, Change in alcohol use, Change in activity, Upcoming invasive procedure, Emergency department visit, Upcoming dental procedure, Missed doses, Extra doses, Change in medications, Change in diet/appetite, Hospital admission, Bruising, Other complaints          Spoke with patient via phone to report a therapeutic INR.      Pt is NOT    Pt confirmed taking reduced dose last week as instructed. Pt states she has taken 3mg the rest of the week - including  (4.5mg on schedule) - updated calendar to reflect this change    Pt instructed to continue with current warfarin dosing regimen, confirms dosing.   Will follow up in 1 week(s).     Oscar Chen, Yesenia

## 2021-09-23 ENCOUNTER — ANTICOAGULATION MONITORING (OUTPATIENT)
Dept: VASCULAR LAB | Facility: MEDICAL CENTER | Age: 81
End: 2021-09-23

## 2021-09-23 DIAGNOSIS — I48.21 PERMANENT ATRIAL FIBRILLATION (HCC): ICD-10-CM

## 2021-09-23 DIAGNOSIS — Z79.01 LONG TERM CURRENT USE OF ANTICOAGULANT THERAPY: ICD-10-CM

## 2021-09-23 LAB — INR PPP: 2.2 (ref 2–3.5)

## 2021-09-24 NOTE — PROGRESS NOTES
Anticoagulation Summary  As of 2021    INR goal:  2.5-3.0   TTR:  58.0 % (6.3 y)   INR used for dosin.20 (2021)   Warfarin maintenance plan:  1.5 mg (3 mg x 0.5) every Wed; 3 mg (3 mg x 1) all other days   Weekly warfarin total:  19.5 mg   Plan last modified:  Oscar Chen, PharmD (9/15/2021)   Next INR check:  2021   Priority:  Maintenance   Target end date:  Indefinite    Indications    Permanent atrial fibrillation (HCC) [I48.21]  Long term current use of anticoagulant therapy [Z79.01]             Anticoagulation Episode Summary     INR check location:  Home Draw    Preferred lab:      Send INR reminders to:      Comments:  Waqar [facility] [phone] -   goal range changed to 2.5-3.2 per raghu black 2019   Pt on ASA due to artery stent.  Evaluate again 2022.      Anticoagulation Care Providers     Provider Role Specialty Phone number    Hu Gamez M.D. Referring Internal Medicine Clinical Cardiac Electrophysiology [phone]    [facility] Anticoagulation Services Responsible  [phone]        Anticoagulation Patient Findings  Patient Findings     Positives:  Missed doses    Comments:  Pt reduced dose on  by accident.               Per above, pt will continue with usual regimen.  Test again in 1 week.    Fazal Pineda, PharmD

## 2021-09-30 ENCOUNTER — ANTICOAGULATION MONITORING (OUTPATIENT)
Dept: CARDIOLOGY | Facility: MEDICAL CENTER | Age: 81
End: 2021-09-30

## 2021-09-30 DIAGNOSIS — I48.21 PERMANENT ATRIAL FIBRILLATION (HCC): ICD-10-CM

## 2021-09-30 DIAGNOSIS — Z79.01 LONG TERM CURRENT USE OF ANTICOAGULANT THERAPY: ICD-10-CM

## 2021-09-30 LAB — INR PPP: 3.1 (ref 2–3.5)

## 2021-09-30 NOTE — PROGRESS NOTES
OP Telephone Anticoagulation Service Note    Date: 9/30/2021      Anticoagulation Summary  As of 9/30/2021    INR goal:  2.5-3.0   TTR:  58.0 % (6.3 y)   INR used for dosing:  3.10 (9/30/2021)   Warfarin maintenance plan:  1.5 mg (3 mg x 0.5) every Wed; 3 mg (3 mg x 1) all other days   Weekly warfarin total:  19.5 mg   Plan last modified:  Oscar Chen, PharmD (9/15/2021)   Next INR check:  10/7/2021   Priority:  Maintenance   Target end date:  Indefinite    Indications    Permanent atrial fibrillation (HCC) [I48.21]  Long term current use of anticoagulant therapy [Z79.01]             Anticoagulation Episode Summary     INR check location:  Home Draw    Preferred lab:      Send INR reminders to:      Comments:  Waqar  - [phone] -   goal range changed to 2.5-3.2 per raghu black 8/8/2019   Pt on ASA due to artery stent.  Evaluate again June 2022.      Anticoagulation Care Providers     Provider Role Specialty Phone number    Hu Gamez M.D. Referring Internal Medicine Clinical Cardiac Electrophysiology [phone]    [facility] Anticoagulation Services Responsible  [phone]        Anticoagulation Patient Findings  Patient Findings     Positives:  Change in diet/appetite (Pt enjoyed some cranberry this week.)    Negatives:  Signs/symptoms of thrombosis, Signs/symptoms of bleeding, Laboratory test error suspected, Change in health, Change in alcohol use, Change in activity, Upcoming invasive procedure, Emergency department visit, Upcoming dental procedure, Missed doses, Extra doses, Change in medications, Hospital admission, Bruising, Other complaints          INR SUPRA-therapeutic at 3.1.  Spoke w/ pt on phone.  Verified regimen w/ pt.  Instructed pt to take a decreased dose of 1.5 mg and to then continue on w/ her current regimen.  NO s/s bleeding reported per pt.  NO changes in medications reported per pt.  Check INR in 1 week(s).  Instructed pt to call clinic at [phone] if there are any  questions.  Pt stated understanding.    Pt is on antiplatelet therapy Aspirin 81mg for hx of PCI.    Garth Fortune, ClarisseD

## 2021-10-07 ENCOUNTER — ANTICOAGULATION MONITORING (OUTPATIENT)
Dept: VASCULAR LAB | Facility: MEDICAL CENTER | Age: 81
End: 2021-10-07

## 2021-10-07 DIAGNOSIS — I48.21 PERMANENT ATRIAL FIBRILLATION (HCC): ICD-10-CM

## 2021-10-07 DIAGNOSIS — Z79.01 LONG TERM CURRENT USE OF ANTICOAGULANT THERAPY: ICD-10-CM

## 2021-10-07 LAB — INR PPP: 2.6 (ref 2–3.5)

## 2021-10-07 NOTE — PROGRESS NOTES
Anticoagulation Summary  As of 10/7/2021    INR goal:  2.5-3.0   TTR:  58.1 % (6.3 y)   INR used for dosin.60 (10/6/2021)   Warfarin maintenance plan:  1.5 mg (3 mg x 0.5) every Wed; 3 mg (3 mg x 1) all other days   Weekly warfarin total:  19.5 mg   Plan last modified:  Oscar Chen, PharmD (9/15/2021)   Next INR check:     Priority:  Maintenance   Target end date:  Indefinite    Indications    Permanent atrial fibrillation (HCC) [I48.21]  Long term current use of anticoagulant therapy [Z79.01]             Anticoagulation Episode Summary     INR check location:  Home Draw    Preferred lab:      Send INR reminders to:      Comments:  Waqar [facility] [phone] -   goal range changed to 2.5-3.2 per raghu black 2019   Pt on ASA due to artery stent.  Evaluate again 2022.      Anticoagulation Care Providers     Provider Role Specialty Phone number    Hu Gamez M.D. Referring Internal Medicine Clinical Cardiac Electrophysiology [phone]    [facility] Anticoagulation Services Responsible  [phone]        Anticoagulation Patient Findings          Spoke with Mary to report a therapeutic INR of 2.6. Continue current dosing regimen.    Follow up in 2 weeks, to reduce the risk of adverse events related to this high risk medication, warfarin.    Samantha Amaya, Clinical Pharmacist

## 2021-10-14 LAB — INR PPP: 2.7 (ref 2–3.5)

## 2021-10-15 ENCOUNTER — ANTICOAGULATION MONITORING (OUTPATIENT)
Dept: VASCULAR LAB | Facility: MEDICAL CENTER | Age: 81
End: 2021-10-15

## 2021-10-15 DIAGNOSIS — I48.21 PERMANENT ATRIAL FIBRILLATION (HCC): ICD-10-CM

## 2021-10-15 DIAGNOSIS — Z79.01 LONG TERM CURRENT USE OF ANTICOAGULANT THERAPY: ICD-10-CM

## 2021-10-15 NOTE — PROGRESS NOTES
Anticoagulation Summary  As of 10/15/2021    INR goal:  2.5-3.0   TTR:  58.2 % (6.3 y)   INR used for dosin.70 (10/14/2021)   Warfarin maintenance plan:  1.5 mg (3 mg x 0.5) every Wed; 3 mg (3 mg x 1) all other days   Weekly warfarin total:  19.5 mg   Plan last modified:  Clarisse CuelloD (9/15/2021)   Next INR check:  10/28/2021   Priority:  Maintenance   Target end date:  Indefinite    Indications    Permanent atrial fibrillation (HCC) [I48.21]  Long term current use of anticoagulant therapy [Z79.01]             Anticoagulation Episode Summary     INR check location:  Home Draw    Preferred lab:      Send INR reminders to:      Comments:  Waqar [facility] [phone] -   goal range changed to 2.5-3.2 per raghu black 2019   Pt on ASA due to artery stent.  Evaluate again 2022.      Anticoagulation Care Providers     Provider Role Specialty Phone number    Hu Gamez M.D. Referring Internal Medicine Clinical Cardiac Electrophysiology [phone]    [facility] Anticoagulation Services Responsible  [phone]        Anticoagulation Patient Findings     Left voicemail message to report a therapeutic INR of 2.70.  Pt to continue with current warfarin dosing regimen. Requested pt contact the clinic for any s/s of unusual bleeding, bruising, clotting or any changes to diet or medication. FU 2 weeks.  Charles Kulkarni, Med Ass't        I have reviewed and concur with the above plan.     Skip Gonzalez, PharmD, MS, BCACP, LCC  [facility]  Phone: [phone],  Fax: [phone]  On call: [phone]

## 2021-10-20 ENCOUNTER — ANTICOAGULATION MONITORING (OUTPATIENT)
Dept: VASCULAR LAB | Facility: MEDICAL CENTER | Age: 81
End: 2021-10-20

## 2021-10-20 DIAGNOSIS — I48.21 PERMANENT ATRIAL FIBRILLATION (HCC): ICD-10-CM

## 2021-10-20 DIAGNOSIS — Z79.01 LONG TERM CURRENT USE OF ANTICOAGULANT THERAPY: ICD-10-CM

## 2021-10-20 LAB — INR PPP: 3 (ref 2–3.5)

## 2021-10-20 NOTE — PROGRESS NOTES
Anticoagulation Summary  As of 10/20/2021    INR goal:  2.5-3.0   TTR:  58.3 % (6.3 y)   INR used for dosing:  3.00 (10/20/2021)   Warfarin maintenance plan:  1.5 mg (3 mg x 0.5) every Wed; 3 mg (3 mg x 1) all other days   Weekly warfarin total:  19.5 mg   Plan last modified:  Clarisse CuelloD (9/15/2021)   Next INR check:  10/27/2021   Priority:  Maintenance   Target end date:  Indefinite    Indications    Permanent atrial fibrillation (HCC) [I48.21]  Long term current use of anticoagulant therapy [Z79.01]             Anticoagulation Episode Summary     INR check location:  Home Draw    Preferred lab:      Send INR reminders to:      Comments:  Waqar  - [phone] -   goal range changed to 2.5-3.2 per raghu black 8/8/2019   Pt on ASA due to artery stent.  Evaluate again June 2022.      Anticoagulation Care Providers     Provider Role Specialty Phone number    Hu Gamez M.D. Referring Internal Medicine Clinical Cardiac Electrophysiology [phone]    [facility] Anticoagulation Services Responsible  [phone]        Anticoagulation Patient Findings    Spoke with patient today regarding therapeutic INR of 3.0.  Patient denies any signs/symptoms of bruising or bleeding or any changes in diet and medications.  Instructed patient to call clinic with any questions or concerns.    Pt is on ASA 81mg  as antiplatelet therapy for CAD and must be reviewed again on with cards.    Pt is to continue with current warfarin dosing regimen.  Follow up in 2 weeks, to reduce risk of adverse events related to this high risk medication,  Warfarin.    Kristopher Escobar, PharmD, BCACP

## 2021-10-21 ENCOUNTER — NON-PROVIDER VISIT (OUTPATIENT)
Dept: CARDIOLOGY | Facility: PHYSICIAN GROUP | Age: 81
End: 2021-10-21
Payer: MEDICARE

## 2021-10-21 VITALS
RESPIRATION RATE: 16 BRPM | WEIGHT: 154 LBS | HEIGHT: 68 IN | HEART RATE: 72 BPM | DIASTOLIC BLOOD PRESSURE: 68 MMHG | OXYGEN SATURATION: 99 % | BODY MASS INDEX: 23.34 KG/M2 | SYSTOLIC BLOOD PRESSURE: 132 MMHG

## 2021-10-21 DIAGNOSIS — I48.21 PERMANENT ATRIAL FIBRILLATION (HCC): Chronic | ICD-10-CM

## 2021-10-21 DIAGNOSIS — I44.2 THIRD DEGREE AV BLOCK (HCC): Chronic | ICD-10-CM

## 2021-10-21 DIAGNOSIS — Z98.890 S/P AV NODAL ABLATION: ICD-10-CM

## 2021-10-21 DIAGNOSIS — Z79.01 LONG TERM CURRENT USE OF ANTICOAGULANT THERAPY: Chronic | ICD-10-CM

## 2021-10-21 DIAGNOSIS — Z95.0 PACEMAKER-DEPENDENT DUE TO NATIVE CARDIAC RHYTHM INSUFFICIENT TO SUPPORT LIFE: Chronic | ICD-10-CM

## 2021-10-21 DIAGNOSIS — I49.8 PACEMAKER-DEPENDENT DUE TO NATIVE CARDIAC RHYTHM INSUFFICIENT TO SUPPORT LIFE: Chronic | ICD-10-CM

## 2021-10-21 DIAGNOSIS — Z95.0 CARDIAC PACEMAKER IN SITU: Chronic | ICD-10-CM

## 2021-10-21 PROCEDURE — 93279 PRGRMG DEV EVAL PM/LDLS PM: CPT | Performed by: NURSE PRACTITIONER

## 2021-10-21 NOTE — PROGRESS NOTES
In June 2021, she had left ICA stent placement.    She is here today for PM interrogation.    Device is working normally. No ventricular high rate episodes.  Normal capture of RV lead; unable to measure R waves, due to AV ablation; stable impedance. Battery longevity is 3.5 years.  No changes are made today.    FU in 6 months for next PM check with me, as well as annual follow-up with Dr. Ghotra.

## 2021-11-05 ENCOUNTER — ANTICOAGULATION MONITORING (OUTPATIENT)
Dept: VASCULAR LAB | Facility: MEDICAL CENTER | Age: 81
End: 2021-11-05

## 2021-11-05 DIAGNOSIS — Z79.01 LONG TERM CURRENT USE OF ANTICOAGULANT THERAPY: ICD-10-CM

## 2021-11-05 DIAGNOSIS — I48.21 PERMANENT ATRIAL FIBRILLATION (HCC): ICD-10-CM

## 2021-11-05 LAB — INR PPP: 2 (ref 2–3.5)

## 2021-11-05 NOTE — PROGRESS NOTES
Anticoagulation Summary  As of 2021    INR goal:  2.5-3.0   TTR:  58.3 % (6.4 y)   INR used for dosin.00 (2021)   Warfarin maintenance plan:  1.5 mg (3 mg x 0.5) every Wed; 3 mg (3 mg x 1) all other days   Weekly warfarin total:  19.5 mg   Plan last modified:  Oscar Chen PharmD (9/15/2021)   Next INR check:  2021   Priority:  Maintenance   Target end date:  Indefinite    Indications    Permanent atrial fibrillation (HCC) [I48.21]  Long term current use of anticoagulant therapy [Z79.01]             Anticoagulation Episode Summary     INR check location:  Home Draw    Preferred lab:      Send INR reminders to:      Comments:  Waqar [facility] [phone] -   goal range changed to 2.5-3.2 per raghu black 2019   Pt on ASA due to artery stent.  Evaluate again 2022.      Anticoagulation Care Providers     Provider Role Specialty Phone number    Hu Gamez M.D. Referring Internal Medicine Clinical Cardiac Electrophysiology [phone]    [facility] Anticoagulation Services Responsible  [phone]          Refer to Anticoagulation Patient Findings for HPI  Patient Findings     Positives:  Missed doses    Negatives:  Signs/symptoms of thrombosis, Signs/symptoms of bleeding, Laboratory test error suspected, Change in health, Change in alcohol use, Change in activity, Upcoming invasive procedure, Emergency department visit, Upcoming dental procedure, Extra doses, Change in medications, Change in diet/appetite, Hospital admission, Bruising, Other complaints          Spoke with pt.  INR is SUB therapeutic.     Pt verifies warfarin weekly dosing.   Pt missed dose this week    Will have pt BOLUS 1x to 4.5mg then continue regimen    Repeat INR in 1 week(s).     Oscar Chen, ClarisseD

## 2021-11-12 ENCOUNTER — ANTICOAGULATION MONITORING (OUTPATIENT)
Dept: VASCULAR LAB | Facility: MEDICAL CENTER | Age: 81
End: 2021-11-12

## 2021-11-12 DIAGNOSIS — Z79.01 LONG TERM CURRENT USE OF ANTICOAGULANT THERAPY: ICD-10-CM

## 2021-11-12 DIAGNOSIS — I48.21 PERMANENT ATRIAL FIBRILLATION (HCC): ICD-10-CM

## 2021-11-12 LAB — INR PPP: 2.8 (ref 2–3.5)

## 2021-11-12 NOTE — PROGRESS NOTES
Anticoagulation Summary  As of 2021    INR goal:  2.5-3.0   TTR:  58.2 % (6.4 y)   INR used for dosin.80 (2021)   Warfarin maintenance plan:  1.5 mg (3 mg x 0.5) every Wed; 3 mg (3 mg x 1) all other days   Weekly warfarin total:  19.5 mg   Plan last modified:  Oscar Chen, PharmD (9/15/2021)   Next INR check:  2021   Priority:  Maintenance   Target end date:  Indefinite    Indications    Permanent atrial fibrillation (HCC) [I48.21]  Long term current use of anticoagulant therapy [Z79.01]             Anticoagulation Episode Summary     INR check location:  Home Draw    Preferred lab:      Send INR reminders to:      Comments:  Waqar [facility] [phone] -   goal range changed to 2.5-3.2 per raghu black 2019   Pt on ASA due to artery stent.  Evaluate again 2022.      Anticoagulation Care Providers     Provider Role Specialty Phone number    Hu Gamez M.D. Referring Internal Medicine Clinical Cardiac Electrophysiology [phone]    [facility] Anticoagulation Services Responsible  [phone]          Refer to Anticoagulation Patient Findings for HPI  Patient Findings     Negatives:  Signs/symptoms of thrombosis, Signs/symptoms of bleeding, Laboratory test error suspected, Change in health, Change in alcohol use, Change in activity, Upcoming invasive procedure, Emergency department visit, Upcoming dental procedure, Missed doses, Extra doses, Change in medications, Change in diet/appetite, Hospital admission, Bruising, Other complaints          Spoke with patient to report a therapeutic INR.      Pt is NOT on antiplatelet therapy.   Pt instructed to continue with current warfarin dosing regimen, confirms dosing.   Will follow up in 2 week(s).       Mavis Hylton, Pharmacy Intern

## 2021-11-26 ENCOUNTER — PATIENT MESSAGE (OUTPATIENT)
Dept: CARDIOLOGY | Facility: PHYSICIAN GROUP | Age: 81
End: 2021-11-26

## 2021-11-26 LAB — INR PPP: 2.6 (ref 2–3.5)

## 2021-11-27 LAB — INR PPP: 2.6 (ref 2–3.5)

## 2021-11-29 ENCOUNTER — ANTICOAGULATION MONITORING (OUTPATIENT)
Dept: MEDICAL GROUP | Facility: MEDICAL CENTER | Age: 81
End: 2021-11-29

## 2021-11-29 DIAGNOSIS — Z79.01 LONG TERM CURRENT USE OF ANTICOAGULANT THERAPY: ICD-10-CM

## 2021-11-29 DIAGNOSIS — I48.21 PERMANENT ATRIAL FIBRILLATION (HCC): ICD-10-CM

## 2021-11-29 NOTE — PROGRESS NOTES
Anticoagulation Summary  As of 2021    INR goal:  2.5-3.0   TTR:  58.5 % (6.4 y)   INR used for dosin.60 (2021)   Warfarin maintenance plan:  1.5 mg (3 mg x 0.5) every Wed; 3 mg (3 mg x 1) all other days   Weekly warfarin total:  19.5 mg   Plan last modified:  Clarisse CuelloD (9/15/2021)   Next INR check:  12/10/2021   Priority:  Maintenance   Target end date:  Indefinite    Indications    Permanent atrial fibrillation (HCC) [I48.21]  Long term current use of anticoagulant therapy [Z79.01]             Anticoagulation Episode Summary     INR check location:  Home Draw    Preferred lab:      Send INR reminders to:      Comments:  Waqar [facility] [phone] -   goal range changed to 2.5-3.2 per raghu black 2019   Pt on ASA due to artery stent.  Evaluate again 2022.      Anticoagulation Care Providers     Provider Role Specialty Phone number    Hu Gamez M.D. Referring Internal Medicine Clinical Cardiac Electrophysiology [phone]    [facility] Anticoagulation Services Responsible  [phone]          Refer to Anticoagulation Patient Findings for HPI  Patient Findings     Negatives:  Signs/symptoms of thrombosis, Signs/symptoms of bleeding, Laboratory test error suspected, Change in health, Change in alcohol use, Change in activity, Upcoming invasive procedure, Emergency department visit, Upcoming dental procedure, Missed doses, Extra doses, Change in medications, Change in diet/appetite, Hospital admission, Bruising, Other complaints          Spoke with patient  to report a therapeutic INR.        Pt instructed to continue with current warfarin dosing regimen, confirms dosing.   Will follow up in 2 week(s).     Vandana Crabtree, PharmD

## 2021-11-30 NOTE — PROGRESS NOTES
OP Anticoagulation Service Note    Date: 2021    Anticoagulation Summary  As of 2021    INR goal:  2.5-3.0   TTR:  58.5 % (6.4 y)   INR used for dosin.60 (2021)   Warfarin maintenance plan:  1.5 mg (3 mg x 0.5) every Wed; 3 mg (3 mg x 1) all other days   Weekly warfarin total:  19.5 mg   Plan last modified:  Oscar Chen, PharmD (9/15/2021)   Next INR check:  2021   Priority:  Maintenance   Target end date:  Indefinite    Indications    Permanent atrial fibrillation (HCC) [I48.21]  Long term current use of anticoagulant therapy [Z79.01]             Anticoagulation Episode Summary     INR check location:  Home Draw    Preferred lab:      Send INR reminders to:      Comments:  Waqar  - [phone] -   goal range changed to 2.5-3.2 per raghu black 2019   Pt on ASA due to artery stent.  Evaluate again 2022.      Anticoagulation Care Providers     Provider Role Specialty Phone number    Hu Gamez M.D. Referring Internal Medicine Clinical Cardiac Electrophysiology [phone]    [facility] Anticoagulation Services Responsible  [phone]        Anticoagulation Patient Findings          HPI:   The reason for today's call is to prevent morbidity and mortality from a blood clot and/or stroke and to reduce the risk of bleeding while on a anticoagulant.     PCP:  Jagruti Heath P.A.-C.  [id number]  [facility] [address]    Assessment:     • INR  therapeutic.       Current Outpatient Medications:   •  warfarin, Take one to one and one-half tablets by mouth daily or as directed by anticoagulation clinic  •  aspirin, 81 mg, Oral, DAILY (Patient not taking: Reported on 10/21/2021)  •  metoprolol SR, 25 mg, Oral, Q EVENING  •  lamoTRIgine, 25 mg, Oral, QHS  •  Apoaequorin (PREVAGEN PO), 1 Tablet, Oral, DAILY  •  omeprazole, 20 mg, Oral, QAM  •  losartan, 100 mg, Oral, DAILY  •  Magnesium, 2 Tablet, Oral, QHS  •  Melatonin, 2 Capsule, Oral, QHS  •  cyclosporin, 1  Drop, Both Eyes, BID  •  cyanocobalamin, 1,000 mcg, Intramuscular, Q7 DAYS  •  Carboxymethylcellulose Sodium (REFRESH CELLUVISC OP), 1 Drop, Ophthalmic, PRN  •  levothyroxine, 88 mcg, Oral, AM ES  •  estradiol, Insert  into the vagina see administration instructions. 3 times week  Indications: Vulvovaginal Atrophy  •  levothyroxine, 100 mcg, Oral, AM ES  •  liothyronine, 5 mcg, Oral, QAM  •  CALCIUM 600-D PO, 1,200 mg, Oral, DAILY  •  POTASSIUM ACETATE, 550 mg, Oral, Q EVENING      Plan:     • Continue the same warfarin dose, as noted above.       Follow-up:     • Our protocol suggests we test in 2 weeks.        Additional information discussed with patient:     • Asked patient to please call the anticoagulation clinic if they have any signs/symptoms of bleeding and/or thrombosis or any changes to diet or medications.      National recommendations regarding anticoagulation therapy:     The CHEST guidelines recommends frequent INR monitoring at regular intervals (a few days up to a max of 12 weeks) to ensure patients are on the proper dose of warfarin, and patients are not having any complications from therapy.  INRs can dramatically change over a short time period due to diet, medications, and medical conditions.     [facility]  Phone [phone] fax [phone]    This note was created using voice recognition software (Dragon). The accuracy of the dictation is limited by the abilities of the software. I have reviewed the note prior to signing, however some errors in grammar and context are still possible. If you have any questions related to this note please do not hesitate to contact our office.

## 2021-12-01 NOTE — PATIENT COMMUNICATION
[email or web address] message relayed to [facility] for follow up.    Replied to pt via Fewzion regarding findings.

## 2021-12-06 ENCOUNTER — PATIENT MESSAGE (OUTPATIENT)
Dept: CARDIOLOGY | Facility: PHYSICIAN GROUP | Age: 81
End: 2021-12-06

## 2021-12-08 NOTE — PATIENT COMMUNICATION
Spoke with patient--advised transmission was received in November and next scheduled transmission is in February 2022-- patient will transmit at that time.  Verbalized understanding.

## 2021-12-10 ENCOUNTER — ANTICOAGULATION MONITORING (OUTPATIENT)
Dept: VASCULAR LAB | Facility: MEDICAL CENTER | Age: 81
End: 2021-12-10

## 2021-12-10 DIAGNOSIS — Z79.01 LONG TERM CURRENT USE OF ANTICOAGULANT THERAPY: ICD-10-CM

## 2021-12-10 DIAGNOSIS — I48.21 PERMANENT ATRIAL FIBRILLATION (HCC): ICD-10-CM

## 2021-12-10 LAB — INR PPP: 2.4 (ref 2–3.5)

## 2021-12-10 NOTE — PROGRESS NOTES
Anticoagulation Summary  As of 12/10/2021    INR goal:  2.5-3.0   TTR:  58.4 % (6.5 y)   INR used for dosin.40 (12/10/2021)   Warfarin maintenance plan:  1.5 mg (3 mg x 0.5) every Wed; 3 mg (3 mg x 1) all other days   Weekly warfarin total:  19.5 mg   Plan last modified:  Skip Gonzalez, PharmD (2021)   Next INR check:  2021   Priority:  Maintenance   Target end date:  Indefinite    Indications    Permanent atrial fibrillation (HCC) [I48.21]  Long term current use of anticoagulant therapy [Z79.01]             Anticoagulation Episode Summary     INR check location:  Home Draw    Preferred lab:      Send INR reminders to:      Comments:  Waqar  - [phone] -   goal range changed to 2.5-3.2 per raghu black 2019   Pt on ASA due to artery stent.  Evaluate again 2022.      Anticoagulation Care Providers     Provider Role Specialty Phone number    Hu Gamez M.D. Referring Internal Medicine Clinical Cardiac Electrophysiology [phone]    [facility] Anticoagulation Services Responsible  [phone]          Refer to Anticoagulation Patient Findings for HPI  Patient Findings     Positives:  Missed doses (Pt missed dose, pt not sure what day. )    Negatives:  Signs/symptoms of thrombosis, Signs/symptoms of bleeding, Laboratory test error suspected, Change in health, Change in alcohol use, Change in activity, Upcoming invasive procedure, Emergency department visit, Upcoming dental procedure, Extra doses, Change in medications, Change in diet/appetite, Hospital admission, Bruising, Other complaints          Spoke with patient.  INR is SUB therapeutic.     Pt verifies warfarin weekly dosing.     Pt is NOT on antiplatelet therapy.    Will have pt BOLUS with 4.5mg tonight, then patient to continue current regimen. Patient verified understanding.     Repeat INR in 2 week(s).     Mavis Hylton, Pharmacy Intern  Discussed with Garth Fortune, ClarisseD

## 2021-12-22 LAB — INR PPP: 2.9 (ref 2–3.5)

## 2022-01-07 LAB — INR PPP: 3.1 (ref 2–3.5)

## 2022-01-10 ENCOUNTER — ANTICOAGULATION MONITORING (OUTPATIENT)
Dept: VASCULAR LAB | Facility: MEDICAL CENTER | Age: 82
End: 2022-01-10

## 2022-01-10 DIAGNOSIS — I48.21 PERMANENT ATRIAL FIBRILLATION (HCC): ICD-10-CM

## 2022-01-10 DIAGNOSIS — Z79.01 LONG TERM CURRENT USE OF ANTICOAGULANT THERAPY: ICD-10-CM

## 2022-01-10 NOTE — PROGRESS NOTES
Anticoagulation Summary  As of 1/10/2022    INR goal:  2.5-3.0   TTR:  58.5 % (6.5 y)   INR used for dosing:  3.10 (1/7/2022)   Warfarin maintenance plan:  1.5 mg (3 mg x 0.5) every Wed; 3 mg (3 mg x 1) all other days   Weekly warfarin total:  19.5 mg   Plan last modified:  Clarisse Jean BaptisteD (11/29/2021)   Next INR check:     Priority:  Maintenance   Target end date:  Indefinite    Indications    Permanent atrial fibrillation (HCC) [I48.21]  Long term current use of anticoagulant therapy [Z79.01]             Anticoagulation Episode Summary     INR check location:  Home Draw    Preferred lab:      Send INR reminders to:      Comments:  Waqar [facility] [phone] -   goal range changed to 2.5-3.2 per raghu black 8/8/2019   Pt on ASA due to artery stent.  Evaluate again June 2022.      Anticoagulation Care Providers     Provider Role Specialty Phone number    Hu Gamez M.D. Referring Internal Medicine Clinical Cardiac Electrophysiology [phone]    [facility] Anticoagulation Services Responsible  [phone]          Refer to Anticoagulation Patient Findings for HPI  Patient Findings     Negatives:  Signs/symptoms of thrombosis, Signs/symptoms of bleeding, Laboratory test error suspected, Change in health, Change in alcohol use, Change in activity, Upcoming invasive procedure, Emergency department visit, Upcoming dental procedure, Missed doses, Extra doses, Change in medications, Change in diet/appetite, Hospital admission, Bruising, Other complaints          Spoke with patient to report a therapeutic INR.      Pt instructed to continue with current warfarin dosing regimen, confirms dosing.     Will follow up in 2 week(s).     Garth Fortune, PharmD, BCACP

## 2022-01-17 ENCOUNTER — HOSPITAL ENCOUNTER (OUTPATIENT)
Dept: RADIOLOGY | Facility: MEDICAL CENTER | Age: 82
End: 2022-01-17
Attending: PHYSICIAN ASSISTANT
Payer: MEDICARE

## 2022-01-17 DIAGNOSIS — Z12.31 VISIT FOR SCREENING MAMMOGRAM: ICD-10-CM

## 2022-01-17 PROCEDURE — 77063 BREAST TOMOSYNTHESIS BI: CPT

## 2022-01-24 ENCOUNTER — ANTICOAGULATION MONITORING (OUTPATIENT)
Dept: VASCULAR LAB | Facility: MEDICAL CENTER | Age: 82
End: 2022-01-24

## 2022-01-24 DIAGNOSIS — Z79.01 LONG TERM CURRENT USE OF ANTICOAGULANT THERAPY: ICD-10-CM

## 2022-01-24 DIAGNOSIS — I48.21 PERMANENT ATRIAL FIBRILLATION (HCC): ICD-10-CM

## 2022-01-24 LAB — INR PPP: 3.2 (ref 2–3.5)

## 2022-01-24 NOTE — PROGRESS NOTES
Anticoagulation Summary  As of 1/24/2022    INR goal:  2.5-3.0   TTR:  58.1 % (6.6 y)   INR used for dosing:  3.20 (1/21/2022)   Warfarin maintenance plan:  1.5 mg (3 mg x 0.5) every Wed; 3 mg (3 mg x 1) all other days   Weekly warfarin total:  19.5 mg   Plan last modified:  Skip Gonzalez, PharmD (11/29/2021)   Next INR check:  2/7/2022   Priority:  Maintenance   Target end date:  Indefinite    Indications    Permanent atrial fibrillation (HCC) [I48.21]  Long term current use of anticoagulant therapy [Z79.01]             Anticoagulation Episode Summary     INR check location:  Home Draw    Preferred lab:      Send INR reminders to:      Comments:  Waqar  - [phone] -   goal range changed to 2.5-3.2 per raghu black 8/8/2019   Pt on ASA due to artery stent.  Evaluate again June 2022.      Anticoagulation Care Providers     Provider Role Specialty Phone number    Hu Gamez M.D. Referring Internal Medicine Clinical Cardiac Electrophysiology [phone]    [facility] Anticoagulation Services Responsible  [phone]          Refer to Anticoagulation Patient Findings for HPI  Patient Findings     Negatives:  Signs/symptoms of thrombosis, Signs/symptoms of bleeding, Laboratory test error suspected, Change in health, Change in alcohol use, Change in activity, Upcoming invasive procedure, Emergency department visit, Upcoming dental procedure, Missed doses, Extra doses, Change in medications, Change in diet/appetite, Hospital admission, Bruising, Other complaints        Spoke with patient to report a therapeutic INR of 3.2.      Pt instructed to continue with current warfarin dosing regimen, confirms dosing.   Patient denies any interval changes to diet and/or medications. Patient denies any signs/symptoms of bleeding or clotting.    Will follow up in 2 week(s).     Jocelyn Cummins, Pharmacy Intern    I agree with the above plan.   Jason Yi  PharmD

## 2022-02-11 ENCOUNTER — ANTICOAGULATION MONITORING (OUTPATIENT)
Dept: VASCULAR LAB | Facility: MEDICAL CENTER | Age: 82
End: 2022-02-11

## 2022-02-11 DIAGNOSIS — Z79.01 LONG TERM CURRENT USE OF ANTICOAGULANT THERAPY: ICD-10-CM

## 2022-02-11 DIAGNOSIS — I48.21 PERMANENT ATRIAL FIBRILLATION (HCC): ICD-10-CM

## 2022-02-11 LAB — INR PPP: 3.6 (ref 2–3.5)

## 2022-02-11 NOTE — PROGRESS NOTES
OP   Telephone Anticoagulation Service Note      Anticoagulation Summary  As of 2/11/2022    INR goal:  2.5-3.0   TTR:  57.6 % (6.6 y)   INR used for dosing:  3.60 (2/11/2022)   Warfarin maintenance plan:  3 mg (3 mg x 1) every day   Weekly warfarin total:  21 mg   Plan last modified:  Emily Yi (2/11/2022)   Next INR check:  2/18/2022   Priority:  Maintenance   Target end date:  Indefinite    Indications    Permanent atrial fibrillation (HCC) [I48.21]  Long term current use of anticoagulant therapy [Z79.01]             Anticoagulation Episode Summary     INR check location:  Home Draw    Preferred lab:      Send INR reminders to:      Comments:  Waq[facility] [phone] -   goal range changed to 2.5-3.2 per raghu black 8/8/2019   Pt on ASA due to artery stent.  Evaluate again June 2022.      Anticoagulation Care Providers     Provider Role Specialty Phone number    Hu Gamez M.D. Referring Internal Medicine Clinical Cardiac Electrophysiology [phone]    [facility] Anticoagulation Services Responsible  [phone]        Anticoagulation Patient Findings  Patient Findings     Positives:  Extra doses (pt was taking 4.5mg on Wed instead of 1.5mg)    Negatives:  Signs/symptoms of thrombosis, Signs/symptoms of bleeding, Laboratory test error suspected, Change in health, Change in alcohol use, Change in activity, Upcoming invasive procedure, Emergency department visit, Upcoming dental procedure, Missed doses, Change in medications, Change in diet/appetite, Hospital admission, Bruising, Other complaints         Spoke with the patient on the phone today, reporting a SUPRA-therapeutic INR of 3.6.   Confirmed the current warfarin dosing regimen and patient compliance.  Patient reports that she has been taking 1.5 TABLETS of warfarin (4.5mg) instead of 1.5mg (1/2 tablet) on wednesdays.   Patient denies any interval changes to diet and/or medications. Patient denies any signs/symptoms of bleeding or  clotting.  Patient instructed to reduce dose to 1.5mg TONIGHT, as a one time adjustment, then to resume 3mg QD.  Patient will follow up again in 1 week.     Jason RobbD

## 2022-02-18 ENCOUNTER — ANTICOAGULATION MONITORING (OUTPATIENT)
Dept: VASCULAR LAB | Facility: MEDICAL CENTER | Age: 82
End: 2022-02-18
Payer: MEDICARE

## 2022-02-18 DIAGNOSIS — I48.21 PERMANENT ATRIAL FIBRILLATION (HCC): ICD-10-CM

## 2022-02-18 DIAGNOSIS — Z79.01 LONG TERM CURRENT USE OF ANTICOAGULANT THERAPY: ICD-10-CM

## 2022-02-18 LAB — INR PPP: 2.8 (ref 2–3.5)

## 2022-02-22 NOTE — PROGRESS NOTES
Anticoagulation Summary  As of 2022    INR goal:  2.5-3.0   TTR:  57.5 % (6.7 y)   INR used for dosin.80 (2022)   Warfarin maintenance plan:  3 mg (3 mg x 1) every day   Weekly warfarin total:  21 mg   Plan last modified:  Emily Yi (2022)   Next INR check:  3/11/2022   Priority:  Maintenance   Target end date:  Indefinite    Indications    Permanent atrial fibrillation (HCC) [I48.21]  Long term current use of anticoagulant therapy [Z79.01]             Anticoagulation Episode Summary     INR check location:  Home Draw    Preferred lab:      Send INR reminders to:      Comments:  W[facility] [phone] -   goal range changed to 2.5-3.2 per raghu black 2019   Pt on ASA due to artery stent.  Evaluate again 2022.      Anticoagulation Care Providers     Provider Role Specialty Phone number    Hu Gamez M.D. Referring Internal Medicine Clinical Cardiac Electrophysiology [phone]    [facility] Anticoagulation Services Responsible  [phone]        Anticoagulation Patient Findings          Spoke with Mary to report a therapeutic INR of 2.8. Continue current dosing regimen.  Follow up in 2 weeks, to reduce the risk of adverse events related to this high risk medication, warfarin.    Samantha Amaya, Clinical Pharmacist

## 2022-03-11 ENCOUNTER — ANTICOAGULATION MONITORING (OUTPATIENT)
Dept: VASCULAR LAB | Facility: MEDICAL CENTER | Age: 82
End: 2022-03-11
Payer: MEDICARE

## 2022-03-11 DIAGNOSIS — Z79.01 LONG TERM CURRENT USE OF ANTICOAGULANT THERAPY: ICD-10-CM

## 2022-03-11 DIAGNOSIS — I48.21 PERMANENT ATRIAL FIBRILLATION (HCC): ICD-10-CM

## 2022-03-11 LAB — INR PPP: 2.9 (ref 2–3.5)

## 2022-03-11 NOTE — PROGRESS NOTES
Anticoagulation Summary  As of 3/11/2022    INR goal:  2.5-3.0   TTR:  57.9 % (6.7 y)   INR used for dosin.90 (3/11/2022)   Warfarin maintenance plan:  3 mg (3 mg x 1) every day   Weekly warfarin total:  21 mg   Plan last modified:  Emily Yi (2022)   Next INR check:  3/23/2022   Priority:  Maintenance   Target end date:  Indefinite    Indications    Permanent atrial fibrillation (HCC) [I48.21]  Long term current use of anticoagulant therapy [Z79.01]             Anticoagulation Episode Summary     INR check location:  Home Draw    Preferred lab:      Send INR reminders to:      Comments:  Waqar [facility] [phone] -   goal range changed to 2.5-3.2 per raghu black 2019   Pt on ASA due to artery stent.  Evaluate again 2022.      Anticoagulation Care Providers     Provider Role Specialty Phone number    Hu Gamez M.D. Referring Internal Medicine Clinical Cardiac Electrophysiology [phone]    [facility] Anticoagulation Services Responsible  [phone]          Refer to Anticoagulation Patient Findings for HPI  Patient Findings     Negatives:  Signs/symptoms of thrombosis, Signs/symptoms of bleeding, Laboratory test error suspected, Change in health, Change in alcohol use, Change in activity, Upcoming invasive procedure, Emergency department visit, Upcoming dental procedure, Missed doses, Extra doses, Change in medications, Change in diet/appetite, Hospital admission, Bruising, Other complaints          Spoke with patient to report a therapeutic INR.      Pt instructed to continue with current warfarin dosing regimen, confirms dosing.   Will follow up in 2 week(s).     Garth Fortune, PharmD, BCACP

## 2022-03-23 ENCOUNTER — ANTICOAGULATION VISIT (OUTPATIENT)
Dept: VASCULAR LAB | Facility: MEDICAL CENTER | Age: 82
End: 2022-03-23
Attending: INTERNAL MEDICINE
Payer: MEDICARE

## 2022-03-23 VITALS — HEART RATE: 80 BPM | DIASTOLIC BLOOD PRESSURE: 74 MMHG | SYSTOLIC BLOOD PRESSURE: 151 MMHG

## 2022-03-23 DIAGNOSIS — D68.69 SECONDARY HYPERCOAGULABLE STATE (HCC): ICD-10-CM

## 2022-03-23 DIAGNOSIS — I48.21 PERMANENT ATRIAL FIBRILLATION (HCC): ICD-10-CM

## 2022-03-23 DIAGNOSIS — Z79.01 LONG TERM CURRENT USE OF ANTICOAGULANT THERAPY: ICD-10-CM

## 2022-03-23 LAB
INR BLD: 3.5 (ref 0.9–1.2)
INR PPP: 3.5 (ref 2–3.5)

## 2022-03-23 PROCEDURE — 85610 PROTHROMBIN TIME: CPT

## 2022-03-23 PROCEDURE — 99212 OFFICE O/P EST SF 10 MIN: CPT | Performed by: NURSE PRACTITIONER

## 2022-03-23 RX ORDER — WARFARIN SODIUM 3 MG/1
TABLET ORAL
Qty: 135 TABLET | Refills: 1 | Status: SHIPPED | OUTPATIENT
Start: 2022-03-23 | End: 2022-05-12 | Stop reason: SDUPTHER

## 2022-03-23 NOTE — PROGRESS NOTES
Anticoagulation Summary  As of 3/23/2022    INR goal:  2.5-3.0   TTR:  57.7 % (6.8 y)   INR used for dosing:  3.50 (3/23/2022)   Warfarin maintenance plan:  3 mg (3 mg x 1) every day   Weekly warfarin total:  21 mg   Plan last modified:  Emiyl Yi (2/11/2022)   Next INR check:  3/30/2022   Priority:  Maintenance   Target end date:  Indefinite    Indications    Permanent atrial fibrillation (HCC) [I48.21]  Long term current use of anticoagulant therapy [Z79.01]  Secondary hypercoagulable state (HCC) [D68.69]             Anticoagulation Episode Summary     INR check location:  Home Draw    Preferred lab:      Send INR reminders to:      Comments:  Waqar [facility] [phone] -   goal range changed to 2.5-3.2 per raghu black 8/8/2019      Anticoagulation Care Providers     Provider Role Specialty Phone number    Hu Gamez M.D. Referring Internal Medicine Clinical Cardiac Electrophysiology [phone]    [facility] Anticoagulation Services Responsible  [phone]                Refer to Patient Findings for HPI:      Vitals:    03/23/22 1345   BP: 151/74   Pulse: 80     Pt states she hasn't taken her metoprolol yet.    Verified current warfarin dosing schedule.    Medications reconciled   Pt is not on antiplatelet therapy. Previous notes state she was on ASA 81 mg daily but patient states her cardiologist stopped aspirin.    Self Management of Warfarin Therapy        Pt was educated on purpose of self management therapy.  Educated Pt on s/s of bleeding and when to contact professional medical help.  Also instructed pt to call coumadin clinic for any changes to his medications.      Educated patient for warfarin therapy concerning:   Diet   Monitoring (s/sx of bleeding and INR)   Drug interactions   Handling of missed doses    Reviewed the test to ensure patient comprehension and patient appears to fully understand how to self manage their warfarin therapy.  Answered Pt questions and will place his warfarin  comprehension test in medical record.    Episode information updated to include when pt should be contacted by clinic based on INR readings in the future.     A/P   INR  supra-therapeutic. She completed PSM education and post-education quiz. All questions answered.     Warfarin dosing recommendation: Take 1.5 mg (1/2 tab) tonight then resume your usual regimen.    Pt educated to contact our clinic with any changes in medications or s/s of bleeding or thrombosis. Pt is aware to seek immediate medical attention for falls, head injury or deep cuts.    Follow up appointment in 1 week(s) via telephone.    DESTINY Cartagena.

## 2022-03-28 ENCOUNTER — ANTICOAGULATION MONITORING (OUTPATIENT)
Dept: MEDICAL GROUP | Facility: PHYSICIAN GROUP | Age: 82
End: 2022-03-28
Payer: MEDICARE

## 2022-03-28 ENCOUNTER — DOCUMENTATION (OUTPATIENT)
Dept: MEDICAL GROUP | Facility: MEDICAL CENTER | Age: 82
End: 2022-03-28
Payer: MEDICARE

## 2022-03-28 DIAGNOSIS — Z79.01 LONG TERM CURRENT USE OF ANTICOAGULANT THERAPY: ICD-10-CM

## 2022-03-28 DIAGNOSIS — D68.69 SECONDARY HYPERCOAGULABLE STATE (HCC): ICD-10-CM

## 2022-03-28 DIAGNOSIS — I48.21 PERMANENT ATRIAL FIBRILLATION (HCC): ICD-10-CM

## 2022-03-28 NOTE — PROGRESS NOTES
Spoke with pt on the phone regarding who to submit INR to now that she is on the PSM. Advised her to still submit her INR to [facility].     Shyanne Damon, ClarisseD

## 2022-04-11 ENCOUNTER — ANTICOAGULATION MONITORING (OUTPATIENT)
Dept: SLEEP MEDICINE | Facility: MEDICAL CENTER | Age: 82
End: 2022-04-11
Payer: MEDICARE

## 2022-04-11 DIAGNOSIS — Z79.01 LONG TERM CURRENT USE OF ANTICOAGULANT THERAPY: ICD-10-CM

## 2022-04-11 DIAGNOSIS — D68.69 SECONDARY HYPERCOAGULABLE STATE (HCC): ICD-10-CM

## 2022-04-11 DIAGNOSIS — I48.21 PERMANENT ATRIAL FIBRILLATION (HCC): ICD-10-CM

## 2022-04-11 LAB — INR PPP: 2.7 (ref 2–3.5)

## 2022-04-11 NOTE — PROGRESS NOTES
Anticoagulation Summary  As of 2022    INR goal:  2.5-3.0   TTR:  57.5 % (6.8 y)   INR used for dosin.70 (2022)   Warfarin maintenance plan:  3 mg (3 mg x 1) every day   Weekly warfarin total:  21 mg   Plan last modified:  Emily Yi (2022)   Next INR check:  2022   Priority:  Maintenance   Target end date:  Indefinite    Indications    Permanent atrial fibrillation (HCC) [I48.21]  Long term current use of anticoagulant therapy [Z79.01]  Secondary hypercoagulable state (HCC) [D68.69]             Anticoagulation Episode Summary     INR check location:  Home Draw    Preferred lab:      Send INR reminders to:      Comments:  Waq[facility] [phone] -   goal range changed to 2.5-3.2 per raghu black 2019      Anticoagulation Care Providers     Provider Role Specialty Phone number    Hu Gamez M.D. Referring Internal Medicine Clinical Cardiac Electrophysiology [phone]    [facility] Anticoagulation Services Responsible  [phone]        Anticoagulation Patient Findings  Patient Findings     Negatives:  Signs/symptoms of thrombosis, Signs/symptoms of bleeding, Laboratory test error suspected, Change in health, Change in alcohol use, Change in activity, Upcoming invasive procedure, Emergency department visit, Upcoming dental procedure, Missed doses, Extra doses, Change in medications, Change in diet/appetite, Hospital admission, Bruising, Other complaints        Spoke with patient today regarding therapeutic INR of 2.7.  Patient denies any signs/symptoms of bruising or bleeding or any changes in diet and medications.  Instructed patient to call clinic with any questions or concerns.    Pt is not on antiplatelet therapy    Pt is to continue with current warfarin dosing regimen.  Follow up in 2 weeks, to reduce risk of adverse events related to this high risk medication,  Warfarin.    Kiran Moreland, Pharmacy Intern

## 2022-04-21 ENCOUNTER — NON-PROVIDER VISIT (OUTPATIENT)
Dept: CARDIOLOGY | Facility: PHYSICIAN GROUP | Age: 82
End: 2022-04-21
Payer: MEDICARE

## 2022-04-21 VITALS
BODY MASS INDEX: 23.34 KG/M2 | HEART RATE: 79 BPM | HEIGHT: 68 IN | RESPIRATION RATE: 14 BRPM | WEIGHT: 154 LBS | SYSTOLIC BLOOD PRESSURE: 134 MMHG | OXYGEN SATURATION: 98 % | DIASTOLIC BLOOD PRESSURE: 60 MMHG

## 2022-04-21 DIAGNOSIS — Z95.0 PACEMAKER-DEPENDENT DUE TO NATIVE CARDIAC RHYTHM INSUFFICIENT TO SUPPORT LIFE: Chronic | ICD-10-CM

## 2022-04-21 DIAGNOSIS — I48.21 PERMANENT ATRIAL FIBRILLATION (HCC): Chronic | ICD-10-CM

## 2022-04-21 DIAGNOSIS — I49.8 PACEMAKER-DEPENDENT DUE TO NATIVE CARDIAC RHYTHM INSUFFICIENT TO SUPPORT LIFE: Chronic | ICD-10-CM

## 2022-04-21 DIAGNOSIS — Z98.890 S/P AV NODAL ABLATION: ICD-10-CM

## 2022-04-21 DIAGNOSIS — Z95.0 CARDIAC PACEMAKER IN SITU: Chronic | ICD-10-CM

## 2022-04-21 DIAGNOSIS — I44.2 THIRD DEGREE AV BLOCK (HCC): Chronic | ICD-10-CM

## 2022-04-21 PROCEDURE — 93279 PRGRMG DEV EVAL PM/LDLS PM: CPT | Performed by: NURSE PRACTITIONER

## 2022-04-21 RX ORDER — AMLODIPINE BESYLATE 2.5 MG/1
2.5 TABLET ORAL DAILY
COMMUNITY
Start: 2022-04-07 | End: 2022-05-23

## 2022-04-21 NOTE — PROGRESS NOTES
Device is working normally. No ventricular high rate episodes. She is paced 100% of the time; unable to measure intrinsic R waves.  Stable capture of RV lead; stable impedance. Battery longevity is 37 months (10-64 months).  No changes are made today.    Given she is PM dependent, I will follow her a little more closely regarding JAMIR.    Follow-up in 4 months for next PM check with me.

## 2022-04-27 ENCOUNTER — ANTICOAGULATION MONITORING (OUTPATIENT)
Dept: VASCULAR LAB | Facility: MEDICAL CENTER | Age: 82
End: 2022-04-27
Payer: MEDICARE

## 2022-04-27 DIAGNOSIS — D68.69 SECONDARY HYPERCOAGULABLE STATE (HCC): ICD-10-CM

## 2022-04-27 DIAGNOSIS — Z79.01 LONG TERM CURRENT USE OF ANTICOAGULANT THERAPY: ICD-10-CM

## 2022-04-27 DIAGNOSIS — I48.21 PERMANENT ATRIAL FIBRILLATION (HCC): ICD-10-CM

## 2022-04-27 LAB — INR PPP: 2.1 (ref 2–3.5)

## 2022-04-27 NOTE — PROGRESS NOTES
Anticoagulation Summary  As of 2022    INR goal:  2.5-3.0   TTR:  57.4 % (6.8 y)   INR used for dosin.10 (2022)   Warfarin maintenance plan:  3 mg (3 mg x 1) every day   Weekly warfarin total:  21 mg   Plan last modified:  Emily Yi (2022)   Next INR check:  2022   Priority:  Maintenance   Target end date:  Indefinite    Indications    Permanent atrial fibrillation (HCC) [I48.21]  Long term current use of anticoagulant therapy [Z79.01]  Secondary hypercoagulable state (HCC) [D68.69]             Anticoagulation Episode Summary     INR check location:  Home Draw    Preferred lab:      Send INR reminders to:      Comments:  Waqar [facility] [phone] -   goal range changed to 2.5-3.2 per raghu black 2019      Anticoagulation Care Providers     Provider Role Specialty Phone number    Hu Gamez M.D. Referring Internal Medicine Clinical Cardiac Electrophysiology [phone]    [facility] Anticoagulation Services Responsible  [phone]        Anticoagulation Patient Findings  Patient Findings     Negatives:  Signs/symptoms of thrombosis, Signs/symptoms of bleeding, Laboratory test error suspected, Change in health, Change in alcohol use, Change in activity, Upcoming invasive procedure, Emergency department visit, Upcoming dental procedure, Missed doses, Extra doses, Change in medications, Change in diet/appetite, Hospital admission, Bruising, Other complaints        Spoke with patient today regarding subtherapeutic INR of 2.1.  Patient denies any signs/symptoms of bruising or bleeding or any changes in diet and medications.  Instructed patient to call clinic with any questions or concerns.    Pt is not on antiplatelet therapy    Instructed patient to bolus with 4.5mg X 1, then resume current warfarin regimen.  Follow up in 2 weeks, to reduce risk of adverse events related to this high risk medication,  Warfarin.    Kristopher Escobar, PharmD, BCACP

## 2022-05-05 ENCOUNTER — HOSPITAL ENCOUNTER (OUTPATIENT)
Dept: RADIOLOGY | Facility: MEDICAL CENTER | Age: 82
End: 2022-05-05
Payer: MEDICARE

## 2022-05-11 ENCOUNTER — ANTICOAGULATION MONITORING (OUTPATIENT)
Dept: VASCULAR LAB | Facility: MEDICAL CENTER | Age: 82
End: 2022-05-11
Payer: MEDICARE

## 2022-05-11 DIAGNOSIS — D68.69 SECONDARY HYPERCOAGULABLE STATE (HCC): ICD-10-CM

## 2022-05-11 DIAGNOSIS — I48.21 PERMANENT ATRIAL FIBRILLATION (HCC): ICD-10-CM

## 2022-05-11 DIAGNOSIS — Z79.01 LONG TERM CURRENT USE OF ANTICOAGULANT THERAPY: ICD-10-CM

## 2022-05-11 LAB — INR PPP: 2.8 (ref 2–3.5)

## 2022-05-11 NOTE — PROGRESS NOTES
Anticoagulation Summary  As of 2022    INR goal:  2.5-3.0   TTR:  56.9 % (7 y)   INR used for dosin.80 (2022)   Warfarin maintenance plan:  3 mg (3 mg x 1) every day   Weekly warfarin total:  21 mg   Plan last modified:  Emily Yi (2022)   Next INR check:  2022   Priority:  Maintenance   Target end date:  Indefinite    Indications    Permanent atrial fibrillation (HCC) [I48.21]  Long term current use of anticoagulant therapy [Z79.01]  Secondary hypercoagulable state (HCC) [D68.69]             Anticoagulation Episode Summary     INR check location:  Home Draw    Preferred lab:      Send INR reminders to:      Comments:  Waqar [facility] [phone] -   goal range changed to 2.5-3.2 per raghu black 2019      Anticoagulation Care Providers     Provider Role Specialty Phone number    Hu Gamez M.D. Referring Internal Medicine Clinical Cardiac Electrophysiology [phone]    [facility] Anticoagulation Services Responsible  [phone]        Anticoagulation Patient Findings  Patient Findings     Negatives:  Signs/symptoms of thrombosis, Signs/symptoms of bleeding, Laboratory test error suspected, Change in health, Change in alcohol use, Change in activity, Upcoming invasive procedure, Emergency department visit, Upcoming dental procedure, Missed doses, Extra doses, Change in medications, Change in diet/appetite, Hospital admission, Bruising, Other complaints        Spoke with patient today regarding therapeutic INR of 2.8.  Patient denies any signs/symptoms of bruising or bleeding or any changes in diet and medications.  Instructed patient to call clinic with any questions or concerns.    Pt is not on antiplatelet therapy    Pt is to continue with current warfarin dosing regimen.  Follow up in 2 weeks, to reduce risk of adverse events related to this high risk medication,  Warfarin.    Kristopher Escobar, PharmD, BCACP

## 2022-05-12 DIAGNOSIS — Z79.01 LONG TERM CURRENT USE OF ANTICOAGULANT THERAPY: ICD-10-CM

## 2022-05-12 RX ORDER — WARFARIN SODIUM 3 MG/1
TABLET ORAL
Qty: 90 TABLET | Refills: 1 | Status: SHIPPED | OUTPATIENT
Start: 2022-05-12 | End: 2022-11-03 | Stop reason: SDUPTHER

## 2022-05-14 ENCOUNTER — NON-PROVIDER VISIT (OUTPATIENT)
Dept: CARDIOLOGY | Facility: MEDICAL CENTER | Age: 82
End: 2022-05-14
Payer: MEDICARE

## 2022-05-14 PROCEDURE — 93294 REM INTERROG EVL PM/LDLS PM: CPT | Performed by: INTERNAL MEDICINE

## 2022-05-17 NOTE — CARDIAC REMOTE MONITOR - SCAN
Device transmission reviewed. Device demonstrated appropriate function.       Electronically Signed by: Jadiel Noriega M.D.    5/18/2022  1:14 PM

## 2022-05-23 ENCOUNTER — OFFICE VISIT (OUTPATIENT)
Dept: CARDIOLOGY | Facility: PHYSICIAN GROUP | Age: 82
End: 2022-05-23
Payer: MEDICARE

## 2022-05-23 VITALS
HEIGHT: 68 IN | SYSTOLIC BLOOD PRESSURE: 138 MMHG | BODY MASS INDEX: 23.28 KG/M2 | WEIGHT: 153.6 LBS | DIASTOLIC BLOOD PRESSURE: 70 MMHG | RESPIRATION RATE: 16 BRPM | HEART RATE: 88 BPM | OXYGEN SATURATION: 98 %

## 2022-05-23 DIAGNOSIS — Z79.01 LONG TERM CURRENT USE OF ANTICOAGULANT THERAPY: Chronic | ICD-10-CM

## 2022-05-23 DIAGNOSIS — Z95.0 PACEMAKER-DEPENDENT DUE TO NATIVE CARDIAC RHYTHM INSUFFICIENT TO SUPPORT LIFE: Chronic | ICD-10-CM

## 2022-05-23 DIAGNOSIS — I44.2 THIRD DEGREE AV BLOCK (HCC): Chronic | ICD-10-CM

## 2022-05-23 DIAGNOSIS — G45.9 TIA (TRANSIENT ISCHEMIC ATTACK): ICD-10-CM

## 2022-05-23 DIAGNOSIS — Z98.890 S/P AV NODAL ABLATION: ICD-10-CM

## 2022-05-23 DIAGNOSIS — Z95.0 CARDIAC PACEMAKER IN SITU: Chronic | ICD-10-CM

## 2022-05-23 DIAGNOSIS — I10 ESSENTIAL HYPERTENSION: ICD-10-CM

## 2022-05-23 DIAGNOSIS — I65.22 STENOSIS OF LEFT CAROTID ARTERY: ICD-10-CM

## 2022-05-23 DIAGNOSIS — I49.8 PACEMAKER-DEPENDENT DUE TO NATIVE CARDIAC RHYTHM INSUFFICIENT TO SUPPORT LIFE: Chronic | ICD-10-CM

## 2022-05-23 DIAGNOSIS — I48.21 PERMANENT ATRIAL FIBRILLATION (HCC): Chronic | ICD-10-CM

## 2022-05-23 PROCEDURE — 99214 OFFICE O/P EST MOD 30 MIN: CPT | Performed by: INTERNAL MEDICINE

## 2022-05-23 RX ORDER — METOPROLOL SUCCINATE 25 MG/1
25 TABLET, EXTENDED RELEASE ORAL EVERY EVENING
Qty: 90 TABLET | Refills: 3 | Status: SHIPPED | OUTPATIENT
Start: 2022-05-23 | End: 2023-11-09 | Stop reason: SDUPTHER

## 2022-05-23 RX ORDER — LOSARTAN POTASSIUM 25 MG/1
25 TABLET ORAL DAILY
COMMUNITY
Start: 2022-04-28 | End: 2023-11-09 | Stop reason: SDUPTHER
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
## 2022-05-27 ENCOUNTER — ANTICOAGULATION MONITORING (OUTPATIENT)
Dept: VASCULAR LAB | Facility: MEDICAL CENTER | Age: 82
End: 2022-05-27
Payer: MEDICARE

## 2022-05-27 DIAGNOSIS — Z79.01 LONG TERM CURRENT USE OF ANTICOAGULANT THERAPY: ICD-10-CM

## 2022-05-27 DIAGNOSIS — I48.21 PERMANENT ATRIAL FIBRILLATION (HCC): ICD-10-CM

## 2022-05-27 DIAGNOSIS — D68.69 SECONDARY HYPERCOAGULABLE STATE (HCC): ICD-10-CM

## 2022-05-27 LAB — INR PPP: 2.7 (ref 2–3.5)

## 2022-05-27 NOTE — PROGRESS NOTES
OP   Telephone Anticoagulation Service Note      Anticoagulation Summary  As of 2022    INR goal:  2.5-3.0   TTR:  56.7 % (7 y)   INR used for dosin.70 (2022)   Warfarin maintenance plan:  3 mg (3 mg x 1) every day   Weekly warfarin total:  21 mg   Plan last modified:  Emily Yi (2022)   Next INR check:  6/10/2022   Priority:  Maintenance   Target end date:  Indefinite    Indications    Permanent atrial fibrillation (HCC) [I48.21]  Long term current use of anticoagulant therapy [Z79.01]  Secondary hypercoagulable state (HCC) [D68.69]             Anticoagulation Episode Summary     INR check location:  Home Draw    Preferred lab:      Send INR reminders to:      Comments:  [facility] [phone] -   goal range changed to 2.5-3.2 per raghu black 2019      Anticoagulation Care Providers     Provider Role Specialty Phone number    Hu Gamez M.D. Referring Internal Medicine Clinical Cardiac Electrophysiology [phone]    [facility] Anticoagulation Services Responsible  [phone]        Anticoagulation Patient Findings  Patient Findings     Positives:  Change in medications (started on Losartan 25mg QD)    Negatives:  Signs/symptoms of thrombosis, Signs/symptoms of bleeding, Laboratory test error suspected, Change in health, Change in alcohol use, Change in activity, Upcoming invasive procedure, Emergency department visit, Upcoming dental procedure, Missed doses, Extra doses, Change in diet/appetite, Hospital admission, Bruising, Other complaints         Spoke with the patient on the phone today, reporting a therapeutic INR of 2.7.   Confirmed the current warfarin dosing regimen and patient compliance.  Patient denies any interval changes to diet and/or medications. Patient denies any signs/symptoms of bleeding or clotting.  Patient instructed to continue with the current warfarin dosing regimen, and asked to follow up again in 2 weeks.     Jason Yi  PharmD

## 2022-06-03 ENCOUNTER — TELEPHONE (OUTPATIENT)
Dept: CARDIOLOGY | Facility: MEDICAL CENTER | Age: 82
End: 2022-06-03
Payer: MEDICARE

## 2022-06-03 DIAGNOSIS — Z79.01 LONG TERM CURRENT USE OF ANTICOAGULANT THERAPY: Chronic | ICD-10-CM

## 2022-06-06 ENCOUNTER — HOSPITAL ENCOUNTER (OUTPATIENT)
Dept: RADIOLOGY | Facility: MEDICAL CENTER | Age: 82
End: 2022-06-06
Attending: NURSE PRACTITIONER
Payer: MEDICARE

## 2022-06-06 DIAGNOSIS — I65.23 BILATERAL CAROTID ARTERY STENOSIS: ICD-10-CM

## 2022-06-06 PROCEDURE — 93880 EXTRACRANIAL BILAT STUDY: CPT

## 2022-06-06 PROCEDURE — 93880 EXTRACRANIAL BILAT STUDY: CPT | Mod: 26 | Performed by: INTERNAL MEDICINE

## 2022-06-07 ENCOUNTER — PATIENT MESSAGE (OUTPATIENT)
Dept: CARDIOLOGY | Facility: MEDICAL CENTER | Age: 82
End: 2022-06-07
Payer: MEDICARE

## 2022-06-10 ENCOUNTER — ANTICOAGULATION MONITORING (OUTPATIENT)
Dept: VASCULAR LAB | Facility: MEDICAL CENTER | Age: 82
End: 2022-06-10
Payer: MEDICARE

## 2022-06-10 DIAGNOSIS — Z79.01 LONG TERM CURRENT USE OF ANTICOAGULANT THERAPY: ICD-10-CM

## 2022-06-10 DIAGNOSIS — D68.69 SECONDARY HYPERCOAGULABLE STATE (HCC): ICD-10-CM

## 2022-06-10 DIAGNOSIS — I48.21 PERMANENT ATRIAL FIBRILLATION (HCC): ICD-10-CM

## 2022-06-10 LAB — INR PPP: 2.8 (ref 2–3.5)

## 2022-06-10 NOTE — PROGRESS NOTES
Anticoagulation Summary  As of 6/10/2022    INR goal:  2.5-3.0   TTR:  56.9 % (7 y)   INR used for dosin.80 (6/10/2022)   Warfarin maintenance plan:  3 mg (3 mg x 1) every day   Weekly warfarin total:  21 mg   Plan last modified:  Emily Yi (2022)   Next INR check:  2022   Priority:  Maintenance   Target end date:  Indefinite    Indications    Permanent atrial fibrillation (HCC) [I48.21]  Long term current use of anticoagulant therapy [Z79.01]  Secondary hypercoagulable state (HCC) [D68.69]             Anticoagulation Episode Summary     INR check location:  Home Draw    Preferred lab:      Send INR reminders to:      Comments:  [facility] [phone] -   goal range changed to 2.5-3.2 per raghu black 2019      Anticoagulation Care Providers     Provider Role Specialty Phone number    Hu Gamez M.D. Referring Internal Medicine Clinical Cardiac Electrophysiology [phone]    [facility] Anticoagulation Services Responsible  [phone]        Anticoagulation Patient Findings  Patient Findings     Negatives:  Signs/symptoms of thrombosis, Signs/symptoms of bleeding, Laboratory test error suspected, Change in health, Change in alcohol use, Change in activity, Upcoming invasive procedure, Emergency department visit, Upcoming dental procedure, Missed doses, Extra doses, Change in medications, Change in diet/appetite, Hospital admission, Bruising, Other complaints           Spoke with patient today regarding therapeutic INR of 2.80.  Patient denies any signs/symptoms of bruising or bleeding or any changes in diet and medications.  Instructed patient to call clinic with any questions or concerns.    Pt is not on antiplatelet therapy    Pt is to continue with current warfarin dosing regimen.    Follow up in 3 weeks (pt preference), to reduce risk of adverse events related to this high risk medication,  Warfarin.    Sukhdeep Willson, Pharmacy Intern

## 2022-06-19 LAB — INR PPP: 2.4 (ref 2–3.5)

## 2022-06-20 ENCOUNTER — ANTICOAGULATION MONITORING (OUTPATIENT)
Dept: VASCULAR LAB | Facility: MEDICAL CENTER | Age: 82
End: 2022-06-20
Payer: MEDICARE

## 2022-06-20 DIAGNOSIS — I48.21 PERMANENT ATRIAL FIBRILLATION (HCC): ICD-10-CM

## 2022-06-20 DIAGNOSIS — D68.69 SECONDARY HYPERCOAGULABLE STATE (HCC): ICD-10-CM

## 2022-06-20 DIAGNOSIS — Z79.01 LONG TERM CURRENT USE OF ANTICOAGULANT THERAPY: ICD-10-CM

## 2022-06-20 NOTE — PROGRESS NOTES
Anticoagulation Summary  As of 2022    INR goal:  2.5-3.0   TTR:  57.4 % (7.1 y)   INR used for dosin.40 (2022)   Warfarin maintenance plan:  3 mg (3 mg x 1) every day   Weekly warfarin total:  21 mg   Plan last modified:  Emily Yi (2022)   Next INR check:  2022   Priority:  Maintenance   Target end date:  Indefinite    Indications    Permanent atrial fibrillation (HCC) [I48.21]  Long term current use of anticoagulant therapy [Z79.01]  Secondary hypercoagulable state (HCC) [D68.69]             Anticoagulation Episode Summary     INR check location:  Home Draw    Preferred lab:      Send INR reminders to:      Comments:  [facility] [phone] -   goal range changed to 2.5-3.2 per raghu black 2019      Anticoagulation Care Providers     Provider Role Specialty Phone number    Hu Gamez M.D. Referring Internal Medicine Clinical Cardiac Electrophysiology [phone]    [facility] Anticoagulation Services Responsible  [phone]        Anticoagulation Patient Findings          HPI:  Madeline Dorantes, on anticoagulation therapy with warfarin for PAF>   Changes to current medical/health status since last appt: Recently infected with COVID.  Denies signs/symptoms of bleeding and/or thrombosis since the last appt.    Denies any interval changes to diet  Denies any interval changes to medications since last appt.   Denies any complications or cost restrictions with current therapy.     A/P   INR  SUB-therapeutic.   Pt already self increased warfarin. Test again in 4 days.     Next INR in 4 daysl.     Fazal Pineda, PharmD

## 2022-06-24 ENCOUNTER — ANTICOAGULATION MONITORING (OUTPATIENT)
Dept: VASCULAR LAB | Facility: MEDICAL CENTER | Age: 82
End: 2022-06-24
Payer: MEDICARE

## 2022-06-24 DIAGNOSIS — Z79.01 LONG TERM CURRENT USE OF ANTICOAGULANT THERAPY: ICD-10-CM

## 2022-06-24 DIAGNOSIS — I48.21 PERMANENT ATRIAL FIBRILLATION (HCC): ICD-10-CM

## 2022-06-24 DIAGNOSIS — D68.69 SECONDARY HYPERCOAGULABLE STATE (HCC): ICD-10-CM

## 2022-06-24 LAB — INR PPP: 4.5 (ref 2–3.5)

## 2022-06-24 NOTE — PROGRESS NOTES
Anticoagulation Summary  As of 2022    INR goal:  2.5-3.0   TTR:  57.3 % (7.1 y)   INR used for dosin.50 (2022)   Warfarin maintenance plan:  3 mg (3 mg x 1) every day   Weekly warfarin total:  21 mg   Plan last modified:  Emily Yi (2022)   Next INR check:  2022   Priority:  Maintenance   Target end date:  Indefinite    Indications    Permanent atrial fibrillation (HCC) [I48.21]  Long term current use of anticoagulant therapy [Z79.01]  Secondary hypercoagulable state (HCC) [D68.69]             Anticoagulation Episode Summary     INR check location:  Home Draw    Preferred lab:      Send INR reminders to:      Comments:  [facility] [phone] -   goal range changed to 2.5-3.2 per raghu black 2019      Anticoagulation Care Providers     Provider Role Specialty Phone number    Hu Gamez M.D. Referring Internal Medicine Clinical Cardiac Electrophysiology [phone]    [facility] Anticoagulation Services Responsible  [phone]        Anticoagulation Patient Findings          HPI:  Madeline Thornetaylor Dorantes, on anticoagulation therapy with warfarin for PAF.   Changes to current medical/health status since last appt: Pt just getting over COVID and lost her appetite the past few days.    Denies signs/symptoms of bleeding and/or thrombosis since the last appt.      Denies any interval changes to medications since last appt.   Denies any complications or cost restrictions with current therapy.     A/P   INR  SUPRA-therapeutic.   Hold today then Pt is to continue with current warfarin dosing regimen.  Pt has regained her appetite.     Next INR in 1 week(s).    Fazal Pineda, PharmD

## 2022-06-29 ENCOUNTER — ANTICOAGULATION MONITORING (OUTPATIENT)
Dept: VASCULAR LAB | Facility: MEDICAL CENTER | Age: 82
End: 2022-06-29
Payer: MEDICARE

## 2022-06-29 DIAGNOSIS — D68.69 SECONDARY HYPERCOAGULABLE STATE (HCC): ICD-10-CM

## 2022-06-29 DIAGNOSIS — Z79.01 LONG TERM CURRENT USE OF ANTICOAGULANT THERAPY: ICD-10-CM

## 2022-06-29 DIAGNOSIS — I48.21 PERMANENT ATRIAL FIBRILLATION (HCC): ICD-10-CM

## 2022-06-29 LAB — INR PPP: 6.1 (ref 2–3.5)

## 2022-06-29 NOTE — PROGRESS NOTES
Anticoagulation Summary  As of 2022    INR goal:  2.5-3.0   TTR:  57.2 % (7.1 y)   INR used for dosin.10 (2022)   Warfarin maintenance plan:  3 mg (3 mg x 1) every day   Weekly warfarin total:  21 mg   Plan last modified:  Emily Yi (2022)   Next INR check:  2022   Priority:  Maintenance   Target end date:  Indefinite    Indications    Permanent atrial fibrillation (HCC) [I48.21]  Long term current use of anticoagulant therapy [Z79.01]  Secondary hypercoagulable state (HCC) [D68.69]             Anticoagulation Episode Summary     INR check location:  Home Draw    Preferred lab:      Send INR reminders to:      Comments:  [facility] [phone] -   goal range changed to 2.5-3.2 per raghu black 2019      Anticoagulation Care Providers     Provider Role Specialty Phone number    Hu Gamez M.D. Referring Internal Medicine Clinical Cardiac Electrophysiology [phone]    [facility] Anticoagulation Services Responsible  [phone]          Refer to Anticoagulation Patient Findings for HPI  Patient Findings     Positives:  Missed doses (Pt skipped tuesday dose as she took 2 doses on monday), Extra doses (Pt took 2 doses on  on accident)    Negatives:  Signs/symptoms of thrombosis, Signs/symptoms of bleeding, Laboratory test error suspected, Change in health, Change in alcohol use, Change in activity, Upcoming invasive procedure, Emergency department visit, Upcoming dental procedure, Change in medications, Change in diet/appetite, Hospital admission, Bruising, Other complaints          Spoke with Madeline.  INR is supratherapeutic.     Pt verifies warfarin weekly dosing.     Pt is NOT on antiplatelet therapy.    Will have pt hold 2 doses (today's dose , Pt already held yesterdays dose  as she accidentally took 2 doses on ) will have her continue regular dosing on Thursday. Confirmed plan with     Repeat INR in 2 days.     Nanci Parker,  PhT

## 2022-07-01 ENCOUNTER — ANTICOAGULATION MONITORING (OUTPATIENT)
Dept: MEDICAL GROUP | Facility: PHYSICIAN GROUP | Age: 82
End: 2022-07-01
Payer: MEDICARE

## 2022-07-01 DIAGNOSIS — Z79.01 LONG TERM CURRENT USE OF ANTICOAGULANT THERAPY: ICD-10-CM

## 2022-07-01 DIAGNOSIS — I48.21 PERMANENT ATRIAL FIBRILLATION (HCC): ICD-10-CM

## 2022-07-01 DIAGNOSIS — D68.69 SECONDARY HYPERCOAGULABLE STATE (HCC): ICD-10-CM

## 2022-07-01 LAB — INR PPP: 3.2 (ref 2–3.5)

## 2022-07-01 NOTE — PROGRESS NOTES
Anticoagulation Summary  As of 7/1/2022    INR goal:  2.5-3.0   TTR:  57.2 % (7.1 y)   INR used for dosing:  3.20 (7/1/2022)   Warfarin maintenance plan:  3 mg (3 mg x 1) every day   Weekly warfarin total:  21 mg   Plan last modified:  Emily Yi (2/11/2022)   Next INR check:  7/5/2022   Priority:  Maintenance   Target end date:  Indefinite    Indications    Permanent atrial fibrillation (HCC) [I48.21]  Long term current use of anticoagulant therapy [Z79.01]  Secondary hypercoagulable state (HCC) [D68.69]             Anticoagulation Episode Summary     INR check location:  Home Draw    Preferred lab:      Send INR reminders to:      Comments:  [facility] [phone] -   goal range changed to 2.5-3.2 per raghu black 8/8/2019      Anticoagulation Care Providers     Provider Role Specialty Phone number    Hu Gamez M.D. Referring Internal Medicine Clinical Cardiac Electrophysiology [phone]    [facility] Anticoagulation Services Responsible  [phone]        Anticoagulation Patient Findings     Left voicemail message to report a therapeutic INR of 3.2.  Requested pt contact the clinic for any s/s of unusual bleeding, bruising, clotting or any changes to diet or medication.   Pt is to continue with current warfarin dosing regimen.    Pt is not on antiplatelet therapy      FU 4 days.  Reviewed plan with Garth Kuo, Pharmacy Intern

## 2022-07-05 ENCOUNTER — ANTICOAGULATION MONITORING (OUTPATIENT)
Dept: MEDICAL GROUP | Facility: PHYSICIAN GROUP | Age: 82
End: 2022-07-05
Payer: MEDICARE

## 2022-07-05 DIAGNOSIS — D68.69 SECONDARY HYPERCOAGULABLE STATE (HCC): ICD-10-CM

## 2022-07-05 DIAGNOSIS — Z79.01 LONG TERM CURRENT USE OF ANTICOAGULANT THERAPY: ICD-10-CM

## 2022-07-05 DIAGNOSIS — I48.21 PERMANENT ATRIAL FIBRILLATION (HCC): ICD-10-CM

## 2022-07-05 LAB — INR PPP: 3.7 (ref 2–3.5)

## 2022-07-05 NOTE — PROGRESS NOTES
Anticoagulation Summary  As of 7/5/2022    INR goal:  2.5-3.0   TTR:  57.1 % (7.1 y)   INR used for dosing:  3.70 (7/5/2022)   Warfarin maintenance plan:  1.5 mg (3 mg x 0.5) every Sat; 3 mg (3 mg x 1) all other days   Weekly warfarin total:  19.5 mg   Plan last modified:  Allyson Kuo, Pharmacy Intern (7/5/2022)   Next INR check:  7/12/2022   Priority:  Maintenance   Target end date:  Indefinite    Indications    Permanent atrial fibrillation (HCC) [I48.21]  Long term current use of anticoagulant therapy [Z79.01]  Secondary hypercoagulable state (HCC) [D68.69]             Anticoagulation Episode Summary     INR check location:  Home Draw    Preferred lab:      Send INR reminders to:      Comments:  Waqar  - [phone] -   goal range changed to 2.5-3.2 per raghu black 8/8/2019      Anticoagulation Care Providers     Provider Role Specialty Phone number    Hu Gamez M.D. Referring Internal Medicine Clinical Cardiac Electrophysiology [phone]    [facility] Anticoagulation Services Responsible  [phone]        Anticoagulation Patient Findings          Left voicemail message to report a SUPRAtherapeutic INR of 3.7.  Requested pt contact the clinic for any s/s of unusual bleeding, bruising, clotting or any changes to diet or medication.   Pt is to hold today's dose and decrease weekly dose as outlined above.  Pt is not on antiplatelet therapy      FU 1 week.  Reviewed plan with Jason Kuo, Pharmacy Intern

## 2022-07-12 LAB — INR PPP: 2.7 (ref 2–3.5)

## 2022-07-13 ENCOUNTER — ANTICOAGULATION MONITORING (OUTPATIENT)
Dept: VASCULAR LAB | Facility: MEDICAL CENTER | Age: 82
End: 2022-07-13
Payer: MEDICARE

## 2022-07-13 DIAGNOSIS — D68.69 SECONDARY HYPERCOAGULABLE STATE (HCC): ICD-10-CM

## 2022-07-13 DIAGNOSIS — Z79.01 LONG TERM CURRENT USE OF ANTICOAGULANT THERAPY: ICD-10-CM

## 2022-07-13 DIAGNOSIS — I48.21 PERMANENT ATRIAL FIBRILLATION (HCC): ICD-10-CM

## 2022-07-13 NOTE — PROGRESS NOTES
Anticoagulation Summary  As of 2022    INR goal:  2.5-3.0   TTR:  57.1 % (7.1 y)   INR used for dosin.70 (2022)   Warfarin maintenance plan:  1.5 mg (3 mg x 0.5) every Sat; 3 mg (3 mg x 1) all other days   Weekly warfarin total:  19.5 mg   Plan last modified:  Allyson Kuo, Pharmacy Intern (2022)   Next INR check:  2022   Priority:  Maintenance   Target end date:  Indefinite    Indications    Permanent atrial fibrillation (HCC) [I48.21]  Long term current use of anticoagulant therapy [Z79.01]  Secondary hypercoagulable state (HCC) [D68.69]             Anticoagulation Episode Summary     INR check location:  Home Draw    Preferred lab:      Send INR reminders to:      Comments:  Waqar  - [phone] -   goal range changed to 2.5-3.2 per raghu black 2019      Anticoagulation Care Providers     Provider Role Specialty Phone number    Hu Gamez M.D. Referring Internal Medicine Clinical Cardiac Electrophysiology [phone]    [facility] Anticoagulation Services Responsible  [phone]        Anticoagulation Patient Findings  Patient Findings     Negatives:  Signs/symptoms of thrombosis, Signs/symptoms of bleeding, Laboratory test error suspected, Change in health, Change in alcohol use, Change in activity, Upcoming invasive procedure, Emergency department visit, Upcoming dental procedure, Missed doses, Extra doses, Change in medications, Change in diet/appetite, Hospital admission, Bruising, Other complaints              Spoke with patient today regarding therapeutic INR of 2.7.  Patient denies any signs/symptoms of bruising or bleeding or any changes in diet and medications.  Instructed patient to call clinic with any questions or concerns.    Pt is not on antiplatelet therapy      Pt is to continue with current warfarin dosing regimen.  Follow up in 1 week, to reduce risk of adverse events related to this high risk medication,  Warfarin.    Reviewed plan with Garth Kuo,  Pharmacy Intern

## 2022-07-19 ENCOUNTER — ANTICOAGULATION MONITORING (OUTPATIENT)
Dept: CARDIOLOGY | Facility: MEDICAL CENTER | Age: 82
End: 2022-07-19
Payer: MEDICARE

## 2022-07-19 DIAGNOSIS — Z79.01 LONG TERM CURRENT USE OF ANTICOAGULANT THERAPY: ICD-10-CM

## 2022-07-19 DIAGNOSIS — I48.21 PERMANENT ATRIAL FIBRILLATION (HCC): ICD-10-CM

## 2022-07-19 DIAGNOSIS — D68.69 SECONDARY HYPERCOAGULABLE STATE (HCC): ICD-10-CM

## 2022-07-19 LAB — INR PPP: 1.9 (ref 2–3.5)

## 2022-07-19 NOTE — PROGRESS NOTES
Anticoagulation Summary  As of 2022    INR goal:  2.5-3.0   TTR:  57.0 % (7.1 y)   INR used for dosin.90 (2022)   Warfarin maintenance plan:  1.5 mg (3 mg x 0.5) every Sat; 3 mg (3 mg x 1) all other days   Weekly warfarin total:  19.5 mg   Plan last modified:  Allyson Kuo, Pharmacy Intern (2022)   Next INR check:  2022   Priority:  Maintenance   Target end date:  Indefinite    Indications    Permanent atrial fibrillation (HCC) [I48.21]  Long term current use of anticoagulant therapy [Z79.01]  Secondary hypercoagulable state (HCC) [D68.69]             Anticoagulation Episode Summary     INR check location:  Home Draw    Preferred lab:      Send INR reminders to:      Comments:  Waqar  - [phone] -   goal range changed to 2.5-3.2 per raghu black 2019      Anticoagulation Care Providers     Provider Role Specialty Phone number    Hu Gamez M.D. Referring Internal Medicine Clinical Cardiac Electrophysiology [phone]    [facility] Anticoagulation Services Responsible  [phone]        Anticoagulation Patient Findings          Spoke with patient today regarding SUB-therapeutic INR of 1.9.  Patient denies any signs/symptoms of bruising or bleeding or any changes in diet and medications.  Instructed patient to call clinic with any questions or concerns.    Pt expressed desire to return to her normal dosing of 3 mg daily, but understood that a bolus today will help get her back to her goal INR of 2.5-3.2 faster than waiting and adjusting Saturday's dose. After seeing how bolus affects her INR, she would like to discuss if returning to this old regimen would be beneficial for her.    Pt is not on antiplatelet therapy      Pt is to bolus 6 mg x 1, then continue with current warfarin dosing regimen.  Follow up in 1 week, to reduce risk of adverse events related to this high risk medication,  Warfarin.    Reviewed plan with Fazal Kuo, Pharmacy Intern

## 2022-07-26 ENCOUNTER — ANTICOAGULATION MONITORING (OUTPATIENT)
Dept: VASCULAR LAB | Facility: MEDICAL CENTER | Age: 82
End: 2022-07-26
Payer: MEDICARE

## 2022-07-26 DIAGNOSIS — I48.21 PERMANENT ATRIAL FIBRILLATION (HCC): ICD-10-CM

## 2022-07-26 DIAGNOSIS — Z79.01 LONG TERM CURRENT USE OF ANTICOAGULANT THERAPY: ICD-10-CM

## 2022-07-26 DIAGNOSIS — D68.69 SECONDARY HYPERCOAGULABLE STATE (HCC): ICD-10-CM

## 2022-07-26 LAB — INR PPP: 2.7 (ref 2–3.5)

## 2022-07-26 NOTE — PROGRESS NOTES
Anticoagulation Summary  As of 2022    INR goal:  2.5-3.0   TTR:  57.0 % (7.2 y)   INR used for dosin.70 (2022)   Warfarin maintenance plan:  3 mg (3 mg x 1) every day   Weekly warfarin total:  21 mg   Plan last modified:  Kristopher Escobar PharmD (2022)   Next INR check:  2022   Priority:  Maintenance   Target end date:  Indefinite    Indications    Permanent atrial fibrillation (HCC) [I48.21]  Long term current use of anticoagulant therapy [Z79.01]  Secondary hypercoagulable state (HCC) [D68.69]             Anticoagulation Episode Summary     INR check location:  Home Draw    Preferred lab:      Send INR reminders to:      Comments:  Waqar [facility] [phone] -   goal range changed to 2.5-3.2 per raghu black 2019      Anticoagulation Care Providers     Provider Role Specialty Phone number    Hu Gamez M.D. Referring Internal Medicine Clinical Cardiac Electrophysiology [phone]    [facility] Anticoagulation Services Responsible  [phone]        Anticoagulation Patient Findings    Spoke with patient today regarding therapeutic INR of 2.7.  Patient denies any signs/symptoms of bruising or bleeding or any changes in diet and medications.  Instructed patient to call clinic with any questions or concerns.    Instructed patient to increase weekly warfarin regimen back to previously stable dose.  Follow up in 1 weeks, to reduce risk of adverse events related to this high risk medication,  Warfarin.    Kristopher Escobar, [place], BCACP

## 2022-08-02 ENCOUNTER — OFFICE VISIT (OUTPATIENT)
Dept: SLEEP MEDICINE | Facility: MEDICAL CENTER | Age: 82
End: 2022-08-02
Payer: MEDICARE

## 2022-08-02 VITALS
HEART RATE: 89 BPM | RESPIRATION RATE: 16 BRPM | BODY MASS INDEX: 23.13 KG/M2 | DIASTOLIC BLOOD PRESSURE: 70 MMHG | HEIGHT: 68 IN | WEIGHT: 152.6 LBS | SYSTOLIC BLOOD PRESSURE: 136 MMHG | OXYGEN SATURATION: 96 %

## 2022-08-02 DIAGNOSIS — G47.33 OBSTRUCTIVE SLEEP APNEA: Chronic | ICD-10-CM

## 2022-08-02 DIAGNOSIS — Z87.891 FORMER SMOKER: ICD-10-CM

## 2022-08-02 DIAGNOSIS — I10 ESSENTIAL HYPERTENSION: ICD-10-CM

## 2022-08-02 PROCEDURE — 99213 OFFICE O/P EST LOW 20 MIN: CPT | Performed by: NURSE PRACTITIONER

## 2022-08-02 RX ORDER — CYCLOSPORINE 0.5 MG/ML
1 EMULSION OPHTHALMIC
COMMUNITY
End: 2022-08-25

## 2022-08-02 RX ORDER — LAMOTRIGINE 25 MG/1
25 TABLET ORAL DAILY
COMMUNITY
Start: 2022-06-06 | End: 2022-08-25

## 2022-08-02 ASSESSMENT — PATIENT HEALTH QUESTIONNAIRE - PHQ9: CLINICAL INTERPRETATION OF PHQ2 SCORE: 0

## 2022-08-02 NOTE — PATIENT INSTRUCTIONS
Complete oxygen test using CPAP through DME:  [facility] [facility] / ph [phone] / fax [phone]  Once complete, send [employer] message or call for results
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
## 2022-08-09 ENCOUNTER — ANTICOAGULATION MONITORING (OUTPATIENT)
Dept: CARDIOLOGY | Facility: MEDICAL CENTER | Age: 82
End: 2022-08-09
Payer: MEDICARE

## 2022-08-09 DIAGNOSIS — I48.21 PERMANENT ATRIAL FIBRILLATION (HCC): ICD-10-CM

## 2022-08-09 DIAGNOSIS — D68.69 SECONDARY HYPERCOAGULABLE STATE (HCC): ICD-10-CM

## 2022-08-09 DIAGNOSIS — Z79.01 LONG TERM CURRENT USE OF ANTICOAGULANT THERAPY: ICD-10-CM

## 2022-08-09 LAB — INR PPP: 3.1 (ref 2–3.5)

## 2022-08-10 ENCOUNTER — OFFICE VISIT (OUTPATIENT)
Dept: URGENT CARE | Facility: PHYSICIAN GROUP | Age: 82
End: 2022-08-10
Payer: MEDICARE

## 2022-08-10 VITALS
RESPIRATION RATE: 16 BRPM | DIASTOLIC BLOOD PRESSURE: 70 MMHG | BODY MASS INDEX: 23.64 KG/M2 | SYSTOLIC BLOOD PRESSURE: 110 MMHG | HEART RATE: 79 BPM | TEMPERATURE: 98.6 F | WEIGHT: 156 LBS | OXYGEN SATURATION: 97 % | HEIGHT: 68 IN

## 2022-08-10 DIAGNOSIS — M79.662 PAIN OF LEFT CALF: ICD-10-CM

## 2022-08-10 DIAGNOSIS — M79.89 PAIN AND SWELLING OF LEFT LOWER LEG: ICD-10-CM

## 2022-08-10 DIAGNOSIS — M79.662 PAIN AND SWELLING OF LEFT LOWER LEG: ICD-10-CM

## 2022-08-10 PROCEDURE — 99214 OFFICE O/P EST MOD 30 MIN: CPT | Performed by: PHYSICIAN ASSISTANT

## 2022-08-10 NOTE — PROGRESS NOTES
Anticoagulation Summary  As of 8/9/2022    INR goal:  2.5-3.0   TTR:  57.0 % (7.2 y)   INR used for dosing:  3.10 (8/9/2022)   Warfarin maintenance plan:  3 mg (3 mg x 1) every day   Weekly warfarin total:  21 mg   Plan last modified:  Kristopher Escobar, PharmD (7/26/2022)   Next INR check:  8/23/2022   Priority:  Maintenance   Target end date:  Indefinite    Indications    Permanent atrial fibrillation (HCC) [I48.21]  Long term current use of anticoagulant therapy [Z79.01]  Secondary hypercoagulable state (HCC) [D68.69]             Anticoagulation Episode Summary     INR check location:  Home Draw    Preferred lab:      Send INR reminders to:      Comments:  Waqar [facility] [phone] -   goal range changed to 2.5-3.2 per raghu black 8/8/2019      Anticoagulation Care Providers     Provider Role Specialty Phone number    Hu Gamez M.D. Referring Internal Medicine Clinical Cardiac Electrophysiology [phone]    [facility] Anticoagulation Services Responsible  [phone]          Refer to Anticoagulation Patient Findings for HPI  Patient Findings     Negatives:  Signs/symptoms of thrombosis, Signs/symptoms of bleeding, Laboratory test error suspected, Change in health, Change in alcohol use, Change in activity, Upcoming invasive procedure, Emergency department visit, Upcoming dental procedure, Missed doses, Extra doses, Change in medications, Change in diet/appetite, Hospital admission, Bruising, Other complaints          Spoke with patient to report a therapeutic INR. (INR goal is 2.5 - 3.2)    Pt instructed to continue with current warfarin dosing regimen, confirms dosing.   Will follow up in 2 week(s).     Vandana Vallecillo, ClarisseD

## 2022-08-11 ENCOUNTER — OFFICE VISIT (OUTPATIENT)
Dept: URGENT CARE | Facility: PHYSICIAN GROUP | Age: 82
End: 2022-08-11
Payer: MEDICARE

## 2022-08-11 VITALS
OXYGEN SATURATION: 97 % | DIASTOLIC BLOOD PRESSURE: 68 MMHG | WEIGHT: 154 LBS | TEMPERATURE: 98 F | SYSTOLIC BLOOD PRESSURE: 118 MMHG | BODY MASS INDEX: 23.34 KG/M2 | RESPIRATION RATE: 16 BRPM | HEIGHT: 68 IN | HEART RATE: 71 BPM

## 2022-08-11 DIAGNOSIS — M79.89 PAIN AND SWELLING OF LEFT LOWER LEG: ICD-10-CM

## 2022-08-11 DIAGNOSIS — M79.662 PAIN AND SWELLING OF LEFT LOWER LEG: ICD-10-CM

## 2022-08-11 PROCEDURE — 99213 OFFICE O/P EST LOW 20 MIN: CPT | Performed by: NURSE PRACTITIONER

## 2022-08-11 ASSESSMENT — ENCOUNTER SYMPTOMS
NECK PAIN: 0
COUGH: 0
VOMITING: 0
FEVER: 0
BRUISES/BLEEDS EASILY: 1
FALLS: 0
FOCAL WEAKNESS: 1
MYALGIAS: 1
NAUSEA: 0
BRUISES/BLEEDS EASILY: 1
SHORTNESS OF BREATH: 0
HEMOPTYSIS: 0
BACK PAIN: 0
CHILLS: 0
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
## 2022-08-13 ENCOUNTER — HOSPITAL ENCOUNTER (EMERGENCY)
Facility: MEDICAL CENTER | Age: 82
End: 2022-08-13
Attending: EMERGENCY MEDICINE
Payer: MEDICARE

## 2022-08-13 ENCOUNTER — NON-PROVIDER VISIT (OUTPATIENT)
Dept: CARDIOLOGY | Facility: MEDICAL CENTER | Age: 82
End: 2022-08-13
Payer: MEDICARE

## 2022-08-13 VITALS
SYSTOLIC BLOOD PRESSURE: 179 MMHG | DIASTOLIC BLOOD PRESSURE: 92 MMHG | WEIGHT: 151.24 LBS | HEART RATE: 90 BPM | OXYGEN SATURATION: 98 % | RESPIRATION RATE: 18 BRPM | TEMPERATURE: 97.5 F | BODY MASS INDEX: 22.92 KG/M2 | HEIGHT: 68 IN

## 2022-08-13 DIAGNOSIS — M79.662 PAIN OF LEFT CALF: ICD-10-CM

## 2022-08-13 PROCEDURE — 99284 EMERGENCY DEPT VISIT MOD MDM: CPT

## 2022-08-13 PROCEDURE — 93294 REM INTERROG EVL PM/LDLS PM: CPT | Performed by: INTERNAL MEDICINE

## 2022-08-13 NOTE — ED PROVIDER NOTES
"ED Provider Note    CHIEF COMPLAINT  Chief Complaint   Patient presents with    Leg Swelling     Pt. Endorses L leg swelling, seen at  with negative US. Reports this has been swollen \"for a couple months, but Thursday was the day I couldn't walk\". Ambulates independently in triage.        HPI  Madeline Dorantes is a 82 y.o. female who presents with left calf pain and swelling.  The patient states that she noticed the swelling a couple of months ago.  Patient states on Thursday she started having acute pain.  She states she was sent for an ultrasound through [facility].  She states that they found an abnormality and she is concerned and wanted to be evaluated in the emergency department.  She does take Coumadin for atrial fibrillation.  She does not remember any acute trauma.  She states she has severe pain in the left calf with ambulation especially when going up or down steps.  She states it feels better with a compress in place.  She does not have any associated chest pain or difficulty with breathing.  She has not had any associated fevers.  She is unaware of any direct trauma to the left calf.    REVIEW OF SYSTEMS  See HPI for further details. All other systems are negative.     PAST MEDICAL HISTORY  Past Medical History:   Diagnosis Date    Atrial fibrillation (HCC)     Hypertension     Hypothyroid     Osteoarthritis        FAMILY HISTORY  [unfilled]    SOCIAL HISTORY  Social History     Socioeconomic History    Marital status:    Tobacco Use    Smoking status: Former     Types: Cigarettes    Smokeless tobacco: Never   Substance and Sexual Activity    Alcohol use: Not Currently    Drug use: Not Currently       SURGICAL HISTORY  Past Surgical History:   Procedure Laterality Date    OTHER      cataract sx    OTHER CARDIAC SURGERY      ablasion and pacemaker       CURRENT MEDICATIONS  Home Medications    **Home medications have not yet been reviewed for this encounter**         ALLERGIES  Allergies " "  Allergen Reactions    Betadine [Povidone Iodine]     Ciprofloxacin     Lisinopril     Percocet [Apap-Fd&C Red #40 Al Hallman-Oxycodone]     Sulfa Drugs        PHYSICAL EXAM  VITAL SIGNS: BP (!) 177/86   Pulse 85   Temp 36.4 °C (97.5 °F) (Temporal)   Resp 18   Ht 1.727 m (5' 8\")   Wt 68.6 kg (151 lb 3.8 oz)   SpO2 99%   BMI 23.00 kg/m²       Constitutional: Well developed, Well nourished, No acute distress, Non-toxic appearance.   HENT: Normocephalic, Atraumatic, Bilateral external ears normal, Oropharynx moist, No oral exudates, Nose normal.   Eyes: PERRLA, EOMI, Conjunctiva normal, No discharge.   Neck: Normal range of motion, No tenderness, Supple, No stridor.   Lymphatic: No lymphadenopathy noted.   Cardiovascular: Normal heart rate, Normal rhythm, No murmurs, No rubs, No gallops.   Thorax & Lungs: Normal breath sounds, No respiratory distress, No wheezing, No chest tenderness.   Abdomen: Bowel sounds normal, Soft, No tenderness, No masses, No pulsatile masses.   Skin: Superficial varicosities noted to the bilateral lower extremities  Back: No tenderness, No CVA tenderness.   Extremities: Reproducible pain to the left calf without obvious deformity.  Mild swelling to the left ankle compared to the right.  Extremities otherwise intact distal pulses, No edema, No tenderness, No cyanosis, No clubbing.   Neurologic: Alert & oriented x 3, Normal motor function, Normal sensory function, No focal deficits noted.   Psychiatric: Affect normal, Judgment normal, Mood normal.     COURSE & MEDICAL DECISION MAKING  Pertinent Labs & Imaging studies reviewed. (See chart for details)  This an 82-year-old female who presents with left calf pain.  My suspicion is this is more from a muscular source.  I did review the ultrasound that she had performed on 10 August.  There is no evidence of a DVT.  They did find a nonvascular hypoechoic lesion next to the greater saphenous vein in the medial calf.  The differential diagnosis " per radiology was hematoma versus potentially superficial thrombophlebitis.  Based on the significant discomfort this could be hematoma from a muscular injury.  This could also be from superficial thrombophlebitis.  The patient will be treated with warm compresses and anti-inflammatories.  She is already on anticoagulation.  I would like the patient to recheck with her primary care doctor in 5 to 7 days.    FINAL IMPRESSION  1.  Left calf pain    Disposition  The patient will be discharged in stable condition.         Electronically signed by: Harinder Gilbert M.D., 8/13/2022 2:56 PM

## 2022-08-13 NOTE — DISCHARGE INSTRUCTIONS
Take anti-inflammatories as discussed.  Your blood pressure is elevated emergency department and this needs to be rechecked in 7 to 10 days.

## 2022-08-13 NOTE — ED NOTES
Dc instructions reviewed  with pt. To f/u with pcp, keep lle compression wrap, do not cross legs, return for worsening s/s

## 2022-08-15 ENCOUNTER — TELEPHONE (OUTPATIENT)
Dept: MEDICAL GROUP | Facility: MEDICAL CENTER | Age: 82
End: 2022-08-15
Payer: MEDICARE

## 2022-08-15 NOTE — CARDIAC REMOTE MONITOR - SCAN
Device transmission reviewed. Device demonstrated appropriate function.       Electronically Signed by: Ammon Rosario M.D.    8/17/2022  7:52 AM

## 2022-08-15 NOTE — TELEPHONE ENCOUNTER
Returned pts phone call regarding starting motrin. LVM asking for her to pls call us back as I would not recommend she take motril with her warfarin.     Shyanne Damon PharmD    ----- Message from Shyanne Damon PharmD sent at 8/15/2022  1:06 PM PDT -----  Regarding: pt started on motrin for calf injury  Pt called asking for call back as she was started on motrin

## 2022-08-22 ENCOUNTER — ANTICOAGULATION MONITORING (OUTPATIENT)
Dept: VASCULAR LAB | Facility: MEDICAL CENTER | Age: 82
End: 2022-08-22
Payer: MEDICARE

## 2022-08-22 DIAGNOSIS — D68.69 SECONDARY HYPERCOAGULABLE STATE (HCC): ICD-10-CM

## 2022-08-22 DIAGNOSIS — I48.21 PERMANENT ATRIAL FIBRILLATION (HCC): ICD-10-CM

## 2022-08-22 DIAGNOSIS — Z79.01 LONG TERM CURRENT USE OF ANTICOAGULANT THERAPY: ICD-10-CM

## 2022-08-22 LAB — INR PPP: 2.5 (ref 2–3.5)

## 2022-08-22 NOTE — PROGRESS NOTES
"Anticoagulation Summary  As of 2022      INR goal:  2.5-3.0   TTR:  57.1 % (7.2 y)   INR used for dosin.50 (2022)   Warfarin maintenance plan:  3 mg (3 mg x 1) every day   Weekly warfarin total:  21 mg   Plan last modified:  Kristopher Escobar, PharmD (2022)   Next INR check:  2022   Priority:  Maintenance   Target end date:  Indefinite    Indications    Permanent atrial fibrillation (HCC) [I48.21]  Long term current use of anticoagulant therapy [Z79.01]  Secondary hypercoagulable state (HCC) [D68.69]                 Anticoagulation Episode Summary       INR check location:  Home Draw    Preferred lab:      Send INR reminders to:      Comments:  Waqar [facility] [phone] -   goal range changed to 2.5-3.2 per raghu vaca 2019          Anticoagulation Care Providers       Provider Role Specialty Phone number    Hu Gamez M.D. Referring Internal Medicine Clinical Cardiac Electrophysiology [phone]    [facility] Anticoagulation Services Responsible  [phone]            Refer to Anticoagulation Patient Findings for HPI  Patient Findings       Positives:  Change in medications (Stopped taking Motrin for a \"ripped muscle\")    Negatives:  Signs/symptoms of thrombosis, Signs/symptoms of bleeding, Laboratory test error suspected, Change in health, Change in alcohol use, Change in activity, Upcoming invasive procedure, Emergency department visit, Upcoming dental procedure, Missed doses, Extra doses, Change in diet/appetite, Hospital admission, Bruising, Other complaints            Spoke with patient to report a therapeutic INR.      Pt is NOT on antiplatelet therapy     Pt instructed to continue with current warfarin dosing regimen, confirms dosing.   Will follow up in 2 week(s).     Juani Melo   "

## 2022-08-25 ENCOUNTER — NON-PROVIDER VISIT (OUTPATIENT)
Dept: CARDIOLOGY | Facility: PHYSICIAN GROUP | Age: 82
End: 2022-08-25
Payer: MEDICARE

## 2022-08-25 VITALS
RESPIRATION RATE: 14 BRPM | HEIGHT: 68 IN | OXYGEN SATURATION: 97 % | WEIGHT: 152.6 LBS | HEART RATE: 82 BPM | DIASTOLIC BLOOD PRESSURE: 64 MMHG | SYSTOLIC BLOOD PRESSURE: 126 MMHG | BODY MASS INDEX: 23.13 KG/M2

## 2022-08-25 DIAGNOSIS — I48.21 PERMANENT ATRIAL FIBRILLATION (HCC): Chronic | ICD-10-CM

## 2022-08-25 DIAGNOSIS — I49.8 PACEMAKER-DEPENDENT DUE TO NATIVE CARDIAC RHYTHM INSUFFICIENT TO SUPPORT LIFE: Chronic | ICD-10-CM

## 2022-08-25 DIAGNOSIS — Z95.0 CARDIAC PACEMAKER IN SITU: Chronic | ICD-10-CM

## 2022-08-25 DIAGNOSIS — Z98.890 S/P AV NODAL ABLATION: ICD-10-CM

## 2022-08-25 DIAGNOSIS — I44.2 THIRD DEGREE AV BLOCK (HCC): Chronic | ICD-10-CM

## 2022-08-25 DIAGNOSIS — Z79.01 LONG TERM CURRENT USE OF ANTICOAGULANT THERAPY: Chronic | ICD-10-CM

## 2022-08-25 DIAGNOSIS — Z95.0 PACEMAKER-DEPENDENT DUE TO NATIVE CARDIAC RHYTHM INSUFFICIENT TO SUPPORT LIFE: Chronic | ICD-10-CM

## 2022-08-25 PROCEDURE — 93279 PRGRMG DEV EVAL PM/LDLS PM: CPT | Performed by: NURSE PRACTITIONER

## 2022-08-25 NOTE — PROGRESS NOTES
We are checking her PM a little more frequently as battery is nearing JAMIR, and she is PM dependent.    Device is working normally. 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 Unable to measure R waves. Stable capture and impedence of RV leads. Battery longevity is 32 months (7-56 months).  No changes are made today.    Follow-up in 4 months for next PM check with me.

## 2022-08-30 ENCOUNTER — ANTICOAGULATION MONITORING (OUTPATIENT)
Dept: MEDICAL GROUP | Facility: PHYSICIAN GROUP | Age: 82
End: 2022-08-30
Payer: MEDICARE

## 2022-08-30 DIAGNOSIS — I48.21 PERMANENT ATRIAL FIBRILLATION (HCC): ICD-10-CM

## 2022-08-30 DIAGNOSIS — D68.69 SECONDARY HYPERCOAGULABLE STATE (HCC): ICD-10-CM

## 2022-08-30 DIAGNOSIS — Z79.01 LONG TERM CURRENT USE OF ANTICOAGULANT THERAPY: ICD-10-CM

## 2022-08-30 LAB — INR PPP: 2.9 (ref 2–3.5)

## 2022-08-30 NOTE — PROGRESS NOTES
Anticoagulation Summary  As of 2022      INR goal:  2.5-3.0   TTR:  57.3 % (7.3 y)   INR used for dosin.90 (2022)   Warfarin maintenance plan:  3 mg (3 mg x 1) every day   Weekly warfarin total:  21 mg   Plan last modified:  Kristopher Escobar, PharmD (2022)   Next INR check:  2022   Priority:  Maintenance   Target end date:  Indefinite    Indications    Permanent atrial fibrillation (HCC) [I48.21]  Long term current use of anticoagulant therapy [Z79.01]  Secondary hypercoagulable state (HCC) [D68.69]                 Anticoagulation Episode Summary       INR check location:  Home Draw    Preferred lab:      Send INR reminders to:      Comments:  Waqar [facility] [phone] -   goal range changed to 2.5-3.2 per raghu vaca 2019          Anticoagulation Care Providers       Provider Role Specialty Phone number    Hu Gamez M.D. Referring Internal Medicine Clinical Cardiac Electrophysiology [phone]    [facility] Anticoagulation Services Responsible  [phone]          Anticoagulation Patient Findings      Left voicemail to report a therapeutic INR of 2.90.    Pt to continue with current warfarin dosing regimen. Requested pt contact the clinic for any s/s of unusual bleeding, bruising, clotting or any changes to diet or medication.    FU INR in 2 week(s).    Juani Melo

## 2022-09-08 ENCOUNTER — HOSPITAL ENCOUNTER (OUTPATIENT)
Dept: RADIOLOGY | Facility: MEDICAL CENTER | Age: 82
End: 2022-09-08
Attending: FAMILY MEDICINE
Payer: MEDICARE

## 2022-09-08 DIAGNOSIS — N64.4 PAINFUL BREASTS: ICD-10-CM

## 2022-09-08 PROCEDURE — G0279 TOMOSYNTHESIS, MAMMO: HCPCS

## 2022-09-13 ENCOUNTER — ANTICOAGULATION MONITORING (OUTPATIENT)
Dept: MEDICAL GROUP | Facility: PHYSICIAN GROUP | Age: 82
End: 2022-09-13
Payer: MEDICARE

## 2022-09-13 DIAGNOSIS — I48.21 PERMANENT ATRIAL FIBRILLATION (HCC): ICD-10-CM

## 2022-09-13 DIAGNOSIS — Z79.01 LONG TERM CURRENT USE OF ANTICOAGULANT THERAPY: ICD-10-CM

## 2022-09-13 DIAGNOSIS — D68.69 SECONDARY HYPERCOAGULABLE STATE (HCC): ICD-10-CM

## 2022-09-13 LAB — INR PPP: 3.1 (ref 2–3.5)

## 2022-09-13 NOTE — PROGRESS NOTES
Anticoagulation Summary  As of 9/13/2022      INR goal:  2.5-3.0   TTR:  57.2 % (7.3 y)   INR used for dosing:  3.10 (9/13/2022)   Warfarin maintenance plan:  3 mg (3 mg x 1) every day   Weekly warfarin total:  21 mg   Plan last modified:  Kristopher Escobar, PharmD (7/26/2022)   Next INR check:  10/4/2022   Priority:  Maintenance   Target end date:  Indefinite    Indications    Permanent atrial fibrillation (HCC) [I48.21]  Long term current use of anticoagulant therapy [Z79.01]  Secondary hypercoagulable state (HCC) [D68.69]                 Anticoagulation Episode Summary       INR check location:  Home Draw    Preferred lab:      Send INR reminders to:      Comments:  Waqar [facility] [phone] -   goal range changed to 2.5-3.2 per raghu vaca 8/8/2019          Anticoagulation Care Providers       Provider Role Specialty Phone number    Hu Gamez M.D. Referring Internal Medicine Clinical Cardiac Electrophysiology [phone]    [facility] Anticoagulation Services Responsible  [phone]            Refer to Anticoagulation Patient Findings for HPI  Patient Findings       Negatives:  Signs/symptoms of thrombosis, Signs/symptoms of bleeding, Laboratory test error suspected, Change in health, Change in alcohol use, Change in activity, Upcoming invasive procedure, Emergency department visit, Upcoming dental procedure, Missed doses, Extra doses, Change in medications, Change in diet/appetite, Hospital admission, Bruising, Other complaints            Spoke with patient to report a therapeutic INR.      Pt is NOT on antiplatelet therapy   Pt instructed to continue with current warfarin dosing regimen, confirms dosing.   Will follow up in 3 week(s).     Juani Melo       +++++++++++++++++++++++++++++++++++++++++++++++++++++++++++++++++++    I have reviewed and concur with the above plan.       Current Outpatient Medications:     losartan, 25 mg, Oral, DAILY    metoprolol SR, 25 mg, Oral, Q EVENING    warfarin, Take one  " tablet by mouth daily or as directed by anticoagulation clinic    INSULIN SYRINGE 1CC/31GX5/16\", USE TO INJECT B12 SUBCUTANEOUS WEEKLY    lamoTRIgine, 25 mg, Oral, QHS    Apoaequorin (PREVAGEN PO), 1 Tablet, Oral, DAILY    omeprazole, 20 mg, Oral, QAM    Magnesium, 2 Tablet, Oral, QHS    Melatonin, 2 Capsule, Oral, QHS    cyclosporin, 1 Drop, Both Eyes, BID    cyanocobalamin, 1,000 mcg, Intramuscular, Q7 DAYS    Carboxymethylcellulose Sodium (REFRESH CELLUVISC OP), 1 Drop, Ophthalmic, PRN    levothyroxine, 88 mcg, Oral, AM ES    estradiol, Insert  into the vagina see administration instructions. 3 times week  Indications: Vulvovaginal Atrophy    levothyroxine, 100 mcg, Oral, AM ES    liothyronine, 5 mcg, Oral, QAM    CALCIUM 600-D PO, 1,200 mg, Oral, DAILY    POTASSIUM ACETATE, 550 mg, Oral, Q EVENING    Skip Gonzalez, PharmD, MS, BCACP, LCC  [facility]  Phone: [phone]  Fax: [phone]  On call: [phone]  General scheduling/information [phone]  For emergencies please dial [phone]  Please do not use IMANIN for urgent matters, call the phone numbers listed above.    This note was created using voice recognition software (Dragon). The accuracy of the dictation is limited by the abilities of the software. I have reviewed the note prior to signing, however some errors in grammar and context are still possible. If you have any questions related to this note please do not hesitate to contact our office.       "

## 2022-10-04 ENCOUNTER — ANTICOAGULATION MONITORING (OUTPATIENT)
Dept: VASCULAR LAB | Facility: MEDICAL CENTER | Age: 82
End: 2022-10-04
Payer: MEDICARE

## 2022-10-04 DIAGNOSIS — Z79.01 LONG TERM CURRENT USE OF ANTICOAGULANT THERAPY: ICD-10-CM

## 2022-10-04 DIAGNOSIS — I48.21 PERMANENT ATRIAL FIBRILLATION (HCC): ICD-10-CM

## 2022-10-04 DIAGNOSIS — D68.69 SECONDARY HYPERCOAGULABLE STATE (HCC): ICD-10-CM

## 2022-10-04 LAB — INR PPP: 3.5 (ref 2–3.5)

## 2022-10-04 NOTE — PROGRESS NOTES
Anticoagulation Summary  As of 10/4/2022      INR goal:  2.5-3.0   TTR:  56.7 % (7.4 y)   INR used for dosing:  3.50 (10/4/2022)   Warfarin maintenance plan:  3 mg (3 mg x 1) every day   Weekly warfarin total:  21 mg   Plan last modified:  Kristopher Escobar, PharmD (7/26/2022)   Next INR check:  10/18/2022   Priority:  Maintenance   Target end date:  Indefinite    Indications    Permanent atrial fibrillation (HCC) [I48.21]  Long term current use of anticoagulant therapy [Z79.01]  Secondary hypercoagulable state (HCC) [D68.69]                 Anticoagulation Episode Summary       INR check location:  Home Draw    Preferred lab:      Send INR reminders to:      Comments:  Waqar [facility] [phone] -   goal range changed to 2.5-3.2 per raghu vaca 8/8/2019          Anticoagulation Care Providers       Provider Role Specialty Phone number    Hu Gamez M.D. Referring Internal Medicine Clinical Cardiac Electrophysiology [phone]    [facility] Anticoagulation Services Responsible  [phone]          Anticoagulation Patient Findings  Patient Findings       Positives:  Change in alcohol use (increased EtOh intake this past weekend)    Negatives:  Signs/symptoms of thrombosis, Signs/symptoms of bleeding, Laboratory test error suspected, Change in health, Change in activity, Upcoming invasive procedure, Emergency department visit, Upcoming dental procedure, Missed doses, Extra doses, Change in medications, Change in diet/appetite, Hospital admission, Bruising, Other complaints                Left voicemail message to report a therapeutic INR of 3.5.  Pt to Reduce warfarin dose today then continue with current warfarin dosing regimen. Requested pt contact the clinic for any s/s of unusual bleeding, bruising, clotting or any changes to diet or medication. FU 2 weeks.    Fazal Pineda, PharmD      ADDENDUM 10/4/22: Pt returned our call. She indulged in wine this past weekend. Relayed warfarin dosing instructions aforementioned  above. F/u INR in 2 wks. Vandana Vallecillo, PharmD

## 2022-10-05 ENCOUNTER — APPOINTMENT (OUTPATIENT)
Dept: RADIOLOGY | Facility: MEDICAL CENTER | Age: 82
End: 2022-10-05
Attending: PHYSICIAN ASSISTANT
Payer: MEDICARE

## 2022-10-19 ENCOUNTER — ANTICOAGULATION MONITORING (OUTPATIENT)
Dept: VASCULAR LAB | Facility: MEDICAL CENTER | Age: 82
End: 2022-10-19
Payer: MEDICARE

## 2022-10-19 DIAGNOSIS — Z79.01 LONG TERM CURRENT USE OF ANTICOAGULANT THERAPY: ICD-10-CM

## 2022-10-19 DIAGNOSIS — I48.21 PERMANENT ATRIAL FIBRILLATION (HCC): ICD-10-CM

## 2022-10-19 DIAGNOSIS — D68.69 SECONDARY HYPERCOAGULABLE STATE (HCC): ICD-10-CM

## 2022-10-19 LAB — INR PPP: 3.4 (ref 2–3.5)

## 2022-10-19 NOTE — PROGRESS NOTES
Anticoagulation Summary  As of 10/19/2022      INR goal:  2.5-3.0   TTR:  56.4 % (7.4 y)   INR used for dosing:  3.40 (10/19/2022)   Warfarin maintenance plan:  1.5 mg (3 mg x 0.5) every Wed; 3 mg (3 mg x 1) all other days   Weekly warfarin total:  19.5 mg   Plan last modified:  Kristopher Escobar PharmD (10/19/2022)   Next INR check:  11/2/2022   Priority:  Maintenance   Target end date:  Indefinite    Indications    Permanent atrial fibrillation (HCC) [I48.21]  Long term current use of anticoagulant therapy [Z79.01]  Secondary hypercoagulable state (HCC) [D68.69]                 Anticoagulation Episode Summary       INR check location:  Home Draw    Preferred lab:      Send INR reminders to:      Comments:  Waqar  - [phone] -   goal range changed to 2.5-3.2 per raghu vaca 8/8/2019          Anticoagulation Care Providers       Provider Role Specialty Phone number    Hu Gamez M.D. Referring Internal Medicine Clinical Cardiac Electrophysiology [phone]    [facility] Anticoagulation Services Responsible  [phone]          Anticoagulation Patient Findings    Spoke with patient today regarding supratherapeutic INR of 3.4.  Patient denies any signs/symptoms of bruising or bleeding or any changes in diet and medications.  Instructed patient to call clinic with any questions or concerns.    Pt is not on antiplatelet therapy    Instructed patient to decrease weekly warfarin regimen as detailed above.  Follow up in 2 weeks, to reduce risk of adverse events related to this high risk medication,  Warfarin.    Kristopher Escobar, Yesenia, BCACP

## 2022-10-31 LAB — INR PPP: 3.3 (ref 2–3.5)

## 2022-11-01 ENCOUNTER — ANTICOAGULATION MONITORING (OUTPATIENT)
Dept: VASCULAR LAB | Facility: MEDICAL CENTER | Age: 82
End: 2022-11-01
Payer: MEDICARE

## 2022-11-01 DIAGNOSIS — Z79.01 LONG TERM CURRENT USE OF ANTICOAGULANT THERAPY: ICD-10-CM

## 2022-11-01 DIAGNOSIS — D68.69 SECONDARY HYPERCOAGULABLE STATE (HCC): ICD-10-CM

## 2022-11-01 DIAGNOSIS — I48.21 PERMANENT ATRIAL FIBRILLATION (HCC): ICD-10-CM

## 2022-11-01 NOTE — PROGRESS NOTES
OP   Telephone Anticoagulation Service Note      Anticoagulation Summary  As of 11/1/2022      INR goal:  2.5-3.0   TTR:  56.2 % (7.4 y)   INR used for dosing:  3.30 (10/31/2022)   Warfarin maintenance plan:  1.5 mg (3 mg x 0.5) every Wed; 3 mg (3 mg x 1) all other days   Weekly warfarin total:  19.5 mg   Plan last modified:  Kristopher Escobar PharmD (10/19/2022)   Next INR check:  11/15/2022   Priority:  Maintenance   Target end date:  Indefinite    Indications    Permanent atrial fibrillation (HCC) [I48.21]  Long term current use of anticoagulant therapy [Z79.01]  Secondary hypercoagulable state (HCC) [D68.69]                 Anticoagulation Episode Summary       INR check location:  Home Draw    Preferred lab:      Send INR reminders to:      Comments:  Waqar  - [phone] -   goal range changed to 2.5-3.2 per raghu vaca 8/8/2019          Anticoagulation Care Providers       Provider Role Specialty Phone number    Hu Gamez M.D. Referring Internal Medicine Clinical Cardiac Electrophysiology [phone]    [facility] Anticoagulation Services Responsible  [phone]          Anticoagulation Patient Findings    Left a message on the patient's voicemail today, informing the patient of a SUPRA-therapeutic INR of 3.3.  Instructed the patient to call the clinic with any changes to diet or medications, with any signs/sx of bleeding or clotting or with any questions or concerns.     Patient instructed to reduce dose of warfarin to 1.5mg TONIGHT, as a one time adjustment, then to resume her current dosing regimen.   Patient asked to retest again in 2 weeks.     Jason RobbD

## 2022-11-03 DIAGNOSIS — Z79.01 LONG TERM CURRENT USE OF ANTICOAGULANT THERAPY: ICD-10-CM

## 2022-11-03 RX ORDER — WARFARIN SODIUM 3 MG/1
TABLET ORAL
Qty: 90 TABLET | Refills: 1 | Status: ON HOLD | OUTPATIENT
Start: 2022-11-03 | End: 2023-03-21

## 2022-11-12 ENCOUNTER — NON-PROVIDER VISIT (OUTPATIENT)
Dept: CARDIOLOGY | Facility: MEDICAL CENTER | Age: 82
End: 2022-11-12
Payer: MEDICARE

## 2022-11-12 PROCEDURE — 93294 REM INTERROG EVL PM/LDLS PM: CPT | Performed by: INTERNAL MEDICINE

## 2022-11-14 NOTE — CARDIAC REMOTE MONITOR - SCAN
Device transmission reviewed. Device demonstrated appropriate function.       Electronically Signed by: Ammon Rosario M.D.    11/14/2022  10:14 AM

## 2022-11-16 LAB — INR PPP: 3.7 (ref 2–3.5)

## 2022-11-17 ENCOUNTER — ANTICOAGULATION MONITORING (OUTPATIENT)
Dept: VASCULAR LAB | Facility: MEDICAL CENTER | Age: 82
End: 2022-11-17
Payer: MEDICARE

## 2022-11-17 DIAGNOSIS — I48.21 PERMANENT ATRIAL FIBRILLATION (HCC): Chronic | ICD-10-CM

## 2022-11-17 DIAGNOSIS — Z79.01 LONG TERM CURRENT USE OF ANTICOAGULANT THERAPY: Chronic | ICD-10-CM

## 2022-11-17 DIAGNOSIS — D68.69 SECONDARY HYPERCOAGULABLE STATE (HCC): ICD-10-CM

## 2022-11-17 NOTE — PROGRESS NOTES
Anticoagulation Summary  As of 11/17/2022      INR goal:  2.5-3.0   TTR:  55.8 % (7.5 y)   INR used for dosing:  3.70 (11/16/2022)   Warfarin maintenance plan:  1.5 mg (3 mg x 0.5) every Wed; 3 mg (3 mg x 1) all other days; Starting 11/17/2022   Weekly warfarin total:  19.5 mg   Plan last modified:  Clarisse BellD (10/19/2022)   Next INR check:  11/21/2022   Priority:  Maintenance   Target end date:  Indefinite    Indications    Permanent atrial fibrillation (HCC) [I48.21]  Long term current use of anticoagulant therapy [Z79.01]  Secondary hypercoagulable state (HCC) [D68.69]                 Anticoagulation Episode Summary       INR check location:  Home Draw    Preferred lab:      Send INR reminders to:      Comments:  Waqar  - [phone] -   goal range changed to 2.5-3.2 per raghu vaca 8/8/2019          Anticoagulation Care Providers       Provider Role Specialty Phone number    Hu Gamez M.D. Referring Internal Medicine Clinical Cardiac Electrophysiology [phone]    [facility] Anticoagulation Services Responsible  [phone]            Refer to Anticoagulation Patient Findings for HPI  Patient Findings       Negatives:  Signs/symptoms of thrombosis, Signs/symptoms of bleeding, Laboratory test error suspected, Change in health, Change in alcohol use, Change in activity, Upcoming invasive procedure, Emergency department visit, Upcoming dental procedure, Missed doses, Extra doses, Change in medications, Change in diet/appetite, Hospital admission, Bruising, Other complaints            Spoke with patient.  INR is supra therapeutic.     Pt verifies warfarin weekly dosing.     Pt is NOT on antiplatelet therapy     Will have pt hold warfarin dose today and make permanent change to regimen on Wednesday to 0.5 tabs. Patient was unaware of the previous permanent change to the regimen and mistook it for a 1 time change.    Repeat INR in 4 day(s).     Riccardo Lewis PhT  Discussed with Yesenia Ross,  BCACP       I agree with the above plan.   Jason RobbD

## 2022-11-21 ENCOUNTER — ANTICOAGULATION MONITORING (OUTPATIENT)
Dept: VASCULAR LAB | Facility: MEDICAL CENTER | Age: 82
End: 2022-11-21
Payer: MEDICARE

## 2022-11-21 DIAGNOSIS — D68.69 SECONDARY HYPERCOAGULABLE STATE (HCC): ICD-10-CM

## 2022-11-21 DIAGNOSIS — I48.21 PERMANENT ATRIAL FIBRILLATION (HCC): Chronic | ICD-10-CM

## 2022-11-21 DIAGNOSIS — Z79.01 LONG TERM CURRENT USE OF ANTICOAGULANT THERAPY: Chronic | ICD-10-CM

## 2022-11-21 LAB — INR PPP: 2.4 (ref 2–3.5)

## 2022-11-21 NOTE — PROGRESS NOTES
OP Anticoagulation Service Note    Date: 2022    Anticoagulation Summary  As of 2022      INR goal:  2.5-3.0   TTR:  55.7 % (7.5 y)   INR used for dosin.40 (2022)   Warfarin maintenance plan:  1.5 mg (3 mg x 0.5) every Wed; 3 mg (3 mg x 1) all other days; Starting 2022   Weekly warfarin total:  19.5 mg   Plan last modified:  Kristopher Escobar, PharmD (10/19/2022)   Next INR check:  2022   Priority:  Maintenance   Target end date:  Indefinite    Indications    Permanent atrial fibrillation (HCC) [I48.21]  Long term current use of anticoagulant therapy [Z79.01]  Secondary hypercoagulable state (HCC) [D68.69]                 Anticoagulation Episode Summary       INR check location:  Home Draw    Preferred lab:      Send INR reminders to:      Comments:  Waqar [facility] [phone] -   goal range changed to 2.5-3.2 per raghu vaca 2019          Anticoagulation Care Providers       Provider Role Specialty Phone number    Hu Gamez M.D. Referring Internal Medicine Clinical Cardiac Electrophysiology [phone]    [facility] Anticoagulation Services Responsible  [phone]          Anticoagulation Patient Findings        Voice message for patient regarding their anticoagulant.   Patient's preferred phone number:  [phone]        HPI:   The reason for today's call is to prevent morbidity and mortality from a blood clot and/or stroke and to reduce the risk of bleeding while on a anticoagulant.     PCP:  Pcp Pt States None  No address on file    Assessment:     INR  sub-therapeutic.     Lab Results   Component Value Date/Time    BUN 17 2021 03:07 AM    CREATININE 0.85 2021 03:07 AM    CREATININE 1.0 2009 02:40 PM     Lab Results   Component Value Date/Time    HEMOGLOBIN 11.6 (L) 2021 03:07 AM    HEMATOCRIT 35.9 (L) 2021 03:07 AM    PLATELETCT 152 (L) 2021 03:07 AM    ALKPHOSPHAT 65 2019 07:20 AM    ASTSGOT 34 2019 07:20 AM    ALTSGPT 32  "07/18/2019 07:20 AM          Current Outpatient Medications:     warfarin, Take 0.5-1  tablet by mouth daily or as directed by anticoagulation clinic    losartan, 25 mg, Oral, DAILY    metoprolol SR, 25 mg, Oral, Q EVENING    INSULIN SYRINGE 1CC/31GX5/16\", USE TO INJECT B12 SUBCUTANEOUS WEEKLY    lamoTRIgine, 25 mg, Oral, QHS    Apoaequorin (PREVAGEN PO), 1 Tablet, Oral, DAILY    omeprazole, 20 mg, Oral, QAM    Magnesium, 2 Tablet, Oral, QHS    Melatonin, 2 Capsule, Oral, QHS    cycloSPORINE, 1 Drop, Both Eyes, BID    cyanocobalamin, 1,000 mcg, Intramuscular, Q7 DAYS    Carboxymethylcellulose Sodium (REFRESH CELLUVISC OP), 1 Drop, Ophthalmic, PRN    levothyroxine, 88 mcg, Oral, AM ES    estradiol, Insert  into the vagina see administration instructions. 3 times week  Indications: Vulvovaginal Atrophy    levothyroxine, 100 mcg, Oral, AM ES    liothyronine, 5 mcg, Oral, QAM    CALCIUM 600-D PO, 1,200 mg, Oral, DAILY    POTASSIUM ACETATE, 550 mg, Oral, Q EVENING      Plan:     Continue the same warfarin dose, as noted above per pt       Follow-up:     test in 1 weeks.        Additional information discussed with patient:     Asked patient to please call the anticoagulation clinic if they have any signs/symptoms of bleeding and/or thrombosis or any changes to diet or medications.      National recommendations regarding anticoagulation therapy:     The CHEST guidelines recommends frequent INR monitoring at regular intervals (a few days up to a max of 12 weeks) to ensure patients are on the proper dose of warfarin, and patients are not having any complications from therapy.  INRs can dramatically change over a short time period due to diet, medications, and medical conditions.       Skip Gonzalez, PharmD, MS, BCACP, C  [facility]  Phone: [phone]  Fax: [phone]  On call: [phone]  General scheduling/information [phone]  For emergencies please dial [phone]  Please do not " use Avimoto for urgent matters, call the phone numbers listed above.    This note was created using voice recognition software (Dragon). The accuracy of the dictation is limited by the abilities of the software. I have reviewed the note prior to signing, however some errors in grammar and context are still possible. If you have any questions related to this note please do not hesitate to contact our office.

## 2022-12-04 LAB — INR PPP: 2.3 (ref 2–3.5)

## 2022-12-05 ENCOUNTER — ANTICOAGULATION MONITORING (OUTPATIENT)
Dept: VASCULAR LAB | Facility: MEDICAL CENTER | Age: 82
End: 2022-12-05
Payer: MEDICARE

## 2022-12-05 DIAGNOSIS — I48.21 PERMANENT ATRIAL FIBRILLATION (HCC): Chronic | ICD-10-CM

## 2022-12-05 DIAGNOSIS — D68.69 SECONDARY HYPERCOAGULABLE STATE (HCC): ICD-10-CM

## 2022-12-05 DIAGNOSIS — Z79.01 LONG TERM CURRENT USE OF ANTICOAGULANT THERAPY: Chronic | ICD-10-CM

## 2022-12-20 ENCOUNTER — ANTICOAGULATION MONITORING (OUTPATIENT)
Dept: CARDIOLOGY | Facility: MEDICAL CENTER | Age: 82
End: 2022-12-20
Payer: MEDICARE

## 2022-12-20 DIAGNOSIS — D68.69 SECONDARY HYPERCOAGULABLE STATE (HCC): ICD-10-CM

## 2022-12-20 DIAGNOSIS — Z79.01 LONG TERM CURRENT USE OF ANTICOAGULANT THERAPY: Chronic | ICD-10-CM

## 2022-12-20 DIAGNOSIS — I48.21 PERMANENT ATRIAL FIBRILLATION (HCC): Chronic | ICD-10-CM

## 2022-12-20 LAB — INR PPP: 4 (ref 2–3.5)

## 2022-12-21 NOTE — PROGRESS NOTES
OP Anticoagulation Service Note    Date: 2022    Anticoagulation Summary  As of 2022      INR goal:  2.5-3.0   TTR:  54.8 % (7.6 y)   INR used for dosin.00 (2022)   Warfarin maintenance plan:  3 mg (3 mg x 1) every day; Starting 2022   Weekly warfarin total:  21 mg   Plan last modified:  Vandana Vallecillo, PharmD (2022)   Next INR check:  1/3/2023   Priority:  Maintenance   Target end date:  Indefinite    Indications    Permanent atrial fibrillation (HCC) [I48.21]  Long term current use of anticoagulant therapy [Z79.01]  Secondary hypercoagulable state (HCC) [D68.69]                 Anticoagulation Episode Summary       INR check location:  Home Draw    Preferred lab:      Send INR reminders to:      Comments:  Waqar  - [phone] -   goal range changed to 2.5-3.2 per raghu vaca 2019          Anticoagulation Care Providers       Provider Role Specialty Phone number    Hu Gamez M.D. Referring Internal Medicine Clinical Cardiac Electrophysiology [phone]    [facility] Anticoagulation Services Responsible  [phone]          Anticoagulation Patient Findings          Patient's preferred phone number:  [phone]        HPI:   The reason for today's call is to prevent morbidity and mortality from a blood clot and/or stroke and to reduce the risk of bleeding while on a anticoagulant.     PCP:  Pcp Pt States None  No address on file    Assessment:     INR  supra-therapeutic.     Lab Results   Component Value Date/Time    BUN 17 2021 03:07 AM    CREATININE 0.85 2021 03:07 AM    CREATININE 1.0 2009 02:40 PM     Lab Results   Component Value Date/Time    HEMOGLOBIN 11.6 (L) 2021 03:07 AM    HEMATOCRIT 35.9 (L) 2021 03:07 AM    PLATELETCT 152 (L) 2021 03:07 AM    ALKPHOSPHAT 65 2019 07:20 AM    ASTSGOT 34 2019 07:20 AM    ALTSGPT 32 2019 07:20 AM          Current Outpatient Medications:     warfarin, Take 0.5-1  tablet by  "mouth daily or as directed by anticoagulation clinic    losartan, 25 mg, Oral, DAILY    metoprolol SR, 25 mg, Oral, Q EVENING    INSULIN SYRINGE 1CC/31GX5/16\", USE TO INJECT B12 SUBCUTANEOUS WEEKLY    lamoTRIgine, 25 mg, Oral, QHS    Apoaequorin (PREVAGEN PO), 1 Tablet, Oral, DAILY    omeprazole, 20 mg, Oral, QAM    Magnesium, 2 Tablet, Oral, QHS    Melatonin, 2 Capsule, Oral, QHS    cycloSPORINE, 1 Drop, Both Eyes, BID    cyanocobalamin, 1,000 mcg, Intramuscular, Q7 DAYS    Carboxymethylcellulose Sodium (REFRESH CELLUVISC OP), 1 Drop, Ophthalmic, PRN    levothyroxine, 88 mcg, Oral, AM ES    estradiol, Insert  into the vagina see administration instructions. 3 times week  Indications: Vulvovaginal Atrophy    levothyroxine, 100 mcg, Oral, AM ES    liothyronine, 5 mcg, Oral, QAM    CALCIUM 600-D PO, 1,200 mg, Oral, DAILY    POTASSIUM ACETATE, 550 mg, Oral, Q EVENING      Plan:     1.5mg today then continue the same warfarin dose, as noted above.       Follow-up:     test in 1 weeks.        Additional information discussed with patient:     Asked patient to please call the anticoagulation clinic if they have any signs/symptoms of bleeding and/or thrombosis or any changes to diet or medications.      National recommendations regarding anticoagulation therapy:     The CHEST guidelines recommends frequent INR monitoring at regular intervals (a few days up to a max of 12 weeks) to ensure patients are on the proper dose of warfarin, and patients are not having any complications from therapy.  INRs can dramatically change over a short time period due to diet, medications, and medical conditions.       Skip Gonzalez, PharmD, MS, BCACP, [facility]  Phone: [phone]  Fax: [phone]  On call: [phone]  General scheduling/information [phone]  For emergencies please dial [phone]  Please do not use Offermobi for urgent matters, call the phone numbers listed above.    This note was " created using voice recognition software (Dragon). The accuracy of the dictation is limited by the abilities of the software. I have reviewed the note prior to signing, however some errors in grammar and context are still possible. If you have any questions related to this note please do not hesitate to contact our office.

## 2022-12-22 ENCOUNTER — NON-PROVIDER VISIT (OUTPATIENT)
Dept: CARDIOLOGY | Facility: PHYSICIAN GROUP | Age: 82
End: 2022-12-22
Payer: MEDICARE

## 2022-12-22 VITALS
RESPIRATION RATE: 14 BRPM | HEART RATE: 76 BPM | HEIGHT: 68 IN | BODY MASS INDEX: 23.49 KG/M2 | OXYGEN SATURATION: 99 % | SYSTOLIC BLOOD PRESSURE: 130 MMHG | DIASTOLIC BLOOD PRESSURE: 74 MMHG | WEIGHT: 155 LBS

## 2022-12-22 DIAGNOSIS — I48.21 PERMANENT ATRIAL FIBRILLATION (HCC): Chronic | ICD-10-CM

## 2022-12-22 DIAGNOSIS — I44.2 THIRD DEGREE AV BLOCK (HCC): Chronic | ICD-10-CM

## 2022-12-22 DIAGNOSIS — Z98.890 S/P AV NODAL ABLATION: ICD-10-CM

## 2022-12-22 DIAGNOSIS — Z95.0 CARDIAC PACEMAKER IN SITU: Chronic | ICD-10-CM

## 2022-12-22 PROCEDURE — 93279 PRGRMG DEV EVAL PM/LDLS PM: CPT | Performed by: NURSE PRACTITIONER

## 2022-12-22 NOTE — PROGRESS NOTES
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     Device is working normally. 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 Unable to measure R waves. Stable capture and impedence of RV leads. Battery longevity is 25 months (<4-45 months).  No changes are made today.     Follow-up in 2 months for next PM check with me.

## 2022-12-26 LAB — INR PPP: 3.3 (ref 2–3.5)

## 2022-12-27 ENCOUNTER — ANTICOAGULATION MONITORING (OUTPATIENT)
Dept: CARDIOLOGY | Facility: MEDICAL CENTER | Age: 82
End: 2022-12-27
Payer: MEDICARE

## 2022-12-27 DIAGNOSIS — Z79.01 LONG TERM CURRENT USE OF ANTICOAGULANT THERAPY: Chronic | ICD-10-CM

## 2022-12-27 DIAGNOSIS — D68.69 SECONDARY HYPERCOAGULABLE STATE (HCC): ICD-10-CM

## 2022-12-27 DIAGNOSIS — I48.21 PERMANENT ATRIAL FIBRILLATION (HCC): Chronic | ICD-10-CM

## 2022-12-27 NOTE — PROGRESS NOTES
Anticoagulation Summary  As of 12/27/2022      INR goal:  2.5-3.0   TTR:  54.7 % (7.6 y)   INR used for dosing:  3.30 (12/26/2022)   Warfarin maintenance plan:  1.5 mg (3 mg x 0.5) every Tue; 3 mg (3 mg x 1) all other days   Weekly warfarin total:  19.5 mg   Plan last modified:  Kristopher Escobar PharmD (12/27/2022)   Next INR check:  1/3/2023   Priority:  Maintenance   Target end date:  Indefinite    Indications    Permanent atrial fibrillation (HCC) [I48.21]  Long term current use of anticoagulant therapy [Z79.01]  Secondary hypercoagulable state (HCC) [D68.69]                 Anticoagulation Episode Summary       INR check location:  Home Draw    Preferred lab:      Send INR reminders to:      Comments:  Waqar  - [phone] -   goal range changed to 2.5-3.2 per raghu vaca 8/8/2019          Anticoagulation Care Providers       Provider Role Specialty Phone number    Hu Gamez M.D. Referring Internal Medicine Clinical Cardiac Electrophysiology [phone]    [facility] Anticoagulation Services Responsible  [phone]          Anticoagulation Patient Findings    Left voicemail message to report a supratherapeutic INR of 3.3.  Requested pt contact the clinic for any s/s of unusual bleeding, bruising, clotting or any changes to diet or medication.   Instructed patient to decrease weekly warfarin regimen as detailed above.    Pt is not on antiplatelet therapy      FU 1 weeks.  Kristopher Escobar, PharmD, BCACP

## 2023-01-03 ENCOUNTER — ANTICOAGULATION MONITORING (OUTPATIENT)
Dept: VASCULAR LAB | Facility: MEDICAL CENTER | Age: 83
End: 2023-01-03
Payer: MEDICARE

## 2023-01-03 DIAGNOSIS — D68.69 SECONDARY HYPERCOAGULABLE STATE (HCC): ICD-10-CM

## 2023-01-03 DIAGNOSIS — Z79.01 LONG TERM CURRENT USE OF ANTICOAGULANT THERAPY: Chronic | ICD-10-CM

## 2023-01-03 DIAGNOSIS — I48.21 PERMANENT ATRIAL FIBRILLATION (HCC): Chronic | ICD-10-CM

## 2023-01-03 LAB — INR PPP: 2.4 (ref 2–3.5)

## 2023-01-04 NOTE — PROGRESS NOTES
Anticoagulation Summary  As of 1/3/2023      INR goal:  2.5-3.0   TTR:  55.1 % (7.6 y)   INR used for dosin.40 (1/3/2023)   Warfarin maintenance plan:  3 mg (3 mg x 1) every day   Weekly warfarin total:  21 mg   Plan last modified:  Vandana Vallecillo, PharmD (1/3/2023)   Next INR check:  1/10/2023   Priority:  Routine   Target end date:  Indefinite    Indications    Permanent atrial fibrillation (HCC) [I48.21]  Long term current use of anticoagulant therapy [Z79.01]  Secondary hypercoagulable state (HCC) [D68.69]                 Anticoagulation Episode Summary       INR check location:  Home Draw    Preferred lab:      Send INR reminders to:      Comments:  Waqar [facility] [phone] -   goal range changed to 2.5-3.2 per raghu vaca 2019          Anticoagulation Care Providers       Provider Role Specialty Phone number    Hu Gamez M.D. Referring Internal Medicine Clinical Cardiac Electrophysiology [phone]    [facility] Anticoagulation Services Responsible  [phone]            Refer to Anticoagulation Patient Findings for HPI  Patient Findings       Positives:  Extra doses (has been taking 3 mg daily), Change in medications (took cranberry pills for UTI)    Negatives:  Signs/symptoms of thrombosis, Signs/symptoms of bleeding, Laboratory test error suspected, Change in health, Change in alcohol use, Change in activity, Upcoming invasive procedure, Emergency department visit, Upcoming dental procedure, Missed doses, Change in diet/appetite, Hospital admission, Bruising, Other complaints            Spoke with pt.  INR is subtherapeutic.     Pt verifies warfarin weekly dosing.     Will have pt continue w/ 3 mg daily; will not bolus today as pt is only 0.1 out of range was previously supratherapeutic    Repeat INR in 1 week(s).     Vandana Vallecillo, PharmD

## 2023-01-14 LAB — INR PPP: 2.7 (ref 2–3.5)

## 2023-01-16 ENCOUNTER — ANTICOAGULATION MONITORING (OUTPATIENT)
Dept: VASCULAR LAB | Facility: MEDICAL CENTER | Age: 83
End: 2023-01-16
Payer: MEDICARE

## 2023-01-16 DIAGNOSIS — Z79.01 LONG TERM CURRENT USE OF ANTICOAGULANT THERAPY: Chronic | ICD-10-CM

## 2023-01-16 DIAGNOSIS — D68.69 SECONDARY HYPERCOAGULABLE STATE (HCC): ICD-10-CM

## 2023-01-16 DIAGNOSIS — I48.21 PERMANENT ATRIAL FIBRILLATION (HCC): Chronic | ICD-10-CM

## 2023-01-17 NOTE — PROGRESS NOTES
OP Anticoagulation Service Note    Date: 2023    Anticoagulation Summary  As of 2023      INR goal:  2.5-3.0   TTR:  55.2 % (7.6 y)   INR used for dosin.70 (2023)   Warfarin maintenance plan:  3 mg (3 mg x 1) every day   Weekly warfarin total:  21 mg   Plan last modified:  Vandana Vallecillo, PharmD (1/3/2023)   Next INR check:  2023   Priority:  Routine   Target end date:  Indefinite    Indications    Permanent atrial fibrillation (HCC) [I48.21]  Long term current use of anticoagulant therapy [Z79.01]  Secondary hypercoagulable state (HCC) [D68.69]                 Anticoagulation Episode Summary       INR check location:  Home Draw    Preferred lab:      Send INR reminders to:      Comments:  Waqar  - [phone] -   goal range changed to 2.5-3.2 per raghu vaca 2019          Anticoagulation Care Providers       Provider Role Specialty Phone number    Hu Gamez M.D. Referring Internal Medicine Clinical Cardiac Electrophysiology [phone]    [facility] Anticoagulation Services Responsible  [phone]          Anticoagulation Patient Findings        Patient's preferred phone number:  [phone]        HPI:   The reason for today's call is to prevent morbidity and mortality from a blood clot and/or stroke and to reduce the risk of bleeding while on a anticoagulant.     PCP:  Pcp Pt States None  No address on file    Assessment:     INR  therapeutic.     Lab Results   Component Value Date/Time    BUN 17 2021 03:07 AM    CREATININE 0.85 2021 03:07 AM    CREATININE 1.0 2009 02:40 PM     Lab Results   Component Value Date/Time    HEMOGLOBIN 11.6 (L) 2021 03:07 AM    HEMATOCRIT 35.9 (L) 2021 03:07 AM    PLATELETCT 152 (L) 2021 03:07 AM    ALKPHOSPHAT 65 2019 07:20 AM    ASTSGOT 34 2019 07:20 AM    ALTSGPT 32 2019 07:20 AM          Current Outpatient Medications:     warfarin, Take 0.5-1  tablet by mouth daily or as directed by  "anticoagulation clinic    losartan, 25 mg, Oral, DAILY    metoprolol SR, 25 mg, Oral, Q EVENING    INSULIN SYRINGE 1CC/31GX5/16\", USE TO INJECT B12 SUBCUTANEOUS WEEKLY    lamoTRIgine, 25 mg, Oral, QHS    Apoaequorin (PREVAGEN PO), 1 Tablet, Oral, DAILY    omeprazole, 20 mg, Oral, QAM    Magnesium, 2 Tablet, Oral, QHS    Melatonin, 2 Capsule, Oral, QHS    cycloSPORINE, 1 Drop, Both Eyes, BID    cyanocobalamin, 1,000 mcg, Intramuscular, Q7 DAYS    Carboxymethylcellulose Sodium (REFRESH CELLUVISC OP), 1 Drop, Ophthalmic, PRN    levothyroxine, 88 mcg, Oral, AM ES    estradiol, Insert  into the vagina see administration instructions. 3 times week  Indications: Vulvovaginal Atrophy    levothyroxine, 100 mcg, Oral, AM ES    liothyronine, 5 mcg, Oral, QAM    CALCIUM 600-D PO, 1,200 mg, Oral, DAILY    POTASSIUM ACETATE, 550 mg, Oral, Q EVENING      Plan:     Continue the same warfarin dose, as noted above.       Follow-up:     As seen above      Additional information discussed with patient:     Asked patient to please call the anticoagulation clinic if they have any signs/symptoms of bleeding and/or thrombosis or any changes to diet or medications.      National recommendations regarding anticoagulation therapy:     The CHEST guidelines recommends frequent INR monitoring at regular intervals (a few days up to a max of 12 weeks) to ensure patients are on the proper dose of warfarin, and patients are not having any complications from therapy.  INRs can dramatically change over a short time period due to diet, medications, and medical conditions.       Skip Gonzalez, PharmD, MS, BCACP, C  [facility]  Phone: [phone]  Fax: [phone]  On call: [phone]  General scheduling/information [phone]  For emergencies please dial 911  Please do not use [employer] for urgent matters, call the phone numbers listed above.    This note was created using voice recognition software (Dragon). " The accuracy of the dictation is limited by the abilities of the software. I have reviewed the note prior to signing, however some errors in grammar and context are still possible. If you have any questions related to this note please do not hesitate to contact our office.

## 2023-01-24 ENCOUNTER — ANTICOAGULATION MONITORING (OUTPATIENT)
Dept: MEDICAL GROUP | Facility: PHYSICIAN GROUP | Age: 83
End: 2023-01-24
Payer: MEDICARE

## 2023-01-24 DIAGNOSIS — I48.21 PERMANENT ATRIAL FIBRILLATION (HCC): Chronic | ICD-10-CM

## 2023-01-24 DIAGNOSIS — D68.69 SECONDARY HYPERCOAGULABLE STATE (HCC): ICD-10-CM

## 2023-01-24 DIAGNOSIS — Z79.01 LONG TERM CURRENT USE OF ANTICOAGULANT THERAPY: Chronic | ICD-10-CM

## 2023-01-24 LAB — INR PPP: 3.3 (ref 2–3.5)

## 2023-01-24 NOTE — PROGRESS NOTES
Anticoagulation Summary  As of 1/24/2023      INR goal:  2.5-3.0   TTR:  55.1 % (7.7 y)   INR used for dosing:  3.30 (1/24/2023)   Warfarin maintenance plan:  3 mg (3 mg x 1) every day   Weekly warfarin total:  21 mg   Plan last modified:  Vandana Vallecillo, PharmD (1/3/2023)   Next INR check:  2/7/2023   Priority:  Routine   Target end date:  Indefinite    Indications    Permanent atrial fibrillation (HCC) [I48.21]  Long term current use of anticoagulant therapy [Z79.01]  Secondary hypercoagulable state (HCC) [D68.69]                 Anticoagulation Episode Summary       INR check location:  Home Draw    Preferred lab:      Send INR reminders to:      Comments:  Waqar [facility] [phone] -   goal range changed to 2.5-3.2 per raghu vaca 8/8/2019          Anticoagulation Care Providers       Provider Role Specialty Phone number    Hu Gamez M.D. Referring Internal Medicine Clinical Cardiac Electrophysiology [phone]    [facility] Anticoagulation Services Responsible  [phone]          Anticoagulation Patient Findings          HPI:  Madeline Dorantes, on anticoagulation therapy with warfarin for PAF.   Changes to current medical/health status since last appt: none  Denies signs/symptoms of bleeding and/or thrombosis since the last appt.    Denies any interval changes to diet  Denies any interval changes to medications since last appt.   Denies any complications or cost restrictions with current therapy.     A/P   INR  SUPRA-therapeutic.   INR above goal by only 0.1, no change to current therapy.     Next INR in 2 week(s).    Fazal Pineda, PharmD

## 2023-02-08 ENCOUNTER — ANTICOAGULATION MONITORING (OUTPATIENT)
Dept: VASCULAR LAB | Facility: MEDICAL CENTER | Age: 83
End: 2023-02-08
Payer: MEDICARE

## 2023-02-08 DIAGNOSIS — Z79.01 LONG TERM CURRENT USE OF ANTICOAGULANT THERAPY: Chronic | ICD-10-CM

## 2023-02-08 DIAGNOSIS — I48.21 PERMANENT ATRIAL FIBRILLATION (HCC): Chronic | ICD-10-CM

## 2023-02-08 DIAGNOSIS — D68.69 SECONDARY HYPERCOAGULABLE STATE (HCC): ICD-10-CM

## 2023-02-08 LAB — INR PPP: 3.2 (ref 2–3.5)

## 2023-02-09 NOTE — PROGRESS NOTES
Anticoagulation Summary  As of 2/8/2023      INR goal:  2.5-3.0   TTR:  54.8 % (7.7 y)   INR used for dosing:  3.20 (2/8/2023)   Warfarin maintenance plan:  3 mg (3 mg x 1) every day   Weekly warfarin total:  21 mg   Plan last modified:  Vandana Vallecillo, PharmD (1/3/2023)   Next INR check:  2/22/2023   Priority:  Routine   Target end date:  Indefinite    Indications    Permanent atrial fibrillation (HCC) [I48.21]  Long term current use of anticoagulant therapy [Z79.01]  Secondary hypercoagulable state (HCC) [D68.69]                 Anticoagulation Episode Summary       INR check location:  Home Draw    Preferred lab:      Send INR reminders to:      Comments:  Waqar [facility] [phone] -   goal range changed to 2.5-3.2 per raghu vaca 8/8/2019          Anticoagulation Care Providers       Provider Role Specialty Phone number    Hu Gamez M.D. Referring Internal Medicine Clinical Cardiac Electrophysiology [phone]    [facility] Anticoagulation Services Responsible  [phone]          Anticoagulation Patient Findings    Spoke with patient today regarding therapeutic INR of 3.2.  Patient denies any signs/symptoms of bruising or bleeding or any changes in diet and medications.  Instructed patient to call clinic with any questions or concerns.    Pt is not on antiplatelet therapy    Pt is to continue with current warfarin dosing regimen.  Follow up in 2 weeks, to reduce risk of adverse events related to this high risk medication,  Warfarin.    Kristopher Escobar, ClarisseD, BCACP

## 2023-02-14 ENCOUNTER — ANTICOAGULATION MONITORING (OUTPATIENT)
Dept: VASCULAR LAB | Facility: MEDICAL CENTER | Age: 83
End: 2023-02-14
Payer: MEDICARE

## 2023-02-14 DIAGNOSIS — I48.21 PERMANENT ATRIAL FIBRILLATION (HCC): Chronic | ICD-10-CM

## 2023-02-14 DIAGNOSIS — Z79.01 LONG TERM CURRENT USE OF ANTICOAGULANT THERAPY: Chronic | ICD-10-CM

## 2023-02-14 DIAGNOSIS — D68.69 SECONDARY HYPERCOAGULABLE STATE (HCC): ICD-10-CM

## 2023-02-14 LAB — INR PPP: 2.9 (ref 2–3.5)

## 2023-02-14 NOTE — PROGRESS NOTES
Anticoagulation Summary  As of 2023      INR goal:  2.5-3.0   TTR:  54.8 % (7.7 y)   INR used for dosin.90 (2023)   Warfarin maintenance plan:  3 mg (3 mg x 1) every day   Weekly warfarin total:  21 mg   Plan last modified:  Vandana Vallecillo, PharmD (1/3/2023)   Next INR check:  2023   Priority:  Routine   Target end date:  Indefinite    Indications    Permanent atrial fibrillation (HCC) [I48.21]  Long term current use of anticoagulant therapy [Z79.01]  Secondary hypercoagulable state (HCC) [D68.69]                 Anticoagulation Episode Summary       INR check location:  Home Draw    Preferred lab:      Send INR reminders to:      Comments:  Waqar [facility] [phone] -   goal range changed to 2.5-3.2 per raghu vaca 2019          Anticoagulation Care Providers       Provider Role Specialty Phone number    Hu Gamez M.D. Referring Internal Medicine Clinical Cardiac Electrophysiology [phone]    [facility] Anticoagulation Services Responsible  [phone]          Anticoagulation Patient Findings          HPI:  Madeline Dorantes, on anticoagulation therapy with warfarin for [employer]   Changes to current medical/health status since last appt: none  Denies signs/symptoms of bleeding and/or thrombosis since the last appt.    Denies any interval changes to diet  Denies any interval changes to medications since last appt.   Denies any complications or cost restrictions with current therapy.     A/P   INR  therapeutic.   Pt is to continue with current warfarin dosing regimen.     Next INR in 2 week(s).    Fazal Pineda, PharmD

## 2023-02-16 ENCOUNTER — NON-PROVIDER VISIT (OUTPATIENT)
Dept: CARDIOLOGY | Facility: MEDICAL CENTER | Age: 83
End: 2023-02-16
Payer: MEDICARE

## 2023-02-16 PROCEDURE — 93294 REM INTERROG EVL PM/LDLS PM: CPT | Performed by: INTERNAL MEDICINE

## 2023-02-17 NOTE — CARDIAC REMOTE MONITOR - SCAN
Device transmission reviewed. Device demonstrated appropriate function.       Electronically Signed by: Ammon Rosario M.D.    2/20/2023  8:31 PM

## 2023-02-23 ENCOUNTER — ANTICOAGULATION MONITORING (OUTPATIENT)
Dept: VASCULAR LAB | Facility: MEDICAL CENTER | Age: 83
End: 2023-02-23

## 2023-02-23 ENCOUNTER — TELEPHONE (OUTPATIENT)
Dept: CARDIOLOGY | Facility: MEDICAL CENTER | Age: 83
End: 2023-02-23

## 2023-02-23 ENCOUNTER — NON-PROVIDER VISIT (OUTPATIENT)
Dept: CARDIOLOGY | Facility: PHYSICIAN GROUP | Age: 83
End: 2023-02-23
Payer: MEDICARE

## 2023-02-23 VITALS
RESPIRATION RATE: 16 BRPM | DIASTOLIC BLOOD PRESSURE: 74 MMHG | SYSTOLIC BLOOD PRESSURE: 132 MMHG | BODY MASS INDEX: 22.73 KG/M2 | HEART RATE: 82 BPM | OXYGEN SATURATION: 96 % | HEIGHT: 68 IN | WEIGHT: 150 LBS

## 2023-02-23 DIAGNOSIS — I48.21 PERMANENT ATRIAL FIBRILLATION (HCC): Chronic | ICD-10-CM

## 2023-02-23 DIAGNOSIS — Z95.0 CARDIAC PACEMAKER IN SITU: Chronic | ICD-10-CM

## 2023-02-23 DIAGNOSIS — I44.2 THIRD DEGREE AV BLOCK (HCC): Chronic | ICD-10-CM

## 2023-02-23 DIAGNOSIS — Z98.890 S/P AV NODAL ABLATION: ICD-10-CM

## 2023-02-23 DIAGNOSIS — Z79.01 LONG TERM CURRENT USE OF ANTICOAGULANT THERAPY: Chronic | ICD-10-CM

## 2023-02-23 DIAGNOSIS — D68.69 SECONDARY HYPERCOAGULABLE STATE (HCC): ICD-10-CM

## 2023-02-23 DIAGNOSIS — I49.8 PACEMAKER-DEPENDENT DUE TO NATIVE CARDIAC RHYTHM INSUFFICIENT TO SUPPORT LIFE: Chronic | ICD-10-CM

## 2023-02-23 DIAGNOSIS — Z95.0 PACEMAKER-DEPENDENT DUE TO NATIVE CARDIAC RHYTHM INSUFFICIENT TO SUPPORT LIFE: Chronic | ICD-10-CM

## 2023-02-23 LAB — INR PPP: 2.6 (ref 2–3.5)

## 2023-02-23 PROCEDURE — 93279 PRGRMG DEV EVAL PM/LDLS PM: CPT | Performed by: NURSE PRACTITIONER

## 2023-02-23 RX ORDER — LAMOTRIGINE 25 MG/1
25 TABLET ORAL DAILY
COMMUNITY
Start: 2023-02-07 | End: 2023-02-23

## 2023-02-23 NOTE — TELEPHONE ENCOUNTER
Dr. Gamez,    Would you like this patient to hold her Coumadin for this procedure or continue taking it?    Thank You,  Sravanthi

## 2023-02-23 NOTE — TELEPHONE ENCOUNTER
----- Message from LOUIS Mcrae sent at 2/23/2023 11:48 AM PST -----  Regarding: PM gen change  Sravanthi,    Please call this patient to schedule PM gen change in the next 3-4 weeks. Order is in Epic.    She would prefer to do wound check afterwards here in [place].    Thanks, Meseret

## 2023-02-23 NOTE — PROGRESS NOTES
We are checking PM more frequently, as battery is nearing JAMIR, and she is completely PM dependent.    Device is working normally. Underlying rhythm is permanent atrial fibrillation (known); she is anticoagulated with Coumadin. No ventricular high rate episodes.  Normal capture of RV lead; stable impedance. Battery longevity is <1-10 months.  No changes are made today.    We will get her scheduled for a generator change in the next 3-4 weeks.    Follow-up one week later with me in [place] for wound check.

## 2023-02-23 NOTE — PROGRESS NOTES
Anticoagulation Summary  As of 2023      INR goal:  2.5-3.0   TTR:  55.0 % (7.7 y)   INR used for dosin.60 (2023)   Warfarin maintenance plan:  3 mg (3 mg x 1) every day   Weekly warfarin total:  21 mg   Plan last modified:  Vandana Vallecillo, PharmD (1/3/2023)   Next INR check:  3/2/2023   Priority:  Routine   Target end date:  Indefinite    Indications    Permanent atrial fibrillation (HCC) [I48.21]  Long term current use of anticoagulant therapy [Z79.01]  Secondary hypercoagulable state (HCC) [D68.69]                 Anticoagulation Episode Summary       INR check location:  Home Draw    Preferred lab:      Send INR reminders to:      Comments:  Waqar [facility] [phone] -   goal range changed to 2.5-3.2 per raghu vaca 2019          Anticoagulation Care Providers       Provider Role Specialty Phone number    Hu Gamez M.D. Referring Internal Medicine Clinical Cardiac Electrophysiology [phone]    [facility] Anticoagulation Services Responsible  [phone]            Refer to Anticoagulation Patient Findings for HPI  Patient Findings       Negatives:  Signs/symptoms of thrombosis, Signs/symptoms of bleeding, Laboratory test error suspected, Change in health, Change in alcohol use, Change in activity, Upcoming invasive procedure, Emergency department visit, Upcoming dental procedure, Missed doses, Extra doses, Change in medications, Change in diet/appetite, Hospital admission, Bruising, Other complaints            Spoke with patient to report a therapeutic INR.      Pt is NOT on antiplatelet therapy     Pt instructed to continue with current warfarin dosing regimen, confirmed dosing.   Will follow up in 1 week(s).     Anihta Leon, Pharmacy Intern      +++++++++++++++++++++++++++++++++++++++++++++++++++++++++++++++++++    I have reviewed and concur with the above plan.       Current Outpatient Medications:     lamoTRIgine, 25 mg, Oral, DAILY    warfarin, Take 0.5-1  tablet by mouth daily or as  "directed by anticoagulation clinic    losartan, 25 mg, Oral, DAILY    metoprolol SR, 25 mg, Oral, Q EVENING    INSULIN SYRINGE 1CC/31GX5/16\", USE TO INJECT B12 SUBCUTANEOUS WEEKLY    lamoTRIgine, 25 mg, Oral, QHS    Apoaequorin (PREVAGEN PO), 1 Tablet, Oral, DAILY    omeprazole, 20 mg, Oral, QAM    Magnesium, 2 Tablet, Oral, QHS    Melatonin, 2 Capsule, Oral, QHS    cycloSPORINE, 1 Drop, Both Eyes, BID    cyanocobalamin, 1,000 mcg, Intramuscular, Q7 DAYS    Carboxymethylcellulose Sodium (REFRESH CELLUVISC OP), 1 Drop, Ophthalmic, PRN    levothyroxine, 88 mcg, Oral, AM ES    estradiol, Insert  into the vagina see administration instructions. 3 times week  Indications: Vulvovaginal Atrophy    levothyroxine, 100 mcg, Oral, AM ES    liothyronine, 5 mcg, Oral, QAM    CALCIUM 600-D PO, 1,200 mg, Oral, DAILY    POTASSIUM ACETATE, 550 mg, Oral, Q EVENING    Skip Gonzalez, PharmD, MS, BCACP, [facility]  Phone: [phone]  Fax: [phone]  On call: [phone]  General scheduling/information [phone]  For emergencies please dial 911  Please do not use [employer] for urgent matters, call the phone numbers listed above.    This note was created using voice recognition software (Dragon). The accuracy of the dictation is limited by the abilities of the software. I have reviewed the note prior to signing, however some errors in grammar and context are still possible. If you have any questions related to this note please do not hesitate to contact our office.       "

## 2023-02-24 NOTE — TELEPHONE ENCOUNTER
Patient scheduled for PM gen change on 3-21-23 with Dr. Gamez. Patient has been instructed to check in at 8:30 for 10:30 case time. Hold Coumadin 1 day. Message sent to authorizations. KIMBERLY Carney with [employer]

## 2023-02-28 ENCOUNTER — ANTICOAGULATION MONITORING (OUTPATIENT)
Dept: VASCULAR LAB | Facility: MEDICAL CENTER | Age: 83
End: 2023-02-28
Payer: MEDICARE

## 2023-02-28 DIAGNOSIS — D68.69 SECONDARY HYPERCOAGULABLE STATE (HCC): ICD-10-CM

## 2023-02-28 DIAGNOSIS — Z79.01 LONG TERM CURRENT USE OF ANTICOAGULANT THERAPY: Chronic | ICD-10-CM

## 2023-02-28 DIAGNOSIS — I48.21 PERMANENT ATRIAL FIBRILLATION (HCC): Chronic | ICD-10-CM

## 2023-02-28 LAB — INR PPP: 2.8 (ref 2–3.5)

## 2023-02-28 NOTE — PROGRESS NOTES
Anticoagulation Summary  As of 2023      INR goal:  2.5-3.0   TTR:  55.1 % (7.8 y)   INR used for dosin.80 (2023)   Warfarin maintenance plan:  3 mg (3 mg x 1) every day   Weekly warfarin total:  21 mg   Plan last modified:  Vandana Vallecillo, PharmD (1/3/2023)   Next INR check:  3/7/2023   Priority:  Routine   Target end date:  Indefinite    Indications    Permanent atrial fibrillation (HCC) [I48.21]  Long term current use of anticoagulant therapy [Z79.01]  Secondary hypercoagulable state (HCC) [D68.69]                 Anticoagulation Episode Summary       INR check location:  Home Draw    Preferred lab:      Send INR reminders to:      Comments:  Waqar [facility] [phone] -   goal range changed to 2.5-3.2 per raghu vaca 2019          Anticoagulation Care Providers       Provider Role Specialty Phone number    Hu Gamez M.D. Referring Internal Medicine Clinical Cardiac Electrophysiology [phone]    [facility] Anticoagulation Services Responsible  [phone]          Anticoagulation Patient Findings      Left voicemail message to report a therapeutic INR.      Pt to continue with current warfarin dosing regimen. Requested pt contact the clinic for any s/s of unusual bleeding, bruising, clotting or any changes to diet or medication.    FU INR in 1 week(s).    Anitha Leon, Pharmacy Intern

## 2023-03-07 ENCOUNTER — PRE-ADMISSION TESTING (OUTPATIENT)
Dept: ADMISSIONS | Facility: MEDICAL CENTER | Age: 83
End: 2023-03-07
Attending: INTERNAL MEDICINE
Payer: MEDICARE

## 2023-03-07 DIAGNOSIS — Z01.812 PRE-OPERATIVE LABORATORY EXAMINATION: ICD-10-CM

## 2023-03-07 DIAGNOSIS — Z01.810 PRE-OPERATIVE CARDIOVASCULAR EXAMINATION: ICD-10-CM

## 2023-03-17 ENCOUNTER — PRE-ADMISSION TESTING (OUTPATIENT)
Dept: ADMISSIONS | Facility: MEDICAL CENTER | Age: 83
End: 2023-03-17
Attending: INTERNAL MEDICINE
Payer: MEDICARE

## 2023-03-17 DIAGNOSIS — Z01.810 PRE-OPERATIVE CARDIOVASCULAR EXAMINATION: ICD-10-CM

## 2023-03-17 DIAGNOSIS — Z01.812 PRE-OPERATIVE LABORATORY EXAMINATION: ICD-10-CM

## 2023-03-17 LAB
ALBUMIN SERPL BCP-MCNC: 4.3 G/DL (ref 3.2–4.9)
ALBUMIN/GLOB SERPL: 1.7 G/DL
ALP SERPL-CCNC: 128 U/L (ref 30–99)
ALT SERPL-CCNC: 17 U/L (ref 2–50)
ANION GAP SERPL CALC-SCNC: 12 MMOL/L (ref 7–16)
AST SERPL-CCNC: 19 U/L (ref 12–45)
BILIRUB SERPL-MCNC: 1.4 MG/DL (ref 0.1–1.5)
BUN SERPL-MCNC: 16 MG/DL (ref 8–22)
CALCIUM ALBUM COR SERPL-MCNC: 9.7 MG/DL (ref 8.5–10.5)
CALCIUM SERPL-MCNC: 9.9 MG/DL (ref 8.5–10.5)
CHLORIDE SERPL-SCNC: 104 MMOL/L (ref 96–112)
CO2 SERPL-SCNC: 25 MMOL/L (ref 20–33)
CREAT SERPL-MCNC: 0.93 MG/DL (ref 0.5–1.4)
EKG IMPRESSION: NORMAL
ERYTHROCYTE [DISTWIDTH] IN BLOOD BY AUTOMATED COUNT: 45.9 FL (ref 35.9–50)
GFR SERPLBLD CREATININE-BSD FMLA CKD-EPI: 60 ML/MIN/1.73 M 2
GLOBULIN SER CALC-MCNC: 2.6 G/DL (ref 1.9–3.5)
GLUCOSE SERPL-MCNC: 100 MG/DL (ref 65–99)
HCT VFR BLD AUTO: 41.5 % (ref 37–47)
HGB BLD-MCNC: 13 G/DL (ref 12–16)
INR PPP: 2.62 (ref 0.87–1.13)
MCH RBC QN AUTO: 27.2 PG (ref 27–33)
MCHC RBC AUTO-ENTMCNC: 31.3 G/DL (ref 33.6–35)
MCV RBC AUTO: 86.8 FL (ref 81.4–97.8)
PLATELET # BLD AUTO: 217 K/UL (ref 164–446)
PMV BLD AUTO: 11.1 FL (ref 9–12.9)
POTASSIUM SERPL-SCNC: 4 MMOL/L (ref 3.6–5.5)
PROT SERPL-MCNC: 6.9 G/DL (ref 6–8.2)
PROTHROMBIN TIME: 27.2 SEC (ref 12–14.6)
RBC # BLD AUTO: 4.78 M/UL (ref 4.2–5.4)
SODIUM SERPL-SCNC: 141 MMOL/L (ref 135–145)
WBC # BLD AUTO: 4.8 K/UL (ref 4.8–10.8)

## 2023-03-17 PROCEDURE — 93010 ELECTROCARDIOGRAM REPORT: CPT | Performed by: INTERNAL MEDICINE

## 2023-03-17 PROCEDURE — 85610 PROTHROMBIN TIME: CPT

## 2023-03-17 PROCEDURE — 85027 COMPLETE CBC AUTOMATED: CPT

## 2023-03-17 PROCEDURE — 80053 COMPREHEN METABOLIC PANEL: CPT

## 2023-03-17 PROCEDURE — 93005 ELECTROCARDIOGRAM TRACING: CPT

## 2023-03-17 PROCEDURE — 36415 COLL VENOUS BLD VENIPUNCTURE: CPT

## 2023-03-18 LAB — INR PPP: 2.9 (ref 2–3.5)

## 2023-03-20 ENCOUNTER — ANTICOAGULATION MONITORING (OUTPATIENT)
Dept: VASCULAR LAB | Facility: MEDICAL CENTER | Age: 83
End: 2023-03-20
Payer: MEDICARE

## 2023-03-20 DIAGNOSIS — I48.21 PERMANENT ATRIAL FIBRILLATION (HCC): Chronic | ICD-10-CM

## 2023-03-20 DIAGNOSIS — D68.69 SECONDARY HYPERCOAGULABLE STATE (HCC): ICD-10-CM

## 2023-03-20 DIAGNOSIS — Z79.01 LONG TERM CURRENT USE OF ANTICOAGULANT THERAPY: Chronic | ICD-10-CM

## 2023-03-20 NOTE — PROGRESS NOTES
Anticoagulation Summary  As of 3/20/2023      INR goal:  2.5-3.0   TTR:  55.3 % (7.8 y)   INR used for dosin.90 (3/18/2023)   Warfarin maintenance plan:  3 mg (3 mg x 1) every day   Weekly warfarin total:  21 mg   Plan last modified:  Vandana Vallecillo, PharmD (1/3/2023)   Next INR check:  3/31/2023   Priority:  Routine   Target end date:  Indefinite    Indications    Permanent atrial fibrillation (HCC) [I48.21]  Long term current use of anticoagulant therapy [Z79.01]  Secondary hypercoagulable state (HCC) [D68.69]                 Anticoagulation Episode Summary       INR check location:  Home Draw    Preferred lab:      Send INR reminders to:      Comments:  Waqar [facility] [phone] -   goal range changed to 2.5-3.2 per raghu vaca 2019          Anticoagulation Care Providers       Provider Role Specialty Phone number    Hu Gamez M.D. Referring Internal Medicine Clinical Cardiac Electrophysiology [phone]    [facility] Anticoagulation Services Responsible  [phone]          Anticoagulation Patient Findings    Left voicemail message to report a therapeutic INR.      Pt to continue with current warfarin dosing regimen. Requested pt contact the clinic for any s/s of unusual bleeding, bruising, clotting or any changes to diet or medication.    FU INR in 2 week(s).    Anitha Leon, Pharmacy Intern

## 2023-03-21 ENCOUNTER — APPOINTMENT (OUTPATIENT)
Dept: RADIOLOGY | Facility: MEDICAL CENTER | Age: 83
End: 2023-03-21
Attending: INTERNAL MEDICINE
Payer: MEDICARE

## 2023-03-21 ENCOUNTER — APPOINTMENT (OUTPATIENT)
Dept: CARDIOLOGY | Facility: MEDICAL CENTER | Age: 83
End: 2023-03-21
Attending: NURSE PRACTITIONER
Payer: MEDICARE

## 2023-03-21 ENCOUNTER — HOSPITAL ENCOUNTER (OUTPATIENT)
Facility: MEDICAL CENTER | Age: 83
End: 2023-03-21
Attending: INTERNAL MEDICINE | Admitting: INTERNAL MEDICINE
Payer: MEDICARE

## 2023-03-21 VITALS
TEMPERATURE: 97.1 F | BODY MASS INDEX: 23.29 KG/M2 | DIASTOLIC BLOOD PRESSURE: 76 MMHG | HEART RATE: 70 BPM | RESPIRATION RATE: 17 BRPM | OXYGEN SATURATION: 94 % | SYSTOLIC BLOOD PRESSURE: 164 MMHG | WEIGHT: 148.37 LBS | HEIGHT: 67 IN

## 2023-03-21 DIAGNOSIS — Z95.0 CARDIAC PACEMAKER IN SITU: Chronic | ICD-10-CM

## 2023-03-21 DIAGNOSIS — I49.8 PACEMAKER-DEPENDENT DUE TO NATIVE CARDIAC RHYTHM INSUFFICIENT TO SUPPORT LIFE: Chronic | ICD-10-CM

## 2023-03-21 DIAGNOSIS — G45.9 TIA (TRANSIENT ISCHEMIC ATTACK): ICD-10-CM

## 2023-03-21 DIAGNOSIS — I44.2 THIRD DEGREE AV BLOCK (HCC): Chronic | ICD-10-CM

## 2023-03-21 DIAGNOSIS — I48.21 PERMANENT ATRIAL FIBRILLATION (HCC): Chronic | ICD-10-CM

## 2023-03-21 DIAGNOSIS — Z95.0 PACEMAKER-DEPENDENT DUE TO NATIVE CARDIAC RHYTHM INSUFFICIENT TO SUPPORT LIFE: Chronic | ICD-10-CM

## 2023-03-21 LAB
EKG IMPRESSION: NORMAL
INR PPP: 1.81 (ref 0.87–1.13)
PROTHROMBIN TIME: 20.5 SEC (ref 12–14.6)

## 2023-03-21 PROCEDURE — 700101 HCHG RX REV CODE 250

## 2023-03-21 PROCEDURE — 85610 PROTHROMBIN TIME: CPT

## 2023-03-21 PROCEDURE — 700105 HCHG RX REV CODE 258: Performed by: INTERNAL MEDICINE

## 2023-03-21 PROCEDURE — 99152 MOD SED SAME PHYS/QHP 5/>YRS: CPT | Performed by: INTERNAL MEDICINE

## 2023-03-21 PROCEDURE — 160035 HCHG PACU - 1ST 60 MINS PHASE I

## 2023-03-21 PROCEDURE — C1785 PMKR, DUAL, RATE-RESP: HCPCS

## 2023-03-21 PROCEDURE — 160002 HCHG RECOVERY MINUTES (STAT)

## 2023-03-21 PROCEDURE — 160046 HCHG PACU - 1ST 60 MINS PHASE II

## 2023-03-21 PROCEDURE — 33228 REMV&REPLC PM GEN DUAL LEAD: CPT | Performed by: INTERNAL MEDICINE

## 2023-03-21 PROCEDURE — 700111 HCHG RX REV CODE 636 W/ 250 OVERRIDE (IP)

## 2023-03-21 PROCEDURE — 93005 ELECTROCARDIOGRAM TRACING: CPT | Performed by: INTERNAL MEDICINE

## 2023-03-21 PROCEDURE — 36415 COLL VENOUS BLD VENIPUNCTURE: CPT

## 2023-03-21 PROCEDURE — 71045 X-RAY EXAM CHEST 1 VIEW: CPT

## 2023-03-21 PROCEDURE — 93010 ELECTROCARDIOGRAM REPORT: CPT | Mod: 59 | Performed by: INTERNAL MEDICINE

## 2023-03-21 RX ORDER — WARFARIN SODIUM 3 MG/1
3 TABLET ORAL DAILY
COMMUNITY
End: 2023-05-18 | Stop reason: SDUPTHER

## 2023-03-21 RX ORDER — MIDAZOLAM HYDROCHLORIDE 1 MG/ML
INJECTION INTRAMUSCULAR; INTRAVENOUS
Status: COMPLETED
Start: 2023-03-21 | End: 2023-03-21

## 2023-03-21 RX ORDER — CEFAZOLIN SODIUM 1 G/3ML
INJECTION, POWDER, FOR SOLUTION INTRAMUSCULAR; INTRAVENOUS
Status: COMPLETED
Start: 2023-03-21 | End: 2023-03-21

## 2023-03-21 RX ORDER — SODIUM CHLORIDE 9 MG/ML
1000 INJECTION, SOLUTION INTRAVENOUS ONCE
Status: COMPLETED | OUTPATIENT
Start: 2023-03-21 | End: 2023-03-21

## 2023-03-21 RX ORDER — LIDOCAINE HYDROCHLORIDE 20 MG/ML
INJECTION, SOLUTION INFILTRATION; PERINEURAL
Status: COMPLETED
Start: 2023-03-21 | End: 2023-03-21

## 2023-03-21 RX ORDER — ACETAMINOPHEN 325 MG/1
650 TABLET ORAL EVERY 4 HOURS PRN
Status: DISCONTINUED | OUTPATIENT
Start: 2023-03-21 | End: 2023-03-21 | Stop reason: HOSPADM

## 2023-03-21 RX ORDER — DOXYCYCLINE 100 MG/1
100 CAPSULE ORAL 2 TIMES DAILY
Qty: 8 CAPSULE | Refills: 0 | Status: SHIPPED | OUTPATIENT
Start: 2023-03-21 | End: 2023-03-24 | Stop reason: SDUPTHER

## 2023-03-21 RX ADMIN — CEFAZOLIN 3000 MG: 330 INJECTION, POWDER, FOR SOLUTION INTRAMUSCULAR; INTRAVENOUS at 11:17

## 2023-03-21 RX ADMIN — FENTANYL CITRATE 100 MCG: 50 INJECTION, SOLUTION INTRAMUSCULAR; INTRAVENOUS at 11:40

## 2023-03-21 RX ADMIN — LIDOCAINE HYDROCHLORIDE: 20 INJECTION, SOLUTION INFILTRATION; PERINEURAL at 11:17

## 2023-03-21 RX ADMIN — MIDAZOLAM HYDROCHLORIDE 2 MG: 1 INJECTION, SOLUTION INTRAMUSCULAR; INTRAVENOUS at 11:40

## 2023-03-21 RX ADMIN — SODIUM CHLORIDE 1000 ML: 9 INJECTION, SOLUTION INTRAVENOUS at 09:36

## 2023-03-21 ASSESSMENT — FIBROSIS 4 INDEX: FIB4 SCORE: 1.78

## 2023-03-21 NOTE — DISCHARGE INSTRUCTIONS
HOME CARE INSTRUCTIONS    ACTIVITY: Rest and take it easy for the first 24 hours.  A responsible adult is recommended to remain with you during that time.  It is normal to feel sleepy.  We encourage you to not do anything that requires balance, judgment or coordination.    FOR 24 HOURS DO NOT:  Drive, operate machinery or run household appliances.  Drink beer or alcoholic beverages.  Make important decisions or sign legal documents.    SPECIAL INSTRUCTIONS: Pacemaker Battery Change, Care After  This sheet gives you information about how to care for yourself after your procedure. Your health care provider may also give you more specific instructions. If you have problems or questions, contact your health care provider.  What can I expect after the procedure?  After your procedure, it is common to have:  Pain or soreness at the site where the pacemaker was inserted.  Swelling at the site where the pacemaker was inserted.  Follow these instructions at home:  Incision care  Keep the incision clean and dry.  Do not take baths, swim, or use a hot tub until your health care provider approves.  You may shower the day after your procedure, or as directed by your health care provider.  Pat the area dry with a clean towel. Do not rub the area. This may cause bleeding.  Follow instructions from your health care provider about how to take care of your incision. Make sure you:  Wash your hands with soap and water before you change your bandage (dressing). If soap and water are not available, use hand .  Change your dressing as told by your health care provider.  Leave stitches (sutures), skin glue, or adhesive strips in place. These skin closures may need to stay in place for 2 weeks or longer. If adhesive strip edges start to loosen and curl up, you may trim the loose edges. Do not remove adhesive strips completely unless your health care provider tells you to do that.  Check your incision area every day for signs of  infection. Check for:  More redness, swelling, or pain.  More fluid or blood.  Warmth.  Pus or a bad smell.  Activity  Do not lift anything that is heavier than 10 lb (4.5 kg) until your health care provider says it is okay to do so.  For the first 2 weeks, or as long as told by your health care provider:  Avoid lifting your left arm higher than your shoulder.  Be gentle when you move your arms over your head. It is okay to raise your arm to comb your hair.  Avoid strenuous exercise.  Ask your health care provider when it is okay to:  Resume your normal activities.  Return to work or school.  Resume sexual activity.  Eating and drinking  Eat a heart-healthy diet. This should include plenty of fresh fruits and vegetables, whole grains, low-fat dairy products, and lean protein like chicken and fish.  Limit alcohol intake to no more than 1 drink a day for non-pregnant women and 2 drinks a day for men. One drink equals 12 oz of beer, 5 oz of wine, or 1½ oz of hard liquor.  Check ingredients and nutrition facts on packaged foods and beverages. Avoid the following types of food:  Food that is high in salt (sodium).  Food that is high in saturated fat, like full-fat dairy or red meat.  Food that is high in trans fat, like fried food.  Food and drinks that are high in sugar.  Lifestyle  Do not use any products that contain nicotine or tobacco, such as cigarettes and e-cigarettes. If you need help quitting, ask your health care provider.  Take steps to manage and control your weight.  Get regular exercise. Aim for 150 minutes of moderate-intensity exercise (such as walking or yoga) or 75 minutes of vigorous exercise (such as running or swimming) each week.  Manage other health problems, such as diabetes or high blood pressure. Ask your health care provider how you can manage these conditions.  General instructions  Do not drive for 24 hours after your procedure if you were given a medicine to help you relax (sedative).  Take  over-the-counter and prescription medicines only as told by your health care provider.  Avoid putting pressure on the area where the pacemaker was placed.  If you need an MRI after your pacemaker has been placed, be sure to tell the health care provider who orders the MRI that you have a pacemaker.  Avoid close and prolonged exposure to electrical devices that have strong magnetic fields. These include:  Cell phones. Avoid keeping them in a pocket near the pacemaker, and try using the ear opposite the pacemaker.  MP3 players.  Household appliances, like microwaves.  Metal detectors.  Electric generators.  High-tension wires.  Keep all follow-up visits as directed by your health care provider. This is important.  Contact a health care provider if:  You have pain at the incision site that is not relieved by over-the-counter or prescription medicines.  You have any of these around your incision site or coming from it:  More redness, swelling, or pain.  Fluid or blood.  Warmth to the touch.  Pus or a bad smell.  You have a fever.  You feel brief, occasional palpitations, light-headedness, or any symptoms that you think might be related to your heart.  Get help right away if:  You experience chest pain that is different from the pain at the pacemaker site.  You develop a red streak that extends above or below the incision site.  You experience shortness of breath.  You have palpitations or an irregular heartbeat.  You have light-headedness that does not go away quickly.  You faint or have dizzy spells.  Your pulse suddenly drops or increases rapidly and does not return to normal.  You begin to gain weight and your legs and ankles swell.  Summary  After your procedure, it is common to have pain, soreness, and some swelling where the pacemaker was inserted.  Make sure to keep your incision clean and dry. Follow instructions from your health care provider about how to take care of your incision.  Check your incision every  day for signs of infection, such as more pain or swelling, pus or a bad smell, warmth, or leaking fluid and blood.  Avoid strenuous exercise and lifting your left arm higher than your shoulder for 2 weeks, or as long as told by your health care provider.    DIET: To avoid nausea, slowly advance diet as tolerated, avoiding spicy or greasy foods for the first day.  Add more substantial food to your diet according to your physician's instructions.  INCREASE FLUIDS AND FIBER TO AVOID CONSTIPATION.    SURGICAL DRESSING/BATHING: Keep dressing dry for 1 week until follow-up appointment with Device Clinic. Sponge bath only. Do not submerge in water or bath for 7 days.    MEDICATIONS: Resume taking daily medication.  Take prescribed pain medication with food.  If no medication is prescribed, you may take non-aspirin pain medication if needed.  PAIN MEDICATION CAN BE VERY CONSTIPATING.  Take a stool softener or laxative such as senokot, pericolace, or milk of magnesia if needed.    Prescription given for DOXYCYCLINE, antibiotic.    A follow-up appointment should be arranged with your doctor in 1 WEEK WITH DEVICE CLINIC [phone]; they will call you with appointment, if they don't in the next 24 hours call to schedule.    You should CALL YOUR PHYSICIAN if you develop:  Fever greater than 101 degrees F.  Pain not relieved by medication, or persistent nausea or vomiting.  Excessive bleeding (blood soaking through dressing) or unexpected drainage from the wound.  Extreme redness or swelling around the incision site, drainage of pus or foul smelling drainage.  Inability to urinate or empty your bladder within 8 hours.  Problems with breathing or chest pain.    You should call 911 if you develop problems with breathing or chest pain.  If you are unable to contact your doctor or surgical center, you should go to the nearest emergency room or urgent care center.  Physician's telephone #: [phone]    MILD FLU-LIKE SYMPTOMS  ARE NORMAL.  YOU MAY EXPERIENCE GENERALIZED MUSCLE ACHES, THROAT IRRITATION, HEADACHE AND/OR SOME NAUSEA.    If any questions arise, call your doctor.  If your doctor is not available, please feel free to call the Surgical Center at [phone].  The Center is open Monday through Friday from 7AM to 7PM.      A registered nurse may call you a few days after your surgery to see how you are doing after your procedure.    You may also receive a survey in the mail within the next two weeks and we ask that you take a few moments to complete the survey and return it to us.  Our goal is to provide you with very good care and we value your comments.     Depression / Suicide Risk    As you are discharged from this [facility] facility, it is important to learn how to keep safe from harming yourself.    Recognize the warning signs:  Abrupt changes in personality, positive or negative- including increase in energy   Giving away possessions  Change in eating patterns- significant weight changes-  positive or negative  Change in sleeping patterns- unable to sleep or sleeping all the time   Unwillingness or inability to communicate  Depression  Unusual sadness, discouragement and loneliness  Talk of wanting to die  Neglect of personal appearance   Rebelliousness- reckless behavior  Withdrawal from people/activities they love  Confusion- inability to concentrate     If you or a loved one observes any of these behaviors or has concerns about self-harm, here's what you can do:  Talk about it- your feelings and reasons for harming yourself  Remove any means that you might use to hurt yourself (examples: pills, rope, extension cords, firearm)  Get professional help from the community (Mental Health, Substance Abuse, psychological counseling)  Do not be alone:Call your Safe Contact- someone whom you trust who will be there for you.  Call your local CRISIS HOTLINE 477-8634 or [phone]  Call your local Children's Mobile Crisis  Response Team [facility] (480) 975-6311 or www.[employer].vWise  Call the toll free National Suicide Prevention Hotlines   National Suicide Prevention Lifeline 602-578-PLZN (0427)  [facility] [facility] 800-SUICIDE (993-1290)    I acknowledge receipt and understanding of these Home Care instructions.

## 2023-03-21 NOTE — OR NURSING
1255: Report received from CRISSY Cook. Patient to Phase II 24 pending transport.    1309: Patient to Phase II Rm 24. Gen change site dressing CDI. Vitals taken upon arrival. Plan of care discussed with patient.    1315: Patient and family updated regarding Plan of care.    1325: Patient provided with water. Patient tolerating PO intake.  Patient belongings returned to patient at bedside.    1335: Pt discharged home. Discharge instructions given to pt prior to leaving unit including when to see PCP, and new medications if applicable. PIV removed. All questions answered. Verbalized understanding. All belongings with pt when leaving unit via wheelchair with CNA.

## 2023-03-21 NOTE — OP REPORT
Electrophysiology Procedure Note  [facility]    PROCEDURE: Dual-chamber pacemaker generator change,   moderate sedation administered by RN and supervised by physician    : Hu Gamez M.D.    ANESTHESIA: Moderate sedation,  start time 1119, stop time 1145  the moderate sedation document has been reviewed, signed and scanned into media     ESTIMATED BLOOD LOSS: 20 cc.    SPECIMENS: None.    COMPLICATIONS: None    INDICATION: JAMIR    PRE-PROCEDURE ECG: Atrial fibrillation with RV pacing    POST-PROCEDURE ECG: Atrial fibrillation with RV pacing    DESCRIPTION OF PROCEDURE: After informed written consent, the patient was brought to the electrophysiology lab in the fasting, unsedated state. The patient was prepped and draped in the usual sterile fashion. The procedure was performed under moderate sedation with local anesthetic. An incision was made with a scalpel along the old scar. Access to the device pocket was made using a combination of blunt dissection and electrocautery. The old generator and leads were freed from adhesions and the generator disconnected from the leads. Leads tested fine.  The pocket was irrigated with antibiotic solution, and the leads were attached to new Generator and inserted back in the pocket. The wound was closed with three layers of absorbable sutures and covered with Steri-Strips.   I personally supervised the administration of moderate sedation by the RN and observed the level of consciousness and physiologic status throughout the procedure.  Following recovery from sedation, the patient was transferred to a monitored bed.    IMPLANTED DEVICE INFORMATION:    Pulse generator is a MedLab42 model W3DR01  Serial number [id number]  LEAD INFORMATION:  1. Right atrial lead is a Tesora model PR44B, serial number [id number], P wave 0.4 millivolts, threshold atrial fed volts, pacing impedance 340 ohms, implant date 12/22/1997  2. Right ventricular lead is a Medtronic  model 5076-58, serial number [id number], R wave paced millivolts, threshold 0.75 volts, pacing impedance 450 ohms, implant date 1/11/2010  3.  Capped RV lead 12/22/1997 in the pocket.    DEVICE PROGRAMMING:    As previous programmed     IMPRESSIONS:  1. Successful dual-chamber pacemaker generator change.    RECOMMENDATIONS:  1. Transfer to PPU.  2. Follow-up in device clinic for wound check and device interrogation.

## 2023-03-21 NOTE — PROGRESS NOTES
Patient in pre-op, assessment completed, patient and daughter Meme updated on plan of care, all questions answered, no further needs at this time, call light within reach.
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
## 2023-03-21 NOTE — OR NURSING
1156 Pt arrived to PACU with Cath lab RN. AAOx4. Even, unlabored respirations. VSS. Denies pain. Denies nausea. Left upper chest dressing CDI.    1210 POC update given to Meme, daughter, over the phone. All questions answered.     1230 EKG complete, CXR complete, medtronic interrogation complete.     1245 Pt meets phase II criteria.     1256 Report called to CRISSY Bocanegra.     1258 Pt transported to pre op 24 with RN. Chart with patient.

## 2023-03-22 ENCOUNTER — TELEPHONE (OUTPATIENT)
Dept: CARDIOLOGY | Facility: MEDICAL CENTER | Age: 83
End: 2023-03-22
Payer: MEDICARE

## 2023-03-22 NOTE — TELEPHONE ENCOUNTER
CHARLIE    Caller:-  [facility] [place]    Topic/issue: Has questions on the script:   doxycycline (MONODOX) 100 MG capsule [[id number]]     Callback Number:  [phone]    Thank you,   Nahed FREEMAN

## 2023-03-23 ENCOUNTER — PATIENT MESSAGE (OUTPATIENT)
Dept: CARDIOLOGY | Facility: PHYSICIAN GROUP | Age: 83
End: 2023-03-23
Payer: MEDICARE

## 2023-03-23 DIAGNOSIS — Z95.0 CARDIAC PACEMAKER IN SITU: ICD-10-CM

## 2023-03-23 DIAGNOSIS — Z95.0 PACEMAKER-DEPENDENT DUE TO NATIVE CARDIAC RHYTHM INSUFFICIENT TO SUPPORT LIFE: ICD-10-CM

## 2023-03-23 DIAGNOSIS — Z98.890 S/P AV NODAL ABLATION: ICD-10-CM

## 2023-03-23 DIAGNOSIS — I49.8 PACEMAKER-DEPENDENT DUE TO NATIVE CARDIAC RHYTHM INSUFFICIENT TO SUPPORT LIFE: ICD-10-CM

## 2023-03-23 DIAGNOSIS — G45.9 TIA (TRANSIENT ISCHEMIC ATTACK): ICD-10-CM

## 2023-03-23 NOTE — TELEPHONE ENCOUNTER
CW    Caller: Madeline Chapa Jayna     Topic/issue: Pt called in regards to doxycycline (MONODOX) 100 MG pt states that the [facility] pharmacy doesn't have it and pt is wondering if we can send it over to [facility] -[address] [place] [address], Pt has to get this medication and she was supposed to have it right after her surgery. Pt would like a phone call back to confirm it was sent over Please advise.    Callback Number: [phone] (home)      Thank you,    Mina MUKHERJEE

## 2023-03-23 NOTE — TELEPHONE ENCOUNTER
To CW, pt was d/c with doxycycline monodox s/p gen change 3/21/23 and [facility] pharmacy is requesting for alternative, doxycycline hyclate instead.  Please advise, thank you!    ====================    Upon chart review:      Called [phone] and s/w pharmacist.  He states they are requesting to switch to doxycycline hyclate 100mg BID instead due to availability of medication.

## 2023-03-24 ENCOUNTER — PATIENT MESSAGE (OUTPATIENT)
Dept: CARDIOLOGY | Facility: PHYSICIAN GROUP | Age: 83
End: 2023-03-24
Payer: MEDICARE

## 2023-03-24 RX ORDER — DOXYCYCLINE 100 MG/1
100 CAPSULE ORAL 2 TIMES DAILY
Qty: 14 CAPSULE | Refills: 0 | Status: SHIPPED | OUTPATIENT
Start: 2023-03-24 | End: 2023-03-27

## 2023-03-24 RX ORDER — DOXYCYCLINE HYCLATE 100 MG
100 TABLET ORAL 2 TIMES DAILY
Qty: 14 TABLET | Refills: 0 | Status: SHIPPED | OUTPATIENT
Start: 2023-03-24 | End: 2023-03-27

## 2023-03-24 RX ORDER — DOXYCYCLINE 100 MG/1
100 CAPSULE ORAL 2 TIMES DAILY
Qty: 8 CAPSULE | Refills: 0 | Status: SHIPPED | OUTPATIENT
Start: 2023-03-24 | End: 2023-03-27

## 2023-03-24 NOTE — TELEPHONE ENCOUNTER
Phone Number Called: [phone]    Call outcome: Spoke to patient regarding message below.    Message: Called to inquire if patient has received her Doxycycline. Patient informed RN that [facility] pharmacy does not have it. Asked to have medication sent to [facility] in [place]. Medication sent to [facility]'s per patient's request.

## 2023-03-27 ENCOUNTER — OFFICE VISIT (OUTPATIENT)
Dept: URGENT CARE | Facility: PHYSICIAN GROUP | Age: 83
End: 2023-03-27
Payer: MEDICARE

## 2023-03-27 VITALS
RESPIRATION RATE: 14 BRPM | TEMPERATURE: 97.5 F | DIASTOLIC BLOOD PRESSURE: 82 MMHG | SYSTOLIC BLOOD PRESSURE: 138 MMHG | OXYGEN SATURATION: 99 % | HEIGHT: 68 IN | WEIGHT: 149 LBS | BODY MASS INDEX: 22.58 KG/M2 | HEART RATE: 67 BPM

## 2023-03-27 DIAGNOSIS — R23.8 SKIN IRRITATION: ICD-10-CM

## 2023-03-27 PROCEDURE — 99213 OFFICE O/P EST LOW 20 MIN: CPT | Performed by: NURSE PRACTITIONER

## 2023-03-27 RX ORDER — HYDROCORTISONE CREAM 1% 10 MG/G
1 CREAM TOPICAL 2 TIMES DAILY
Qty: 28 G | Refills: 0 | Status: SHIPPED | OUTPATIENT
Start: 2023-03-27

## 2023-03-27 ASSESSMENT — ENCOUNTER SYMPTOMS
CHILLS: 0
DIZZINESS: 0
HEADACHES: 0
NAUSEA: 0
PALPITATIONS: 0
VOMITING: 0
FEVER: 0

## 2023-03-27 ASSESSMENT — FIBROSIS 4 INDEX: FIB4 SCORE: 1.78

## 2023-03-27 NOTE — PROGRESS NOTES
Patient has consented to treatment and for use of patient information for treatment and billing purposes.    Chief Complaint:    Chief Complaint   Patient presents with    Allergic Reaction     Pt had surgery last Tuesday for pace maker, had allergic reaction to tape, itchy, redness, took it off and bandaged it her self, has appt Wed for post op check         History of Present Illness: 82 y.o.  female presents to clinic with 6-day history of burning and itching sensation under her the tape from a surgical placement of pacemaker.    She indicates that she tried to tolerate it as long as she could but had to take the bandage off this morning.    She now has erythematous area under the tape with some bruising noted.  Small amount of serosanguineous discharge from  the area that the tape was touching on the lower end.  She does have a postop appointment scheduled for Wednesday for a recheck.  She does have mild soreness at the incision site that is held together with Steri-Strips.      Review of Systems   Constitutional:  Negative for chills, fever and malaise/fatigue.   Cardiovascular:  Negative for chest pain and palpitations.   Gastrointestinal:  Negative for nausea and vomiting.   Neurological:  Negative for dizziness and headaches.     Medications, Allergies, and current problem list reviewed today in Epic.    Physical Exam:    Vitals:    03/27/23 1548   BP: 138/82   Pulse: 67   Resp: 14   Temp: 36.4 °C (97.5 °F)   SpO2: 99%             Physical Exam  Vitals reviewed.   Constitutional:       General: She is not in acute distress.     Appearance: Normal appearance. She is not toxic-appearing.   HENT:      Nose: Nose normal.   Cardiovascular:      Rate and Rhythm: Normal rate and regular rhythm.      Heart sounds: No murmur heard.  Pulmonary:      Effort: Pulmonary effort is normal. No respiratory distress.      Breath sounds: Normal breath sounds. No wheezing, rhonchi or rales.   Musculoskeletal:      Cervical  back: Normal range of motion.   Skin:     General: Skin is warm and dry.      Comments: Left upper chest: Steri-Strips in place over surgical incision that is dried blood noted, dry and intact.  Surrounding tissue is erythematous with some bruising noted.  No discharge or drainage.   Neurological:      Mental Status: She is alert.                Medical Decision Making:  I personally reviewed prior external notes and test results pertinent to today's visit.   Shared decision-making was utilized with patient did develop treatment plan and clinic course. Li, 82 y.o. female, presents to clinic with skin irritation due to Tegaderm tape with bruising noted from postsurgical incision area for pacemaker.  I do not feel that this is a cellulitis or infection and recommended patient keep site area open to air as much as possible until follow-up with cardiology on Wednesday.  Did give prescription of hydrocortisone cream to help with inflammation.  New dressing was applied over surgical site with Steri-Strips of nonadherent dressing and paper tape.        The patient remained stable during the urgent care visit.    Plan:    Medication discussed included indication for use and the potential  benefits and side effects.         1. Skin irritation  - Hydrocortisone Acetate 1 % Cream; Apply 1 Application. topically 2 times a day.  Dispense: 28 g; Refill: 0          Verbal and/or printed education was provided regarding the assessment and diagnosis.  All of the patient's questions were answered to their satisfaction at the time of discharge.    Follow up:    Recommended f/u in  1-2 days if there is no improvement.    Patient was encouraged to monitor symptoms closely. Those signs and symptoms which would warrant concern and mandate seeking a higher level of service through the emergency department discussed at length and included in discharge papers.  Patient stated agreement and understanding of this plan of  care.    Disposition:  Home in stable condition       Voice Recognition Disclaimer:  Portions of this document were created using voice recognition software. The software does have a chance of producing errors of grammar and possibly content. I have made every reasonable attempt to correct obvious errors, but there may be errors of grammar and possibly content that I did not discover before finalizing the documentation.

## 2023-03-29 ENCOUNTER — NON-PROVIDER VISIT (OUTPATIENT)
Dept: CARDIOLOGY | Facility: MEDICAL CENTER | Age: 83
End: 2023-03-29

## 2023-03-29 ENCOUNTER — NON-PROVIDER VISIT (OUTPATIENT)
Dept: CARDIOLOGY | Facility: MEDICAL CENTER | Age: 83
End: 2023-03-29
Payer: MEDICARE

## 2023-03-29 DIAGNOSIS — Z95.0 CARDIAC PACEMAKER IN SITU: Chronic | ICD-10-CM

## 2023-03-29 DIAGNOSIS — I44.2 THIRD DEGREE AV BLOCK (HCC): Chronic | ICD-10-CM

## 2023-03-29 DIAGNOSIS — I48.21 PERMANENT ATRIAL FIBRILLATION (HCC): Chronic | ICD-10-CM

## 2023-03-29 PROCEDURE — 93279 PRGRMG DEV EVAL PM/LDLS PM: CPT | Performed by: INTERNAL MEDICINE

## 2023-03-30 NOTE — CARDIAC REMOTE MONITOR - SCAN
In office interrogation reviewed. Device demonstrated appropriate function.       Electronically Signed by: Hu Gamez M.D.    4/8/2023  9:20 AM

## 2023-04-03 LAB — INR PPP: 3 (ref 2–3.5)

## 2023-04-04 ENCOUNTER — ANTICOAGULATION MONITORING (OUTPATIENT)
Dept: MEDICAL GROUP | Facility: PHYSICIAN GROUP | Age: 83
End: 2023-04-04
Payer: MEDICARE

## 2023-04-04 DIAGNOSIS — I48.21 PERMANENT ATRIAL FIBRILLATION (HCC): Chronic | ICD-10-CM

## 2023-04-04 DIAGNOSIS — Z79.01 LONG TERM CURRENT USE OF ANTICOAGULANT THERAPY: Chronic | ICD-10-CM

## 2023-04-04 DIAGNOSIS — D68.69 SECONDARY HYPERCOAGULABLE STATE (HCC): ICD-10-CM

## 2023-04-04 NOTE — PROGRESS NOTES
Anticoagulation Summary  As of 4/4/2023      INR goal:  2.5-3.0   TTR:  55.3 % (7.8 y)   INR used for dosing:  3.00 (4/3/2023)   Warfarin maintenance plan:  3 mg (3 mg x 1) every day   Weekly warfarin total:  21 mg   Plan last modified:  Vandana Vallecillo, PharmD (1/3/2023)   Next INR check:  4/18/2023   Priority:  Routine   Target end date:  Indefinite    Indications    Permanent atrial fibrillation (HCC) [I48.21]  Long term current use of anticoagulant therapy [Z79.01]  Secondary hypercoagulable state (HCC) [D68.69]                 Anticoagulation Episode Summary       INR check location:  Home Draw    Preferred lab:      Send INR reminders to:      Comments:  [facility] [phone] -   goal range changed to 2.5-3.2 per raghu vaca 8/8/2019          Anticoagulation Care Providers       Provider Role Specialty Phone number    Hu Gamez M.D. Referring Internal Medicine Clinical Cardiac Electrophysiology [phone]    [facility] Anticoagulation Services Responsible  [phone]          Anticoagulation Patient Findings     Spoke with patient today regarding therapeutic INR of 3.0.  Patient denies any signs/symptoms of bruising or bleeding or any changes in diet and medications.  Instructed patient to call clinic with any questions or concerns.    Pt is not on antiplatelet therapy     Pt is to continue with current warfarin dosing regimen.    Follow up in 2 weeks, to reduce risk of adverse events related to this high risk medication,  Warfarin.    Stef Chisholm PhT

## 2023-04-17 ENCOUNTER — ANTICOAGULATION MONITORING (OUTPATIENT)
Dept: VASCULAR LAB | Facility: MEDICAL CENTER | Age: 83
End: 2023-04-17
Payer: MEDICARE

## 2023-04-17 DIAGNOSIS — Z79.01 LONG TERM CURRENT USE OF ANTICOAGULANT THERAPY: Chronic | ICD-10-CM

## 2023-04-17 DIAGNOSIS — I48.21 PERMANENT ATRIAL FIBRILLATION (HCC): Chronic | ICD-10-CM

## 2023-04-17 DIAGNOSIS — D68.69 SECONDARY HYPERCOAGULABLE STATE (HCC): ICD-10-CM

## 2023-04-17 LAB — INR PPP: 2.9 (ref 2–3.5)

## 2023-04-17 NOTE — PROGRESS NOTES
OP   Telephone Anticoagulation Service Note      Anticoagulation Summary  As of 2023      INR goal:  2.5-3.0   TTR:  55.5 % (7.9 y)   INR used for dosin.90 (2023)   Warfarin maintenance plan:  3 mg (3 mg x 1) every day   Weekly warfarin total:  21 mg   No change documented:  Emily Yi   Plan last modified:  Vandana Vallecillo, PharmD (1/3/2023)   Next INR check:  2023   Priority:  Routine   Target end date:  Indefinite    Indications    Permanent atrial fibrillation (HCC) [I48.21]  Long term current use of anticoagulant therapy [Z79.01]  Secondary hypercoagulable state (HCC) [D68.69]                 Anticoagulation Episode Summary       INR check location:  Home Draw    Preferred lab:      Send INR reminders to:      Comments:  Waqar  - [phone] -   goal range changed to 2.5-3.2 per raghu vaca 2019          Anticoagulation Care Providers       Provider Role Specialty Phone number    Hu Gamez M.D. Referring Internal Medicine Clinical Cardiac Electrophysiology [phone]    [facility] Anticoagulation Services Responsible  [phone]          Anticoagulation Patient Findings  Patient Findings       Negatives:  Signs/symptoms of thrombosis, Signs/symptoms of bleeding, Laboratory test error suspected, Change in health, Change in alcohol use, Change in activity, Upcoming invasive procedure, Emergency department visit, Upcoming dental procedure, Missed doses, Extra doses, Change in medications, Change in diet/appetite, Hospital admission, Bruising, Other complaints          Spoke with the patient on the phone today, reporting a therapeutic INR of 2.9.   Confirmed the current warfarin dosing regimen and patient compliance.  Patient denies any interval changes to diet and/or medications. Patient denies any signs/symptoms of bleeding or clotting.  Patient instructed to continue with the current warfarin dosing regimen, and asked to follow up again in 2 weeks.     Jason Yi   PharmD

## 2023-05-01 ENCOUNTER — ANTICOAGULATION MONITORING (OUTPATIENT)
Dept: VASCULAR LAB | Facility: MEDICAL CENTER | Age: 83
End: 2023-05-01
Payer: MEDICARE

## 2023-05-01 DIAGNOSIS — Z79.01 LONG TERM CURRENT USE OF ANTICOAGULANT THERAPY: Chronic | ICD-10-CM

## 2023-05-01 DIAGNOSIS — D68.69 SECONDARY HYPERCOAGULABLE STATE (HCC): ICD-10-CM

## 2023-05-01 DIAGNOSIS — I48.21 PERMANENT ATRIAL FIBRILLATION (HCC): Chronic | ICD-10-CM

## 2023-05-01 LAB — INR PPP: 2.9 (ref 2–3.5)

## 2023-05-01 NOTE — PROGRESS NOTES
Anticoagulation Summary  As of 2023      INR goal:  2.5-3.0   TTR:  55.7 % (7.9 y)   INR used for dosin.90 (2023)   Warfarin maintenance plan:  3 mg (3 mg x 1) every day   Weekly warfarin total:  21 mg   Plan last modified:  Vandana Vallecillo, PharmD (1/3/2023)   Next INR check:  5/15/2023   Priority:  Routine   Target end date:  Indefinite    Indications    Permanent atrial fibrillation (HCC) [I48.21]  Long term current use of anticoagulant therapy [Z79.01]  Secondary hypercoagulable state (HCC) [D68.69]                 Anticoagulation Episode Summary       INR check location:  Home Draw    Preferred lab:      Send INR reminders to:      Comments:  Waqar [facility] [phone] -   goal range changed to 2.5-3.2 per raghu vaca 2019          Anticoagulation Care Providers       Provider Role Specialty Phone number    Hu Gamez M.D. Referring Internal Medicine Clinical Cardiac Electrophysiology [phone]    [facility] Anticoagulation Services Responsible  [phone]          Anticoagulation Patient Findings    Spoke with patient today regarding therapeutic INR of 2.9.  Patient denies any signs/symptoms of bruising or bleeding or any changes in diet and medications.  Instructed patient to call clinic with any questions or concerns.    Pt is not on antiplatelet therapy    Pt is to continue with current warfarin dosing regimen.  Follow up in 2 weeks, to reduce risk of adverse events related to this high risk medication,  Warfarin.    Kristopher Escobar, ClarisseD, BCACP

## 2023-05-05 ENCOUNTER — NON-PROVIDER VISIT (OUTPATIENT)
Dept: CARDIOLOGY | Facility: MEDICAL CENTER | Age: 83
End: 2023-05-05

## 2023-05-05 ENCOUNTER — NON-PROVIDER VISIT (OUTPATIENT)
Dept: CARDIOLOGY | Facility: MEDICAL CENTER | Age: 83
End: 2023-05-05
Payer: MEDICARE

## 2023-05-05 DIAGNOSIS — Z98.890 S/P AV NODAL ABLATION: ICD-10-CM

## 2023-05-05 DIAGNOSIS — Z95.0 CARDIAC PACEMAKER IN SITU: Chronic | ICD-10-CM

## 2023-05-05 DIAGNOSIS — I44.2 THIRD DEGREE AV BLOCK (HCC): Chronic | ICD-10-CM

## 2023-05-05 PROCEDURE — 93279 PRGRMG DEV EVAL PM/LDLS PM: CPT | Performed by: INTERNAL MEDICINE

## 2023-05-09 NOTE — CARDIAC REMOTE MONITOR - SCAN
Device transmission reviewed. Device demonstrated appropriate function.       Electronically Signed by: Ammon Rosario M.D.    5/10/2023  7:56 AM

## 2023-05-16 LAB — INR PPP: 3 (ref 2–3.5)

## 2023-05-17 ENCOUNTER — ANTICOAGULATION MONITORING (OUTPATIENT)
Dept: VASCULAR LAB | Facility: MEDICAL CENTER | Age: 83
End: 2023-05-17
Payer: MEDICARE

## 2023-05-17 DIAGNOSIS — D68.69 SECONDARY HYPERCOAGULABLE STATE (HCC): ICD-10-CM

## 2023-05-17 DIAGNOSIS — I48.21 PERMANENT ATRIAL FIBRILLATION (HCC): Chronic | ICD-10-CM

## 2023-05-17 DIAGNOSIS — Z79.01 LONG TERM CURRENT USE OF ANTICOAGULANT THERAPY: Chronic | ICD-10-CM

## 2023-05-17 NOTE — PROGRESS NOTES
Anticoagulation Summary  As of 5/17/2023      INR goal:  2.5-3.0   TTR:  56.0 % (8 y)   INR used for dosing:  3.00 (5/16/2023)   Warfarin maintenance plan:  3 mg (3 mg x 1) every day   Weekly warfarin total:  21 mg   Plan last modified:  Vandana Vallecillo, PharmD (1/3/2023)   Next INR check:     Priority:  Routine   Target end date:  Indefinite    Indications    Permanent atrial fibrillation (HCC) [I48.21]  Long term current use of anticoagulant therapy [Z79.01]  Secondary hypercoagulable state (HCC) [D68.69]                 Anticoagulation Episode Summary       INR check location:  Home Draw    Preferred lab:      Send INR reminders to:      Comments:  Waqar [facility] [phone] -   goal range changed to 2.5-3.2 per raghu vaca 8/8/2019          Anticoagulation Care Providers       Provider Role Specialty Phone number    Hu Gamez M.D. Referring Internal Medicine Clinical Cardiac Electrophysiology [phone]    [facility] Anticoagulation Services Responsible  [phone]            Refer to Anticoagulation Patient Findings for HPI  Patient Findings       Negatives:  Signs/symptoms of thrombosis, Signs/symptoms of bleeding, Laboratory test error suspected, Change in health, Change in alcohol use, Change in activity, Upcoming invasive procedure, Emergency department visit, Upcoming dental procedure, Missed doses, Extra doses, Change in medications, Change in diet/appetite, Hospital admission, Bruising, Other complaints            Spoke with patient to report a therapeutic INR.      Pt is NOT on antiplatelet therapy.  Pt instructed to continue with current warfarin dosing regimen, confirms dosing.   Will follow up in 2 week(s).     Venessa Thacker PhT

## 2023-05-18 DIAGNOSIS — Z79.01 CHRONIC ANTICOAGULATION: ICD-10-CM

## 2023-05-18 RX ORDER — WARFARIN SODIUM 3 MG/1
3 TABLET ORAL DAILY
Qty: 90 TABLET | Refills: 1 | Status: SHIPPED | OUTPATIENT
Start: 2023-05-18 | End: 2023-08-22 | Stop reason: SDUPTHER

## 2023-06-02 ENCOUNTER — ANTICOAGULATION MONITORING (OUTPATIENT)
Dept: VASCULAR LAB | Facility: MEDICAL CENTER | Age: 83
End: 2023-06-02
Payer: MEDICARE

## 2023-06-02 DIAGNOSIS — I48.21 PERMANENT ATRIAL FIBRILLATION (HCC): Chronic | ICD-10-CM

## 2023-06-02 DIAGNOSIS — Z79.01 LONG TERM CURRENT USE OF ANTICOAGULANT THERAPY: Chronic | ICD-10-CM

## 2023-06-02 DIAGNOSIS — D68.69 SECONDARY HYPERCOAGULABLE STATE (HCC): ICD-10-CM

## 2023-06-02 LAB — INR PPP: 3.1 (ref 2–3.5)

## 2023-06-02 NOTE — PROGRESS NOTES
Anticoagulation Summary  As of 6/2/2023      INR goal:  2.5-3.0   TTR:  55.7 % (8 y)   INR used for dosing:  3.10 (6/2/2023)   Warfarin maintenance plan:  3 mg (3 mg x 1) every day   Weekly warfarin total:  21 mg   Plan last modified:  Clarisse MartínezD (1/3/2023)   Next INR check:  6/15/2023   Priority:  Routine   Target end date:  Indefinite    Indications    Permanent atrial fibrillation (HCC) [I48.21]  Long term current use of anticoagulant therapy [Z79.01]  Secondary hypercoagulable state (HCC) [D68.69]                 Anticoagulation Episode Summary       INR check location:  Home Draw    Preferred lab:      Send INR reminders to:      Comments:  [facility] [phone] -   goal range changed to 2.5-3.2 per raghu vaca 8/8/2019          Anticoagulation Care Providers       Provider Role Specialty Phone number    Hu Gamez M.D. Referring Internal Medicine Clinical Cardiac Electrophysiology [phone]    [facility] Anticoagulation Services Responsible  [phone]          Anticoagulation Patient Findings      Left voicemail message to report a therapeutic INR.      Pt to continue with current warfarin dosing regimen. Requested pt contact the clinic for any s/s of unusual bleeding, bruising, clotting or any changes to diet or medication.    FU INR in 2 week(s).    Anitha Gamez, ClarisseD

## 2023-06-08 ENCOUNTER — NON-PROVIDER VISIT (OUTPATIENT)
Dept: CARDIOLOGY | Facility: MEDICAL CENTER | Age: 83
End: 2023-06-08
Payer: MEDICARE

## 2023-06-09 NOTE — CARDIAC REMOTE MONITOR - SCAN
Device transmission reviewed. Device demonstrated appropriate function.       Electronically Signed by: Jadiel Noriega M.D.    6/10/2023  11:29 AM

## 2023-06-14 ENCOUNTER — HOSPITAL ENCOUNTER (OUTPATIENT)
Dept: RADIOLOGY | Facility: MEDICAL CENTER | Age: 83
End: 2023-06-14
Attending: FAMILY MEDICINE
Payer: MEDICARE

## 2023-06-14 DIAGNOSIS — M85.80 OSTEOPENIA, UNSPECIFIED LOCATION: ICD-10-CM

## 2023-06-14 DIAGNOSIS — Z78.0 POSTMENOPAUSAL: ICD-10-CM

## 2023-06-14 LAB — INR PPP: 2.9 (ref 2–3.5)

## 2023-06-14 PROCEDURE — 77080 DXA BONE DENSITY AXIAL: CPT

## 2023-06-15 ENCOUNTER — ANTICOAGULATION MONITORING (OUTPATIENT)
Dept: VASCULAR LAB | Facility: MEDICAL CENTER | Age: 83
End: 2023-06-15
Payer: MEDICARE

## 2023-06-15 DIAGNOSIS — Z79.01 LONG TERM CURRENT USE OF ANTICOAGULANT THERAPY: Chronic | ICD-10-CM

## 2023-06-15 DIAGNOSIS — I48.21 PERMANENT ATRIAL FIBRILLATION (HCC): Chronic | ICD-10-CM

## 2023-06-15 DIAGNOSIS — D68.69 SECONDARY HYPERCOAGULABLE STATE (HCC): ICD-10-CM

## 2023-06-15 NOTE — PROGRESS NOTES
Anticoagulation Summary  As of 6/15/2023      INR goal:  2.5-3.0   TTR:  55.7 % (8 y)   INR used for dosin.90 (2023)   Warfarin maintenance plan:  3 mg (3 mg x 1) every day   Weekly warfarin total:  21 mg   Plan last modified:  Vandana Vallecillo PharmD (1/3/2023)   Next INR check:  2023   Priority:  Routine   Target end date:  Indefinite    Indications    Permanent atrial fibrillation (HCC) [I48.21]  Long term current use of anticoagulant therapy [Z79.01]  Secondary hypercoagulable state (HCC) [D68.69]                 Anticoagulation Episode Summary       INR check location:  Home Draw    Preferred lab:      Send INR reminders to:      Comments:  Waqar [facility] [phone] -   goal range changed to 2.5-3.2 per raghu vaca 2019          Anticoagulation Care Providers       Provider Role Specialty Phone number    Hu Gamez M.D. Referring Internal Medicine Clinical Cardiac Electrophysiology [phone]    [facility] Anticoagulation Services Responsible  [phone]            Refer to Anticoagulation Patient Findings for HPI  Patient Findings       Negatives:  Signs/symptoms of thrombosis, Signs/symptoms of bleeding, Laboratory test error suspected, Change in health, Change in alcohol use, Change in activity, Upcoming invasive procedure, Emergency department visit, Upcoming dental procedure, Missed doses, Extra doses, Change in medications, Change in diet/appetite, Hospital admission, Bruising, Other complaints            Spoke with patient to report a therapeutic INR.      Pt is NOT on antiplatelet therapy   Pt instructed to continue with current warfarin dosing regimen, confirms dosing.   Will follow up in 2 week(s).     Vandana Vallecillo, ClarisseD

## 2023-06-19 ENCOUNTER — HOSPITAL ENCOUNTER (OUTPATIENT)
Dept: RADIOLOGY | Facility: MEDICAL CENTER | Age: 83
End: 2023-06-19
Attending: NURSE PRACTITIONER
Payer: MEDICARE

## 2023-06-19 DIAGNOSIS — I65.22 CAROTID STENOSIS, LEFT: ICD-10-CM

## 2023-06-19 PROCEDURE — 93880 EXTRACRANIAL BILAT STUDY: CPT

## 2023-06-19 PROCEDURE — 93880 EXTRACRANIAL BILAT STUDY: CPT | Mod: 26 | Performed by: INTERNAL MEDICINE

## 2023-06-21 ENCOUNTER — NON-PROVIDER VISIT (OUTPATIENT)
Dept: CARDIOLOGY | Facility: MEDICAL CENTER | Age: 83
End: 2023-06-21
Payer: MEDICARE

## 2023-06-21 PROCEDURE — 93294 REM INTERROG EVL PM/LDLS PM: CPT | Performed by: INTERNAL MEDICINE

## 2023-06-21 NOTE — CARDIAC REMOTE MONITOR - SCAN
Device transmission reviewed. Device demonstrated appropriate function.       Electronically Signed by: Hu Gamez M.D.    7/10/2023  3:10 PM

## 2023-06-30 ENCOUNTER — ANTICOAGULATION MONITORING (OUTPATIENT)
Dept: VASCULAR LAB | Facility: MEDICAL CENTER | Age: 83
End: 2023-06-30
Payer: MEDICARE

## 2023-06-30 DIAGNOSIS — Z79.01 LONG TERM CURRENT USE OF ANTICOAGULANT THERAPY: Chronic | ICD-10-CM

## 2023-06-30 DIAGNOSIS — I48.21 PERMANENT ATRIAL FIBRILLATION (HCC): Chronic | ICD-10-CM

## 2023-06-30 DIAGNOSIS — D68.69 SECONDARY HYPERCOAGULABLE STATE (HCC): ICD-10-CM

## 2023-06-30 LAB — INR PPP: 3.3 (ref 2–3.5)

## 2023-06-30 NOTE — PROGRESS NOTES
Anticoagulation Summary  As of 6/30/2023      INR goal:  2.5-3.0   TTR:  55.5 % (8.1 y)   INR used for dosing:  3.30 (6/30/2023)   Warfarin maintenance plan:  3 mg (3 mg x 1) every day   Weekly warfarin total:  21 mg   Plan last modified:  Clarisse MartínezD (1/3/2023)   Next INR check:  7/14/2023   Priority:  Routine   Target end date:  Indefinite    Indications    Permanent atrial fibrillation (HCC) [I48.21]  Long term current use of anticoagulant therapy [Z79.01]  Secondary hypercoagulable state (HCC) [D68.69]                 Anticoagulation Episode Summary       INR check location:  Home Draw    Preferred lab:      Send INR reminders to:      Comments:  Waqar [facility] [phone] -   goal range changed to 2.5-3.2 per raghu vaca 8/8/2019          Anticoagulation Care Providers       Provider Role Specialty Phone number    Hu Gamez M.D. Referring Internal Medicine Clinical Cardiac Electrophysiology [phone]    [facility] Anticoagulation Services Responsible  [phone]          Anticoagulation Patient Findings      Left voicemail  message to report a supra therapeutic INR.    Will have pt take a decrease dose of 1.5 mg  of warfarin today and then continue with current warfarin dosing regimen. Requested pt contact the clinic for any s/s of unusual bleeding, bruising, clotting or any changes to diet or medication.    FU INR in 2 week(s).  Discussed with Garth Reed, Pharmacy Intern

## 2023-07-13 ENCOUNTER — ANTICOAGULATION MONITORING (OUTPATIENT)
Dept: VASCULAR LAB | Facility: MEDICAL CENTER | Age: 83
End: 2023-07-13
Payer: MEDICARE

## 2023-07-13 DIAGNOSIS — Z79.01 LONG TERM CURRENT USE OF ANTICOAGULANT THERAPY: Chronic | ICD-10-CM

## 2023-07-13 DIAGNOSIS — I48.21 PERMANENT ATRIAL FIBRILLATION (HCC): Chronic | ICD-10-CM

## 2023-07-13 DIAGNOSIS — D68.69 SECONDARY HYPERCOAGULABLE STATE (HCC): ICD-10-CM

## 2023-07-13 LAB — INR PPP: 2.9 (ref 2–3.5)

## 2023-07-13 NOTE — PROGRESS NOTES
Anticoagulation Summary  As of 2023      INR goal:  2.5-3.0   TTR:  55.8 % (8.1 y)   INR used for dosin.90 (2023)   Warfarin maintenance plan:  3 mg (3 mg x 1) every day   Weekly warfarin total:  21 mg   Plan last modified:  Vandana Vallecillo, PharmD (1/3/2023)   Next INR check:  2023   Priority:  Routine   Target end date:  Indefinite    Indications    Permanent atrial fibrillation (HCC) [I48.21]  Long term current use of anticoagulant therapy [Z79.01]  Secondary hypercoagulable state (HCC) [D68.69]                 Anticoagulation Episode Summary       INR check location:  Home Draw    Preferred lab:      Send INR reminders to:      Comments:  [facility] [phone] -   goal range changed to 2.5-3.2 per raghu vaca 2019          Anticoagulation Care Providers       Provider Role Specialty Phone number    Hu Gamez M.D. Referring Internal Medicine Clinical Cardiac Electrophysiology [phone]    [facility] Anticoagulation Services Responsible  [phone]          Anticoagulation Patient Findings      Left voicemail to report a therapeutic INR.      Pt to continue with current warfarin dosing regimen. Requested pt contact the clinic for any s/s of unusual bleeding, bruising, clotting or any changes to diet or medication.    FU INR in 2 week(s).    Carlita Reed, Pharmacy Intern

## 2023-07-27 ENCOUNTER — ANTICOAGULATION MONITORING (OUTPATIENT)
Dept: VASCULAR LAB | Facility: MEDICAL CENTER | Age: 83
End: 2023-07-27
Payer: MEDICARE

## 2023-07-27 DIAGNOSIS — Z79.01 LONG TERM CURRENT USE OF ANTICOAGULANT THERAPY: Chronic | ICD-10-CM

## 2023-07-27 DIAGNOSIS — D68.69 SECONDARY HYPERCOAGULABLE STATE (HCC): ICD-10-CM

## 2023-07-27 DIAGNOSIS — I48.21 PERMANENT ATRIAL FIBRILLATION (HCC): Chronic | ICD-10-CM

## 2023-07-27 LAB — INR PPP: 2.8 (ref 2–3.5)

## 2023-07-27 NOTE — PROGRESS NOTES
Anticoagulation Summary  As of 2023      INR goal:  2.5-3.0   TTR:  56.0 % (8.1 y)   INR used for dosin.80 (2023)   Warfarin maintenance plan:  3 mg (3 mg x 1) every day   Weekly warfarin total:  21 mg   Plan last modified:  Vandana Vallecillo, PharmD (1/3/2023)   Next INR check:  8/10/2023   Priority:  Routine   Target end date:  Indefinite    Indications    Permanent atrial fibrillation (HCC) [I48.21]  Long term current use of anticoagulant therapy [Z79.01]  Secondary hypercoagulable state (HCC) [D68.69]                 Anticoagulation Episode Summary       INR check location:  Home Draw    Preferred lab:      Send INR reminders to:      Comments:  Waq[facility] [phone] -   goal range changed to 2.5-3.2 per raghu vaca 2019          Anticoagulation Care Providers       Provider Role Specialty Phone number    Hu Gamez M.D. Referring Internal Medicine Clinical Cardiac Electrophysiology [phone]    [facility] Anticoagulation Services Responsible  [phone]            Refer to Anticoagulation Patient Findings for HPI  Patient Findings       Negatives:  Signs/symptoms of thrombosis, Signs/symptoms of bleeding, Laboratory test error suspected, Change in health, Change in alcohol use, Change in activity, Upcoming invasive procedure, Emergency department visit, Upcoming dental procedure, Missed doses, Extra doses, Change in medications, Change in diet/appetite, Hospital admission, Bruising, Other complaints            Spoke with patient to report a therapeutic INR.      Pt is NOT on antiplatelet therapy     Pt instructed to continue with current warfarin dosing regimen, confirms dosing.   Will follow up in 2 week(s).     Carlita Reed, Pharmacy Intern

## 2023-08-08 ENCOUNTER — OFFICE VISIT (OUTPATIENT)
Dept: SLEEP MEDICINE | Facility: MEDICAL CENTER | Age: 83
End: 2023-08-08
Attending: NURSE PRACTITIONER
Payer: MEDICARE

## 2023-08-08 VITALS
BODY MASS INDEX: 22.13 KG/M2 | OXYGEN SATURATION: 94 % | DIASTOLIC BLOOD PRESSURE: 60 MMHG | HEIGHT: 68 IN | WEIGHT: 146 LBS | RESPIRATION RATE: 16 BRPM | SYSTOLIC BLOOD PRESSURE: 120 MMHG | HEART RATE: 89 BPM

## 2023-08-08 DIAGNOSIS — Z87.891 FORMER SMOKER: ICD-10-CM

## 2023-08-08 DIAGNOSIS — Z95.0 CARDIAC PACEMAKER IN SITU: Chronic | ICD-10-CM

## 2023-08-08 DIAGNOSIS — I10 ESSENTIAL HYPERTENSION: ICD-10-CM

## 2023-08-08 DIAGNOSIS — G47.33 OBSTRUCTIVE SLEEP APNEA: Chronic | ICD-10-CM

## 2023-08-08 PROCEDURE — 99212 OFFICE O/P EST SF 10 MIN: CPT | Performed by: NURSE PRACTITIONER

## 2023-08-08 PROCEDURE — 99213 OFFICE O/P EST LOW 20 MIN: CPT | Performed by: NURSE PRACTITIONER

## 2023-08-08 PROCEDURE — 3077F SYST BP >= 140 MM HG: CPT | Performed by: NURSE PRACTITIONER

## 2023-08-08 PROCEDURE — 3078F DIAST BP <80 MM HG: CPT | Performed by: NURSE PRACTITIONER

## 2023-08-08 ASSESSMENT — PATIENT HEALTH QUESTIONNAIRE - PHQ9: CLINICAL INTERPRETATION OF PHQ2 SCORE: 0

## 2023-08-08 ASSESSMENT — FIBROSIS 4 INDEX: FIB4 SCORE: 1.81
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
## 2023-08-09 LAB — INR PPP: 1.8 (ref 2–3.5)

## 2023-08-10 ENCOUNTER — ANTICOAGULATION MONITORING (OUTPATIENT)
Dept: VASCULAR LAB | Facility: MEDICAL CENTER | Age: 83
End: 2023-08-10
Payer: MEDICARE

## 2023-08-10 DIAGNOSIS — Z79.01 LONG TERM CURRENT USE OF ANTICOAGULANT THERAPY: Chronic | ICD-10-CM

## 2023-08-10 DIAGNOSIS — D68.69 SECONDARY HYPERCOAGULABLE STATE (HCC): ICD-10-CM

## 2023-08-10 DIAGNOSIS — I48.21 PERMANENT ATRIAL FIBRILLATION (HCC): Chronic | ICD-10-CM

## 2023-08-10 NOTE — PROGRESS NOTES
Anticoagulation Summary  As of 8/10/2023      INR goal:  2.5-3.0   TTR:  55.9 % (8.1 y)   INR used for dosin.80 (2023)   Warfarin maintenance plan:  3 mg (3 mg x 1) every day   Weekly warfarin total:  21 mg   Plan last modified:  Vandana Vallecillo, PharmD (1/3/2023)   Next INR check:  2023   Priority:  Routine   Target end date:  Indefinite    Indications    Permanent atrial fibrillation (HCC) [I48.21]  Long term current use of anticoagulant therapy [Z79.01]  Secondary hypercoagulable state (HCC) [D68.69]                 Anticoagulation Episode Summary       INR check location:  Home Draw    Preferred lab:      Send INR reminders to:      Comments:  [facility] [phone] -   goal range changed to 2.5-3.2 per raghu vaca 2019          Anticoagulation Care Providers       Provider Role Specialty Phone number    Hu Gamez M.D. Referring Internal Medicine Clinical Cardiac Electrophysiology [phone]    [facility] Anticoagulation Services Responsible  [phone]            Refer to Anticoagulation Patient Findings for HPI  Patient Findings       Negatives:  Signs/symptoms of thrombosis, Signs/symptoms of bleeding, Laboratory test error suspected, Change in health, Change in alcohol use, Change in activity, Upcoming invasive procedure, Emergency department visit, Upcoming dental procedure, Missed doses, Extra doses, Change in medications, Change in diet/appetite, Hospital admission, Bruising, Other complaints            Spoke with patient.  INR is sub therapeutic.     Pt verifies warfarin weekly dosing.     Pt is NOT on antiplatelet therapy     Will have pt take 2 tablets (6mg) tonight then resume regular regimen. Discussed with vandana.     Repeat INR in 1 week(s).     Allyson Alfosno, Student

## 2023-08-16 ENCOUNTER — PATIENT MESSAGE (OUTPATIENT)
Dept: SLEEP MEDICINE | Facility: MEDICAL CENTER | Age: 83
End: 2023-08-16
Payer: MEDICARE

## 2023-08-17 ENCOUNTER — ANTICOAGULATION MONITORING (OUTPATIENT)
Dept: VASCULAR LAB | Facility: MEDICAL CENTER | Age: 83
End: 2023-08-17
Payer: MEDICARE

## 2023-08-17 DIAGNOSIS — Z79.01 LONG TERM CURRENT USE OF ANTICOAGULANT THERAPY: Chronic | ICD-10-CM

## 2023-08-17 DIAGNOSIS — D68.69 SECONDARY HYPERCOAGULABLE STATE (HCC): ICD-10-CM

## 2023-08-17 DIAGNOSIS — I48.21 PERMANENT ATRIAL FIBRILLATION (HCC): Chronic | ICD-10-CM

## 2023-08-17 LAB — INR PPP: 2.7 (ref 2–3.5)

## 2023-08-18 NOTE — PROGRESS NOTES
Anticoagulation Summary  As of 2023      INR goal:  2.5-3.0   TTR:  55.8 % (8.2 y)   INR used for dosin.70 (2023)   Warfarin maintenance plan:  3 mg (3 mg x 1) every day   Weekly warfarin total:  21 mg   Plan last modified:  Vandana Vallecillo, PharmD (1/3/2023)   Next INR check:  2023   Priority:  Routine   Target end date:  Indefinite    Indications    Permanent atrial fibrillation (HCC) [I48.21]  Long term current use of anticoagulant therapy [Z79.01]  Secondary hypercoagulable state (HCC) [D68.69]                 Anticoagulation Episode Summary       INR check location:  Home Draw    Preferred lab:      Send INR reminders to:      Comments:  [facility] [phone] -   goal range changed to 2.5-3.2 per raghu vaca 2019          Anticoagulation Care Providers       Provider Role Specialty Phone number    Hu Gamez M.D. Referring Internal Medicine Clinical Cardiac Electrophysiology [phone]    [facility] Anticoagulation Services Responsible  [phone]            Refer to Anticoagulation Patient Findings for HPI  Patient Findings       Negatives:  Signs/symptoms of thrombosis, Signs/symptoms of bleeding, Laboratory test error suspected, Change in health, Change in alcohol use, Change in activity, Upcoming invasive procedure, Emergency department visit, Upcoming dental procedure, Missed doses, Extra doses, Change in medications, Change in diet/appetite, Hospital admission, Bruising, Other complaints            Spoke with patient to report a therapeutic INR.      Pt is NOT on antiplatelet therapy.    Pt instructed to continue with current warfarin dosing regimen, confirms dosing.   Will follow up in 1 week(s).     Allyson Alfonso, Student

## 2023-08-22 DIAGNOSIS — Z79.01 CHRONIC ANTICOAGULATION: ICD-10-CM

## 2023-08-22 RX ORDER — WARFARIN SODIUM 3 MG/1
3 TABLET ORAL DAILY
Qty: 90 TABLET | Refills: 1 | Status: SHIPPED | OUTPATIENT
Start: 2023-08-22 | End: 2023-10-05 | Stop reason: SDUPTHER

## 2023-08-22 NOTE — PROGRESS NOTES
[facility]      Patient called and requested a refill on warfarin which was sent in for patient to pharmacy of her preference.  Will follow up with the patient as scheduled.       Jason RobbD

## 2023-08-23 ENCOUNTER — ANTICOAGULATION MONITORING (OUTPATIENT)
Dept: VASCULAR LAB | Facility: MEDICAL CENTER | Age: 83
End: 2023-08-23
Payer: MEDICARE

## 2023-08-23 DIAGNOSIS — Z79.01 LONG TERM CURRENT USE OF ANTICOAGULANT THERAPY: Chronic | ICD-10-CM

## 2023-08-23 DIAGNOSIS — D68.69 SECONDARY HYPERCOAGULABLE STATE (HCC): ICD-10-CM

## 2023-08-23 DIAGNOSIS — I48.21 PERMANENT ATRIAL FIBRILLATION (HCC): Chronic | ICD-10-CM

## 2023-08-23 LAB — INR PPP: 2.7 (ref 2–3.5)

## 2023-08-23 NOTE — PROGRESS NOTES
Anticoagulation Summary  As of 2023      INR goal:  2.5-3.0   TTR:  55.9 % (8.2 y)   INR used for dosin.70 (2023)   Warfarin maintenance plan:  3 mg (3 mg x 1) every day   Weekly warfarin total:  21 mg   Plan last modified:  Vandana Vallecillo, Yesenia (1/3/2023)   Next INR check:  2023   Priority:  Routine   Target end date:  Indefinite    Indications    Permanent atrial fibrillation (HCC) [I48.21]  Long term current use of anticoagulant therapy [Z79.01]  Secondary hypercoagulable state (HCC) [D68.69]                 Anticoagulation Episode Summary       INR check location:  Home Draw    Preferred lab:      Send INR reminders to:      Comments:  [facility] [phone] -   goal range changed to 2.5-3.2 per raghu vaca 2019          Anticoagulation Care Providers       Provider Role Specialty Phone number    Hu Gamez M.D. Referring Internal Medicine Clinical Cardiac Electrophysiology [phone]    [facility] Anticoagulation Services Responsible  [phone]            Refer to Anticoagulation Patient Findings for HPI  Patient Findings       Negatives:  Signs/symptoms of thrombosis, Signs/symptoms of bleeding, Laboratory test error suspected, Change in health, Change in alcohol use, Change in activity, Upcoming invasive procedure, Emergency department visit, Upcoming dental procedure, Missed doses, Extra doses, Change in medications, Change in diet/appetite, Hospital admission, Bruising, Other complaints            Spoke with patient to report a therapeutic INR.      Pt is NOT on antiplatelet therapy     Pt instructed to continue with current warfarin dosing regimen, confirms dosing.   Will follow up in 2 week(s).     Vandana Vallecillo, ClarisseD

## 2023-09-13 ENCOUNTER — ANTICOAGULATION MONITORING (OUTPATIENT)
Dept: VASCULAR LAB | Facility: MEDICAL CENTER | Age: 83
End: 2023-09-13
Payer: MEDICARE

## 2023-09-13 DIAGNOSIS — Z79.01 LONG TERM CURRENT USE OF ANTICOAGULANT THERAPY: Chronic | ICD-10-CM

## 2023-09-13 DIAGNOSIS — D68.69 SECONDARY HYPERCOAGULABLE STATE (HCC): ICD-10-CM

## 2023-09-13 DIAGNOSIS — I48.21 PERMANENT ATRIAL FIBRILLATION (HCC): Chronic | ICD-10-CM

## 2023-09-13 LAB — INR PPP: 2.4 (ref 2–3.5)

## 2023-09-13 NOTE — PROGRESS NOTES
Anticoagulation Summary  As of 2023      INR goal:  2.5-3.0   TTR:  55.9 % (8.2 y)   INR used for dosin.40 (2023)   Warfarin maintenance plan:  3 mg (3 mg x 1) every day   Weekly warfarin total:  21 mg   Plan last modified:  Vandana Vallecillo, PharmD (1/3/2023)   Next INR check:  2023   Priority:  Routine   Target end date:  Indefinite    Indications    Permanent atrial fibrillation (HCC) [I48.21]  Long term current use of anticoagulant therapy [Z79.01]  Secondary hypercoagulable state (HCC) [D68.69]                 Anticoagulation Episode Summary       INR check location:  Home Draw    Preferred lab:      Send INR reminders to:      Comments:  [facility] [phone] -   goal range changed to 2.5-3.2 per raghu vaca 2019          Anticoagulation Care Providers       Provider Role Specialty Phone number    Hu Gamez M.D. Referring Internal Medicine Clinical Cardiac Electrophysiology [phone]    [facility] Anticoagulation Services Responsible  [phone]          Anticoagulation Patient Findings  Patient Findings       Negatives:  Signs/symptoms of thrombosis, Signs/symptoms of bleeding, Laboratory test error suspected, Change in health, Change in alcohol use, Change in activity, Upcoming invasive procedure, Emergency department visit, Upcoming dental procedure, Missed doses, Extra doses, Change in medications, Change in diet/appetite, Hospital admission, Bruising, Other complaints          Spoke with patient today regarding sub-therapeutic INR of 2.4.  Patient denies any signs/symptoms of bruising or bleeding or any changes in diet and medications.  Instructed patient to call clinic with any questions or concerns.    Instructed patient to bolus with 1.5 tablets today, then resume regular warfarin dosing. Plan was approved by Kristopher.     Pt is not on antiplatelet therapy    Follow up in 2 weeks.   Jen Cast, Student Pharmacist

## 2023-09-20 ENCOUNTER — NON-PROVIDER VISIT (OUTPATIENT)
Dept: CARDIOLOGY | Facility: MEDICAL CENTER | Age: 83
End: 2023-09-20
Payer: MEDICARE

## 2023-09-20 PROCEDURE — 93294 REM INTERROG EVL PM/LDLS PM: CPT | Performed by: INTERNAL MEDICINE

## 2023-09-21 NOTE — CARDIAC REMOTE MONITOR - SCAN
Device transmission reviewed. Device demonstrated appropriate function.       Electronically Signed by: Ammon Rosario M.D.    9/25/2023  6:29 PM

## 2023-09-28 ENCOUNTER — ANTICOAGULATION MONITORING (OUTPATIENT)
Dept: VASCULAR LAB | Facility: MEDICAL CENTER | Age: 83
End: 2023-09-28
Payer: MEDICARE

## 2023-09-28 DIAGNOSIS — Z79.01 LONG TERM CURRENT USE OF ANTICOAGULANT THERAPY: Chronic | ICD-10-CM

## 2023-09-28 DIAGNOSIS — I48.21 PERMANENT ATRIAL FIBRILLATION (HCC): Chronic | ICD-10-CM

## 2023-09-28 DIAGNOSIS — D68.69 SECONDARY HYPERCOAGULABLE STATE (HCC): ICD-10-CM

## 2023-09-28 LAB — INR PPP: 3.6 (ref 2–3.5)

## 2023-09-28 NOTE — PROGRESS NOTES
Anticoagulation Summary  As of 9/28/2023      INR goal:  2.5-3.0   TTR:  55.9 % (8.3 y)   INR used for dosing:  3.60 (9/28/2023)   Warfarin maintenance plan:  3 mg (3 mg x 1) every day   Weekly warfarin total:  21 mg   Plan last modified:  Clarisse MartínezD (1/3/2023)   Next INR check:  10/12/2023   Priority:  Routine   Target end date:  Indefinite    Indications    Permanent atrial fibrillation (HCC) [I48.21]  Long term current use of anticoagulant therapy [Z79.01]  Secondary hypercoagulable state (HCC) [D68.69]                 Anticoagulation Episode Summary       INR check location:  Home Draw    Preferred lab:      Send INR reminders to:      Comments:  [facility] [phone] -   goal range changed to 2.5-3.2 per raghu vaca 8/8/2019          Anticoagulation Care Providers       Provider Role Specialty Phone number    Hu Gamez M.D. Referring Internal Medicine Clinical Cardiac Electrophysiology [phone]    [facility] Anticoagulation Services Responsible  [phone]            Refer to Anticoagulation Patient Findings for HPI  Patient Findings       Negatives:  Signs/symptoms of thrombosis, Signs/symptoms of bleeding, Laboratory test error suspected, Change in health, Change in alcohol use, Change in activity, Upcoming invasive procedure, Emergency department visit, Upcoming dental procedure, Missed doses, Extra doses, Change in medications, Change in diet/appetite, Hospital admission, Bruising, Other complaints            Spoke with pt.  INR is SUPRA therapeutic.     Pt verifies warfarin weekly dosing.     Will have pt HOLD x 1 dose today and then continue on w/ her current regimen.    Repeat INR in 2 week(s).     Garth Fortune, PharmD, BCACP

## 2023-10-05 DIAGNOSIS — Z79.01 CHRONIC ANTICOAGULATION: ICD-10-CM

## 2023-10-05 RX ORDER — WARFARIN SODIUM 3 MG/1
3 TABLET ORAL DAILY
Qty: 90 TABLET | Refills: 1 | Status: SHIPPED | OUTPATIENT
Start: 2023-10-05 | End: 2023-11-20 | Stop reason: SDUPTHER

## 2023-10-10 ENCOUNTER — ANTICOAGULATION MONITORING (OUTPATIENT)
Dept: VASCULAR LAB | Facility: MEDICAL CENTER | Age: 83
End: 2023-10-10
Payer: MEDICARE

## 2023-10-10 DIAGNOSIS — Z79.01 LONG TERM CURRENT USE OF ANTICOAGULANT THERAPY: Chronic | ICD-10-CM

## 2023-10-10 DIAGNOSIS — I48.21 PERMANENT ATRIAL FIBRILLATION (HCC): Chronic | ICD-10-CM

## 2023-10-10 DIAGNOSIS — D68.69 SECONDARY HYPERCOAGULABLE STATE (HCC): ICD-10-CM

## 2023-10-10 LAB — INR PPP: 2.8 (ref 2–3.5)

## 2023-10-10 NOTE — PROGRESS NOTES
Anticoagulation Summary  As of 10/10/2023      INR goal:  2.5-3.0   TTR:  55.7 % (8.3 y)   INR used for dosin.80 (10/10/2023)   Warfarin maintenance plan:  3 mg (3 mg x 1) every day   Weekly warfarin total:  21 mg   Plan last modified:  Vandana Vallecillo, PharmD (1/3/2023)   Next INR check:  10/24/2023   Priority:  Routine   Target end date:  Indefinite    Indications    Permanent atrial fibrillation (HCC) [I48.21]  Long term current use of anticoagulant therapy [Z79.01]  Secondary hypercoagulable state (HCC) [D68.69]                 Anticoagulation Episode Summary       INR check location:  Home Draw    Preferred lab:      Send INR reminders to:      Comments:  [facility] [phone] -   goal range changed to 2.5-3.2 per raghu vaca 2019          Anticoagulation Care Providers       Provider Role Specialty Phone number    Hu Gamez M.D. Referring Internal Medicine Clinical Cardiac Electrophysiology [phone]    [facility] Anticoagulation Services Responsible  [phone]          Anticoagulation Patient Findings  Patient Findings       Negatives:  Signs/symptoms of thrombosis, Signs/symptoms of bleeding, Laboratory test error suspected, Change in health, Change in alcohol use, Change in activity, Upcoming invasive procedure, Emergency department visit, Upcoming dental procedure, Missed doses, Extra doses, Change in medications, Change in diet/appetite, Hospital admission, Bruising, Other complaints            INR is therapeutic    Called and spoke to patient.    Warfarin Plan: Continue regimen as listed above.    Pt is not on antiplatelet therapy.    Next INR in 2 week(s).    Vandana Vallecillo, ClarisseD

## 2023-10-21 ENCOUNTER — HOSPITAL ENCOUNTER (OUTPATIENT)
Facility: MEDICAL CENTER | Age: 83
End: 2023-10-21
Payer: MEDICARE

## 2023-10-21 ENCOUNTER — OFFICE VISIT (OUTPATIENT)
Dept: URGENT CARE | Facility: PHYSICIAN GROUP | Age: 83
End: 2023-10-21
Payer: MEDICARE

## 2023-10-21 VITALS
WEIGHT: 140 LBS | HEART RATE: 80 BPM | SYSTOLIC BLOOD PRESSURE: 132 MMHG | HEIGHT: 68 IN | DIASTOLIC BLOOD PRESSURE: 82 MMHG | BODY MASS INDEX: 21.22 KG/M2 | OXYGEN SATURATION: 98 % | RESPIRATION RATE: 18 BRPM | TEMPERATURE: 96.9 F

## 2023-10-21 DIAGNOSIS — R39.15 URGENCY OF URINATION: ICD-10-CM

## 2023-10-21 DIAGNOSIS — N30.01 ACUTE CYSTITIS WITH HEMATURIA: ICD-10-CM

## 2023-10-21 LAB
APPEARANCE UR: CLEAR
BILIRUB UR STRIP-MCNC: NEGATIVE MG/DL
COLOR UR AUTO: YELLOW
GLUCOSE UR STRIP.AUTO-MCNC: NEGATIVE MG/DL
KETONES UR STRIP.AUTO-MCNC: NEGATIVE MG/DL
LEUKOCYTE ESTERASE UR QL STRIP.AUTO: NORMAL
NITRITE UR QL STRIP.AUTO: NEGATIVE
PH UR STRIP.AUTO: 6 [PH] (ref 5–8)
PROT UR QL STRIP: NEGATIVE MG/DL
RBC UR QL AUTO: NORMAL
SP GR UR STRIP.AUTO: 1.01
UROBILINOGEN UR STRIP-MCNC: 0.2 MG/DL

## 2023-10-21 PROCEDURE — 87077 CULTURE AEROBIC IDENTIFY: CPT

## 2023-10-21 PROCEDURE — 87186 SC STD MICRODIL/AGAR DIL: CPT

## 2023-10-21 PROCEDURE — 87086 URINE CULTURE/COLONY COUNT: CPT

## 2023-10-21 PROCEDURE — 3075F SYST BP GE 130 - 139MM HG: CPT

## 2023-10-21 PROCEDURE — 81002 URINALYSIS NONAUTO W/O SCOPE: CPT

## 2023-10-21 PROCEDURE — 3079F DIAST BP 80-89 MM HG: CPT

## 2023-10-21 PROCEDURE — 99213 OFFICE O/P EST LOW 20 MIN: CPT

## 2023-10-21 RX ORDER — NITROFURANTOIN 25; 75 MG/1; MG/1
100 CAPSULE ORAL EVERY 12 HOURS
Qty: 10 CAPSULE | Refills: 0 | Status: SHIPPED | OUTPATIENT
Start: 2023-10-21 | End: 2023-10-26

## 2023-10-21 ASSESSMENT — FIBROSIS 4 INDEX: FIB4 SCORE: 1.81

## 2023-10-21 ASSESSMENT — ENCOUNTER SYMPTOMS
ABDOMINAL PAIN: 1
VOMITING: 0
FEVER: 0
FLANK PAIN: 0
NAUSEA: 0
DIARRHEA: 1

## 2023-10-21 NOTE — PROGRESS NOTES
"Subjective:   Madeline Dorantes is a 83 y.o. female who presents for Groin Pain (X 5 days )      Patient present to the  with complaints of lower abdominal pain for the past 5 days. Patient denies dysuria, but states that she has been experiencing frequency and states that she had to get up to urinate x 7 last night. She states that her urine has \"looked like maple syrup\". Patient also reports diarrhea at the onset of symptoms, which has since resolved. Denies fever, or lower back pain.     Review of Systems   Constitutional:  Negative for fever.   Gastrointestinal:  Positive for abdominal pain and diarrhea. Negative for nausea and vomiting.   Genitourinary:  Positive for frequency and urgency. Negative for dysuria and flank pain.       Medications, Allergies, and current problem list reviewed today in Epic.     Objective:     /82   Pulse 80   Temp 36.1 °C (96.9 °F) (Temporal)   Resp 18   Ht 1.727 m (5' 8\")   Wt 63.5 kg (140 lb)   SpO2 98%     Physical Exam  Constitutional:       Appearance: Normal appearance.   Cardiovascular:      Rate and Rhythm: Normal rate and regular rhythm.      Pulses: Normal pulses.      Heart sounds: Normal heart sounds.   Pulmonary:      Effort: Pulmonary effort is normal.      Breath sounds: Normal breath sounds.   Abdominal:      General: There is no distension.      Tenderness: There is abdominal tenderness in the suprapubic area. There is no right CVA tenderness, left CVA tenderness or guarding.   Neurological:      Mental Status: She is alert.   Psychiatric:         Mood and Affect: Mood normal.     Lab Results/POC Test Results   Results for orders placed or performed in visit on 10/21/23   POCT Urinalysis   Result Value Ref Range    POC Color YELLOW Negative    POC Appearance CLEAR Negative    POC Glucose NEGATIVE Negative mg/dL    POC Bilirubin NEGATIVE Negative mg/dL    POC Ketones NEGATIVE Negative mg/dL    POC Specific Gravity 1.010 <1.005 - >1.030    POC Blood " SMALL Negative    POC Urine PH 6.0 5.0 - 8.0    POC Protein NEGATIVE Negative mg/dL    POC Urobiligen 0.2 Negative (0.2) mg/dL    POC Nitrites NEGATIVE Negative    POC Leukocyte Esterase SMALL Negative            Assessment/Plan:     Diagnosis and associated orders:     1. Urgency of urination  POCT Urinalysis    Urine Culture      2. Acute cystitis with hematuria  nitrofurantoin (MACROBID) 100 MG Cap         Comments/MDM:       Patient presents to UC with complaints of frequency, suprapubic pain, and dark colored urine. Patient denies fevers, no CVA tenderness.   Last GFR 60, creatine 0.93 in March. Patient consistently within normal range. Will prescribe Macrobid, as she is currently on Coumadin.    Please follow up with Coumadin clinic on Monday.  Strict ER precautions given to patient. Instructed to follow up with UC or ER if symptoms worsen or fail to improve.            Differential diagnosis, natural history, supportive care, and indications for immediate follow-up discussed.    Advised the patient to follow-up with the primary care physician for recheck, reevaluation, and consideration of further management.    Please note that this dictation was created using voice recognition software. I have made a reasonable attempt to correct obvious errors, but I expect that there are errors of grammar and possibly content that I did not discover before finalizing the note.    This note was electronically signed by ANTOINE Olea

## 2023-10-23 LAB
BACTERIA UR CULT: ABNORMAL
SIGNIFICANT IND 70042: ABNORMAL
SITE SITE: ABNORMAL
SOURCE SOURCE: ABNORMAL

## 2023-10-24 ENCOUNTER — ANTICOAGULATION MONITORING (OUTPATIENT)
Dept: VASCULAR LAB | Facility: MEDICAL CENTER | Age: 83
End: 2023-10-24
Payer: MEDICARE

## 2023-10-24 DIAGNOSIS — I48.21 PERMANENT ATRIAL FIBRILLATION (HCC): Chronic | ICD-10-CM

## 2023-10-24 DIAGNOSIS — Z79.01 LONG TERM CURRENT USE OF ANTICOAGULANT THERAPY: Chronic | ICD-10-CM

## 2023-10-24 DIAGNOSIS — D68.69 SECONDARY HYPERCOAGULABLE STATE (HCC): ICD-10-CM

## 2023-10-24 LAB — INR PPP: 3 (ref 2–3.5)

## 2023-10-24 NOTE — PROGRESS NOTES
Anticoagulation Summary  As of 10/24/2023      INR goal:  2.5-3.0   TTR:  56.0 % (8.3 y)   INR used for dosing:  3.00 (10/24/2023)   Warfarin maintenance plan:  3 mg (3 mg x 1) every day   Weekly warfarin total:  21 mg   Plan last modified:  Vandana Vallecillo, PharmD (1/3/2023)   Next INR check:  11/7/2023   Priority:  Routine   Target end date:  Indefinite    Indications    Permanent atrial fibrillation (HCC) [I48.21]  Long term current use of anticoagulant therapy [Z79.01]  Secondary hypercoagulable state (HCC) [D68.69]                 Anticoagulation Episode Summary       INR check location:  Home Draw    Preferred lab:      Send INR reminders to:      Comments:  Waqar [facility] [phone] -   goal range changed to 2.5-3.2 per raghu vaca 8/8/2019          Anticoagulation Care Providers       Provider Role Specialty Phone number    Hu Gamez M.D. Referring Internal Medicine Clinical Cardiac Electrophysiology [phone]    [facility] Anticoagulation Services Responsible  [phone]          Anticoagulation Patient Findings  Patient Findings       Positives:  Change in medications (Macrobid x 5days)    Negatives:  Signs/symptoms of thrombosis, Signs/symptoms of bleeding, Laboratory test error suspected, Change in health, Change in alcohol use, Change in activity, Upcoming invasive procedure, Emergency department visit, Upcoming dental procedure, Missed doses, Extra doses, Change in diet/appetite, Hospital admission, Bruising, Other complaints             Spoke with patient on the phone.   Patient is therapeutic today.   Confirmed dosing.   Instructed patient to call clinic if any unusual bleeding or bruising occurs.   Will continue dosing as outlined.   Will follow-up with patient in 2 week(s).  Cheyanne Valadez, Student Pharmacist

## 2023-10-25 ENCOUNTER — APPOINTMENT (OUTPATIENT)
Dept: RADIOLOGY | Facility: MEDICAL CENTER | Age: 83
End: 2023-10-25
Attending: FAMILY MEDICINE
Payer: MEDICARE

## 2023-11-01 ENCOUNTER — ANTICOAGULATION MONITORING (OUTPATIENT)
Dept: VASCULAR LAB | Facility: MEDICAL CENTER | Age: 83
End: 2023-11-01
Payer: MEDICARE

## 2023-11-01 DIAGNOSIS — I48.21 PERMANENT ATRIAL FIBRILLATION (HCC): Chronic | ICD-10-CM

## 2023-11-01 DIAGNOSIS — Z79.01 LONG TERM CURRENT USE OF ANTICOAGULANT THERAPY: Chronic | ICD-10-CM

## 2023-11-01 DIAGNOSIS — D68.69 SECONDARY HYPERCOAGULABLE STATE (HCC): ICD-10-CM

## 2023-11-01 LAB — INR PPP: 2.5 (ref 2–3.5)

## 2023-11-02 NOTE — PROGRESS NOTES
Anticoagulation Summary  As of 2023      INR goal:  2.5-3.0   TTR:  56.1 % (8.4 y)   INR used for dosin.50 (2023)   Warfarin maintenance plan:  3 mg (3 mg x 1) every day   Weekly warfarin total:  21 mg   Plan last modified:  Vandana Vallecillo, PharmD (1/3/2023)   Next INR check:  2023   Priority:  Routine   Target end date:  Indefinite    Indications    Permanent atrial fibrillation (HCC) [I48.21]  Long term current use of anticoagulant therapy [Z79.01]  Secondary hypercoagulable state (HCC) [D68.69]                 Anticoagulation Episode Summary       INR check location:  Home Draw    Preferred lab:      Send INR reminders to:      Comments:  Waqar [facility] [phone] -   goal range changed to 2.5-3.2 per raghu vaca 2019          Anticoagulation Care Providers       Provider Role Specialty Phone number    Hu Gamez M.D. Referring Internal Medicine Clinical Cardiac Electrophysiology [phone]    [facility] Anticoagulation Services Responsible  [phone]          Anticoagulation Patient Findings  Patient Findings       Negatives:  Signs/symptoms of thrombosis, Signs/symptoms of bleeding, Laboratory test error suspected, Change in health, Change in alcohol use, Change in activity, Upcoming invasive procedure, Emergency department visit, Upcoming dental procedure, Missed doses, Extra doses, Change in medications, Change in diet/appetite, Hospital admission, Bruising, Other complaints                Spoke with patient on the phone.   Patient is therapeutic today.   Confirmed dosing.   Instructed patient to call clinic if any unusual bleeding or bruising occurs.   Will continue dosing as outlined.   Will follow-up with patient in 1 week(s).    Cheyanne Valadez, Student Pharmacist

## 2023-11-09 ENCOUNTER — NON-PROVIDER VISIT (OUTPATIENT)
Dept: CARDIOLOGY | Facility: PHYSICIAN GROUP | Age: 83
End: 2023-11-09
Payer: MEDICARE

## 2023-11-09 VITALS
HEART RATE: 75 BPM | BODY MASS INDEX: 22.13 KG/M2 | RESPIRATION RATE: 12 BRPM | OXYGEN SATURATION: 98 % | WEIGHT: 146 LBS | SYSTOLIC BLOOD PRESSURE: 144 MMHG | HEIGHT: 68 IN | DIASTOLIC BLOOD PRESSURE: 78 MMHG

## 2023-11-09 DIAGNOSIS — Z95.0 CARDIAC PACEMAKER IN SITU: Chronic | ICD-10-CM

## 2023-11-09 DIAGNOSIS — I49.8 PACEMAKER-DEPENDENT DUE TO NATIVE CARDIAC RHYTHM INSUFFICIENT TO SUPPORT LIFE: Chronic | ICD-10-CM

## 2023-11-09 DIAGNOSIS — I48.21 PERMANENT ATRIAL FIBRILLATION (HCC): Chronic | ICD-10-CM

## 2023-11-09 DIAGNOSIS — I10 ESSENTIAL HYPERTENSION: ICD-10-CM

## 2023-11-09 DIAGNOSIS — I44.2 THIRD DEGREE AV BLOCK (HCC): Chronic | ICD-10-CM

## 2023-11-09 DIAGNOSIS — Z95.0 PACEMAKER-DEPENDENT DUE TO NATIVE CARDIAC RHYTHM INSUFFICIENT TO SUPPORT LIFE: Chronic | ICD-10-CM

## 2023-11-09 PROCEDURE — 93288 INTERROG EVL PM/LDLS PM IP: CPT | Mod: 26 | Performed by: NURSE PRACTITIONER

## 2023-11-09 RX ORDER — METOPROLOL SUCCINATE 25 MG/1
25 TABLET, EXTENDED RELEASE ORAL EVERY EVENING
Qty: 100 TABLET | Refills: 3 | Status: SHIPPED | OUTPATIENT
Start: 2023-11-09

## 2023-11-09 RX ORDER — LOSARTAN POTASSIUM 25 MG/1
25 TABLET ORAL DAILY
Qty: 100 TABLET | Refills: 3 | Status: SHIPPED | OUTPATIENT
Start: 2023-11-09

## 2023-11-09 ASSESSMENT — FIBROSIS 4 INDEX: FIB4 SCORE: 1.81

## 2023-11-09 NOTE — PROGRESS NOTES
Patient had PM generator change in March 2023. The new device protrudes a little more than her previous one, which is a little uncomfortable for her.    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    Device is working normally. No ventricular high rate episodes.  Normal capture of RV lead; unable to measure R waves.; stable impedances. Battery longevity is 12.3 years.  No changes are made today.    Follow-up in 6 months for next PM check with me.   plan as above  discussed with daughter, Abby, in detail this morning, explained plan of care

## 2023-11-13 ENCOUNTER — ANTICOAGULATION MONITORING (OUTPATIENT)
Dept: VASCULAR LAB | Facility: MEDICAL CENTER | Age: 83
End: 2023-11-13
Payer: MEDICARE

## 2023-11-13 DIAGNOSIS — D68.69 SECONDARY HYPERCOAGULABLE STATE (HCC): ICD-10-CM

## 2023-11-13 DIAGNOSIS — Z79.01 LONG TERM CURRENT USE OF ANTICOAGULANT THERAPY: Chronic | ICD-10-CM

## 2023-11-13 DIAGNOSIS — I48.21 PERMANENT ATRIAL FIBRILLATION (HCC): Chronic | ICD-10-CM

## 2023-11-13 LAB — INR PPP: 3.1 (ref 2–3.5)

## 2023-11-13 NOTE — PROGRESS NOTES
Anticoagulation Summary  As of 11/13/2023      INR goal:  2.5-3.0   TTR:  56.2 % (8.4 y)   INR used for dosing:  3.10 (11/13/2023)   Warfarin maintenance plan:  3 mg (3 mg x 1) every day   Weekly warfarin total:  21 mg   Plan last modified:  Clarisse MartínezD (1/3/2023)   Next INR check:  11/27/2023   Priority:  Routine   Target end date:  Indefinite    Indications    Permanent atrial fibrillation (HCC) [I48.21]  Long term current use of anticoagulant therapy [Z79.01]  Secondary hypercoagulable state (HCC) [D68.69]                 Anticoagulation Episode Summary       INR check location:  Home Draw    Preferred lab:      Send INR reminders to:      Comments:  Waqar [facility] [phone] -   goal range changed to 2.5-3.2 per raghu vaca 8/8/2019          Anticoagulation Care Providers       Provider Role Specialty Phone number    Hu Gamez M.D. Referring Internal Medicine Clinical Cardiac Electrophysiology [phone]    [facility] Anticoagulation Services Responsible  [phone]            Refer to Anticoagulation Patient Findings for HPI  Patient Findings       Negatives:  Signs/symptoms of thrombosis, Signs/symptoms of bleeding, Laboratory test error suspected, Change in health, Change in alcohol use, Change in activity, Upcoming invasive procedure, Emergency department visit, Upcoming dental procedure, Missed doses, Extra doses, Change in medications, Change in diet/appetite, Hospital admission, Bruising, Other complaints            Spoke with patient to report a therapeutic INR (goal 2.5-3.2 per CHARO Rodriguez).      Pt instructed to continue with current warfarin dosing regimen, confirms dosing.     Will follow up in 2 week(s).     Garth Fortune, PharmD, BCACP

## 2023-11-20 DIAGNOSIS — Z79.01 CHRONIC ANTICOAGULATION: ICD-10-CM

## 2023-11-20 RX ORDER — WARFARIN SODIUM 3 MG/1
TABLET ORAL
Qty: 100 TABLET | Refills: 1 | Status: SHIPPED | OUTPATIENT
Start: 2023-11-20

## 2023-11-27 ENCOUNTER — HOSPITAL ENCOUNTER (OUTPATIENT)
Dept: RADIOLOGY | Facility: MEDICAL CENTER | Age: 83
End: 2023-11-27
Attending: FAMILY MEDICINE
Payer: MEDICARE

## 2023-11-27 DIAGNOSIS — Z12.31 ENCOUNTER FOR MAMMOGRAM TO ESTABLISH BASELINE MAMMOGRAM: ICD-10-CM

## 2023-11-27 PROCEDURE — 77063 BREAST TOMOSYNTHESIS BI: CPT

## 2023-11-28 ENCOUNTER — ANTICOAGULATION MONITORING (OUTPATIENT)
Dept: MEDICAL GROUP | Facility: PHYSICIAN GROUP | Age: 83
End: 2023-11-28
Payer: MEDICARE

## 2023-11-28 DIAGNOSIS — I48.21 PERMANENT ATRIAL FIBRILLATION (HCC): Chronic | ICD-10-CM

## 2023-11-28 DIAGNOSIS — D68.69 SECONDARY HYPERCOAGULABLE STATE (HCC): ICD-10-CM

## 2023-11-28 DIAGNOSIS — Z79.01 LONG TERM CURRENT USE OF ANTICOAGULANT THERAPY: Chronic | ICD-10-CM

## 2023-11-28 LAB — INR PPP: 3 (ref 2–3.5)

## 2023-11-29 NOTE — PROGRESS NOTES
Anticoagulation Summary  As of 11/28/2023      INR goal:  2.5-3.0   TTR:  55.9 % (8.4 y)   INR used for dosing:  3.00 (11/28/2023)   Warfarin maintenance plan:  3 mg (3 mg x 1) every day   Weekly warfarin total:  21 mg   Plan last modified:  Vandana Vallecillo, PharmD (1/3/2023)   Next INR check:  12/12/2023   Priority:  Routine   Target end date:  Indefinite    Indications    Permanent atrial fibrillation (HCC) [I48.21]  Long term current use of anticoagulant therapy [Z79.01]  Secondary hypercoagulable state (HCC) [D68.69]                 Anticoagulation Episode Summary       INR check location:  Home Draw    Preferred lab:      Send INR reminders to:      Comments:  Waq[facility] [phone] -   goal range changed to 2.5-3.2 per raghu vaca 8/8/2019          Anticoagulation Care Providers       Provider Role Specialty Phone number    Hu Gamez M.D. Referring Internal Medicine Clinical Cardiac Electrophysiology [phone]    [facility] Anticoagulation Services Responsible  [phone]          Anticoagulation Patient Findings  Patient Findings       Negatives:  Signs/symptoms of thrombosis, Signs/symptoms of bleeding, Laboratory test error suspected, Change in health, Change in alcohol use, Change in activity, Upcoming invasive procedure, Emergency department visit, Upcoming dental procedure, Missed doses, Extra doses, Change in medications, Change in diet/appetite, Hospital admission, Bruising, Other complaints          Spoke with patient today regarding therapeutic INR of 3.0.  Patient denies any signs/symptoms of bruising or bleeding or any changes in diet and medications.  Instructed patient to call clinic with any questions or concerns.    Pt is to continue with current warfarin dosing regimen.  Follow up in 2 weeks, to reduce risk of adverse events related to this high risk medication,  Warfarin.    Kristopher Escobar, PharmD, BCACP

## 2023-12-12 ENCOUNTER — ANTICOAGULATION MONITORING (OUTPATIENT)
Dept: MEDICAL GROUP | Facility: PHYSICIAN GROUP | Age: 83
End: 2023-12-12
Payer: MEDICARE

## 2023-12-12 DIAGNOSIS — D68.69 SECONDARY HYPERCOAGULABLE STATE (HCC): ICD-10-CM

## 2023-12-12 DIAGNOSIS — I48.21 PERMANENT ATRIAL FIBRILLATION (HCC): Chronic | ICD-10-CM

## 2023-12-12 DIAGNOSIS — Z79.01 LONG TERM CURRENT USE OF ANTICOAGULANT THERAPY: Chronic | ICD-10-CM

## 2023-12-12 LAB — INR PPP: 2.9 (ref 2–3.5)

## 2023-12-13 NOTE — PROGRESS NOTES
Anticoagulation Summary  As of 2023      INR goal:  2.5-3.0   TTR:  56.1 % (8.5 y)   INR used for dosin.90 (2023)   Warfarin maintenance plan:  3 mg (3 mg x 1) every day   Weekly warfarin total:  21 mg   Plan last modified:  Vandana Vallecillo, PharmD (1/3/2023)   Next INR check:  2023   Priority:  Routine   Target end date:  Indefinite    Indications    Permanent atrial fibrillation (HCC) [I48.21]  Long term current use of anticoagulant therapy [Z79.01]  Secondary hypercoagulable state (HCC) [D68.69]                 Anticoagulation Episode Summary       INR check location:  Home Draw    Preferred lab:      Send INR reminders to:      Comments:  Waqar [facility] [phone] -   goal range changed to 2.5-3.2 per raghu vaca 2019          Anticoagulation Care Providers       Provider Role Specialty Phone number    Hu Gamez M.D. Referring Internal Medicine Clinical Cardiac Electrophysiology [phone]    [facility] Anticoagulation Services Responsible  [phone]          Anticoagulation Patient Findings  Patient Findings       Negatives:  Signs/symptoms of thrombosis, Signs/symptoms of bleeding, Laboratory test error suspected, Change in health, Change in alcohol use, Change in activity, Upcoming invasive procedure, Emergency department visit, Upcoming dental procedure, Missed doses, Extra doses, Change in medications, Change in diet/appetite, Hospital admission, Bruising, Other complaints          Spoke with patient today regarding therapeutic INR of 2.9.  Patient denies any signs/symptoms of bruising or bleeding or any changes in diet and medications.  Instructed patient to call clinic with any questions or concerns.    Pt is not on antiplatelet therapy     Pt is to continue with current warfarin dosing regimen.  Follow up in 2 weeks, to reduce risk of adverse events related to this high risk medication,  Warfarin.    Kristopher Escobar, PharmD, BCACP

## 2023-12-21 ENCOUNTER — NON-PROVIDER VISIT (OUTPATIENT)
Dept: CARDIOLOGY | Facility: MEDICAL CENTER | Age: 83
End: 2023-12-21
Payer: MEDICARE

## 2023-12-21 PROCEDURE — 93294 REM INTERROG EVL PM/LDLS PM: CPT | Performed by: INTERNAL MEDICINE

## 2023-12-21 NOTE — CARDIAC REMOTE MONITOR - SCAN
Device transmission reviewed. Device demonstrated appropriate function.       Electronically Signed by: Hu Gamez M.D.    12/22/2023  8:28 AM

## 2023-12-26 ENCOUNTER — TELEPHONE (OUTPATIENT)
Dept: VASCULAR LAB | Facility: MEDICAL CENTER | Age: 83
End: 2023-12-26
Payer: MEDICARE

## 2023-12-26 ENCOUNTER — DOCUMENTATION (OUTPATIENT)
Dept: VASCULAR LAB | Facility: MEDICAL CENTER | Age: 83
End: 2023-12-26
Payer: MEDICARE

## 2023-12-26 ENCOUNTER — ANTICOAGULATION MONITORING (OUTPATIENT)
Dept: VASCULAR LAB | Facility: MEDICAL CENTER | Age: 83
End: 2023-12-26
Payer: MEDICARE

## 2023-12-26 DIAGNOSIS — Z79.01 LONG TERM CURRENT USE OF ANTICOAGULANT THERAPY: Chronic | ICD-10-CM

## 2023-12-26 DIAGNOSIS — I48.21 PERMANENT ATRIAL FIBRILLATION (HCC): Chronic | ICD-10-CM

## 2023-12-26 DIAGNOSIS — D68.69 SECONDARY HYPERCOAGULABLE STATE (HCC): ICD-10-CM

## 2023-12-26 LAB — INR PPP: 4.6 (ref 2–3.5)

## 2023-12-26 NOTE — TELEPHONE ENCOUNTER
Caller: Madeline Dorantes     Topic/issue: Patient called to follow up after her appointment today with the name of the urinary tract infection medication she is taking. She said it is Macrobiba but is unsure if that is the correct spelling.     Callback Number: [phone] (home)     Thank you,     Violeta SWAN

## 2023-12-26 NOTE — PROGRESS NOTES
[facility]      Received message from the patient stating that the abx she is taking is Macrobid. This is not likely to affect her INR much. Will proceed with previous plan discussed earlier for warfarin dosing.   Called patient to confirm this.       Jason RobbD

## 2024-01-02 ENCOUNTER — ANTICOAGULATION MONITORING (OUTPATIENT)
Dept: VASCULAR LAB | Facility: MEDICAL CENTER | Age: 84
End: 2024-01-02
Payer: MEDICARE

## 2024-01-02 DIAGNOSIS — Z79.01 LONG TERM CURRENT USE OF ANTICOAGULANT THERAPY: Chronic | ICD-10-CM

## 2024-01-02 DIAGNOSIS — D68.69 SECONDARY HYPERCOAGULABLE STATE (HCC): ICD-10-CM

## 2024-01-02 DIAGNOSIS — I48.21 PERMANENT ATRIAL FIBRILLATION (HCC): Chronic | ICD-10-CM

## 2024-01-02 LAB — INR PPP: 4.6 (ref 2–3.5)

## 2024-01-03 NOTE — PROGRESS NOTES
Anticoagulation Summary  As of 2024      INR goal:  2.5-3.0   TTR:  55.7 % (8.5 y)   INR used for dosin.60 (2024)   Warfarin maintenance plan:  3 mg (3 mg x 1) every day   Weekly warfarin total:  21 mg   Plan last modified:  Vandana Vallecillo, PharmD (1/3/2023)   Next INR check:  2024   Priority:  Routine   Target end date:  Indefinite    Indications    Permanent atrial fibrillation (HCC) [I48.21]  Long term current use of anticoagulant therapy [Z79.01]  Secondary hypercoagulable state (HCC) [D68.69]                 Anticoagulation Episode Summary       INR check location:  Home Draw    Preferred lab:      Send INR reminders to:      Comments:  Waqar [facility] [phone] -   goal range changed to 2.5-3.2 per raghu vaca 2019          Anticoagulation Care Providers       Provider Role Specialty Phone number    Hu Gamez M.D. Referring Internal Medicine Clinical Cardiac Electrophysiology [phone]    [facility] Anticoagulation Services Responsible  [phone]          Anticoagulation Patient Findings      INR is supratherapeutic    Called and left VM for patient.    Requested patient to contact the clinic for any s/sx of unusual bleeding, bruising, clotting or any changes to diet or medications. Unless patient reports any changes that would warrant an adjustment to the plan:  Warfarin Plan: Hold x2 days, then Continue regimen as listed above.    Next INR in 3 day(s).    Vandana Vallecillo, ClarisseD

## 2024-01-05 LAB — INR PPP: 1.9 (ref 2–3.5)

## 2024-01-08 ENCOUNTER — ANTICOAGULATION MONITORING (OUTPATIENT)
Dept: VASCULAR LAB | Facility: MEDICAL CENTER | Age: 84
End: 2024-01-08
Payer: MEDICARE

## 2024-01-08 DIAGNOSIS — Z79.01 LONG TERM CURRENT USE OF ANTICOAGULANT THERAPY: Chronic | ICD-10-CM

## 2024-01-08 DIAGNOSIS — I48.21 PERMANENT ATRIAL FIBRILLATION (HCC): Chronic | ICD-10-CM

## 2024-01-08 DIAGNOSIS — D68.69 SECONDARY HYPERCOAGULABLE STATE (HCC): ICD-10-CM

## 2024-01-08 NOTE — PROGRESS NOTES
Anticoagulation Summary  As of 2024      INR goal:  2.5-3.0   TTR:  55.7 % (8.5 y)   INR used for dosin.90 (2024)   Warfarin maintenance plan:  3 mg (3 mg x 1) every day   Weekly warfarin total:  21 mg   Plan last modified:  Vandana Vallecillo, PharmD (1/3/2023)   Next INR check:  2024   Priority:  Routine   Target end date:  Indefinite    Indications    Permanent atrial fibrillation (HCC) [I48.21]  Long term current use of anticoagulant therapy [Z79.01]  Secondary hypercoagulable state (HCC) [D68.69]                 Anticoagulation Episode Summary       INR check location:  Home Draw    Preferred lab:      Send INR reminders to:      Comments:  Waqar [facility] [phone] -   goal range changed to 2.5-3.2 per raghu vaca 2019          Anticoagulation Care Providers       Provider Role Specialty Phone number    Hu Gamez M.D. Referring Internal Medicine Clinical Cardiac Electrophysiology [phone]    [facility] Anticoagulation Services Responsible  [phone]          Anticoagulation Patient Findings  Patient Findings       Negatives:  Signs/symptoms of thrombosis, Signs/symptoms of bleeding, Laboratory test error suspected, Change in health, Change in alcohol use, Change in activity, Upcoming invasive procedure, Emergency department visit, Upcoming dental procedure, Missed doses, Extra doses, Change in medications, Change in diet/appetite, Hospital admission, Bruising, Other complaints            INR is subtherapeutic    Called and spoke to patient.    Warfarin Plan: Continue regimen as listed above. Will refrain from bolusing since previous INR was supratherapeutic.    Next INR in 1 day(s).    Vandana Vallecillo, ClarisseD

## 2024-01-12 ENCOUNTER — ANTICOAGULATION MONITORING (OUTPATIENT)
Dept: VASCULAR LAB | Facility: MEDICAL CENTER | Age: 84
End: 2024-01-12
Payer: MEDICARE

## 2024-01-12 DIAGNOSIS — I48.21 PERMANENT ATRIAL FIBRILLATION (HCC): Chronic | ICD-10-CM

## 2024-01-12 DIAGNOSIS — D68.69 SECONDARY HYPERCOAGULABLE STATE (HCC): ICD-10-CM

## 2024-01-12 DIAGNOSIS — Z79.01 LONG TERM CURRENT USE OF ANTICOAGULANT THERAPY: Chronic | ICD-10-CM

## 2024-01-12 LAB — INR PPP: 3.3 (ref 2–3.5)

## 2024-01-12 NOTE — PROGRESS NOTES
Anticoagulation Summary  As of 1/12/2024      INR goal:  2.5-3.0   TTR:  55.7 % (8.6 y)   INR used for dosing:  3.30 (1/12/2024)   Warfarin maintenance plan:  3 mg (3 mg x 1) every day   Weekly warfarin total:  21 mg   Plan last modified:  Vandana Vallecillo, PharmD (1/3/2023)   Next INR check:  1/19/2024   Priority:  Routine   Target end date:  Indefinite    Indications    Permanent atrial fibrillation (HCC) [I48.21]  Long term current use of anticoagulant therapy [Z79.01]  Secondary hypercoagulable state (HCC) [D68.69]                 Anticoagulation Episode Summary       INR check location:  Home Draw    Preferred lab:      Send INR reminders to:      Comments:  Waqar [facility] [phone] -   goal range changed to 2.5-3.2 per raghu vaca 8/8/2019          Anticoagulation Care Providers       Provider Role Specialty Phone number    Hu Gamez M.D. Referring Internal Medicine Clinical Cardiac Electrophysiology [phone]    [facility] Anticoagulation Services Responsible  [phone]          Anticoagulation Patient Findings  Patient Findings       Negatives:  Signs/symptoms of thrombosis, Signs/symptoms of bleeding, Laboratory test error suspected, Change in health, Change in alcohol use, Change in activity, Upcoming invasive procedure, Emergency department visit, Upcoming dental procedure, Missed doses, Extra doses, Change in medications, Change in diet/appetite, Hospital admission, Bruising, Other complaints            Called and spoke to patient.    INR slightly supratherapeutic after resuming usual regimen. Recent supratherapeutic INRs were likely due to acute illness. Will rechallenge historical regimen at this time. Patient to consume additional serving of dietary vitamin K tonight to help lower INR/    Warfarin Plan: Continue regimen as listed above.    Next INR in 1 week(s).    Nayana Dunbar, ClarisseD, BCACP

## 2024-01-19 ENCOUNTER — ANTICOAGULATION MONITORING (OUTPATIENT)
Dept: VASCULAR LAB | Facility: MEDICAL CENTER | Age: 84
End: 2024-01-19
Payer: MEDICARE

## 2024-01-19 DIAGNOSIS — I48.21 PERMANENT ATRIAL FIBRILLATION (HCC): Chronic | ICD-10-CM

## 2024-01-19 DIAGNOSIS — D68.69 SECONDARY HYPERCOAGULABLE STATE (HCC): ICD-10-CM

## 2024-01-19 DIAGNOSIS — Z79.01 LONG TERM CURRENT USE OF ANTICOAGULANT THERAPY: Chronic | ICD-10-CM

## 2024-01-19 LAB — INR PPP: 3 (ref 2–3.5)

## 2024-01-19 NOTE — PROGRESS NOTES
OP   Telephone Anticoagulation Service Note      Anticoagulation Summary  As of 1/19/2024      INR goal:  2.5-3.0   TTR:  55.5 % (8.6 y)   INR used for dosing:  3.00 (1/19/2024)   Warfarin maintenance plan:  1.5 mg (3 mg x 0.5) every Sun; 3 mg (3 mg x 1) all other days   Weekly warfarin total:  19.5 mg   Plan last modified:  Emily Yi (1/19/2024)   Next INR check:  1/26/2024   Priority:  Routine   Target end date:  Indefinite    Indications    Permanent atrial fibrillation (HCC) [I48.21]  Long term current use of anticoagulant therapy [Z79.01]  Secondary hypercoagulable state (HCC) [D68.69]                 Anticoagulation Episode Summary       INR check location:  Home Draw    Preferred lab:      Send INR reminders to:      Comments:  Waqar  - [phone] -   goal range changed to 2.5-3.2 per raghu vaca 8/8/2019          Anticoagulation Care Providers       Provider Role Specialty Phone number    Hu Gamez M.D. Referring Internal Medicine Clinical Cardiac Electrophysiology [phone]    [facility] Anticoagulation Services Responsible  [phone]          Anticoagulation Patient Findings  Patient Findings       Positives:  Change in diet/appetite (eating more greens)    Negatives:  Signs/symptoms of thrombosis, Signs/symptoms of bleeding, Laboratory test error suspected, Change in health, Change in alcohol use, Change in activity, Upcoming invasive procedure, Emergency department visit, Upcoming dental procedure, Missed doses, Extra doses, Change in medications, Hospital admission, Bruising, Other complaints          Spoke with the patient on the phone today, reporting a therapeutic INR of 3.0.  Confirmed the current warfarin dosing regimen and patient compliance.  Patient reports she was told to eat more greens so she has been having a salad each night, but does not think it is something she will continue with long term.   Patient denies any interval changes to medications.   Patient denies  any signs/symptoms of bleeding or clotting.  Will have the patient begin slightly reduced weekly regimen of 1.5mg on Sun and 3mg ROW. Will retest again in 1 week.    Jason RobbD

## 2024-01-26 ENCOUNTER — ANTICOAGULATION MONITORING (OUTPATIENT)
Dept: VASCULAR LAB | Facility: MEDICAL CENTER | Age: 84
End: 2024-01-26
Payer: MEDICARE

## 2024-01-26 DIAGNOSIS — D68.69 SECONDARY HYPERCOAGULABLE STATE (HCC): ICD-10-CM

## 2024-01-26 DIAGNOSIS — I48.21 PERMANENT ATRIAL FIBRILLATION (HCC): Chronic | ICD-10-CM

## 2024-01-26 DIAGNOSIS — Z79.01 LONG TERM CURRENT USE OF ANTICOAGULANT THERAPY: Chronic | ICD-10-CM

## 2024-01-26 LAB — INR PPP: 3.4 (ref 2–3.5)

## 2024-01-26 NOTE — PROGRESS NOTES
OP   Telephone Anticoagulation Service Note      Anticoagulation Summary  As of 1/26/2024      INR goal:  2.5-3.0   TTR:  55.4 % (8.6 y)   INR used for dosing:  3.40 (1/26/2024)   Warfarin maintenance plan:  1.5 mg (3 mg x 0.5) every Sun; 3 mg (3 mg x 1) all other days   Weekly warfarin total:  19.5 mg   Plan last modified:  Emily Yi (1/19/2024)   Next INR check:  2/2/2024   Priority:  Routine   Target end date:  Indefinite    Indications    Permanent atrial fibrillation (HCC) [I48.21]  Long term current use of anticoagulant therapy [Z79.01]  Secondary hypercoagulable state (HCC) [D68.69]                 Anticoagulation Episode Summary       INR check location:  Home Draw    Preferred lab:      Send INR reminders to:      Comments:  Waqar  - [phone] -   goal range changed to 2.5-3.2 per raghu vaca 8/8/2019          Anticoagulation Care Providers       Provider Role Specialty Phone number    Hu Gamez M.D. Referring Internal Medicine Clinical Cardiac Electrophysiology [phone]    [facility] Anticoagulation Services Responsible  [phone]          Anticoagulation Patient Findings  Patient Findings       Negatives:  Signs/symptoms of thrombosis, Signs/symptoms of bleeding, Laboratory test error suspected, Change in health, Change in alcohol use, Change in activity, Upcoming invasive procedure, Emergency department visit, Upcoming dental procedure, Missed doses, Extra doses, Change in medications, Change in diet/appetite, Hospital admission, Bruising, Other complaints          Spoke with the patient on the phone today, reporting a SUPRA-therapeutic INR of 3.4.   Confirmed the current warfarin dosing regimen and patient compliance.  Patient reports she ate more greens than usual this past week.  Patient denies any interval changes to medications.    Patient denies any signs/symptoms of bleeding or clotting.  Patient will reduce dose to 1.5mg TONIGHT, as a one time dose change, then to  resume her current dosing regimen.   Patient will retest INR again in 1 week.     Jason RobbD

## 2024-02-02 ENCOUNTER — ANTICOAGULATION MONITORING (OUTPATIENT)
Dept: VASCULAR LAB | Facility: MEDICAL CENTER | Age: 84
End: 2024-02-02
Payer: MEDICARE

## 2024-02-02 DIAGNOSIS — Z79.01 LONG TERM CURRENT USE OF ANTICOAGULANT THERAPY: Chronic | ICD-10-CM

## 2024-02-02 DIAGNOSIS — D68.69 SECONDARY HYPERCOAGULABLE STATE (HCC): ICD-10-CM

## 2024-02-02 DIAGNOSIS — I48.21 PERMANENT ATRIAL FIBRILLATION (HCC): Chronic | ICD-10-CM

## 2024-02-02 LAB — INR PPP: 1.8 (ref 2–3.5)

## 2024-02-03 NOTE — PROGRESS NOTES
OP   Telephone Anticoagulation Service Note      Anticoagulation Summary  As of 2024      INR goal:  2.5-3.0   TTR:  55.4 % (8.6 y)   INR used for dosin.80 (2024)   Warfarin maintenance plan:  3 mg (3 mg x 1) every day   Weekly warfarin total:  21 mg   Plan last modified:  Emily Yi (2024)   Next INR check:  2024   Priority:  Routine   Target end date:  Indefinite    Indications    Permanent atrial fibrillation (HCC) [I48.21]  Long term current use of anticoagulant therapy [Z79.01]  Secondary hypercoagulable state (HCC) [D68.69]                 Anticoagulation Episode Summary       INR check location:  Home Draw    Preferred lab:      Send INR reminders to:      Comments:  [facility] [phone] -   goal range changed to 2.5-3.2 per raghu vaca 2019          Anticoagulation Care Providers       Provider Role Specialty Phone number    Hu Gamez M.D. Referring Internal Medicine Clinical Cardiac Electrophysiology [phone]    [facility]ticoagulation [facility] Responsible  [phone]          Anticoagulation Patient Findings  Patient Findings       Positives:  Extra doses, Change in diet/appetite (ate green beans)    Negatives:  Signs/symptoms of thrombosis, Signs/symptoms of bleeding, Laboratory test error suspected, Change in health, Change in alcohol use, Change in activity, Upcoming invasive procedure, Emergency department visit, Upcoming dental procedure, Missed doses, Change in medications, Hospital admission, Bruising, Other complaints          Spoke with the patient on the phone today, reporting a SUB-therapeutic INR of 1.8.  Confirmed the current warfarin dosing regimen and patient compliance.  Patient reports she has not been taking the 1.5mg on Sundays as the calendars says. She only took 1.5mg on Friday.   Patient denies any interval changes to diet and/or medications. Patient denies any signs/symptoms of bleeding or clotting.  Patient will bolus with 4.5mg  TONIGHT, as a one time boost, then will resume her current dosing regimen.   Patient will retest again in 1 week.     Jason RobbD

## 2024-02-09 ENCOUNTER — TELEPHONE (OUTPATIENT)
Dept: VASCULAR LAB | Facility: MEDICAL CENTER | Age: 84
End: 2024-02-09
Payer: MEDICARE

## 2024-02-09 ENCOUNTER — ANTICOAGULATION MONITORING (OUTPATIENT)
Dept: VASCULAR LAB | Facility: MEDICAL CENTER | Age: 84
End: 2024-02-09
Payer: MEDICARE

## 2024-02-09 DIAGNOSIS — Z79.01 LONG TERM CURRENT USE OF ANTICOAGULANT THERAPY: Chronic | ICD-10-CM

## 2024-02-09 DIAGNOSIS — I48.21 PERMANENT ATRIAL FIBRILLATION (HCC): Chronic | ICD-10-CM

## 2024-02-09 DIAGNOSIS — D68.69 SECONDARY HYPERCOAGULABLE STATE (HCC): ICD-10-CM

## 2024-02-09 LAB — INR PPP: 3.2 (ref 2–3.5)

## 2024-02-10 NOTE — TELEPHONE ENCOUNTER
Caller:  Mary    Topic/issue: Mary is returning a call, said she was disconnected.     Callback Number: [phone]    Thank you,   Nahed FREEMAN

## 2024-02-10 NOTE — PROGRESS NOTES
OP Anticoagulation Service Note    Date: 2/9/2024    Anticoagulation Summary  As of 2/9/2024      INR goal:  2.5-3.0   TTR:  55.3 % (8.6 y)   INR used for dosing:  3.20 (2/9/2024)   Warfarin maintenance plan:  3 mg (3 mg x 1) every day   Weekly warfarin total:  21 mg   Plan last modified:  Emily Yi (2/2/2024)   Next INR check:  2/16/2024   Priority:  Routine   Target end date:  Indefinite    Indications    Permanent atrial fibrillation (HCC) [I48.21]  Long term current use of anticoagulant therapy [Z79.01]  Secondary hypercoagulable state (HCC) [D68.69]                 Anticoagulation Episode Summary       INR check location:  Home Draw    Preferred lab:      Send INR reminders to:      Comments:  Waqar  - [phone] -   goal range changed to 2.5-3.2 per raghu vaca 8/8/2019          Anticoagulation Care Providers       Provider Role Specialty Phone number    Hu Gamez M.D. Referring Internal Medicine Clinical Cardiac Electrophysiology [phone]    [facility] Anticoagulation Services Responsible  [phone]          Anticoagulation Patient Findings        Patient's preferred phone number:  [phone]        HPI:   The reason for today's call is to prevent morbidity and mortality from a blood clot and/or stroke and to reduce the risk of bleeding while on a anticoagulant.     PCP:  Kristin Figueroa D.O.  [address] [address]    Assessment:     INR  therapeutic.     Lab Results   Component Value Date/Time    BUN 16 03/17/2023 12:05 PM    CREATININE 0.93 03/17/2023 12:05 PM    CREATININE 1.0 02/18/2009 02:40 PM     Lab Results   Component Value Date/Time    HEMOGLOBIN 13.0 03/17/2023 12:04 PM    HEMATOCRIT 41.5 03/17/2023 12:04 PM    PLATELETCT 217 03/17/2023 12:04 PM    ALKPHOSPHAT 128 (H) 03/17/2023 12:05 PM    ASTSGOT 19 03/17/2023 12:05 PM    ALTSGPT 17 03/17/2023 12:05 PM          Current Outpatient Medications:     warfarin, Take 1 tablet(s) by  "mouth daily or as directed by the Anticoagulation Clinic    metoprolol SR, 25 mg, Oral, Q EVENING    losartan, 25 mg, Oral, DAILY    Hydrocortisone Acetate, 1 Application , Apply externally, BID    INSULIN SYRINGE 1CC/31GX5/16\", USE TO INJECT B12 SUBCUTANEOUS WEEKLY    lamoTRIgine, 25 mg, Oral, QHS    Apoaequorin (PREVAGEN PO), 1 Tablet, Oral, DAILY    omeprazole, 20 mg, Oral, QAM    Magnesium, 500 mg, Oral, QHS    Melatonin, 6 mg, Oral, QHS    cyanocobalamin, 1,000 mcg, Intramuscular, Q7 DAYS    levothyroxine, 88 mcg, Oral, AM ES    levothyroxine, 100 mcg, Oral, AM ES    liothyronine, 5 mcg, Oral, QAM    CALCIUM 600-D PO, 600 mg, Oral, DAILY    POTASSIUM ACETATE, 550 mg, Oral, Q EVENING      Plan:     Continue the same warfarin dose, as noted above.       Follow-up:     As seen above      Additional information discussed with patient:     Asked patient to please call the anticoagulation clinic if they have any signs/symptoms of bleeding and/or thrombosis or any changes to diet or medications.      National recommendations regarding anticoagulation therapy:     The CHEST guidelines recommends frequent INR monitoring at regular intervals (a few days up to a max of 12 weeks) to ensure patients are on the proper dose of warfarin, and patients are not having any complications from therapy.  INRs can dramatically change over a short time period due to diet, medications, and medical conditions.         [facility]  Phone: [phone]  Fax: [phone]  On call: [phone]  General scheduling/information [phone]  For emergencies please dial [phone]  Please do not use [facility] for urgent matters, call the phone numbers listed above.    This note was created using voice recognition software (Dragon). The accuracy of the dictation is limited by the abilities of the software. I have reviewed the note prior to signing, however some errors in grammar and context are still possible. If you " have any questions related to this note please do not hesitate to contact our office.

## 2024-02-16 ENCOUNTER — ANTICOAGULATION MONITORING (OUTPATIENT)
Dept: VASCULAR LAB | Facility: MEDICAL CENTER | Age: 84
End: 2024-02-16
Payer: MEDICARE

## 2024-02-16 DIAGNOSIS — I48.21 PERMANENT ATRIAL FIBRILLATION (HCC): Chronic | ICD-10-CM

## 2024-02-16 DIAGNOSIS — Z79.01 LONG TERM CURRENT USE OF ANTICOAGULANT THERAPY: Chronic | ICD-10-CM

## 2024-02-16 DIAGNOSIS — D68.69 SECONDARY HYPERCOAGULABLE STATE (HCC): ICD-10-CM

## 2024-02-16 LAB — INR PPP: 3.5 (ref 2–3.5)

## 2024-02-16 NOTE — PROGRESS NOTES
Anticoagulation Summary  As of 2/16/2024      INR goal:  2.5-3.0   TTR:  55.2% (8.7 y)   INR used for dosing:  3.50 (2/16/2024)   Warfarin maintenance plan:  3 mg (3 mg x 1) every day   Weekly warfarin total:  21 mg   Plan last modified:  Emily Yi (2/2/2024)   Next INR check:  2/23/2024   Priority:  Routine   Target end date:  Indefinite    Indications    Permanent atrial fibrillation (HCC) [I48.21]  Long term current use of anticoagulant therapy [Z79.01]  Secondary hypercoagulable state (HCC) [D68.69]                 Anticoagulation Episode Summary       INR check location:  Home Draw    Preferred lab:      Send INR reminders to:      Comments:  [facility]aq[facility] [phone] -   goal range changed to 2.5-3.2 per raghu vaca 8/8/2019          Anticoagulation Care Providers       Provider Role Specialty Phone number    Hu Gamez M.D. Referring Internal Medicine Clinical Cardiac Electrophysiology [phone]    [facility] Anticoagulation Services Responsible  [phone]          Anticoagulation Patient Findings  Patient Findings       Negatives:  Signs/symptoms of thrombosis, Signs/symptoms of bleeding, Laboratory test error suspected, Change in health, Change in alcohol use, Change in activity, Upcoming invasive procedure, Emergency department visit, Upcoming dental procedure, Missed doses, Extra doses, Change in medications, Change in diet/appetite, Hospital admission, Bruising, Other complaints            Called and spoke to patient.    INR slightly supratherapeutic without obvious causes. Will reduce today and rechallenge regimen.    Warfarin Plan: Take 1.5 mg today, then Continue regimen as listed above.    Next INR in 1 week(s).    Nayana Dunbar, PharmD, BCACP

## 2024-02-23 ENCOUNTER — ANTICOAGULATION MONITORING (OUTPATIENT)
Dept: VASCULAR LAB | Facility: MEDICAL CENTER | Age: 84
End: 2024-02-23
Payer: MEDICARE

## 2024-02-23 DIAGNOSIS — I48.21 PERMANENT ATRIAL FIBRILLATION (HCC): Chronic | ICD-10-CM

## 2024-02-23 DIAGNOSIS — D68.69 SECONDARY HYPERCOAGULABLE STATE (HCC): ICD-10-CM

## 2024-02-23 DIAGNOSIS — Z79.01 LONG TERM CURRENT USE OF ANTICOAGULANT THERAPY: Chronic | ICD-10-CM

## 2024-02-23 LAB — INR PPP: 3.2 (ref 2–3.5)

## 2024-02-23 NOTE — PROGRESS NOTES
Anticoagulation Summary  As of 2/23/2024      INR goal:  2.5-3.0   TTR:  55.1% (8.7 y)   INR used for dosing:  3.20 (2/23/2024)   Warfarin maintenance plan:  3 mg (3 mg x 1) every day   Weekly warfarin total:  21 mg   Plan last modified:  Emily Yi (2/2/2024)   Next INR check:  3/1/2024   Priority:  Routine   Target end date:  Indefinite    Indications    Permanent atrial fibrillation (HCC) [I48.21]  Long term current use of anticoagulant therapy [Z79.01]  Secondary hypercoagulable state (HCC) [D68.69]                 Anticoagulation Episode Summary       INR check location:  Home Draw    Preferred lab:      Send INR reminders to:      Comments:  Waqar [facility] [phone] -   goal range changed to 2.5-3.2 per raghu vaca 8/8/2019          Anticoagulation Care Providers       Provider Role Specialty Phone number    Hu Gamez M.D. Referring Internal Medicine Clinical Cardiac Electrophysiology [phone]    [facility] Anticoagulation Services Responsible  [phone]          Anticoagulation Patient Findings      Left voicemail message to report a therapeutic INR.    Will have pt take reduced dose of warfarin today of 1.5 mg and then   Pt to continue with current warfarin dosing regimen. Requested pt contact the clinic for any s/s of unusual bleeding, bruising, clotting or any changes to diet or medication.    FU INR in 1 week(s).    Anitha Gamez, ClarisseD

## 2024-03-01 LAB — INR PPP: 2.9 (ref 2–3.5)

## 2024-03-04 ENCOUNTER — ANTICOAGULATION MONITORING (OUTPATIENT)
Dept: VASCULAR LAB | Facility: MEDICAL CENTER | Age: 84
End: 2024-03-04
Payer: MEDICARE

## 2024-03-04 DIAGNOSIS — D68.69 SECONDARY HYPERCOAGULABLE STATE (HCC): ICD-10-CM

## 2024-03-04 DIAGNOSIS — I48.21 PERMANENT ATRIAL FIBRILLATION (HCC): Chronic | ICD-10-CM

## 2024-03-04 DIAGNOSIS — Z79.01 LONG TERM CURRENT USE OF ANTICOAGULANT THERAPY: Chronic | ICD-10-CM

## 2024-03-04 NOTE — PROGRESS NOTES
OP Anticoagulation Service Note    Date: 3/4/2024    Anticoagulation Summary  As of 3/4/2024      INR goal:  2.5-3.0   TTR:  55.0% (8.7 y)   INR used for dosin.90 (3/1/2024)   Warfarin maintenance plan:  3 mg (3 mg x 1) every day   Weekly warfarin total:  21 mg   Plan last modified:  Emily Yi (2024)   Next INR check:  3/11/2024   Priority:  Routine   Target end date:  Indefinite    Indications    Permanent atrial fibrillation (HCC) [I48.21]  Long term current use of anticoagulant therapy [Z79.01]  Secondary hypercoagulable state (HCC) [D68.69]                 Anticoagulation Episode Summary       INR check location:  Home Draw    Preferred lab:      Send INR reminders to:      Comments:  Waqar  - [phone] -   goal range changed to 2.5-3.2 per raghu vaca 2019          Anticoagulation Care Providers       Provider Role Specialty Phone number    Hu Gamez M.D. Referring Internal Medicine Clinical Cardiac Electrophysiology [phone]    [facility] Anticoagulation Services Responsible  [phone]          Anticoagulation Patient Findings        Patient's preferred phone number:  [phone]        HPI:   The reason for today's call is to prevent morbidity and mortality from a blood clot and/or stroke and to reduce the risk of bleeding while on a anticoagulant.     PCP:  Kristin Figueroa D.O.  [address] [address]    Assessment:     INR  therapeutic.     Lab Results   Component Value Date/Time    BUN 16 2023 12:05 PM    CREATININE 0.93 2023 12:05 PM    CREATININE 1.0 2009 02:40 PM     Lab Results   Component Value Date/Time    HEMOGLOBIN 13.0 2023 12:04 PM    HEMATOCRIT 41.5 2023 12:04 PM    PLATELETCT 217 2023 12:04 PM    ALKPHOSPHAT 128 (H) 2023 12:05 PM    ASTSGOT 19 2023 12:05 PM    ALTSGPT 17 2023 12:05 PM          Current Outpatient Medications:     warfarin, Take 1 tablet(s) by mouth  "daily or as directed by the Anticoagulation Clinic    metoprolol SR, 25 mg, Oral, Q EVENING    losartan, 25 mg, Oral, DAILY    Hydrocortisone Acetate, 1 Application , Apply externally, BID    INSULIN SYRINGE 1CC/31GX5/16\", USE TO INJECT B12 SUBCUTANEOUS WEEKLY    lamoTRIgine, 25 mg, Oral, QHS    Apoaequorin (PREVAGEN PO), 1 Tablet, Oral, DAILY    omeprazole, 20 mg, Oral, QAM    Magnesium, 500 mg, Oral, QHS    Melatonin, 6 mg, Oral, QHS    cyanocobalamin, 1,000 mcg, Intramuscular, Q7 DAYS    levothyroxine, 88 mcg, Oral, AM ES    levothyroxine, 100 mcg, Oral, AM ES    liothyronine, 5 mcg, Oral, QAM    CALCIUM 600-D PO, 600 mg, Oral, DAILY    POTASSIUM ACETATE, 550 mg, Oral, Q EVENING      Plan:     Continue the same warfarin dose, as noted above.       Follow-up:     As seen above      Additional information discussed with patient:     Asked patient to please call the anticoagulation clinic if they have any signs/symptoms of bleeding and/or thrombosis or any changes to diet or medications.      National recommendations regarding anticoagulation therapy:     The CHEST guidelines recommends frequent INR monitoring at regular intervals (a few days up to a max of 12 weeks) to ensure patients are on the proper dose of warfarin, and patients are not having any complications from therapy.  INRs can dramatically change over a short time period due to diet, medications, and medical conditions.         [facility]  Phone: [phone]  Fax: [phone]  On call: [phone]  General scheduling/information [phone]  For emergencies please dial [phone]  Please do not use [facility] for urgent matters, call the phone numbers listed above.    This note was created using voice recognition software (Dragon). The accuracy of the dictation is limited by the abilities of the software. I have reviewed the note prior to signing, however some errors in grammar and context are still possible. If you have any " questions related to this note please do not hesitate to contact our office.

## 2024-03-14 ENCOUNTER — ANTICOAGULATION MONITORING (OUTPATIENT)
Dept: VASCULAR LAB | Facility: MEDICAL CENTER | Age: 84
End: 2024-03-14
Payer: MEDICARE

## 2024-03-14 DIAGNOSIS — Z79.01 LONG TERM CURRENT USE OF ANTICOAGULANT THERAPY: Chronic | ICD-10-CM

## 2024-03-14 DIAGNOSIS — I48.21 PERMANENT ATRIAL FIBRILLATION (HCC): Chronic | ICD-10-CM

## 2024-03-14 DIAGNOSIS — D68.69 SECONDARY HYPERCOAGULABLE STATE (HCC): ICD-10-CM

## 2024-03-14 LAB — INR PPP: 2.6 (ref 2–3.5)

## 2024-03-14 NOTE — PROGRESS NOTES
Anticoagulation Summary  As of 3/14/2024      INR goal:  2.5-3.0   TTR:  55.2% (8.7 y)   INR used for dosin.60 (3/14/2024)   Warfarin maintenance plan:  3 mg (3 mg x 1) every day   Weekly warfarin total:  21 mg   Plan last modified:  Emily Yi (2024)   Next INR check:  3/28/2024   Priority:  Routine   Target end date:  Indefinite    Indications    Permanent atrial fibrillation (HCC) [I48.21]  Long term current use of anticoagulant therapy [Z79.01]  Secondary hypercoagulable state (HCC) [D68.69]                 Anticoagulation Episode Summary       INR check location:  Home Draw    Preferred lab:      Send INR reminders to:      Comments:  Waqar [facility] [phone] -   goal range changed to 2.5-3.2 per raghu vaca 2019          Anticoagulation Care Providers       Provider Role Specialty Phone number    Hu Gamez M.D. Referring Internal Medicine Clinical Cardiac Electrophysiology [phone]    [facility] Anticoagulation Services Responsible  [phone]          Anticoagulation Patient Findings  Patient Findings       Negatives:  Signs/symptoms of thrombosis, Signs/symptoms of bleeding, Laboratory test error suspected, Change in health, Change in alcohol use, Change in activity, Upcoming invasive procedure, Emergency department visit, Upcoming dental procedure, Missed doses, Extra doses, Change in medications, Change in diet/appetite, Hospital admission, Bruising, Other complaints            Called and spoke to patient .    INR therapeutic    Warfarin Plan: Continue regimen as listed above.    Next INR in 2 week(s).    Nayana Dunbar, ClarisseD, BCACP

## 2024-03-19 ENCOUNTER — PATIENT MESSAGE (OUTPATIENT)
Dept: SLEEP MEDICINE | Facility: MEDICAL CENTER | Age: 84
End: 2024-03-19
Payer: MEDICARE

## 2024-03-22 ENCOUNTER — NON-PROVIDER VISIT (OUTPATIENT)
Dept: CARDIOLOGY | Facility: MEDICAL CENTER | Age: 84
End: 2024-03-22
Payer: MEDICARE

## 2024-03-22 PROCEDURE — 93294 REM INTERROG EVL PM/LDLS PM: CPT | Performed by: INTERNAL MEDICINE

## 2024-03-22 NOTE — CARDIAC REMOTE MONITOR - SCAN
Device transmission reviewed. Device demonstrated appropriate function.       Electronically Signed by: Ammon Rosario M.D.    3/22/2024  4:27 PM

## 2024-03-29 ENCOUNTER — ANTICOAGULATION MONITORING (OUTPATIENT)
Dept: VASCULAR LAB | Facility: MEDICAL CENTER | Age: 84
End: 2024-03-29
Payer: MEDICARE

## 2024-03-29 DIAGNOSIS — Z79.01 LONG TERM CURRENT USE OF ANTICOAGULANT THERAPY: Chronic | ICD-10-CM

## 2024-03-29 DIAGNOSIS — I48.21 PERMANENT ATRIAL FIBRILLATION (HCC): Chronic | ICD-10-CM

## 2024-03-29 DIAGNOSIS — D68.69 SECONDARY HYPERCOAGULABLE STATE (HCC): ICD-10-CM

## 2024-03-29 LAB — INR PPP: 3.3 (ref 2–3.5)

## 2024-03-29 NOTE — PROGRESS NOTES
Anticoagulation Summary  As of 3/29/2024      INR goal:  2.5-3.0   TTR:  55.2% (8.8 y)   INR used for dosing:  3.30 (3/29/2024)   Warfarin maintenance plan:  3 mg (3 mg x 1) every day   Weekly warfarin total:  21 mg   Plan last modified:  Emily Yi (2/2/2024)   Next INR check:  4/12/2024   Priority:  Routine   Target end date:  Indefinite    Indications    Permanent atrial fibrillation (HCC) [I48.21]  Long term current use of anticoagulant therapy [Z79.01]  Secondary hypercoagulable state (HCC) [D68.69]                 Anticoagulation Episode Summary       INR check location:  Home Draw    Preferred lab:      Send INR reminders to:      Comments:  Waqar [facility] [phone] -   goal range changed to 2.5-3.2 per raghu vaca 8/8/2019          Anticoagulation Care Providers       Provider Role Specialty Phone number    Hu Gamez M.D. Referring Internal Medicine Clinical Cardiac Electrophysiology [phone]    [facility] Anticoagulation Services Responsible  [phone]          Anticoagulation Patient Findings  Patient Findings       Negatives:  Signs/symptoms of thrombosis, Signs/symptoms of bleeding, Laboratory test error suspected, Change in health, Change in alcohol use, Change in activity, Upcoming invasive procedure, Emergency department visit, Upcoming dental procedure, Missed doses, Extra doses, Change in medications, Change in diet/appetite, Hospital admission, Bruising, Other complaints            Called and spoke to patient .    INR slightly supratherapeutic without obvious causes. Will decrease dose today, then rechallenge regimen.    Warfarin Plan: Take 1.5 mg today, then Continue regimen as listed above.    Next INR in 2 week(s).    Nayana Dunbar, PharmD, BCACP

## 2024-04-19 ENCOUNTER — ANTICOAGULATION MONITORING (OUTPATIENT)
Dept: MEDICAL GROUP | Facility: PHYSICIAN GROUP | Age: 84
End: 2024-04-19
Payer: MEDICARE

## 2024-04-19 DIAGNOSIS — I48.21 PERMANENT ATRIAL FIBRILLATION (HCC): Chronic | ICD-10-CM

## 2024-04-19 DIAGNOSIS — D68.69 SECONDARY HYPERCOAGULABLE STATE (HCC): ICD-10-CM

## 2024-04-19 DIAGNOSIS — Z79.01 LONG TERM CURRENT USE OF ANTICOAGULANT THERAPY: Chronic | ICD-10-CM

## 2024-04-19 LAB — INR PPP: 3.1 (ref 2–3.5)

## 2024-04-19 NOTE — PROGRESS NOTES
Anticoagulation Summary  As of 4/19/2024      INR goal:  2.5-3.0   TTR:  54.9% (8.8 y)   INR used for dosing:  3.10 (4/19/2024)   Warfarin maintenance plan:  3 mg (3 mg x 1) every day   Weekly warfarin total:  21 mg   Plan last modified:  Emily Yi (2/2/2024)   Next INR check:  5/3/2024   Priority:  Routine   Target end date:  Indefinite    Indications    Permanent atrial fibrillation (HCC) [I48.21]  Long term current use of anticoagulant therapy [Z79.01]  Secondary hypercoagulable state (HCC) [D68.69]                 Anticoagulation Episode Summary       INR check location:  Home Draw    Preferred lab:      Send INR reminders to:      Comments:  Waqar [facility] [phone] -   goal range changed to 2.5-3.2 per raghu vaca 8/8/2019          Anticoagulation Care Providers       Provider Role Specialty Phone number    Hu Gamez M.D. Referring Internal Medicine Clinical Cardiac Electrophysiology [phone]    [facility] Anticoagulation Services Responsible  [phone]          Anticoagulation Patient Findings    Spoke with patient today regarding therapeutic INR of 3.1.  Patient denies any signs/symptoms of bruising or bleeding or any changes in diet and medications.  Instructed patient to call clinic with any questions or concerns.    Pt is not on antiplatelet therapy    Pt is to continue with current warfarin dosing regimen.  Follow up in 2 weeks, to reduce risk of adverse events related to this high risk medication,  Warfarin.    Kristopher Escobar, PharmD, BCACP

## 2024-05-03 ENCOUNTER — ANTICOAGULATION MONITORING (OUTPATIENT)
Dept: VASCULAR LAB | Facility: MEDICAL CENTER | Age: 84
End: 2024-05-03
Payer: MEDICARE

## 2024-05-03 DIAGNOSIS — D68.69 SECONDARY HYPERCOAGULABLE STATE (HCC): ICD-10-CM

## 2024-05-03 DIAGNOSIS — I48.21 PERMANENT ATRIAL FIBRILLATION (HCC): Chronic | ICD-10-CM

## 2024-05-03 DIAGNOSIS — Z79.01 LONG TERM CURRENT USE OF ANTICOAGULANT THERAPY: Chronic | ICD-10-CM

## 2024-05-03 LAB — INR PPP: 3 (ref 2–3.5)

## 2024-05-03 NOTE — PROGRESS NOTES
OP   Telephone Anticoagulation Service Note      Anticoagulation Summary  As of 5/3/2024      INR goal:  2.5-3.0   TTR:  54.6% (8.9 y)   INR used for dosing:  3.00 (5/3/2024)   Warfarin maintenance plan:  3 mg (3 mg x 1) every day   Weekly warfarin total:  21 mg   Plan last modified:  Emily Yi (2/2/2024)   Next INR check:  5/10/2024   Priority:  Routine   Target end date:  Indefinite    Indications    Permanent atrial fibrillation (HCC) [I48.21]  Long term current use of anticoagulant therapy [Z79.01]  Secondary hypercoagulable state (HCC) [D68.69]                 Anticoagulation Episode Summary       INR check location:  Home Draw    Preferred lab:      Send INR reminders to:      Comments:  Waqar [facility] [phone] -   goal range changed to 2.5-3.2 per raghu vaca 8/8/2019          Anticoagulation Care Providers       Provider Role Specialty Phone number    Hu Gamez M.D. Referring Internal Medicine Clinical Cardiac Electrophysiology [phone]    [facility] Anticoagulation Services Responsible  [phone]          Anticoagulation Patient Findings  Patient Findings       Positives:  Change in medications (gonna start new supplement)    Negatives:  Signs/symptoms of thrombosis, Signs/symptoms of bleeding, Laboratory test error suspected, Change in health, Change in alcohol use, Change in activity, Upcoming invasive procedure, Emergency department visit, Upcoming dental procedure, Missed doses, Extra doses, Change in diet/appetite, Hospital admission, Bruising, Other complaints          Spoke with the patient on the phone today, reporting a therapeutic INR of 3.0.  Confirmed the current warfarin dosing regimen and patient compliance.  Patient denies any interval changes to diet and/or medications. Patient denies any signs/symptoms of bleeding or clotting.  Patient wanting to start on a new supplement called Lavitox but does not know what the active ingredients are. Unable to predict how it may  impact her INR so told her we will just follow up closely to see.   Patient instructed to continue with the current warfarin dosing regimen, and asked to follow up again in 1 week.     Jason RobbD

## 2024-05-10 ENCOUNTER — ANTICOAGULATION MONITORING (OUTPATIENT)
Dept: VASCULAR LAB | Facility: MEDICAL CENTER | Age: 84
End: 2024-05-10
Payer: MEDICARE

## 2024-05-10 DIAGNOSIS — I48.21 PERMANENT ATRIAL FIBRILLATION (HCC): Chronic | ICD-10-CM

## 2024-05-10 DIAGNOSIS — Z79.01 LONG TERM CURRENT USE OF ANTICOAGULANT THERAPY: Chronic | ICD-10-CM

## 2024-05-10 DIAGNOSIS — D68.69 SECONDARY HYPERCOAGULABLE STATE (HCC): ICD-10-CM

## 2024-05-10 LAB — INR PPP: 3.1 (ref 2–3.5)

## 2024-05-10 NOTE — PROGRESS NOTES
Anticoagulation Summary  As of 5/10/2024      INR goal:  2.5-3.0   TTR:  54.5% (8.9 y)   INR used for dosing:  3.10 (5/10/2024)   Warfarin maintenance plan:  3 mg (3 mg x 1) every day   Weekly warfarin total:  21 mg   Plan last modified:  Emily Yi (2/2/2024)   Next INR check:  5/24/2024   Priority:  Routine   Target end date:  Indefinite    Indications    Permanent atrial fibrillation (HCC) [I48.21]  Long term current use of anticoagulant therapy [Z79.01]  Secondary hypercoagulable state (HCC) [D68.69]                 Anticoagulation Episode Summary       INR check location:  Home Draw    Preferred lab:      Send INR reminders to:      Comments:  Waqar [facility] [phone] -   goal range changed to 2.5-3.2 per raghu vaca 8/8/2019          Anticoagulation Care Providers       Provider Role Specialty Phone number    Hu Gamez M.D. Referring Internal Medicine Clinical Cardiac Electrophysiology [phone]    [facility] Anticoagulation Services Responsible  [phone]            Refer to Anticoagulation Patient Findings for HPI  Patient Findings       Negatives:  Signs/symptoms of thrombosis, Signs/symptoms of bleeding, Laboratory test error suspected, Change in health, Change in alcohol use, Change in activity, Upcoming invasive procedure, Emergency department visit, Upcoming dental procedure, Missed doses, Extra doses, Change in medications, Change in diet/appetite, Hospital admission, Bruising, Other complaints            Spoke with patient to report a therapeutic INR.      Pt instructed to continue with current warfarin dosing regimen, confirms dosing.     Will follow up in 2 week(s).     Garth Fortune, PharmD, BCACP

## 2024-05-17 ENCOUNTER — ANTICOAGULATION MONITORING (OUTPATIENT)
Dept: VASCULAR LAB | Facility: MEDICAL CENTER | Age: 84
End: 2024-05-17
Payer: MEDICARE

## 2024-05-17 DIAGNOSIS — I48.21 PERMANENT ATRIAL FIBRILLATION (HCC): Chronic | ICD-10-CM

## 2024-05-17 DIAGNOSIS — Z79.01 LONG TERM CURRENT USE OF ANTICOAGULANT THERAPY: Chronic | ICD-10-CM

## 2024-05-17 DIAGNOSIS — D68.69 SECONDARY HYPERCOAGULABLE STATE (HCC): ICD-10-CM

## 2024-05-17 LAB — INR PPP: 3 (ref 2–3.5)

## 2024-05-18 NOTE — PROGRESS NOTES
Anticoagulation Summary  As of 5/17/2024      INR goal:  2.5-3.0   TTR:  54.4% (8.9 y)   INR used for dosing:  3.00 (5/17/2024)   Warfarin maintenance plan:  3 mg (3 mg x 1) every day   Weekly warfarin total:  21 mg   Plan last modified:  Emily Yi (2/2/2024)   Next INR check:  5/31/2024   Priority:  Routine   Target end date:  Indefinite    Indications    Permanent atrial fibrillation (HCC) [I48.21]  Long term current use of anticoagulant therapy [Z79.01]  Secondary hypercoagulable state (HCC) [D68.69]                 Anticoagulation Episode Summary       INR check location:  Home Draw    Preferred lab:      Send INR reminders to:      Comments:  Waqar [facility] [phone] -   goal range changed to 2.5-3.2 per raghu vaca 8/8/2019          Anticoagulation Care Providers       Provider Role Specialty Phone number    Hu Gamez M.D. Referring Internal Medicine Clinical Cardiac Electrophysiology [phone]    [facility] Anticoagulation Services Responsible  [phone]          Anticoagulation Patient Findings    Left voicemail message to report a therapeutic INR of 3.0.  Requested pt contact the clinic for any s/s of unusual bleeding, bruising, clotting or any changes to diet or medication.   Pt is to continue with current warfarin dosing regimen.    Pt is not on antiplatelet therapy      FU 2 weeks.  Kristopher Escobar, PharmD, BCACP

## 2024-05-23 ENCOUNTER — APPOINTMENT (OUTPATIENT)
Dept: CARDIOLOGY | Facility: PHYSICIAN GROUP | Age: 84
End: 2024-05-23
Payer: MEDICARE

## 2024-05-23 VITALS
HEART RATE: 73 BPM | RESPIRATION RATE: 15 BRPM | SYSTOLIC BLOOD PRESSURE: 134 MMHG | OXYGEN SATURATION: 99 % | BODY MASS INDEX: 21.52 KG/M2 | DIASTOLIC BLOOD PRESSURE: 62 MMHG | HEIGHT: 68 IN | WEIGHT: 142 LBS

## 2024-05-23 DIAGNOSIS — I10 ESSENTIAL HYPERTENSION: ICD-10-CM

## 2024-05-23 DIAGNOSIS — I44.2 THIRD DEGREE AV BLOCK (HCC): Chronic | ICD-10-CM

## 2024-05-23 DIAGNOSIS — I48.21 PERMANENT ATRIAL FIBRILLATION (HCC): Chronic | ICD-10-CM

## 2024-05-23 DIAGNOSIS — Z95.0 CARDIAC PACEMAKER IN SITU: Chronic | ICD-10-CM

## 2024-05-23 RX ORDER — METOPROLOL SUCCINATE 25 MG/1
25 TABLET, EXTENDED RELEASE ORAL EVERY EVENING
Qty: 100 TABLET | Refills: 3 | Status: SHIPPED | OUTPATIENT
Start: 2024-05-23

## 2024-05-23 RX ORDER — LOSARTAN POTASSIUM 25 MG/1
25 TABLET ORAL DAILY
Qty: 100 TABLET | Refills: 3 | Status: SHIPPED | OUTPATIENT
Start: 2024-05-23

## 2024-05-23 ASSESSMENT — FIBROSIS 4 INDEX: FIB4 SCORE: 1.81

## 2024-05-23 NOTE — PROGRESS NOTES
Device is working normally. Underlying rhythm is permanent AFib with high AV block; no ventricular high rate episodes. She is anticoagulated with Coumadin, and does home INRs.  Normal sensing and capture of RV lead; stable impedances. Battery longevity is 11.7 years.  No changes are made today.    Follow-up in 6 months for next PM check with me.

## 2024-06-04 ENCOUNTER — ANTICOAGULATION MONITORING (OUTPATIENT)
Dept: CARDIOLOGY | Facility: MEDICAL CENTER | Age: 84
End: 2024-06-04
Payer: MEDICARE

## 2024-06-04 DIAGNOSIS — D68.69 SECONDARY HYPERCOAGULABLE STATE (HCC): ICD-10-CM

## 2024-06-04 DIAGNOSIS — Z79.01 LONG TERM CURRENT USE OF ANTICOAGULANT THERAPY: Chronic | ICD-10-CM

## 2024-06-04 DIAGNOSIS — I48.21 PERMANENT ATRIAL FIBRILLATION (HCC): Chronic | ICD-10-CM

## 2024-06-04 LAB — INR PPP: 3 (ref 2–3.5)

## 2024-06-21 ENCOUNTER — HOSPITAL ENCOUNTER (OUTPATIENT)
Dept: RADIOLOGY | Facility: MEDICAL CENTER | Age: 84
End: 2024-06-21
Attending: NURSE PRACTITIONER
Payer: MEDICARE

## 2024-06-21 ENCOUNTER — ANTICOAGULATION MONITORING (OUTPATIENT)
Dept: VASCULAR LAB | Facility: MEDICAL CENTER | Age: 84
End: 2024-06-21

## 2024-06-21 DIAGNOSIS — Z79.01 LONG TERM CURRENT USE OF ANTICOAGULANT THERAPY: Chronic | ICD-10-CM

## 2024-06-21 DIAGNOSIS — I65.22 LEFT CAROTID ARTERY STENOSIS: ICD-10-CM

## 2024-06-21 DIAGNOSIS — D68.69 SECONDARY HYPERCOAGULABLE STATE (HCC): ICD-10-CM

## 2024-06-21 DIAGNOSIS — I48.21 PERMANENT ATRIAL FIBRILLATION (HCC): Chronic | ICD-10-CM

## 2024-06-21 LAB — INR PPP: 3.5 (ref 2–3.5)

## 2024-06-21 PROCEDURE — 93880 EXTRACRANIAL BILAT STUDY: CPT | Mod: 26 | Performed by: INTERNAL MEDICINE

## 2024-06-21 PROCEDURE — 93880 EXTRACRANIAL BILAT STUDY: CPT

## 2024-06-21 NOTE — PROGRESS NOTES
Anticoagulation Summary  As of 6/21/2024      INR goal:  2.5-3.0   TTR:  54.4% (9 y)   INR used for dosing:  3.50 (6/21/2024)   Warfarin maintenance plan:  3 mg (3 mg x 1) every day   Weekly warfarin total:  21 mg   Plan last modified:  Emily Yi (2/2/2024)   Next INR check:  7/5/2024   Priority:  Routine   Target end date:  Indefinite    Indications    Permanent atrial fibrillation (HCC) [I48.21]  Long term current use of anticoagulant therapy [Z79.01]  Secondary hypercoagulable state (HCC) [D68.69]                 Anticoagulation Episode Summary       INR check location:  Home Draw    Preferred lab:      Send INR reminders to:      Comments:  Waqar [facility] [phone] -   goal range changed to 2.5-3.2 per raghu vaca 8/8/2019          Anticoagulation Care Providers       Provider Role Specialty Phone number    Hu Gamez M.D. Referring Internal Medicine Clinical Cardiac Electrophysiology [phone]    [facility] Anticoagulation Services Responsible  [phone]          Anticoagulation Patient Findings      Left voicemail message to report a SUPRA therapeutic INR of 3.5.    Will have pt take a decreased dose of 1.5 mg today and then continue on with current warfarin dosing regimen.   Requested pt contact the clinic for any s/s of unusual bleeding, bruising, clotting or any changes to diet or medication.    FU INR in 2 week(s).    Garth Fortune, PharmD, BCACP

## 2024-06-22 ENCOUNTER — NON-PROVIDER VISIT (OUTPATIENT)
Dept: CARDIOLOGY | Facility: MEDICAL CENTER | Age: 84
End: 2024-06-22
Payer: MEDICARE

## 2024-06-22 PROCEDURE — 93294 REM INTERROG EVL PM/LDLS PM: CPT | Mod: 26 | Performed by: INTERNAL MEDICINE

## 2024-06-25 NOTE — PROGRESS NOTES
USD received, stent is patent but there is a rt subclavian artery stenosis not previously mentioned in prior reports.  Results called to pt as requested, binh f/u once MD returns to the office and reviews her studies. She had a question about stent migration and described an episode in which she turned her head and had the immediate onset of severe pain requiring her to lay in bed to resolve. Provided reassurance that the stent has not migrated as it was implanted in 2021. Pt thankful for call. Will review with Dr. Yeboah and f/u with pt upon his return.

## 2024-07-02 DIAGNOSIS — I65.22 LEFT CAROTID ARTERY STENOSIS: ICD-10-CM

## 2024-07-02 DIAGNOSIS — Z01.812 PRE-PROCEDURE LAB EXAM: ICD-10-CM

## 2024-07-02 DIAGNOSIS — I77.1 STENOSIS OF RIGHT SUBCLAVIAN ARTERY (HCC): ICD-10-CM

## 2024-07-09 ENCOUNTER — ANTICOAGULATION MONITORING (OUTPATIENT)
Dept: VASCULAR LAB | Facility: MEDICAL CENTER | Age: 84
End: 2024-07-09
Payer: MEDICARE

## 2024-07-09 ENCOUNTER — HOSPITAL ENCOUNTER (OUTPATIENT)
Dept: LAB | Facility: MEDICAL CENTER | Age: 84
End: 2024-07-09
Attending: NURSE PRACTITIONER
Payer: MEDICARE

## 2024-07-09 DIAGNOSIS — D68.69 SECONDARY HYPERCOAGULABLE STATE (HCC): ICD-10-CM

## 2024-07-09 DIAGNOSIS — Z01.812 PRE-PROCEDURE LAB EXAM: ICD-10-CM

## 2024-07-09 DIAGNOSIS — Z79.01 LONG TERM CURRENT USE OF ANTICOAGULANT THERAPY: Chronic | ICD-10-CM

## 2024-07-09 DIAGNOSIS — I48.21 PERMANENT ATRIAL FIBRILLATION (HCC): Chronic | ICD-10-CM

## 2024-07-09 LAB
CREAT SERPL-MCNC: 0.97 MG/DL (ref 0.5–1.4)
GFR SERPLBLD CREATININE-BSD FMLA CKD-EPI: 57 ML/MIN/1.73 M 2
INR PPP: 2.2 (ref 2–3.5)

## 2024-07-09 PROCEDURE — 82565 ASSAY OF CREATININE: CPT

## 2024-07-09 PROCEDURE — 36415 COLL VENOUS BLD VENIPUNCTURE: CPT

## 2024-07-12 ENCOUNTER — HOSPITAL ENCOUNTER (OUTPATIENT)
Dept: RADIOLOGY | Facility: MEDICAL CENTER | Age: 84
End: 2024-07-12
Attending: NURSE PRACTITIONER
Payer: MEDICARE

## 2024-07-12 DIAGNOSIS — I65.22 LEFT CAROTID ARTERY STENOSIS: ICD-10-CM

## 2024-07-12 DIAGNOSIS — I77.1 STENOSIS OF RIGHT SUBCLAVIAN ARTERY (HCC): ICD-10-CM

## 2024-07-12 PROCEDURE — 700117 HCHG RX CONTRAST REV CODE 255: Performed by: NURSE PRACTITIONER

## 2024-07-12 PROCEDURE — 70498 CT ANGIOGRAPHY NECK: CPT

## 2024-07-12 RX ADMIN — IOHEXOL 100 ML: 350 INJECTION, SOLUTION INTRAVENOUS at 15:46

## 2024-07-18 ENCOUNTER — HOSPITAL ENCOUNTER (OUTPATIENT)
Dept: RADIOLOGY | Facility: MEDICAL CENTER | Age: 84
End: 2024-07-18
Attending: FAMILY MEDICINE
Payer: MEDICARE

## 2024-07-18 DIAGNOSIS — R13.12 OROPHARYNGEAL DYSPHAGIA: ICD-10-CM

## 2024-07-18 PROCEDURE — 73030 X-RAY EXAM OF SHOULDER: CPT | Mod: RT

## 2024-07-23 ENCOUNTER — ANTICOAGULATION MONITORING (OUTPATIENT)
Dept: VASCULAR LAB | Facility: MEDICAL CENTER | Age: 84
End: 2024-07-23
Payer: MEDICARE

## 2024-07-23 DIAGNOSIS — I48.21 PERMANENT ATRIAL FIBRILLATION (HCC): Chronic | ICD-10-CM

## 2024-07-23 DIAGNOSIS — Z79.01 LONG TERM CURRENT USE OF ANTICOAGULANT THERAPY: Chronic | ICD-10-CM

## 2024-07-23 DIAGNOSIS — D68.69 SECONDARY HYPERCOAGULABLE STATE (HCC): ICD-10-CM

## 2024-07-23 LAB — INR PPP: 3 (ref 2–3.5)

## 2024-08-03 ENCOUNTER — OFFICE VISIT (OUTPATIENT)
Dept: URGENT CARE | Facility: PHYSICIAN GROUP | Age: 84
End: 2024-08-03
Payer: MEDICARE

## 2024-08-03 VITALS
OXYGEN SATURATION: 97 % | HEART RATE: 79 BPM | DIASTOLIC BLOOD PRESSURE: 70 MMHG | TEMPERATURE: 97.7 F | WEIGHT: 150.3 LBS | SYSTOLIC BLOOD PRESSURE: 128 MMHG | BODY MASS INDEX: 22.85 KG/M2 | RESPIRATION RATE: 16 BRPM

## 2024-08-03 DIAGNOSIS — N30.01 ACUTE CYSTITIS WITH HEMATURIA: ICD-10-CM

## 2024-08-03 DIAGNOSIS — R39.9 UTI SYMPTOMS: ICD-10-CM

## 2024-08-03 LAB
APPEARANCE UR: CLEAR
BILIRUB UR STRIP-MCNC: NORMAL MG/DL
COLOR UR AUTO: YELLOW
GLUCOSE UR STRIP.AUTO-MCNC: NORMAL MG/DL
KETONES UR STRIP.AUTO-MCNC: NORMAL MG/DL
LEUKOCYTE ESTERASE UR QL STRIP.AUTO: NORMAL
NITRITE UR QL STRIP.AUTO: NORMAL
PH UR STRIP.AUTO: 7 [PH] (ref 5–8)
PROT UR QL STRIP: NORMAL MG/DL
RBC UR QL AUTO: NORMAL
SP GR UR STRIP.AUTO: 1.01
UROBILINOGEN UR STRIP-MCNC: 0.2 MG/DL

## 2024-08-03 PROCEDURE — 81002 URINALYSIS NONAUTO W/O SCOPE: CPT | Performed by: NURSE PRACTITIONER

## 2024-08-03 PROCEDURE — 3074F SYST BP LT 130 MM HG: CPT | Performed by: NURSE PRACTITIONER

## 2024-08-03 PROCEDURE — 99214 OFFICE O/P EST MOD 30 MIN: CPT | Performed by: NURSE PRACTITIONER

## 2024-08-03 PROCEDURE — 3078F DIAST BP <80 MM HG: CPT | Performed by: NURSE PRACTITIONER

## 2024-08-03 RX ORDER — PHENAZOPYRIDINE HYDROCHLORIDE 200 MG/1
200 TABLET, FILM COATED ORAL 3 TIMES DAILY PRN
Qty: 6 TABLET | Refills: 0 | Status: SHIPPED | OUTPATIENT
Start: 2024-08-03 | End: 2024-08-05

## 2024-08-03 RX ORDER — NITROFURANTOIN 25; 75 MG/1; MG/1
100 CAPSULE ORAL 2 TIMES DAILY
Qty: 10 CAPSULE | Refills: 0 | Status: SHIPPED | OUTPATIENT
Start: 2024-08-03 | End: 2024-08-08

## 2024-08-03 ASSESSMENT — FIBROSIS 4 INDEX: FIB4 SCORE: 1.83

## 2024-08-03 NOTE — PROGRESS NOTES
Subjective:   Madeline Dorantes is a 84 y.o. female who presents for UTI (Since Wednesday, having pain while urinating with urgency. )    Patient is a 84-year-old female presents clinic today stating 4-day history of worsening pain when urinating, frequency, and urgency.  Patient denies any nausea, vomiting, flank pain, hematuria, fevers, or confusion.  She states she has had UTIs in the past with similar symptoms.  She has responded well to Macrobid in the past.  Patient does not get frequent UTIs, last UTI was last October with urine culture positive for E. coli.      Medications, Allergies, and current problem list reviewed today in Epic.     Objective:     /70   Pulse 79   Temp 36.5 °C (97.7 °F) (Temporal)   Resp 16   Wt 68.2 kg (150 lb 4.8 oz)   SpO2 97%     Physical Exam  Vitals reviewed.   Constitutional:       Appearance: Normal appearance.   HENT:      Head: Normocephalic.      Nose: Nose normal.      Mouth/Throat:      Mouth: Mucous membranes are moist.   Eyes:      Extraocular Movements: Extraocular movements intact.      Conjunctiva/sclera: Conjunctivae normal.      Pupils: Pupils are equal, round, and reactive to light.   Cardiovascular:      Rate and Rhythm: Normal rate.   Pulmonary:      Effort: Pulmonary effort is normal.   Abdominal:      General: Abdomen is flat.      Palpations: Abdomen is soft.      Tenderness: There is abdominal tenderness in the suprapubic area. There is no right CVA tenderness, left CVA tenderness, guarding or rebound.   Musculoskeletal:         General: Normal range of motion.      Cervical back: Normal range of motion and neck supple.   Skin:     General: Skin is warm and dry.   Neurological:      Mental Status: She is alert and oriented to person, place, and time.   Psychiatric:         Mood and Affect: Mood normal.         Behavior: Behavior normal.         Thought Content: Thought content normal.         Judgment: Judgment normal.           Assessment/Plan:      Diagnosis and associated orders:     1. Acute cystitis with hematuria  nitrofurantoin (MACROBID) 100 MG Cap    phenazopyridine (PYRIDIUM) 200 MG Tab      2. UTI symptoms  POCT Urinalysis         Comments/MDM:     Is acute condition.  Patient is a very pleasant nontoxic-appearing 84-year-old female presenting clinic today with acute cystitis symptoms with hematuria for the past 4 days.  POCT urinalysis is consistent for UTI.  Reviewed previous urine culture from October 2023 indicating positive for E. coli with good sensitivity to Macrobid.  Patient did tolerate Macrobid well at that time.  New prescription of Macrobid along with Pyridium sent to pharmacy.  Side effects to medications were discussed.  Stressed with patient the importance of pushing fluids and decreasing her caffeine/tea intake.  Prescription was sent in to preferred pharmacy.  Patient educated on red flags of UTI and encouraged to seek care back in UC or ER for  fever, chills, flank pain, or worsening symptoms.   Patient was involved with shared decision-making throughout the exam today and verbalizes understanding regards to plan of care, discharge instructions, and follow-up         Differential diagnosis, natural history, supportive care, and indications for immediate follow-up discussed.    Advised the patient to follow-up with the primary care physician for recheck, reevaluation, and consideration of further management.    I personally reviewed prior external notes and test results pertinent to today's visit as well as additional imaging and testing completed in clinic today.     Please note that this dictation was created using voice recognition software. I have made a reasonable attempt to correct obvious errors, but I expect that there are errors of grammar and possibly content that I did not discover before finalizing the note.

## 2024-08-05 LAB — INR PPP: 2.8 (ref 2–3.5)

## 2024-08-06 ENCOUNTER — ANTICOAGULATION MONITORING (OUTPATIENT)
Dept: MEDICAL GROUP | Facility: PHYSICIAN GROUP | Age: 84
End: 2024-08-06
Payer: MEDICARE

## 2024-08-06 DIAGNOSIS — D68.69 SECONDARY HYPERCOAGULABLE STATE (HCC): ICD-10-CM

## 2024-08-06 DIAGNOSIS — I48.21 PERMANENT ATRIAL FIBRILLATION (HCC): Chronic | ICD-10-CM

## 2024-08-06 DIAGNOSIS — Z79.01 LONG TERM CURRENT USE OF ANTICOAGULANT THERAPY: Chronic | ICD-10-CM

## 2024-08-06 NOTE — PROGRESS NOTES
Anticoagulation Summary  As of 2024      INR goal:  2.5-3.0   TTR:  54.5% (9.1 y)   INR used for dosin.80 (2024)   Warfarin maintenance plan:  3 mg (3 mg x 1) every day   Weekly warfarin total:  21 mg   Plan last modified:  Emily Yi (2024)   Next INR check:  2024   Target end date:  Indefinite    Indications    Permanent atrial fibrillation (HCC) [I48.21]  Long term current use of anticoagulant therapy [Z79.01]  Secondary hypercoagulable state (HCC) [D68.69]                 Anticoagulation Episode Summary       INR check location:  Home Draw    Preferred lab:      Send INR reminders to:      Comments:  Waqar [facility] [phone] -   goal range changed to 2.5-3.2 per raghu vaca 2019          Anticoagulation Care Providers       Provider Role Specialty Phone number    Hu Gamez M.D. Referring Internal Medicine Clinical Cardiac Electrophysiology [phone]    [facility] Anticoagulation Services Responsible  [phone]            Called and left  for patient . Requested patient to contact the clinic for any s/sx of unusual bleeding, bruising, clotting or any changes to diet or medications.    INR therapeutic    Unless patient reports any changes that would warrant an adjustment to the plan:  Warfarin Plan: Continue regimen as listed above.    Next INR in 2 week(s).    Nayana Dunbar, PharmD, BCACP

## 2024-08-07 ENCOUNTER — OFFICE VISIT (OUTPATIENT)
Dept: SLEEP MEDICINE | Facility: MEDICAL CENTER | Age: 84
End: 2024-08-07
Attending: NURSE PRACTITIONER
Payer: MEDICARE

## 2024-08-07 VITALS
OXYGEN SATURATION: 96 % | HEIGHT: 68 IN | DIASTOLIC BLOOD PRESSURE: 84 MMHG | RESPIRATION RATE: 14 BRPM | HEART RATE: 80 BPM | WEIGHT: 146.8 LBS | BODY MASS INDEX: 22.25 KG/M2 | SYSTOLIC BLOOD PRESSURE: 128 MMHG

## 2024-08-07 DIAGNOSIS — Z87.891 FORMER SMOKER: ICD-10-CM

## 2024-08-07 DIAGNOSIS — G47.33 OBSTRUCTIVE SLEEP APNEA: ICD-10-CM

## 2024-08-07 PROCEDURE — 99213 OFFICE O/P EST LOW 20 MIN: CPT | Performed by: NURSE PRACTITIONER

## 2024-08-07 PROCEDURE — 3079F DIAST BP 80-89 MM HG: CPT | Performed by: NURSE PRACTITIONER

## 2024-08-07 PROCEDURE — 3074F SYST BP LT 130 MM HG: CPT | Performed by: NURSE PRACTITIONER

## 2024-08-07 ASSESSMENT — FIBROSIS 4 INDEX: FIB4 SCORE: 1.83
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
## 2024-08-09 ENCOUNTER — APPOINTMENT (OUTPATIENT)
Dept: RADIOLOGY | Facility: MEDICAL CENTER | Age: 84
End: 2024-08-09
Attending: FAMILY MEDICINE
Payer: MEDICARE

## 2024-08-10 ENCOUNTER — OFFICE VISIT (OUTPATIENT)
Dept: URGENT CARE | Facility: PHYSICIAN GROUP | Age: 84
End: 2024-08-10
Payer: MEDICARE

## 2024-08-10 ENCOUNTER — HOSPITAL ENCOUNTER (OUTPATIENT)
Facility: MEDICAL CENTER | Age: 84
End: 2024-08-10
Attending: FAMILY MEDICINE
Payer: MEDICARE

## 2024-08-10 VITALS
OXYGEN SATURATION: 98 % | HEART RATE: 80 BPM | RESPIRATION RATE: 14 BRPM | TEMPERATURE: 97.1 F | DIASTOLIC BLOOD PRESSURE: 72 MMHG | SYSTOLIC BLOOD PRESSURE: 122 MMHG

## 2024-08-10 DIAGNOSIS — R35.0 URINARY FREQUENCY: ICD-10-CM

## 2024-08-10 DIAGNOSIS — R10.9 LEFT FLANK PAIN: ICD-10-CM

## 2024-08-10 DIAGNOSIS — N39.0 ACUTE URINARY TRACT INFECTION: ICD-10-CM

## 2024-08-10 DIAGNOSIS — R82.81 PYURIA: ICD-10-CM

## 2024-08-10 LAB
APPEARANCE UR: CLEAR
BILIRUB UR STRIP-MCNC: NEGATIVE MG/DL
COLOR UR AUTO: YELLOW
GLUCOSE UR STRIP.AUTO-MCNC: NEGATIVE MG/DL
KETONES UR STRIP.AUTO-MCNC: NEGATIVE MG/DL
LEUKOCYTE ESTERASE UR QL STRIP.AUTO: NORMAL
NITRITE UR QL STRIP.AUTO: NEGATIVE
PH UR STRIP.AUTO: 6 [PH] (ref 5–8)
PROT UR QL STRIP: NEGATIVE MG/DL
RBC UR QL AUTO: NORMAL
SP GR UR STRIP.AUTO: <=1.005
UROBILINOGEN UR STRIP-MCNC: 0.2 MG/DL

## 2024-08-10 PROCEDURE — 81002 URINALYSIS NONAUTO W/O SCOPE: CPT | Performed by: FAMILY MEDICINE

## 2024-08-10 PROCEDURE — 3074F SYST BP LT 130 MM HG: CPT | Performed by: FAMILY MEDICINE

## 2024-08-10 PROCEDURE — 3078F DIAST BP <80 MM HG: CPT | Performed by: FAMILY MEDICINE

## 2024-08-10 PROCEDURE — 87086 URINE CULTURE/COLONY COUNT: CPT

## 2024-08-10 PROCEDURE — 99214 OFFICE O/P EST MOD 30 MIN: CPT | Performed by: FAMILY MEDICINE

## 2024-08-10 RX ORDER — NITROFURANTOIN 25; 75 MG/1; MG/1
100 CAPSULE ORAL 2 TIMES DAILY
Qty: 10 CAPSULE | Refills: 0 | Status: SHIPPED | OUTPATIENT
Start: 2024-08-10 | End: 2024-08-15
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
## 2024-08-12 ENCOUNTER — TELEPHONE (OUTPATIENT)
Dept: CARDIOLOGY | Facility: MEDICAL CENTER | Age: 84
End: 2024-08-12
Payer: MEDICARE

## 2024-08-12 NOTE — TELEPHONE ENCOUNTER
Caller: Madeline Dorantes     Topic/issue: Patient received a letter from [facility] stating they will not be able to continue her INR home monitoring without a licensed prescription. She is concerned and confused on what needs to be done. Please advise    Callback Number: [phone]     Thank you  Misa GOLDSMITH

## 2024-08-12 NOTE — TELEPHONE ENCOUNTER
Called and LVM for patient. Advised that new enrollment form for [employer] has been faxed over. Form given to MA to scan.    Nayana Dunbar, PharmD, BCACP

## 2024-08-13 LAB
BACTERIA UR CULT: NORMAL
SIGNIFICANT IND 70042: NORMAL
SITE SITE: NORMAL
SOURCE SOURCE: NORMAL

## 2024-08-20 ENCOUNTER — ANTICOAGULATION MONITORING (OUTPATIENT)
Dept: VASCULAR LAB | Facility: MEDICAL CENTER | Age: 84
End: 2024-08-20
Payer: MEDICARE

## 2024-08-20 DIAGNOSIS — Z79.01 LONG TERM CURRENT USE OF ANTICOAGULANT THERAPY: Chronic | ICD-10-CM

## 2024-08-20 DIAGNOSIS — D68.69 SECONDARY HYPERCOAGULABLE STATE (HCC): ICD-10-CM

## 2024-08-20 DIAGNOSIS — I48.21 PERMANENT ATRIAL FIBRILLATION (HCC): Chronic | ICD-10-CM

## 2024-08-20 LAB — INR PPP: 3.6 (ref 2–3.5)

## 2024-08-20 NOTE — PROGRESS NOTES
Anticoagulation Summary  As of 8/20/2024      INR goal:  2.5-3.0   TTR:  54.4% (9.2 y)   INR used for dosing:  3.60 (8/20/2024)   Warfarin maintenance plan:  3 mg (3 mg x 1) every day   Weekly warfarin total:  21 mg   Plan last modified:  Emily Yi (2/2/2024)   Next INR check:  8/27/2024   Priority:  Routine   Target end date:  Indefinite    Indications    Permanent atrial fibrillation (HCC) [I48.21]  Long term current use of anticoagulant therapy [Z79.01]  Secondary hypercoagulable state (HCC) [D68.69]                 Anticoagulation Episode Summary       INR check location:  Home Draw    Preferred lab:  --    Send INR reminders to:  --    Comments:  Goal range changed to 2.5-3.2 per raghu vaca 8/8/2019          Anticoagulation Care Providers       Provider Role Specialty Phone number    Hu Gamez M.D. Referring Internal Medicine Clinical Cardiac Electrophysiology [phone]    [facility] Anticoagulation Services Responsible  [phone]          Anticoagulation Patient Findings      INR is supratherapeutic  Reason(s) for out of range INR today: No obvious causes      Called and left VM for patient.    Pt is not on antiplatelet therapy.    Requested patient to contact the clinic for any s/sx of unusual bleeding, bruising, clotting or any changes to diet or medications. Unless patient reports any changes that would warrant an adjustment to the plan:  Warfarin Plan: Hold x1 day, then Continue regimen as listed above.    Next INR in 1 week(s).    Vandana Vallecillo, PharmD

## 2024-08-28 LAB — INR PPP: 2.1 (ref 2–3.5)

## 2024-08-29 ENCOUNTER — ANTICOAGULATION MONITORING (OUTPATIENT)
Dept: VASCULAR LAB | Facility: MEDICAL CENTER | Age: 84
End: 2024-08-29
Payer: MEDICARE

## 2024-08-29 DIAGNOSIS — I48.21 PERMANENT ATRIAL FIBRILLATION (HCC): Chronic | ICD-10-CM

## 2024-08-29 DIAGNOSIS — Z79.01 LONG TERM CURRENT USE OF ANTICOAGULANT THERAPY: Chronic | ICD-10-CM

## 2024-08-29 DIAGNOSIS — D68.69 SECONDARY HYPERCOAGULABLE STATE (HCC): ICD-10-CM

## 2024-08-29 NOTE — PROGRESS NOTES
Anticoagulation Summary  As of 2024      INR goal:  2.5-3.0   TTR:  54.3% (9.2 y)   INR used for dosin.10 (2024)   Warfarin maintenance plan:  3 mg (3 mg x 1) every day   Weekly warfarin total:  21 mg   Plan last modified:  Emily Yi (2024)   Next INR check:  2024   Priority:  Routine   Target end date:  Indefinite    Indications    Permanent atrial fibrillation (HCC) [I48.21]  Long term current use of anticoagulant therapy [Z79.01]  Secondary hypercoagulable state (HCC) [D68.69]                 Anticoagulation Episode Summary       INR check location:  Home Draw    Preferred lab:  --    Send INR reminders to:  --    Comments:  Goal range changed to 2.5-3.2 per raghu vaca 2019          Anticoagulation Care Providers       Provider Role Specialty Phone number    Hu Gamez M.D. Referring Internal Medicine Clinical Cardiac Electrophysiology [phone]    [facility] Anticoagulation Services Responsible  [phone]            Refer to Anticoagulation Patient Findings for HPI  Patient Findings       Negatives:  Signs/symptoms of thrombosis, Signs/symptoms of bleeding, Laboratory test error suspected, Change in health, Change in alcohol use, Change in activity, Upcoming invasive procedure, Emergency department visit, Upcoming dental procedure, Missed doses, Extra doses, Change in medications, Change in diet/appetite, Hospital admission, Bruising, Other complaints            Spoke with pt.  INR is sub therapeutic.     Pt verifies warfarin weekly dosing.     Will have pt BOLUS x 1 dose w/ 4.5 mg today and then continue on w/ her current regimen.    Repeat INR in 1 week(s).     Garth Fortune, PharmD, BCACP

## 2024-09-05 ENCOUNTER — ANTICOAGULATION MONITORING (OUTPATIENT)
Dept: VASCULAR LAB | Facility: MEDICAL CENTER | Age: 84
End: 2024-09-05
Payer: MEDICARE

## 2024-09-05 DIAGNOSIS — D68.69 SECONDARY HYPERCOAGULABLE STATE (HCC): ICD-10-CM

## 2024-09-05 DIAGNOSIS — Z79.01 LONG TERM CURRENT USE OF ANTICOAGULANT THERAPY: Chronic | ICD-10-CM

## 2024-09-05 DIAGNOSIS — I48.21 PERMANENT ATRIAL FIBRILLATION (HCC): Chronic | ICD-10-CM

## 2024-09-05 LAB — INR PPP: 3.8 (ref 2–3.5)

## 2024-09-05 NOTE — PROGRESS NOTES
Anticoagulation Summary  As of 9/5/2024      INR goal:  2.5-3.0   TTR:  54.2% (9.2 y)   INR used for dosing:  3.80 (9/5/2024)   Warfarin maintenance plan:  3 mg (3 mg x 1) every day   Weekly warfarin total:  21 mg   Plan last modified:  Emily Yi (2/2/2024)   Next INR check:  9/12/2024   Priority:  Routine   Target end date:  Indefinite    Indications    Permanent atrial fibrillation (HCC) [I48.21]  Long term current use of anticoagulant therapy [Z79.01]  Secondary hypercoagulable state (HCC) [D68.69]                 Anticoagulation Episode Summary       INR check location:  Home Draw    Preferred lab:  --    Send INR reminders to:  --    Comments:  Goal range changed to 2.5-3.2 per raghu vaca 8/8/2019          Anticoagulation Care Providers       Provider Role Specialty Phone number    Hu Gamez M.D. Referring Internal Medicine Clinical Cardiac Electrophysiology [phone]    [facility] Anticoagulation Services Responsible  [phone]            Refer to Anticoagulation Patient Findings for HPI  Patient Findings       Negatives:  Signs/symptoms of thrombosis, Signs/symptoms of bleeding, Laboratory test error suspected, Change in health, Change in alcohol use, Change in activity, Upcoming invasive procedure, Emergency department visit, Upcoming dental procedure, Missed doses, Extra doses, Change in medications, Change in diet/appetite, Hospital admission, Bruising, Other complaints            Spoke with pt.  INR is SUPRA therapeutic.     Pt verifies warfarin weekly dosing.     Will have pt HOLD x 1 dose today and then continue on w/ her current regimen.    Repeat INR in 1 week(s).     Garth Fortune, PharmD, BCACP

## 2024-09-18 ENCOUNTER — ANTICOAGULATION MONITORING (OUTPATIENT)
Dept: VASCULAR LAB | Facility: MEDICAL CENTER | Age: 84
End: 2024-09-18
Payer: MEDICARE

## 2024-09-18 DIAGNOSIS — Z79.01 LONG TERM CURRENT USE OF ANTICOAGULANT THERAPY: Chronic | ICD-10-CM

## 2024-09-18 DIAGNOSIS — D68.69 SECONDARY HYPERCOAGULABLE STATE (HCC): ICD-10-CM

## 2024-09-18 DIAGNOSIS — I48.21 PERMANENT ATRIAL FIBRILLATION (HCC): Chronic | ICD-10-CM

## 2024-09-18 LAB — INR PPP: 4.1 (ref 2–3.5)

## 2024-09-18 NOTE — PROGRESS NOTES
Anticoagulation Summary  As of 2024      INR goal:  2.5-3.0   TTR:  54.0% (9.2 y)   INR used for dosin.10 (2024)   Warfarin maintenance plan:  3 mg (3 mg x 1) every day   Weekly warfarin total:  21 mg   Plan last modified:  Emily Yi (2024)   Next INR check:  2024   Priority:  Routine   Target end date:  Indefinite    Indications    Permanent atrial fibrillation (HCC) [I48.21]  Long term current use of anticoagulant therapy [Z79.01]  Secondary hypercoagulable state (HCC) [D68.69]                 Anticoagulation Episode Summary       INR check location:  Home Draw    Preferred lab:  --    Send INR reminders to:  --    Comments:  Goal range changed to 2.5-3.2 per raghu vaca 2019          Anticoagulation Care Providers       Provider Role Specialty Phone number    Hu Gamez M.D. Referring Internal Medicine Clinical Cardiac Electrophysiology [phone]    [facility] Anticoagulation Services Responsible  [phone]          Anticoagulation Patient Findings  Patient Findings       Negatives:  Signs/symptoms of thrombosis, Signs/symptoms of bleeding, Laboratory test error suspected, Change in health, Change in alcohol use, Change in activity, Upcoming invasive procedure, Emergency department visit, Upcoming dental procedure, Missed doses, Extra doses, Change in medications, Change in diet/appetite, Hospital admission, Bruising, Other complaints              Spoke with patient today regarding supratherapeutic INR of 4.1.  Patient denies any signs/symptoms of bruising or bleeding or any changes in diet and medications.  Instructed patient to call clinic with any questions or concerns.    Pt is not on antiplatelet therapy    Pt agreed to hold warfarin dose today then continue with current warfarin dosing regimen. Pt agreed to check INR on Tuesday,  as she is going out of town -10/1.    Plan reviewed with Kristopher.    Follow up in 1 week, to reduce risk of adverse events  related to this high risk medication,  Warfarin.    Myriam eGnao, Pharmacy Intern

## 2024-09-22 ENCOUNTER — NON-PROVIDER VISIT (OUTPATIENT)
Dept: CARDIOLOGY | Facility: MEDICAL CENTER | Age: 84
End: 2024-09-22
Payer: MEDICARE

## 2024-09-23 LAB — INR PPP: 3.4 (ref 2–3.5)

## 2024-09-23 PROCEDURE — 93294 REM INTERROG EVL PM/LDLS PM: CPT | Performed by: STUDENT IN AN ORGANIZED HEALTH CARE EDUCATION/TRAINING PROGRAM

## 2024-09-23 NOTE — CARDIAC REMOTE MONITOR - SCAN
Device transmission reviewed. Device demonstrated appropriate function.       Electronically Signed by: Elisha Means MD, PhD    9/24/2024  8:54 AM

## 2024-09-24 ENCOUNTER — ANTICOAGULATION MONITORING (OUTPATIENT)
Dept: VASCULAR LAB | Facility: MEDICAL CENTER | Age: 84
End: 2024-09-24
Payer: MEDICARE

## 2024-09-24 DIAGNOSIS — D68.69 SECONDARY HYPERCOAGULABLE STATE (HCC): ICD-10-CM

## 2024-09-24 DIAGNOSIS — Z79.01 LONG TERM CURRENT USE OF ANTICOAGULANT THERAPY: Chronic | ICD-10-CM

## 2024-09-24 DIAGNOSIS — I48.21 PERMANENT ATRIAL FIBRILLATION (HCC): Chronic | ICD-10-CM

## 2024-09-24 NOTE — PROGRESS NOTES
Anticoagulation Summary  As of 9/24/2024      INR goal:  2.5-3.0   TTR:  54.0% (9.3 y)   INR used for dosing:  3.40 (9/23/2024)   Warfarin maintenance plan:  3 mg (3 mg x 1) every day   Weekly warfarin total:  21 mg   Plan last modified:  Emily Yi (2/2/2024)   Next INR check:  10/2/2024   Priority:  Routine   Target end date:  Indefinite    Indications    Permanent atrial fibrillation (HCC) [I48.21]  Long term current use of anticoagulant therapy [Z79.01]  Secondary hypercoagulable state (HCC) [D68.69]                 Anticoagulation Episode Summary       INR check location:  Home Draw    Preferred lab:  --    Send INR reminders to:  --    Comments:  Goal range changed to 2.5-3.2 per raghu vaca 8/8/2019          Anticoagulation Care Providers       Provider Role Specialty Phone number    Hu Gamez M.D. Referring Internal Medicine Clinical Cardiac Electrophysiology [phone]    [facility] Anticoagulation Services Responsible  [phone]          Anticoagulation Patient Findings  Patient Findings       Negatives:  Signs/symptoms of thrombosis, Signs/symptoms of bleeding, Laboratory test error suspected, Change in health, Change in alcohol use, Change in activity, Upcoming invasive procedure, Emergency department visit, Upcoming dental procedure, Missed doses, Extra doses, Change in medications, Change in diet/appetite, Hospital admission, Bruising, Other complaints           Spoke with patient today regarding supratherapeutic INR of 3.4.  Patient denies any signs/symptoms of bruising or bleeding or any changes in diet and medications.  Instructed patient to call clinic with any questions or concerns.    Pt is not on antiplatelet therapy    Pt is to continue with current warfarin dosing regimen. Pt stated that she will be checking her INR on 10/2 next week.    Follow up in 1 week, to reduce risk of adverse events related to this high risk medication, Warfarin.    Myriam Genao, Pharmacy  Intern

## 2024-09-26 LAB — INR PPP: 3.4 (ref 2–3.5)

## 2024-09-29 LAB — INR PPP: 2.2 (ref 2–3.5)

## 2024-09-30 ENCOUNTER — TELEPHONE (OUTPATIENT)
Dept: VASCULAR LAB | Facility: MEDICAL CENTER | Age: 84
End: 2024-09-30
Payer: MEDICARE

## 2024-09-30 ENCOUNTER — ANTICOAGULATION MONITORING (OUTPATIENT)
Dept: VASCULAR LAB | Facility: MEDICAL CENTER | Age: 84
End: 2024-09-30
Payer: MEDICARE

## 2024-09-30 DIAGNOSIS — I48.21 PERMANENT ATRIAL FIBRILLATION (HCC): Chronic | ICD-10-CM

## 2024-09-30 DIAGNOSIS — I82.890 SUPERFICIAL VEIN THROMBOSIS: ICD-10-CM

## 2024-09-30 DIAGNOSIS — Z79.01 CHRONIC ANTICOAGULATION: ICD-10-CM

## 2024-09-30 DIAGNOSIS — D68.69 SECONDARY HYPERCOAGULABLE STATE (HCC): ICD-10-CM

## 2024-09-30 DIAGNOSIS — Z79.01 LONG TERM CURRENT USE OF ANTICOAGULANT THERAPY: Chronic | ICD-10-CM

## 2024-09-30 RX ORDER — ENOXAPARIN SODIUM 100 MG/ML
100 INJECTION SUBCUTANEOUS DAILY
Qty: 5 ML | Refills: 0 | Status: SHIPPED | OUTPATIENT
Start: 2024-09-30

## 2024-09-30 NOTE — TELEPHONE ENCOUNTER
Received call from pt's daughter    They are in [place]. Pt presented to the ER on Thursday 9/26 for LE swelling.   ER states pt has superficial VTE. INR on Thursday 9/26 was 3.4.   Pt took her back to ER yesterday for COVID and her INR yesterday was 2.2.  Superficial VTE could be from COVID infection.    [place] home from [place] tomorrow 10/1.  Will have pt bridge with Lovenox 100mg daily + warfarin 3mg daily for now.  Retest INR as soon as they're home 10/1 or 10/2.  We discussed pt may need to switch to DOAC (pt has ).    Vandana Vallecillo, PharmD

## 2024-10-02 ENCOUNTER — ANTICOAGULATION MONITORING (OUTPATIENT)
Dept: VASCULAR LAB | Facility: MEDICAL CENTER | Age: 84
End: 2024-10-02
Payer: MEDICARE

## 2024-10-02 DIAGNOSIS — I48.21 PERMANENT ATRIAL FIBRILLATION (HCC): Chronic | ICD-10-CM

## 2024-10-02 DIAGNOSIS — Z79.01 LONG TERM CURRENT USE OF ANTICOAGULANT THERAPY: Chronic | ICD-10-CM

## 2024-10-02 DIAGNOSIS — D68.69 SECONDARY HYPERCOAGULABLE STATE (HCC): ICD-10-CM

## 2024-10-02 LAB — INR PPP: 2.1 (ref 2–3.5)

## 2024-10-03 ENCOUNTER — APPOINTMENT (OUTPATIENT)
Dept: RADIOLOGY | Facility: MEDICAL CENTER | Age: 84
End: 2024-10-03
Attending: FAMILY MEDICINE
Payer: MEDICARE

## 2024-10-04 ENCOUNTER — ANTICOAGULATION MONITORING (OUTPATIENT)
Dept: VASCULAR LAB | Facility: MEDICAL CENTER | Age: 84
End: 2024-10-04
Payer: MEDICARE

## 2024-10-04 DIAGNOSIS — D68.69 SECONDARY HYPERCOAGULABLE STATE (HCC): ICD-10-CM

## 2024-10-04 DIAGNOSIS — Z79.01 LONG TERM CURRENT USE OF ANTICOAGULANT THERAPY: Chronic | ICD-10-CM

## 2024-10-04 DIAGNOSIS — I82.890 SUPERFICIAL VEIN THROMBOSIS: ICD-10-CM

## 2024-10-04 DIAGNOSIS — I48.21 PERMANENT ATRIAL FIBRILLATION (HCC): Chronic | ICD-10-CM

## 2024-10-04 DIAGNOSIS — Z79.01 CHRONIC ANTICOAGULATION: ICD-10-CM

## 2024-10-04 LAB — INR PPP: 2 (ref 2–3.5)

## 2024-10-04 RX ORDER — ENOXAPARIN SODIUM 100 MG/ML
100 INJECTION SUBCUTANEOUS DAILY
Qty: 5 ML | Refills: 0 | Status: SHIPPED | OUTPATIENT
Start: 2024-10-04

## 2024-10-08 ENCOUNTER — APPOINTMENT (OUTPATIENT)
Dept: RADIOLOGY | Facility: MEDICAL CENTER | Age: 84
End: 2024-10-08
Attending: EMERGENCY MEDICINE
Payer: MEDICARE

## 2024-10-08 ENCOUNTER — PATIENT MESSAGE (OUTPATIENT)
Dept: CARDIOLOGY | Facility: PHYSICIAN GROUP | Age: 84
End: 2024-10-08
Payer: MEDICARE

## 2024-10-08 ENCOUNTER — HOSPITAL ENCOUNTER (EMERGENCY)
Facility: MEDICAL CENTER | Age: 84
End: 2024-10-08
Attending: EMERGENCY MEDICINE
Payer: MEDICARE

## 2024-10-08 ENCOUNTER — HOSPITAL ENCOUNTER (EMERGENCY)
Facility: MEDICAL CENTER | Age: 84
End: 2024-10-08
Payer: MEDICARE

## 2024-10-08 ENCOUNTER — TELEPHONE (OUTPATIENT)
Dept: CARDIOLOGY | Facility: MEDICAL CENTER | Age: 84
End: 2024-10-08
Payer: MEDICARE

## 2024-10-08 VITALS
BODY MASS INDEX: 22.22 KG/M2 | HEART RATE: 82 BPM | OXYGEN SATURATION: 98 % | TEMPERATURE: 97.3 F | SYSTOLIC BLOOD PRESSURE: 168 MMHG | DIASTOLIC BLOOD PRESSURE: 89 MMHG | WEIGHT: 146.61 LBS | RESPIRATION RATE: 16 BRPM | HEIGHT: 68 IN

## 2024-10-08 VITALS
DIASTOLIC BLOOD PRESSURE: 75 MMHG | HEART RATE: 82 BPM | TEMPERATURE: 97.5 F | OXYGEN SATURATION: 97 % | RESPIRATION RATE: 18 BRPM | SYSTOLIC BLOOD PRESSURE: 179 MMHG

## 2024-10-08 DIAGNOSIS — T14.8XXA BRUISING: ICD-10-CM

## 2024-10-08 DIAGNOSIS — R79.1 LOW INTERNATIONAL NORMALIZED RATIO (INR): ICD-10-CM

## 2024-10-08 LAB
ANION GAP SERPL CALC-SCNC: 14 MMOL/L (ref 7–16)
APTT PPP: 31.7 SEC (ref 24.7–36)
BASOPHILS # BLD AUTO: 0.7 % (ref 0–1.8)
BASOPHILS # BLD: 0.05 K/UL (ref 0–0.12)
BUN SERPL-MCNC: 13 MG/DL (ref 8–22)
CALCIUM SERPL-MCNC: 9.1 MG/DL (ref 8.4–10.2)
CHLORIDE SERPL-SCNC: 104 MMOL/L (ref 96–112)
CO2 SERPL-SCNC: 22 MMOL/L (ref 20–33)
CREAT SERPL-MCNC: 0.89 MG/DL (ref 0.5–1.4)
EOSINOPHIL # BLD AUTO: 0.18 K/UL (ref 0–0.51)
EOSINOPHIL NFR BLD: 2.7 % (ref 0–6.9)
ERYTHROCYTE [DISTWIDTH] IN BLOOD BY AUTOMATED COUNT: 45.3 FL (ref 35.9–50)
GFR SERPLBLD CREATININE-BSD FMLA CKD-EPI: 63 ML/MIN/1.73 M 2
GLUCOSE SERPL-MCNC: 99 MG/DL (ref 65–99)
HCT VFR BLD AUTO: 35.3 % (ref 37–47)
HGB BLD-MCNC: 11.2 G/DL (ref 12–16)
IMM GRANULOCYTES # BLD AUTO: 0.02 K/UL (ref 0–0.11)
IMM GRANULOCYTES NFR BLD AUTO: 0.3 % (ref 0–0.9)
INR PPP: 1.57 (ref 0.87–1.13)
LYMPHOCYTES # BLD AUTO: 1.06 K/UL (ref 1–4.8)
LYMPHOCYTES NFR BLD: 15.6 % (ref 22–41)
MCH RBC QN AUTO: 27.8 PG (ref 27–33)
MCHC RBC AUTO-ENTMCNC: 31.7 G/DL (ref 32.2–35.5)
MCV RBC AUTO: 87.6 FL (ref 81.4–97.8)
MONOCYTES # BLD AUTO: 0.81 K/UL (ref 0–0.85)
MONOCYTES NFR BLD AUTO: 11.9 % (ref 0–13.4)
NEUTROPHILS # BLD AUTO: 4.66 K/UL (ref 1.82–7.42)
NEUTROPHILS NFR BLD: 68.8 % (ref 44–72)
NRBC # BLD AUTO: 0.04 K/UL
NRBC BLD-RTO: 0.6 /100 WBC (ref 0–0.2)
PLATELET # BLD AUTO: 290 K/UL (ref 164–446)
PMV BLD AUTO: 9.9 FL (ref 9–12.9)
POTASSIUM SERPL-SCNC: 3.9 MMOL/L (ref 3.6–5.5)
PROTHROMBIN TIME: 19.3 SEC (ref 12–14.6)
RBC # BLD AUTO: 4.03 M/UL (ref 4.2–5.4)
SODIUM SERPL-SCNC: 140 MMOL/L (ref 135–145)
WBC # BLD AUTO: 6.8 K/UL (ref 4.8–10.8)

## 2024-10-08 PROCEDURE — 36415 COLL VENOUS BLD VENIPUNCTURE: CPT

## 2024-10-08 PROCEDURE — 85730 THROMBOPLASTIN TIME PARTIAL: CPT

## 2024-10-08 PROCEDURE — 85610 PROTHROMBIN TIME: CPT

## 2024-10-08 PROCEDURE — 85025 COMPLETE CBC W/AUTO DIFF WBC: CPT

## 2024-10-08 PROCEDURE — 99284 EMERGENCY DEPT VISIT MOD MDM: CPT

## 2024-10-08 PROCEDURE — 73502 X-RAY EXAM HIP UNI 2-3 VIEWS: CPT | Mod: RT

## 2024-10-08 PROCEDURE — 302449 STATCHG TRIAGE ONLY (STATISTIC)

## 2024-10-08 PROCEDURE — 93971 EXTREMITY STUDY: CPT | Mod: RT

## 2024-10-08 PROCEDURE — 80048 BASIC METABOLIC PNL TOTAL CA: CPT

## 2024-10-08 ASSESSMENT — FIBROSIS 4 INDEX: FIB4 SCORE: 1.83

## 2024-10-09 ENCOUNTER — ANTICOAGULATION MONITORING (OUTPATIENT)
Dept: MEDICAL GROUP | Facility: PHYSICIAN GROUP | Age: 84
End: 2024-10-09
Payer: MEDICARE

## 2024-10-09 DIAGNOSIS — Z79.01 CHRONIC ANTICOAGULATION: ICD-10-CM

## 2024-10-09 DIAGNOSIS — I48.21 PERMANENT ATRIAL FIBRILLATION (HCC): Chronic | ICD-10-CM

## 2024-10-09 DIAGNOSIS — D68.69 SECONDARY HYPERCOAGULABLE STATE (HCC): ICD-10-CM

## 2024-10-09 DIAGNOSIS — Z79.01 LONG TERM CURRENT USE OF ANTICOAGULANT THERAPY: Chronic | ICD-10-CM

## 2024-10-09 LAB — INR PPP: 1.7 (ref 2–3.5)

## 2024-10-09 RX ORDER — WARFARIN SODIUM 3 MG/1
3-6 TABLET ORAL DAILY
Qty: 200 TABLET | Refills: 0 | Status: SHIPPED | OUTPATIENT
Start: 2024-10-09

## 2024-10-10 ENCOUNTER — ANTICOAGULATION MONITORING (OUTPATIENT)
Dept: VASCULAR LAB | Facility: MEDICAL CENTER | Age: 84
End: 2024-10-10
Payer: MEDICARE

## 2024-10-10 DIAGNOSIS — I48.21 PERMANENT ATRIAL FIBRILLATION (HCC): Chronic | ICD-10-CM

## 2024-10-10 DIAGNOSIS — D68.69 SECONDARY HYPERCOAGULABLE STATE (HCC): ICD-10-CM

## 2024-10-10 DIAGNOSIS — Z79.01 LONG TERM CURRENT USE OF ANTICOAGULANT THERAPY: Chronic | ICD-10-CM

## 2024-10-10 LAB — INR PPP: 2.4 (ref 2–3.5)

## 2024-10-11 ENCOUNTER — TELEPHONE (OUTPATIENT)
Dept: VASCULAR LAB | Facility: MEDICAL CENTER | Age: 84
End: 2024-10-11
Payer: MEDICARE

## 2024-10-11 ENCOUNTER — ANTICOAGULATION MONITORING (OUTPATIENT)
Dept: VASCULAR LAB | Facility: MEDICAL CENTER | Age: 84
End: 2024-10-11
Payer: MEDICARE

## 2024-10-11 DIAGNOSIS — D68.69 SECONDARY HYPERCOAGULABLE STATE (HCC): ICD-10-CM

## 2024-10-11 DIAGNOSIS — I48.21 PERMANENT ATRIAL FIBRILLATION (HCC): Chronic | ICD-10-CM

## 2024-10-11 DIAGNOSIS — Z79.01 LONG TERM CURRENT USE OF ANTICOAGULANT THERAPY: Chronic | ICD-10-CM

## 2024-10-11 LAB — INR PPP: 2.2 (ref 2–3.5)

## 2024-10-14 ENCOUNTER — ANTICOAGULATION MONITORING (OUTPATIENT)
Dept: VASCULAR LAB | Facility: MEDICAL CENTER | Age: 84
End: 2024-10-14

## 2024-10-14 ENCOUNTER — OFFICE VISIT (OUTPATIENT)
Dept: CARDIOLOGY | Facility: PHYSICIAN GROUP | Age: 84
End: 2024-10-14
Payer: MEDICARE

## 2024-10-14 VITALS
DIASTOLIC BLOOD PRESSURE: 60 MMHG | BODY MASS INDEX: 23.04 KG/M2 | HEART RATE: 78 BPM | HEIGHT: 67 IN | OXYGEN SATURATION: 98 % | RESPIRATION RATE: 16 BRPM | SYSTOLIC BLOOD PRESSURE: 142 MMHG | WEIGHT: 146.8 LBS

## 2024-10-14 DIAGNOSIS — I82.811: ICD-10-CM

## 2024-10-14 DIAGNOSIS — Z79.01 LONG TERM CURRENT USE OF ANTICOAGULANT THERAPY: Chronic | ICD-10-CM

## 2024-10-14 DIAGNOSIS — I49.8 PACEMAKER-DEPENDENT DUE TO NATIVE CARDIAC RHYTHM INSUFFICIENT TO SUPPORT LIFE: Chronic | ICD-10-CM

## 2024-10-14 DIAGNOSIS — Z95.0 PACEMAKER-DEPENDENT DUE TO NATIVE CARDIAC RHYTHM INSUFFICIENT TO SUPPORT LIFE: Chronic | ICD-10-CM

## 2024-10-14 DIAGNOSIS — I44.2 THIRD DEGREE AV BLOCK (HCC): Chronic | ICD-10-CM

## 2024-10-14 DIAGNOSIS — I48.21 PERMANENT ATRIAL FIBRILLATION (HCC): Chronic | ICD-10-CM

## 2024-10-14 DIAGNOSIS — Z98.890 S/P AV NODAL ABLATION: ICD-10-CM

## 2024-10-14 DIAGNOSIS — D68.69 SECONDARY HYPERCOAGULABLE STATE (HCC): ICD-10-CM

## 2024-10-14 DIAGNOSIS — Z95.0 CARDIAC PACEMAKER IN SITU: Chronic | ICD-10-CM

## 2024-10-14 DIAGNOSIS — I65.22 STENOSIS OF LEFT CAROTID ARTERY: ICD-10-CM

## 2024-10-14 DIAGNOSIS — G45.9 TIA (TRANSIENT ISCHEMIC ATTACK): ICD-10-CM

## 2024-10-14 DIAGNOSIS — I10 ESSENTIAL HYPERTENSION: ICD-10-CM

## 2024-10-14 LAB — INR PPP: 2.9 (ref 2–3.5)

## 2024-10-14 PROCEDURE — 3078F DIAST BP <80 MM HG: CPT | Performed by: INTERNAL MEDICINE

## 2024-10-14 PROCEDURE — G2211 COMPLEX E/M VISIT ADD ON: HCPCS | Performed by: INTERNAL MEDICINE

## 2024-10-14 PROCEDURE — 3077F SYST BP >= 140 MM HG: CPT | Performed by: INTERNAL MEDICINE

## 2024-10-14 PROCEDURE — 99214 OFFICE O/P EST MOD 30 MIN: CPT | Performed by: INTERNAL MEDICINE

## 2024-10-14 ASSESSMENT — FIBROSIS 4 INDEX: FIB4 SCORE: 1.37

## 2024-10-16 ENCOUNTER — ANTICOAGULATION MONITORING (OUTPATIENT)
Dept: VASCULAR LAB | Facility: MEDICAL CENTER | Age: 84
End: 2024-10-16
Payer: MEDICARE

## 2024-10-16 DIAGNOSIS — D68.69 SECONDARY HYPERCOAGULABLE STATE (HCC): ICD-10-CM

## 2024-10-16 DIAGNOSIS — I48.21 PERMANENT ATRIAL FIBRILLATION (HCC): Chronic | ICD-10-CM

## 2024-10-16 DIAGNOSIS — Z79.01 LONG TERM CURRENT USE OF ANTICOAGULANT THERAPY: Chronic | ICD-10-CM

## 2024-10-16 LAB — INR PPP: 3.5 (ref 2–3.5)

## 2024-10-21 ENCOUNTER — ANTICOAGULATION MONITORING (OUTPATIENT)
Dept: VASCULAR LAB | Facility: MEDICAL CENTER | Age: 84
End: 2024-10-21
Payer: MEDICARE

## 2024-10-21 ENCOUNTER — TELEPHONE (OUTPATIENT)
Dept: VASCULAR LAB | Facility: MEDICAL CENTER | Age: 84
End: 2024-10-21
Payer: MEDICARE

## 2024-10-21 DIAGNOSIS — Z79.01 LONG TERM CURRENT USE OF ANTICOAGULANT THERAPY: Chronic | ICD-10-CM

## 2024-10-21 DIAGNOSIS — D68.69 SECONDARY HYPERCOAGULABLE STATE (HCC): ICD-10-CM

## 2024-10-21 DIAGNOSIS — I48.21 PERMANENT ATRIAL FIBRILLATION (HCC): Chronic | ICD-10-CM

## 2024-10-21 LAB — INR PPP: 3.1 (ref 2–3.5)

## 2024-10-30 ENCOUNTER — ANTICOAGULATION MONITORING (OUTPATIENT)
Dept: VASCULAR LAB | Facility: MEDICAL CENTER | Age: 84
End: 2024-10-30
Payer: MEDICARE

## 2024-10-30 ENCOUNTER — HOSPITAL ENCOUNTER (OUTPATIENT)
Dept: LAB | Facility: MEDICAL CENTER | Age: 84
End: 2024-10-30
Attending: FAMILY MEDICINE
Payer: MEDICARE

## 2024-10-30 DIAGNOSIS — I48.21 PERMANENT ATRIAL FIBRILLATION (HCC): Chronic | ICD-10-CM

## 2024-10-30 DIAGNOSIS — Z79.01 LONG TERM CURRENT USE OF ANTICOAGULANT THERAPY: Chronic | ICD-10-CM

## 2024-10-30 DIAGNOSIS — D68.69 SECONDARY HYPERCOAGULABLE STATE (HCC): ICD-10-CM

## 2024-10-30 LAB
BASOPHILS # BLD AUTO: 2 % (ref 0–1.8)
BASOPHILS # BLD: 0.11 K/UL (ref 0–0.12)
EOSINOPHIL # BLD AUTO: 0.15 K/UL (ref 0–0.51)
EOSINOPHIL NFR BLD: 2.7 % (ref 0–6.9)
ERYTHROCYTE [DISTWIDTH] IN BLOOD BY AUTOMATED COUNT: 50.4 FL (ref 35.9–50)
FERRITIN SERPL-MCNC: 138 NG/ML (ref 10–291)
HCT VFR BLD AUTO: 41.3 % (ref 37–47)
HGB BLD-MCNC: 13.4 G/DL (ref 12–16)
IMM GRANULOCYTES # BLD AUTO: 0.03 K/UL (ref 0–0.11)
IMM GRANULOCYTES NFR BLD AUTO: 0.5 % (ref 0–0.9)
INR PPP: 2.8 (ref 2–3.5)
IRON SATN MFR SERPL: 19 % (ref 15–55)
IRON SERPL-MCNC: 58 UG/DL (ref 40–170)
LYMPHOCYTES # BLD AUTO: 1.22 K/UL (ref 1–4.8)
LYMPHOCYTES NFR BLD: 22 % (ref 22–41)
MCH RBC QN AUTO: 29.3 PG (ref 27–33)
MCHC RBC AUTO-ENTMCNC: 32.4 G/DL (ref 32.2–35.5)
MCV RBC AUTO: 90.2 FL (ref 81.4–97.8)
MONOCYTES # BLD AUTO: 0.69 K/UL (ref 0–0.85)
MONOCYTES NFR BLD AUTO: 12.5 % (ref 0–13.4)
NEUTROPHILS # BLD AUTO: 3.34 K/UL (ref 1.82–7.42)
NEUTROPHILS NFR BLD: 60.3 % (ref 44–72)
NRBC # BLD AUTO: 0 K/UL
NRBC BLD-RTO: 0 /100 WBC (ref 0–0.2)
PLATELET # BLD AUTO: 211 K/UL (ref 164–446)
PMV BLD AUTO: 11.2 FL (ref 9–12.9)
RBC # BLD AUTO: 4.58 M/UL (ref 4.2–5.4)
TIBC SERPL-MCNC: 305 UG/DL (ref 250–450)
UIBC SERPL-MCNC: 247 UG/DL (ref 110–370)
VIT B12 SERPL-MCNC: 1266 PG/ML (ref 211–911)
WBC # BLD AUTO: 5.5 K/UL (ref 4.8–10.8)

## 2024-10-30 PROCEDURE — 82728 ASSAY OF FERRITIN: CPT

## 2024-10-30 PROCEDURE — 36415 COLL VENOUS BLD VENIPUNCTURE: CPT

## 2024-10-30 PROCEDURE — 83540 ASSAY OF IRON: CPT

## 2024-10-30 PROCEDURE — 82607 VITAMIN B-12: CPT

## 2024-10-30 PROCEDURE — 83550 IRON BINDING TEST: CPT

## 2024-10-30 PROCEDURE — 85025 COMPLETE CBC W/AUTO DIFF WBC: CPT

## 2024-10-30 PROCEDURE — 82746 ASSAY OF FOLIC ACID SERUM: CPT

## 2024-10-31 LAB — FOLATE SERPL-MCNC: 11.9 NG/ML

## 2024-11-07 ENCOUNTER — ANTICOAGULATION MONITORING (OUTPATIENT)
Dept: VASCULAR LAB | Facility: MEDICAL CENTER | Age: 84
End: 2024-11-07
Payer: MEDICARE

## 2024-11-07 ENCOUNTER — TELEPHONE (OUTPATIENT)
Dept: VASCULAR LAB | Facility: MEDICAL CENTER | Age: 84
End: 2024-11-07
Payer: MEDICARE

## 2024-11-07 DIAGNOSIS — D68.69 SECONDARY HYPERCOAGULABLE STATE (HCC): ICD-10-CM

## 2024-11-07 DIAGNOSIS — Z79.01 LONG TERM CURRENT USE OF ANTICOAGULANT THERAPY: Chronic | ICD-10-CM

## 2024-11-07 DIAGNOSIS — I48.21 PERMANENT ATRIAL FIBRILLATION (HCC): Chronic | ICD-10-CM

## 2024-11-07 LAB — INR PPP: 2.8 (ref 2–3.5)

## 2024-11-07 NOTE — TELEPHONE ENCOUNTER
INR REPORTING    PT Name: Madeline Dorantes    PT Reports INR Value: 2.8    Date of INR Results: 11/7/2024 around 9am     Concerns/Questions Reported: or N/A: NA    Callback Number: [phone]    Thank you,  Meagan MELCHOR

## 2024-11-08 NOTE — PROGRESS NOTES
Anticoagulation Summary  As of 2024      INR goal:  2.5-3.0   TTR:  53.7% (9.4 y)   INR used for dosin.80 (2024)   Warfarin maintenance plan:  3 mg (3 mg x 1) every day   Weekly warfarin total:  21 mg   Plan last modified:  Emily Yi (2024)   Next INR check:  2024   Priority:  Routine   Target end date:  Indefinite    Indications    Permanent atrial fibrillation (HCC) [I48.21]  Long term current use of anticoagulant therapy [Z79.01]  Secondary hypercoagulable state (HCC) [D68.69]                 Anticoagulation Episode Summary       INR check location:  Home Draw    Preferred lab:  --    Send INR reminders to:  --    Comments:  Goal range changed to 2.5-3.2 per raghu vaca 2019          Anticoagulation Care Providers       Provider Role Specialty Phone number    Hu Gamez M.D. Referring Internal Medicine Clinical Cardiac Electrophysiology [phone]    [facility] Anticoagulation Services Responsible  [phone]          Anticoagulation Patient Findings  Patient Findings       Negatives:  Signs/symptoms of thrombosis, Signs/symptoms of bleeding, Laboratory test error suspected, Change in health, Change in alcohol use, Change in activity, Upcoming invasive procedure, Emergency department visit, Upcoming dental procedure, Missed doses, Extra doses, Change in medications, Change in diet/appetite, Hospital admission, Bruising, Other complaints            Called and spoke to patient.    INR therapeutic  Reason(s) for out of range INR today: N/A      Warfarin Plan: Continue regimen as listed above.    Next INR 2024    Mary Alice Martinez, Pharmacy Intern

## 2024-11-13 ENCOUNTER — HOSPITAL ENCOUNTER (OUTPATIENT)
Dept: RADIOLOGY | Facility: MEDICAL CENTER | Age: 84
End: 2024-11-13
Attending: FAMILY MEDICINE
Payer: MEDICARE

## 2024-11-13 DIAGNOSIS — R13.12 OROPHARYNGEAL DYSPHAGIA: ICD-10-CM

## 2024-11-13 PROCEDURE — 74230 X-RAY XM SWLNG FUNCJ C+: CPT

## 2024-11-13 PROCEDURE — 92611 MOTION FLUOROSCOPY/SWALLOW: CPT

## 2024-11-14 NOTE — OP THERAPY EVALUATION
"[facility] Services  Outpatient Modified Barium Swallow Study       Patient Name: Madeline Dorantes  Date of Evaluation: 11/13/2024  Referring Provider: Kristin Figueroa D.O.  Fax: [phone]    Patient History/Reason for Referral  The pt was referred for a Modified Barium Swallow Study (MBSS) for R13.12, oropharyngeal dysphagia.     PMHx: Afib, peptic ulcer, cardiac pacemaker, SYEDA, third degree AV block, hypothyroidism, HTN, GERD, TIA, Sjogren's syndrome, OA of knee, stenosis L carotid artery    SLP Hx: Speech-language cognitive evaluation done 5/12/16 as an inpatient at [facility] during admission for stroke/TIA with findings WFL.     Oral Mechanism Evaluation  Facial Symmetry: Equal  Facial Sensation: Equal  Labial Observations: WFL  Lingual Observations: Midline  Dentition: Good  Comments:    Voice  Quality: Hoarse  Resonance: WFL  Intensity: Soft  Cough: WFL  Comments:    Current Method of Nutrition   Oral diet (Regular solids, Thin liquids)    Pertinent Information  Affect/Behavior: Calm, Cooperative  Oxygen Requirements: Room Air  Secretion Management: WNL    Subjective  Patient reports coughing with food and liquid still in her mouth, loss of saliva out the corners of her mouth, feelings of food getting stuck in her upper chest. She reports no difficulty with pills. She denies a recent  hx of PNA but does report GERD, which is well-managed and generally not bothersome. Pt does report having her \"throat stretched\" before (query esophageal dilation). Pt also reports changes to her vocal quality, c/o increased hoarseness with pt having difficulty hearing her when she speaks.     Discussed with the risks, benefits, and alternatives of the MBSS procedure. Patient acknowledged and agreed to proceed.    Assessment  Videofluoroscopic Swallow Study was conducted in the lateral and AP projection(s) to evaluate oropharyngeal swallow function. A radiology tech was present to assist with the procedure. "     Positioning: seated upright in fluoroscopy chair, standing (AP view)  Anatomic View:  bony protrusions at C3-C6, consistent with cervical osteophytes  Bolus Administration: SLP and Patient  PO barium contrast trials: Varibar thin liquid, Varibar nectar (mildly thick) liquid, Varibar pudding, solid coated in Varibar pudding    Consistency PAS Score Timing Residue Comments   Thin Liquid 1 N/A Vallecular Residue: Mild (5%-25%)  Pyriform Sinus Residue: Trace (1%-5%)    Mildly Thick Liquid 1 N/A Vallecular Residue: Mild (5%-25%)  Pyriform Sinus Residue: Trace (1%-5%)    Pudding 1 N/A Vallecular Residue: Mild (5%-25%)  Pyriform Sinus Residue: Trace (1%-5%)    Regular 1 N/A Vallecular Residue: Mild (5%-25%)  Pyriform Sinus Residue: Trace (1%-5%)      Penetration-Aspiration Scale (PAS)  1     No contrast enters airway  2     Contrast enters the airway, remains above the vocal folds, and is ejected from the airway (not seen in the airway at the end of the swallow).  3     Contrast enters the airway, remains above the vocal folds, and is not ejected from the airway (is seen in the airway after the swallow).  4     Contrast enters the airway, contacts the vocal folds, and is ejected from the airway.  5     Contrast enters the airway, contacts the vocal folds, and is not ejected from the airway  6     Contrast enters the airway, crosses the plane of the vocal folds, and is ejected from the airway.  7     Contrast enters the airway, crosses the plane of the vocal folds, and is not ejected from the airway despite effort.  8     Contrast enters the airway, crosses the plane of the vocal folds, is not ejected from the airway and there is no response to aspiration.    The following qualitative information was analyzed via the MBSImp Protocol (Anyi et al., 2008) with some modifications to standard bolus administration:     Oral phase:  Lip closure was characterized by interlabial escape with no progression to anterior  lip.  Tongue control during bolus hold was characterized by posterior escape of less than half of bolus   Bolus preparation/mastication was timely and efficient.   Bolus transport/lingual motion was brisk.   Oral clearance was incomplete with trace residue lining oral structures.     Pharyngeal phase:  Initiation of pharyngeal swallow was with the bolus head in pyriform sinuses at first hyoid excursion.  Soft palate elevation was complete with no bolus between soft palate (SP)/pharyngeal wall (PW).  Laryngeal elevation was partial for superior movement of thyroid cartilage/partial approximation of arytenoids to epiglottic petiole.  Anterior hyoid excursion was partial for anterior movement.  Epiglottic inversion was complete  Laryngeal vestibule closure was complete with no air/contrast in the laryngeal vestibule.  Pharyngeal stripping wave was present but diminished.    Pharyngeal contraction was complete.  Pharyngoesophageal Segment Opening was partial for distension/partial duration with partial obstruction of flow.  Tongue base retraction was moderately decreased with narrow column of contrast or air between TB and PW.  Pharyngeal residue was trace-mild with collection of residue within or on pharyngeal structures.    Esophageal phase:  Assessed in AP view with thin liquid via straw and pudding via tsp.   Esophageal clearance was complete. No retrograde flow or retention appreciated.     Compensatory Strategies:  Cleansing swallow: effective to clear pharyngeal residue    Clinical Impressions  The pt presents with a functional oropharyngeal swallow for her age. Swallow safety and efficiency are intact. Suspect anterior cervical osteophytes at C3-C6 and possibly chronic GERD could be contributing to reduced PES opening, which may cause pt to occasionally feeling food stuck in her throat. Loss of oral bolus control may contribute to spillage of the bolus into the laryngeal vestibule before the swallow, though this  was not observed on today's study; the liquid only spilled to the pyriform sinuses and was promptly swallowed. Discussed compensatory strategies, such as adding moisture to food, taking small bites/sips, alternating bites and sips and following reflux precautions to mitigate dysphagia symptoms and pt verbalized good understanding. Should her dysphagia worsen, pt may consider a GI/ENT consult for further evaluation, especially given hx of esophageal dilation and reported changes to her voice (?LPR vs presbyphonia). Also discussed the option to have a formal instrumental voice evaluation by an SLP as an outpatient to address her voice concerns.     Recommendations  Regular solids, Thin liquids; add moisture  2. Swallowing Instructions & Precautions:   Supervision: Independent  Positioning: Fully upright and midline during oral intake  Medication: Whole with liquid, As tolerated  Strategies: Small bites/sips, Alternate bites and sips, Slow rate of intake  Oral Care: BID  3.  Referrals: Consider GI/ENT for further assessment of GERD/LPR, esophageal and cricopharyngeal function should dysphagia symptoms worsen; consider OP SLP instrumental voice evaluation    Thank you for the referral. For further questions, please call [phone].  Celine Caldwell MS,CCC-SLP

## 2024-11-22 ENCOUNTER — ANTICOAGULATION MONITORING (OUTPATIENT)
Dept: VASCULAR LAB | Facility: MEDICAL CENTER | Age: 84
End: 2024-11-22
Payer: MEDICARE

## 2024-11-22 DIAGNOSIS — I48.21 PERMANENT ATRIAL FIBRILLATION (HCC): Chronic | ICD-10-CM

## 2024-11-22 DIAGNOSIS — D68.69 SECONDARY HYPERCOAGULABLE STATE (HCC): ICD-10-CM

## 2024-11-22 DIAGNOSIS — Z79.01 LONG TERM CURRENT USE OF ANTICOAGULANT THERAPY: Chronic | ICD-10-CM

## 2024-11-22 LAB — INR PPP: 3 (ref 2–3.5)

## 2024-11-22 NOTE — PROGRESS NOTES
OP Anticoagulation Service Note    Date: 11/22/2024    Anticoagulation Summary  As of 11/22/2024      INR goal:  2.5-3.0   TTR:  54.0% (9.4 y)   INR used for dosing:  3.00 (11/22/2024)   Warfarin maintenance plan:  3 mg (3 mg x 1) every day   Weekly warfarin total:  21 mg   Plan last modified:  Emily Yi (2/2/2024)   Next INR check:  12/20/2024   Priority:  Routine   Target end date:  Indefinite    Indications    Permanent atrial fibrillation (HCC) [I48.21]  Long term current use of anticoagulant therapy [Z79.01]  Secondary hypercoagulable state (HCC) [D68.69]                 Anticoagulation Episode Summary       INR check location:  Home Draw    Preferred lab:  --    Send INR reminders to:  --    Comments:  Goal range changed to 2.5-3.2 per raghu vaca 8/8/2019          Anticoagulation Care Providers       Provider Role Specialty Phone number    Hu Gamez M.D. Referring Internal Medicine Clinical Cardiac Electrophysiology [phone]    [facility] Anticoagulation Services Responsible  [phone]          Anticoagulation Patient Findings    Patient's preferred phone number:  Madeline Doranets  [address] [facility] [id number] [phone]        HPI:   The reason for today's call is to prevent morbidity and mortality from a blood clot and/or stroke and to reduce the risk of bleeding while on a anticoagulant.     PCP:  Kristin Figueroa D.O.  [address] [facility] [address]    Assessment:     INR  therapeutic.     Lab Results   Component Value Date/Time    BUN 13 10/08/2024 10:03 PM    CREATININE 0.89 10/08/2024 10:03 PM    CREATININE 1.0 02/18/2009 02:40 PM     Lab Results   Component Value Date/Time    HEMOGLOBIN 13.4 10/30/2024 10:51 AM    HEMATOCRIT 41.3 10/30/2024 10:51 AM    PLATELETCT 211 10/30/2024 10:51 AM    ALKPHOSPHAT 128 (H) 03/17/2023 12:05 PM    ASTSGOT 19 03/17/2023 12:05 PM    ALTSGPT 17 03/17/2023 12:05 PM          Current Outpatient Medications:     warfarin, 3-6 mg,  "Oral, DAILY    enoxaparin, 100 mg, Subcutaneous, DAILY    metoprolol SR, 25 mg, Oral, Q EVENING    losartan, 25 mg, Oral, DAILY    INSULIN SYRINGE 1CC/31GX5/16\", USE TO INJECT B12 SUBCUTANEOUS WEEKLY    Apoaequorin (PREVAGEN PO), 1 Tablet, Oral, DAILY    omeprazole, 20 mg, Oral, QAM    Magnesium, 500 mg, Oral, QHS    Melatonin, 6 mg, Oral, QHS    cyanocobalamin, 1,000 mcg, Intramuscular, Q7 DAYS    liothyronine, 5 mcg, Oral, QAM    CALCIUM 600-D PO, 600 mg, Oral, DAILY    POTASSIUM ACETATE, 550 mg, Oral, Q EVENING      [id number] Stroke Risk Points: 7   Values used to calculate this score:    Points  Metrics       0        Has Congestive Heart Failure: No       1        Has Hypertension: Yes       2        Age: 84       0        Has Diabetes: No       2        Had Stroke: No                 Had TIA: Yes                 Had Thromboembolism: No       1        Has Vascular Disease: Yes       1        Clinically Relevant Sex: Female     No data recorded     Plan:     Continue the same warfarin dose, as noted above.       Follow-up:     As seen above      Additional information discussed with patient:     Asked patient to please call the anticoagulation clinic if they have any signs/symptoms of bleeding and/or thrombosis or any changes to diet or medications.      National recommendations regarding anticoagulation therapy:     The CHEST guidelines recommends frequent INR monitoring at regular intervals (a few days up to a max of 12 weeks) to ensure patients are on the proper dose of warfarin, and patients are not having any complications from therapy.  INRs can dramatically change over a short time period due to diet, medications, and medical conditions.       [facility]  Phone: [phone]  Fax: [phone]  On call: [phone]  General scheduling/information [phone]  For emergencies please dial [phone]  Please do not use [employer] for urgent matters, call the phone numbers listed " above.    This note was created using voice recognition software (Dragon). The accuracy of the dictation is limited by the abilities of the software. I have reviewed the note prior to signing, however some errors in grammar and context are still possible. If you have any questions related to this note please do not hesitate to contact our office.

## 2024-12-12 ENCOUNTER — NON-PROVIDER VISIT (OUTPATIENT)
Dept: CARDIOLOGY | Facility: PHYSICIAN GROUP | Age: 84
End: 2024-12-12
Payer: MEDICARE

## 2024-12-12 VITALS
BODY MASS INDEX: 25.27 KG/M2 | WEIGHT: 148 LBS | RESPIRATION RATE: 12 BRPM | OXYGEN SATURATION: 97 % | HEART RATE: 88 BPM | SYSTOLIC BLOOD PRESSURE: 122 MMHG | DIASTOLIC BLOOD PRESSURE: 62 MMHG | HEIGHT: 64 IN

## 2024-12-12 DIAGNOSIS — Z95.0 PACEMAKER-DEPENDENT DUE TO NATIVE CARDIAC RHYTHM INSUFFICIENT TO SUPPORT LIFE: Chronic | ICD-10-CM

## 2024-12-12 DIAGNOSIS — Z79.01 LONG TERM CURRENT USE OF ANTICOAGULANT THERAPY: Chronic | ICD-10-CM

## 2024-12-12 DIAGNOSIS — Z79.01 CHRONIC ANTICOAGULATION: ICD-10-CM

## 2024-12-12 DIAGNOSIS — I10 ESSENTIAL HYPERTENSION: ICD-10-CM

## 2024-12-12 DIAGNOSIS — I49.8 PACEMAKER-DEPENDENT DUE TO NATIVE CARDIAC RHYTHM INSUFFICIENT TO SUPPORT LIFE: Chronic | ICD-10-CM

## 2024-12-12 DIAGNOSIS — I44.2 THIRD DEGREE AV BLOCK (HCC): Chronic | ICD-10-CM

## 2024-12-12 DIAGNOSIS — Z95.0 CARDIAC PACEMAKER IN SITU: Chronic | ICD-10-CM

## 2024-12-12 DIAGNOSIS — I48.21 PERMANENT ATRIAL FIBRILLATION (HCC): Chronic | ICD-10-CM

## 2024-12-12 DIAGNOSIS — Z98.890 S/P AV NODAL ABLATION: ICD-10-CM

## 2024-12-12 PROCEDURE — 93288 INTERROG EVL PM/LDLS PM IP: CPT | Performed by: NURSE PRACTITIONER

## 2024-12-12 RX ORDER — METOPROLOL SUCCINATE 25 MG/1
25 TABLET, EXTENDED RELEASE ORAL EVERY EVENING
Qty: 100 TABLET | Refills: 3 | Status: SHIPPED | OUTPATIENT
Start: 2024-12-12

## 2024-12-12 RX ORDER — WARFARIN SODIUM 3 MG/1
3-6 TABLET ORAL DAILY
Qty: 200 TABLET | Refills: 3 | Status: SHIPPED | OUTPATIENT
Start: 2024-12-12 | End: 2024-12-12 | Stop reason: SDUPTHER

## 2024-12-12 RX ORDER — LOSARTAN POTASSIUM 25 MG/1
25 TABLET ORAL DAILY
Qty: 100 TABLET | Refills: 3 | Status: SHIPPED | OUTPATIENT
Start: 2024-12-12

## 2024-12-12 ASSESSMENT — FIBROSIS 4 INDEX: FIB4 SCORE: 1.88

## 2024-12-12 NOTE — PROGRESS NOTES
Patient saw Dr. Ghtora in October 2024; she had been in the ER in earlier October 2024 for leg pain. There was concern about possible DVT; her INR had been low, and consistent INRs were stressed.  No further leg pain.    Device is working normally. Underlying rhythm is permanent atrial fibrillation. No ventricular high rate episodes.  Normal sensing and capture of RV lead; stable impedance. Battery longevity is 11.1 years.  No changes are made today.    She is maintained on Coumadin, and has been consistent with INRs.    Follow-up in 6 months for next PM check with me.

## 2024-12-13 ENCOUNTER — ANTICOAGULATION MONITORING (OUTPATIENT)
Dept: VASCULAR LAB | Facility: MEDICAL CENTER | Age: 84
End: 2024-12-13
Payer: MEDICARE

## 2024-12-13 DIAGNOSIS — I48.21 PERMANENT ATRIAL FIBRILLATION (HCC): Chronic | ICD-10-CM

## 2024-12-13 DIAGNOSIS — D68.69 SECONDARY HYPERCOAGULABLE STATE (HCC): ICD-10-CM

## 2024-12-13 DIAGNOSIS — Z79.01 LONG TERM CURRENT USE OF ANTICOAGULANT THERAPY: Chronic | ICD-10-CM

## 2024-12-13 LAB — INR PPP: 2.5 (ref 2–3.5)

## 2024-12-13 RX ORDER — WARFARIN SODIUM 3 MG/1
3-6 TABLET ORAL DAILY
Qty: 200 TABLET | Refills: 1 | Status: SHIPPED | OUTPATIENT
Start: 2024-12-13

## 2024-12-13 NOTE — PROGRESS NOTES
Anticoagulation Summary  As of 2024      INR goal:  2.5-3.0   TTR:  54.2% (9.5 y)   INR used for dosin.50 (2024)   Warfarin maintenance plan:  3 mg (3 mg x 1) every day   Weekly warfarin total:  21 mg   Plan last modified:  Emily Yi (2024)   Next INR check:  2024   Priority:  Routine   Target end date:  Indefinite    Indications    Permanent atrial fibrillation (HCC) [I48.21]  Long term current use of anticoagulant therapy [Z79.01]  Secondary hypercoagulable state (HCC) [D68.69]                 Anticoagulation Episode Summary       INR check location:  Home Draw    Preferred lab:  --    Send INR reminders to:  --    Comments:  Goal range changed to 2.5-3.2 per raghu vaca 2019          Anticoagulation Care Providers       Provider Role Specialty Phone number    Hu Gamez M.D. Referring Internal Medicine Clinical Cardiac Electrophysiology [phone]    [facility] Anticoagulation Services Responsible  [phone]          Anticoagulation Patient Findings  Patient Findings       Negatives:  Signs/symptoms of thrombosis, Signs/symptoms of bleeding, Laboratory test error suspected, Change in health, Change in alcohol use, Change in activity, Upcoming invasive procedure, Emergency department visit, Upcoming dental procedure, Missed doses, Extra doses, Change in medications, Change in diet/appetite, Hospital admission, Bruising, Other complaints            INR is therapeutic  Reason(s) for out of range INR today: N/A      Called and spoke to patient.    Pt is not on antiplatelet therapy.    Warfarin Plan: Continue regimen as listed above.    Next INR in 2 week(s).    Vega Muller, PharmD

## 2024-12-19 ENCOUNTER — HOSPITAL ENCOUNTER (OUTPATIENT)
Dept: RADIOLOGY | Facility: MEDICAL CENTER | Age: 84
End: 2024-12-19
Attending: FAMILY MEDICINE
Payer: MEDICARE

## 2024-12-19 DIAGNOSIS — Z12.31 ENCOUNTER FOR SCREENING MAMMOGRAM FOR BREAST CANCER: ICD-10-CM

## 2024-12-19 PROCEDURE — 77067 SCR MAMMO BI INCL CAD: CPT

## 2024-12-23 ENCOUNTER — NON-PROVIDER VISIT (OUTPATIENT)
Dept: CARDIOLOGY | Facility: MEDICAL CENTER | Age: 84
End: 2024-12-23
Payer: MEDICARE

## 2024-12-23 PROCEDURE — 93294 REM INTERROG EVL PM/LDLS PM: CPT | Performed by: INTERNAL MEDICINE

## 2024-12-30 LAB — INR PPP: 2.1 (ref 2–3.5)

## 2024-12-31 ENCOUNTER — ANTICOAGULATION MONITORING (OUTPATIENT)
Dept: MEDICAL GROUP | Facility: PHYSICIAN GROUP | Age: 84
End: 2024-12-31
Payer: MEDICARE

## 2024-12-31 DIAGNOSIS — D68.69 SECONDARY HYPERCOAGULABLE STATE (HCC): ICD-10-CM

## 2024-12-31 DIAGNOSIS — Z79.01 LONG TERM CURRENT USE OF ANTICOAGULANT THERAPY: Chronic | ICD-10-CM

## 2024-12-31 DIAGNOSIS — I48.21 PERMANENT ATRIAL FIBRILLATION (HCC): Chronic | ICD-10-CM

## 2024-12-31 NOTE — PROGRESS NOTES
Anticoagulation Summary  As of 2024      INR goal:  2.5-3.0   TTR:  54.0% (9.5 y)   INR used for dosin.10 (2024)   Warfarin maintenance plan:  3 mg (3 mg x 1) every day   Weekly warfarin total:  21 mg   Plan last modified:  Emily Yi (2024)   Next INR check:  2025   Priority:  Routine   Target end date:  Indefinite    Indications    Permanent atrial fibrillation (HCC) [I48.21]  Long term current use of anticoagulant therapy [Z79.01]  Secondary hypercoagulable state (HCC) [D68.69]                 Anticoagulation Episode Summary       INR check location:  Home Draw    Preferred lab:  --    Send INR reminders to:  --    Comments:  Goal range changed to 2.5-3.2 per raghu vaca 2019          Anticoagulation Care Providers       Provider Role Specialty Phone number    Hu Gamez M.D. Referring Internal Medicine Clinical Cardiac Electrophysiology [phone]    [facility] Anticoagulation Services Responsible  [phone]          Anticoagulation Patient Findings      Called and left  for patient . Requested patient to contact the clinic for any s/sx of unusual bleeding, bruising, clotting or any changes to diet or medications.    INR subtherapeutic  Reason(s) for out of range INR today: No obvious causes      Unless patient reports any changes that would warrant an adjustment to the plan:  Warfarin Plan: Bolus with 4.5 mg today, then Continue regimen as listed above.    Next INR in 2 week(s).    Nayana Dunbar, PharmD, BCACP

## 2025-01-02 ENCOUNTER — TELEPHONE (OUTPATIENT)
Dept: VASCULAR LAB | Facility: MEDICAL CENTER | Age: 85
End: 2025-01-02
Payer: MEDICARE

## 2025-01-02 NOTE — TELEPHONE ENCOUNTER
[facility]     Topic[facility]issue: Patient is currently on their serivce for testing , they are needing an updated order to continue remote testing as her listed name does not match the order they have. Patient is listed as Mary alvarado .    Fax: [phone]    Needs to filled out with an [employer] prescription form , faxed over 1/2/25 to  [phone]    Callback Number: routing comments     Thank you  Elida BLACKBURN

## 2025-01-03 NOTE — TELEPHONE ENCOUNTER
Refaxed order form for [employer] with pt's name (Madeline Dorantes).    Nayana Dunbar, PharmD, BCACP

## 2025-01-10 ENCOUNTER — ANTICOAGULATION MONITORING (OUTPATIENT)
Dept: VASCULAR LAB | Facility: MEDICAL CENTER | Age: 85
End: 2025-01-10
Payer: MEDICARE

## 2025-01-10 DIAGNOSIS — D68.69 SECONDARY HYPERCOAGULABLE STATE (HCC): ICD-10-CM

## 2025-01-10 DIAGNOSIS — I48.21 PERMANENT ATRIAL FIBRILLATION (HCC): Chronic | ICD-10-CM

## 2025-01-10 DIAGNOSIS — Z79.01 LONG TERM CURRENT USE OF ANTICOAGULANT THERAPY: Chronic | ICD-10-CM

## 2025-01-10 LAB — INR PPP: 2.5 (ref 2–3.5)

## 2025-01-10 NOTE — PROGRESS NOTES
Anticoagulation Summary  As of 1/10/2025      INR goal:  2.5-3.0   TTR:  53.8% (9.6 y)   INR used for dosin.50 (1/10/2025)   Warfarin maintenance plan:  3 mg (3 mg x 1) every day   Weekly warfarin total:  21 mg   Plan last modified:  Emily Yi (2024)   Next INR check:  2025   Priority:  Routine   Target end date:  Indefinite    Indications    Permanent atrial fibrillation (HCC) [I48.21]  Long term current use of anticoagulant therapy [Z79.01]  Secondary hypercoagulable state (HCC) [D68.69]                 Anticoagulation Episode Summary       INR check location:  Home Draw    Preferred lab:  --    Send INR reminders to:  --    Comments:  Goal range changed to 2.5-3.2 per raghu vaca 2019          Anticoagulation Care Providers       Provider Role Specialty Phone number    Hu Gamez M.D. Referring Internal Medicine Clinical Cardiac Electrophysiology [phone]    [facility] Anticoagulation Services Responsible  [phone]          Anticoagulation Patient Findings  Patient Findings       Negatives:  Signs/symptoms of thrombosis, Signs/symptoms of bleeding, Laboratory test error suspected, Change in health, Change in alcohol use, Change in activity, Upcoming invasive procedure, Emergency department visit, Upcoming dental procedure, Missed doses, Extra doses, Change in medications, Change in diet/appetite, Hospital admission, Bruising, Other complaints            INR is therapeutic  Reason(s) for out of range INR today: N/A      Called and spoke to patient.        Warfarin Plan: Continue regimen as listed above.    Next INR in 1 week(s).    Anitha Gamez, ClarisseD

## 2025-01-16 ENCOUNTER — ANTICOAGULATION MONITORING (OUTPATIENT)
Dept: CARDIOLOGY | Facility: MEDICAL CENTER | Age: 85
End: 2025-01-16
Payer: MEDICARE

## 2025-01-16 DIAGNOSIS — D68.69 SECONDARY HYPERCOAGULABLE STATE (HCC): ICD-10-CM

## 2025-01-16 DIAGNOSIS — Z79.01 LONG TERM CURRENT USE OF ANTICOAGULANT THERAPY: Chronic | ICD-10-CM

## 2025-01-16 DIAGNOSIS — I48.21 PERMANENT ATRIAL FIBRILLATION (HCC): Chronic | ICD-10-CM

## 2025-01-16 LAB — INR PPP: 2.9 (ref 2–3.5)

## 2025-01-16 NOTE — PROGRESS NOTES
Anticoagulation Summary  As of 2025      INR goal:  2.5-3.0   TTR:  53.9% (9.6 y)   INR used for dosin.90 (2025)   Warfarin maintenance plan:  3 mg (3 mg x 1) every day   Weekly warfarin total:  21 mg   Plan last modified:  Emily Yi (2024)   Next INR check:  2025   Priority:  Routine   Target end date:  Indefinite    Indications    Permanent atrial fibrillation (HCC) [I48.21]  Long term current use of anticoagulant therapy [Z79.01]  Secondary hypercoagulable state (HCC) [D68.69]                 Anticoagulation Episode Summary       INR check location:  Home Draw    Preferred lab:  --    Send INR reminders to:  --    Comments:  Goal range changed to 2.5-3.2 per raghu vaca 2019          Anticoagulation Care Providers       Provider Role Specialty Phone number    Hu Gamez M.D. Referring Internal Medicine Clinical Cardiac Electrophysiology [phone]    [facility] Anticoagulation Services Responsible  [phone]          Anticoagulation Patient Findings  Patient Findings       Negatives:  Signs/symptoms of thrombosis, Signs/symptoms of bleeding, Laboratory test error suspected, Change in health, Change in alcohol use, Change in activity, Upcoming invasive procedure, Emergency department visit, Upcoming dental procedure, Missed doses, Extra doses, Change in medications, Change in diet/appetite, Hospital admission, Bruising, Other complaints            Called and spoke to patient .    INR therapeutic  Reason(s) for out of range INR today: N/A      Warfarin Plan: Continue regimen as listed above.    Next INR in 1 week(s).    Nayana Dunbar, ClarisseD, BCACP

## 2025-01-24 ENCOUNTER — ANTICOAGULATION MONITORING (OUTPATIENT)
Dept: VASCULAR LAB | Facility: MEDICAL CENTER | Age: 85
End: 2025-01-24
Payer: MEDICARE

## 2025-01-24 DIAGNOSIS — I48.21 PERMANENT ATRIAL FIBRILLATION (HCC): Chronic | ICD-10-CM

## 2025-01-24 DIAGNOSIS — Z79.01 LONG TERM CURRENT USE OF ANTICOAGULANT THERAPY: Chronic | ICD-10-CM

## 2025-01-24 DIAGNOSIS — D68.69 SECONDARY HYPERCOAGULABLE STATE (HCC): ICD-10-CM

## 2025-01-24 LAB — INR PPP: 2.6 (ref 2–3.5)

## 2025-01-24 NOTE — PROGRESS NOTES
OP Anticoagulation Service Note    Date: 2025    Anticoagulation Summary  As of 2025      INR goal:  2.5-3.0   TTR:  54.0% (9.6 y)   INR used for dosin.60 (2025)   Warfarin maintenance plan:  3 mg (3 mg x 1) every day   Weekly warfarin total:  21 mg   Plan last modified:  Emily Yi (2024)   Next INR check:  2025   Priority:  Routine   Target end date:  Indefinite    Indications    Permanent atrial fibrillation (HCC) [I48.21]  Long term current use of anticoagulant therapy [Z79.01]  Secondary hypercoagulable state (HCC) [D68.69]                 Anticoagulation Episode Summary       INR check location:  Home Draw    Preferred lab:  --    Send INR reminders to:  --    Comments:  Goal range changed to 2.5-3.2 per raghu vaca 2019          Anticoagulation Care Providers       Provider Role Specialty Phone number    Hu Gamez M.D. Referring Internal Medicine Clinical Cardiac Electrophysiology [phone]    [facility] Anticoagulation [facility] Responsible  [phone]          Anticoagulation Patient Findings        Patient's preferred phone number:  Madeline Dorantes  [address] [address] [phone]        HPI:   The reason for today's call is to prevent morbidity and mortality from a blood clot and/or stroke and to reduce the risk of bleeding while on a anticoagulant.     Care Team    PCP:  Kristin Figueroa D.O.  [address] [facility] [id number] [phone]      Patient Care Team                Kristin Figueroa D.O. PCP - General, Family Medicine [phone]     [address] [facility] [address]    [employer]  Respiratory Therapist, DME Supplier     [facility]  [phone]     [address] [facility] [address]    Jagruti Heath P.A.-C. Physician Assistant [phone]    Merged      [address] [address]            Assessment:     INR  therapeutic.     Lab Results   Component Value Date/Time     "BUN 13 10/08/2024 10:03 PM    CREATININE 0.89 10/08/2024 10:03 PM    CREATININE 1.0 02/18/2009 02:40 PM     Lab Results   Component Value Date/Time    HEMOGLOBIN 13.4 10/30/2024 10:51 AM    HEMATOCRIT 41.3 10/30/2024 10:51 AM    PLATELETCT 211 10/30/2024 10:51 AM    ALKPHOSPHAT 128 (H) 03/17/2023 12:05 PM    ASTSGOT 19 03/17/2023 12:05 PM    ALTSGPT 17 03/17/2023 12:05 PM          Current Outpatient Medications:     warfarin, 3-6 mg, Oral, DAILY    metoprolol SR, 25 mg, Oral, Q EVENING    losartan, 25 mg, Oral, DAILY    INSULIN SYRINGE 1CC/31GX5/16\", USE TO INJECT B12 SUBCUTANEOUS WEEKLY    Apoaequorin (PREVAGEN PO), 1 Tablet, Oral, DAILY    omeprazole, 20 mg, Oral, QAM    Magnesium, 500 mg, Oral, QHS    Melatonin, 6 mg, Oral, QHS    cyanocobalamin, 1,000 mcg, Intramuscular, Q7 DAYS    liothyronine, 5 mcg, Oral, QAM    CALCIUM 600-D PO, 600 mg, Oral, DAILY    POTASSIUM ACETATE, 550 mg, Oral, Q EVENING      [id number] Stroke Risk Points: 7   Values used to calculate this score:    Points  Metrics       0        Has Congestive Heart Failure: No       1        Has Hypertension: Yes       2        Age: 84       0        Has Diabetes: No       2        Had Stroke: No                 Had TIA: Yes                 Had Thromboembolism: No       1        Has Vascular Disease: Yes       1        Clinically Relevant Sex: Female     No data recorded     Plan:     Continue the same warfarin dose, as noted above.       Follow-up:     As seen above      Additional information discussed with patient:     Asked patient to please call the anticoagulation clinic if they have any signs/symptoms of bleeding and/or thrombosis or any changes to diet or medications.      National recommendations regarding anticoagulation therapy:     The CHEST guidelines recommends frequent INR monitoring at regular intervals (a few days up to a max of 12 weeks) to ensure patients are on the proper dose of warfarin, and patients are not having any " complications from therapy.  INRs can dramatically change over a short time period due to diet, medications, and medical conditions.         [facility]  Phone: [phone]  Fax: [phone]  On call: [phone]  General scheduling/information [phone]  For emergencies please dial 911  Please do not use [employer] for urgent matters, call the phone numbers listed above.    This note was created using voice recognition software (Dragon). The accuracy of the dictation is limited by the abilities of the software. I have reviewed the note prior to signing, however some errors in grammar and context are still possible. If you have any questions related to this note please do not hesitate to contact our office.

## 2025-02-14 ENCOUNTER — ANTICOAGULATION MONITORING (OUTPATIENT)
Dept: VASCULAR LAB | Facility: MEDICAL CENTER | Age: 85
End: 2025-02-14
Payer: MEDICARE

## 2025-02-14 DIAGNOSIS — I48.21 PERMANENT ATRIAL FIBRILLATION (HCC): Chronic | ICD-10-CM

## 2025-02-14 DIAGNOSIS — D68.69 SECONDARY HYPERCOAGULABLE STATE (HCC): ICD-10-CM

## 2025-02-14 DIAGNOSIS — Z79.01 LONG TERM CURRENT USE OF ANTICOAGULANT THERAPY: Chronic | ICD-10-CM

## 2025-02-14 LAB — INR PPP: 1.5 (ref 2–3.5)

## 2025-02-14 NOTE — PROGRESS NOTES
Anticoagulation Summary  As of 2025      INR goal:  2.5-3.0   TTR:  53.7% (9.7 y)   INR used for dosin.50 (2025)   Warfarin maintenance plan:  3 mg (3 mg x 1) every day   Weekly warfarin total:  21 mg   Plan last modified:  Emily Yi (2024)   Next INR check:  2025   Priority:  Routine   Target end date:  Indefinite    Indications    Permanent atrial fibrillation (HCC) [I48.21]  Long term current use of anticoagulant therapy [Z79.01]  Secondary hypercoagulable state (HCC) [D68.69]                 Anticoagulation Episode Summary       INR check location:  Home Draw    Preferred lab:  --    Send INR reminders to:  --    Comments:  Goal range changed to 2.5-3.2 per raghu vaca 2019          Anticoagulation Care Providers       Provider Role Specialty Phone number    Hu Gamez M.D. Referring Internal Medicine Clinical Cardiac Electrophysiology [phone]    [facility] Anticoagulation Services Responsible  [phone]          Anticoagulation Patient Findings  Patient Findings       Negatives:  Signs/symptoms of thrombosis, Signs/symptoms of bleeding, Laboratory test error suspected, Change in health, Change in alcohol use, Change in activity, Upcoming invasive procedure, Emergency department visit, Upcoming dental procedure, Missed doses, Extra doses, Change in medications, Change in diet/appetite, Hospital admission, Bruising, Other complaints            INR is subtherapeutic  Reason(s) for out of range INR today: N/A      Called and spoke to patient.    Pt is not on antiplatelet therapy.      Warfarin Plan: Increase to warfarin to 4.5 mg and Continue regimen as listed above.    Next INR in 1 week(s).    Anitha Gamez, ClarisseD

## 2025-02-17 ENCOUNTER — HOSPITAL ENCOUNTER (OUTPATIENT)
Dept: RADIOLOGY | Facility: MEDICAL CENTER | Age: 85
End: 2025-02-17
Attending: FAMILY MEDICINE
Payer: MEDICARE

## 2025-02-17 DIAGNOSIS — K40.90 LEFT INGUINAL HERNIA: ICD-10-CM

## 2025-02-17 PROCEDURE — 76857 US EXAM PELVIC LIMITED: CPT

## 2025-02-20 ENCOUNTER — RESULTS FOLLOW-UP (OUTPATIENT)
Dept: URGENT CARE | Facility: PHYSICIAN GROUP | Age: 85
End: 2025-02-20

## 2025-02-20 ENCOUNTER — OFFICE VISIT (OUTPATIENT)
Dept: URGENT CARE | Facility: PHYSICIAN GROUP | Age: 85
End: 2025-02-20
Payer: MEDICARE

## 2025-02-20 VITALS
RESPIRATION RATE: 16 BRPM | HEART RATE: 71 BPM | WEIGHT: 150.4 LBS | BODY MASS INDEX: 25.82 KG/M2 | OXYGEN SATURATION: 98 % | TEMPERATURE: 97.7 F

## 2025-02-20 DIAGNOSIS — R05.1 ACUTE COUGH: ICD-10-CM

## 2025-02-20 DIAGNOSIS — R50.9 FEVER, UNSPECIFIED FEVER CAUSE: ICD-10-CM

## 2025-02-20 DIAGNOSIS — B33.8 RSV (RESPIRATORY SYNCYTIAL VIRUS INFECTION): ICD-10-CM

## 2025-02-20 DIAGNOSIS — H61.21 IMPACTED CERUMEN OF RIGHT EAR: ICD-10-CM

## 2025-02-20 LAB
APPEARANCE UR: CLEAR
BILIRUB UR STRIP-MCNC: NORMAL MG/DL
COLOR UR AUTO: YELLOW
FLUAV RNA SPEC QL NAA+PROBE: NEGATIVE
FLUBV RNA SPEC QL NAA+PROBE: NEGATIVE
GLUCOSE UR STRIP.AUTO-MCNC: NORMAL MG/DL
KETONES UR STRIP.AUTO-MCNC: NORMAL MG/DL
LEUKOCYTE ESTERASE UR QL STRIP.AUTO: NORMAL
NITRITE UR QL STRIP.AUTO: NORMAL
PH UR STRIP.AUTO: 6 [PH] (ref 5–8)
PROT UR QL STRIP: NORMAL MG/DL
RBC UR QL AUTO: NORMAL
RSV RNA SPEC QL NAA+PROBE: POSITIVE
SARS-COV-2 RNA RESP QL NAA+PROBE: NEGATIVE
SP GR UR STRIP.AUTO: <=1.005
UROBILINOGEN UR STRIP-MCNC: 0.2 MG/DL

## 2025-02-20 PROCEDURE — 0241U POCT CEPHEID COV-2, FLU A/B, RSV - PCR: CPT | Performed by: NURSE PRACTITIONER

## 2025-02-20 PROCEDURE — 99214 OFFICE O/P EST MOD 30 MIN: CPT | Performed by: NURSE PRACTITIONER

## 2025-02-20 PROCEDURE — 81002 URINALYSIS NONAUTO W/O SCOPE: CPT | Performed by: NURSE PRACTITIONER

## 2025-02-20 RX ORDER — GUAIFENESIN 600 MG/1
600 TABLET, EXTENDED RELEASE ORAL EVERY 12 HOURS
Qty: 20 TABLET | Refills: 0 | Status: CANCELLED | OUTPATIENT
Start: 2025-02-20 | End: 2025-03-02

## 2025-02-20 ASSESSMENT — FIBROSIS 4 INDEX: FIB4 SCORE: 1.88

## 2025-02-20 NOTE — PROGRESS NOTES
Subjective:   Madeline Dorantes is a 84 y.o. female who presents for Cough (Cough, fever, brown mucus, X 4 days )    Patient is a 84-year-old female presented clinic today reporting 4-day history of fatigue, cough with occasional brown-colored mucus, fevers, body aches/backache, and runny nose.  Patient denies any chest pain, shortness of breath, wheezing, abdominal symptoms, or dysuria.      Medications, Allergies, and current problem list reviewed today in Epic.     Objective:     Pulse 71   Temp 36.5 °C (97.7 °F) (Temporal)   Resp 16   Wt 68.2 kg (150 lb 6.4 oz)   SpO2 98%     Physical Exam  Vitals reviewed.   Constitutional:       General: She is not in acute distress.     Appearance: Normal appearance. She is ill-appearing. She is not toxic-appearing or diaphoretic.   HENT:      Head: Normocephalic.      Right Ear: Tympanic membrane, ear canal and external ear normal.      Left Ear: There is impacted cerumen.      Nose: Rhinorrhea present. No congestion.      Mouth/Throat:      Mouth: Mucous membranes are moist.      Pharynx: Oropharynx is clear. No oropharyngeal exudate or posterior oropharyngeal erythema.   Eyes:      Extraocular Movements: Extraocular movements intact.      Conjunctiva/sclera: Conjunctivae normal.      Pupils: Pupils are equal, round, and reactive to light.   Cardiovascular:      Rate and Rhythm: Normal rate and regular rhythm.   Pulmonary:      Effort: Pulmonary effort is normal. No respiratory distress.      Breath sounds: Normal breath sounds. No stridor. No wheezing, rhonchi or rales.      Comments: Dry cough   Musculoskeletal:         General: Normal range of motion.      Cervical back: Normal range of motion and neck supple. No rigidity or tenderness.   Lymphadenopathy:      Cervical: No cervical adenopathy.   Skin:     General: Skin is warm and dry.   Neurological:      Mental Status: She is alert and oriented to person, place, and time.   Psychiatric:         Mood and Affect:  Mood normal.         Behavior: Behavior normal.         Thought Content: Thought content normal.         Judgment: Judgment normal.       Results for orders placed or performed in visit on 02/20/25   POCT CEPHEID COV-2, FLU A/B, RSV - PCR    Collection Time: 02/20/25  3:15 PM   Result Value Ref Range    SARS-CoV-2 by PCR Negative Negative, Invalid    Influenza virus A RNA Negative Negative, Invalid    Influenza virus B, PCR Negative Negative, Invalid    RSV, PCR Positive (A) Negative, Invalid   POCT Urinalysis    Collection Time: 02/20/25  3:50 PM   Result Value Ref Range    POC Color yellow Negative    POC Appearance clear Negative    POC Glucose neg Negative mg/dL    POC Bilirubin neg Negative mg/dL    POC Ketones neg Negative mg/dL    POC Specific Gravity <=1.005 <1.005 - >1.030    POC Blood small Negative    POC Urine PH 6.0 5.0 - 8.0    POC Protein neg Negative mg/dL    POC Urobiligen 0.2 Negative (0.2) mg/dL    POC Nitrites neg Negative    POC Leukocyte Esterase small Negative     *Note: Due to a large number of results and/or encounters for the requested time period, some results have not been displayed. A complete set of results can be found in Results Review.         Assessment/Plan:     Diagnosis and associated orders:     1. RSV (respiratory syncytial virus infection)        2. Fever, unspecified fever cause  POCT CEPHEID COV-2, FLU A/B, RSV - PCR    POCT Urinalysis    DX-CHEST-2 VIEWS      3. Acute cough  DX-CHEST-2 VIEWS      4. Impacted cerumen of right ear           Comments/MDM:     1. RSV (respiratory syncytial virus infection)  Discussed the management of RSV  and expected course is outined. Reviewed the need for frequent  nasal toileting and use of nasal saline to ensure movement of mucus and prevention of respiratory distress and pneumonia.  Recommend having bed side humidification and elevation of HOB.  Increase fluids, recommend adequate rest.  Treat fevers and bodyaches with Tylenol Motrin per   directions.  Please follow-up in clinic if fever persists or  reoccurs, no improvement with cough or you are not feeling adequately hydrated.     2. Fever, unspecified fever cause  - POCT CEPHEID COV-2, FLU A/B, RSV - PCR  - POCT Urinalysis  - DX-CHEST-2 VIEWS    3. Acute cough  - DX-CHEST-2 VIEWS    4. Impacted cerumen of right ear  Procedure: Cerumen Removal  Risks and benefits of procedure discussed  Cerumen removed with curette and lavage after softening agent instilled  Patient tolerated well  Post procedure exam with clear canal and normal TM     Patient was involved with shared decision-making throughout the exam today and verbalizes understanding regards to plan of care, discharge instructions, and follow-up    02/21/2025-[facility] two-view chest x-ray [place] no evidence of active pulmonary disease, patient does have some background emphysematous changes.  Did call patient and spoke with patient and daughter in regards to x-ray results.  All questions were answered.  Patient states that fevers have now resolved and she is feeling better.  Did reiterate ER precautions.         I have spent 31 minutes on the care of Madeline Dorantes.  This includes preparing for the urgent care visit. This time includes review of previous visits/ documents, obtaining HPI, examination and evaluation of patient, ordering and interpretation of imaging, tests, medical management, counseling, education and documentation.             Differential diagnosis, natural history, supportive care, and indications for immediate follow-up discussed.    Advised the patient to follow-up with the primary care physician for recheck, reevaluation, and consideration of further management.    I personally reviewed prior external notes and test results pertinent to today's visit as well as additional imaging and testing completed in clinic today.     Please note that this dictation was created using voice recognition  software. I have made a reasonable attempt to correct obvious errors, but I expect that there are errors of grammar and possibly content that I did not discover before finalizing the note.

## 2025-02-21 ENCOUNTER — ANTICOAGULATION MONITORING (OUTPATIENT)
Dept: VASCULAR LAB | Facility: MEDICAL CENTER | Age: 85
End: 2025-02-21
Payer: MEDICARE

## 2025-02-21 DIAGNOSIS — I48.21 PERMANENT ATRIAL FIBRILLATION (HCC): Chronic | ICD-10-CM

## 2025-02-21 DIAGNOSIS — D68.69 SECONDARY HYPERCOAGULABLE STATE (HCC): ICD-10-CM

## 2025-02-21 DIAGNOSIS — Z79.01 LONG TERM CURRENT USE OF ANTICOAGULANT THERAPY: Chronic | ICD-10-CM

## 2025-02-21 LAB — INR PPP: 1.4 (ref 2–3.5)

## 2025-02-21 NOTE — PROGRESS NOTES
Anticoagulation Summary  As of 2025      INR goal:  2.5-3.0   TTR:  53.6% (9.7 y)   INR used for dosin.40 (2025)   Warfarin maintenance plan:  3 mg (3 mg x 1) every day   Weekly warfarin total:  21 mg   Plan last modified:  Emily Yi (2024)   Next INR check:  2025   Priority:  Routine   Target end date:  Indefinite    Indications    Permanent atrial fibrillation (HCC) [I48.21]  Long term current use of anticoagulant therapy [Z79.01]  Secondary hypercoagulable state (HCC) [D68.69]                 Anticoagulation Episode Summary       INR check location:  Home Draw    Preferred lab:  --    Send INR reminders to:  --    Comments:  Goal range changed to 2.5-3.2 per raghu vaca 2019          Anticoagulation Care Providers       Provider Role Specialty Phone number    Hu Gamez M.D. Referring Internal Medicine Clinical Cardiac Electrophysiology [phone]    [facility] Anticoagulation Services Responsible  [phone]            Refer to Anticoagulation Patient Findings for HPI  Patient Findings       Positives:  Change in health (Had RSV), Missed doses (Pt states she missed up to 2 days of warfarin d/t not eating), Change in diet/appetite (Lack of appetite d/t illness. Started eating again today d/t feeling better)    Negatives:  Signs/symptoms of thrombosis, Signs/symptoms of bleeding, Laboratory test error suspected, Change in alcohol use, Change in activity, Upcoming invasive procedure, Emergency department visit, Upcoming dental procedure, Extra doses, Change in medications, Hospital admission, Bruising, Other complaints            Spoke with pt.  INR is SUB therapeutic.     Pt verifies warfarin weekly dosing.     Will have pt BOLUS x 1 dose w/ 4.5 mg today and then continue on w/ her current regimen.    Repeat INR in 1 week(s).     Garth Fortune, PharmD, BCACP

## 2025-02-28 LAB — INR PPP: 2 (ref 2–3.5)

## 2025-03-03 ENCOUNTER — ANTICOAGULATION MONITORING (OUTPATIENT)
Dept: MEDICAL GROUP | Facility: PHYSICIAN GROUP | Age: 85
End: 2025-03-03
Payer: MEDICARE

## 2025-03-03 DIAGNOSIS — Z79.01 LONG TERM CURRENT USE OF ANTICOAGULANT THERAPY: Chronic | ICD-10-CM

## 2025-03-03 DIAGNOSIS — I48.21 PERMANENT ATRIAL FIBRILLATION (HCC): Chronic | ICD-10-CM

## 2025-03-03 DIAGNOSIS — D68.69 SECONDARY HYPERCOAGULABLE STATE (HCC): ICD-10-CM

## 2025-03-04 NOTE — PROGRESS NOTES
Anticoagulation Summary  As of 3/3/2025      INR goal:  2.5-3.0   TTR:  53.5% (9.7 y)   INR used for dosin.00 (2025)   Warfarin maintenance plan:  3 mg (3 mg x 1) every day   Weekly warfarin total:  21 mg   Plan last modified:  Emily Yi (2024)   Next INR check:  3/7/2025   Priority:  Routine   Target end date:  Indefinite    Indications    Permanent atrial fibrillation (HCC) [I48.21]  Long term current use of anticoagulant therapy [Z79.01]  Secondary hypercoagulable state (HCC) [D68.69]                 Anticoagulation Episode Summary       INR check location:  Home Draw    Preferred lab:  --    Send INR reminders to:  --    Comments:  Goal range changed to 2.5-3.2 per raghu vaca 2019          Anticoagulation Care Providers       Provider Role Specialty Phone number    Hu Gamez M.D. Referring Internal Medicine Clinical Cardiac Electrophysiology [phone]    [facility] Anticoagulation Services Responsible  [phone]          Anticoagulation Patient Findings      INR is subtherapeutic  Reason(s) for out of range INR today: No obvious causes      Called and left VM for patient.    Pt is not on antiplatelet therapy.    Requested patient to contact the clinic for any s/sx of unusual bleeding, bruising, clotting or any changes to diet or medications. Unless patient reports any changes that would warrant an adjustment to the plan:  Warfarin Plan: Bolus with 4.5mg x2 days, then Continue regimen as listed above.    Next INR in 4 day(s).    Vandana Vallecillo, ClarisseD

## 2025-03-07 ENCOUNTER — ANTICOAGULATION MONITORING (OUTPATIENT)
Dept: VASCULAR LAB | Facility: MEDICAL CENTER | Age: 85
End: 2025-03-07
Payer: MEDICARE

## 2025-03-07 DIAGNOSIS — D68.69 SECONDARY HYPERCOAGULABLE STATE (HCC): ICD-10-CM

## 2025-03-07 DIAGNOSIS — Z79.01 LONG TERM CURRENT USE OF ANTICOAGULANT THERAPY: Chronic | ICD-10-CM

## 2025-03-07 DIAGNOSIS — I48.21 PERMANENT ATRIAL FIBRILLATION (HCC): Chronic | ICD-10-CM

## 2025-03-07 LAB — INR PPP: 2.9 (ref 2–3.5)

## 2025-03-07 NOTE — PROGRESS NOTES
Anticoagulation Summary  As of 3/7/2025      INR goal:  2.5-3.0   TTR:  53.5% (9.7 y)   INR used for dosin.90 (3/7/2025)   Warfarin maintenance plan:  4.5 mg (3 mg x 1.5) every Mon, Fri; 3 mg (3 mg x 1) all other days   Weekly warfarin total:  24 mg   Plan last modified:  Garth Fortune PharmD (3/7/2025)   Next INR check:  3/14/2025   Priority:  Routine   Target end date:  Indefinite    Indications    Permanent atrial fibrillation (HCC) [I48.21]  Long term current use of anticoagulant therapy [Z79.01]  Secondary hypercoagulable state (HCC) [D68.69]                 Anticoagulation Episode Summary       INR check location:  Home Draw    Preferred lab:  --    Send INR reminders to:  --    Comments:  Goal range changed to 2.5-3.2 per raghu vaca 2019          Anticoagulation Care Providers       Provider Role Specialty Phone number    Hu Gamez M.D. Referring Internal Medicine Clinical Cardiac Electrophysiology [phone]    [facility] Anticoagulation Services Responsible  [phone]            Refer to Anticoagulation Patient Findings for HPI  Patient Findings       Negatives:  Signs/symptoms of thrombosis, Signs/symptoms of bleeding, Laboratory test error suspected, Change in health, Change in alcohol use, Change in activity, Upcoming invasive procedure, Emergency department visit, Upcoming dental procedure, Missed doses, Extra doses, Change in medications, Change in diet/appetite, Hospital admission, Bruising, Other complaints            Spoke with patient to report a therapeutic INR (TWD from last 7 days was 24 mg).      Pt instructed to begin newly increased regimen of 4.5 mg Mon/Fri and 3 mg ROW (TWD 24 mg).     Will follow up in 1 week(s).     Garth Fortune, PharmD, BCACP

## 2025-03-14 ENCOUNTER — ANTICOAGULATION MONITORING (OUTPATIENT)
Dept: VASCULAR LAB | Facility: MEDICAL CENTER | Age: 85
End: 2025-03-14
Payer: MEDICARE

## 2025-03-14 DIAGNOSIS — I48.21 PERMANENT ATRIAL FIBRILLATION (HCC): Chronic | ICD-10-CM

## 2025-03-14 DIAGNOSIS — Z79.01 LONG TERM CURRENT USE OF ANTICOAGULANT THERAPY: Chronic | ICD-10-CM

## 2025-03-14 DIAGNOSIS — D68.69 SECONDARY HYPERCOAGULABLE STATE (HCC): ICD-10-CM

## 2025-03-14 LAB — INR PPP: 3.3 (ref 2–3.5)

## 2025-03-14 NOTE — PROGRESS NOTES
OP   Telephone Anticoagulation Service Note      Anticoagulation Summary  As of 3/14/2025      INR goal:  2.5-3.0   TTR:  53.4% (9.7 y)   INR used for dosing:  3.30 (3/14/2025)   Warfarin maintenance plan:  4.5 mg (3 mg x 1.5) every Mon; 3 mg (3 mg x 1) all other days   Weekly warfarin total:  22.5 mg   Plan last modified:  Emily Yi (3/14/2025)   Next INR check:  3/21/2025   Priority:  Routine   Target end date:  Indefinite    Indications    Permanent atrial fibrillation (HCC) [I48.21]  Long term current use of anticoagulant therapy [Z79.01]  Secondary hypercoagulable state (HCC) [D68.69]                 Anticoagulation Episode Summary       INR check location:  Home Draw    Preferred lab:  --    Send INR reminders to:  --    Comments:  Goal range changed to 2.5-3.2 per raghu vaca 8/8/2019          Anticoagulation Care Providers       Provider Role Specialty Phone number    Hu Gamez M.D. Referring Internal Medicine Clinical Cardiac Electrophysiology [phone]    [facility] Anticoagulation Services Responsible  [phone]          Anticoagulation Patient Findings  Patient Findings       Negatives:  Signs/symptoms of thrombosis, Signs/symptoms of bleeding, Laboratory test error suspected, Change in health, Change in alcohol use, Change in activity, Upcoming invasive procedure, Emergency department visit, Upcoming dental procedure, Missed doses, Extra doses, Change in medications, Change in diet/appetite, Hospital admission, Bruising, Other complaints          Spoke with the patient on the phone today, reporting a SUPRA-therapeutic INR of 3.3.  Confirmed the current warfarin dosing regimen and patient compliance.  Patient denies any interval changes to diet and/or medications.   Patient denies any signs/symptoms of bleeding or clotting.  Patient instructed to reduce her current regimen down and begin taking 4.5mg on Mon and 3mg ROW.   Patient will retest again in 1 week.     Jason Yi   PharmD

## 2025-03-16 NOTE — PROGRESS NOTES
Anticoagulation Summary  As of 2022      INR goal:  2.5-3.0   TTR:  55.4 % (7.5 y)   INR used for dosin.30 (2022)   Warfarin maintenance plan:  3 mg (3 mg x 1) every day; Starting 2022   Weekly warfarin total:  21 mg   Plan last modified:  Vandana Vallecillo, [place] (2022)   Next INR check:  2022   Priority:  Maintenance   Target end date:  Indefinite    Indications    Permanent atrial fibrillation (HCC) [I48.21]  Long term current use of anticoagulant therapy [Z79.01]  Secondary hypercoagulable state (HCC) [D68.69]                 Anticoagulation Episode Summary       INR check location:  Home Draw    Preferred lab:      Send INR reminders to:      Comments:  [facility] [phone] -   goal range changed to 2.5-3.2 per raghu vaca 2019          Anticoagulation Care Providers       Provider Role Specialty Phone number    Hu Gamez M.D. Referring Internal Medicine Clinical Cardiac Electrophysiology [phone]    [facility] Anticoagulation Services Responsible  [phone]            Refer to Anticoagulation Patient Findings for HPI  Patient Findings       Negatives:  Signs/symptoms of thrombosis, Signs/symptoms of bleeding, Laboratory test error suspected, Change in health, Change in alcohol use, Change in activity, Upcoming invasive procedure, Emergency department visit, Upcoming dental procedure, Missed doses, Extra doses, Change in medications, Change in diet/appetite, Hospital admission, Bruising, Other complaints            Spoke with pt.  INR is subtherapeutic.     Pt verifies warfarin weekly dosing.     Will have pt increase regimen as listed above    Repeat INR in 2 week(s).     Vandana Vallecillo, PharmD       disease management

## 2025-03-24 LAB — INR PPP: 2.6 (ref 2–3.5)

## 2025-03-25 ENCOUNTER — ANTICOAGULATION MONITORING (OUTPATIENT)
Dept: CARDIOLOGY | Facility: MEDICAL CENTER | Age: 85
End: 2025-03-25
Payer: MEDICARE

## 2025-03-25 DIAGNOSIS — Z79.01 LONG TERM CURRENT USE OF ANTICOAGULANT THERAPY: Chronic | ICD-10-CM

## 2025-03-25 DIAGNOSIS — D68.69 SECONDARY HYPERCOAGULABLE STATE (HCC): ICD-10-CM

## 2025-03-25 DIAGNOSIS — I48.21 PERMANENT ATRIAL FIBRILLATION (HCC): Chronic | ICD-10-CM

## 2025-03-25 NOTE — PROGRESS NOTES
Anticoagulation Summary  As of 3/25/2025      INR goal:  2.5-3.0   TTR:  53.4% (9.8 y)   INR used for dosin.60 (3/24/2025)   Warfarin maintenance plan:  3 mg (3 mg x 1) every day   Weekly warfarin total:  21 mg   Plan last modified:  Whitney Hall, Pharmacy Intern (3/25/2025)   Next INR check:  2025   Priority:  Routine   Target end date:  Indefinite    Indications    Permanent atrial fibrillation (HCC) [I48.21]  Long term current use of anticoagulant therapy [Z79.01]  Secondary hypercoagulable state (HCC) [D68.69]                 Anticoagulation Episode Summary       INR check location:  Home Draw    Preferred lab:  --    Send INR reminders to:  --    Comments:  Goal range changed to 2.5-3.2 per raghu vaca 2019          Anticoagulation Care Providers       Provider Role Specialty Phone number    Hu Gamez M.D. Referring Internal Medicine Clinical Cardiac Electrophysiology [phone]    [facility] Anticoagulation Services Responsible  [phone]          Anticoagulation Patient Findings  Patient Findings       Negatives:  Signs/symptoms of thrombosis, Signs/symptoms of bleeding, Laboratory test error suspected, Change in health, Change in alcohol use, Change in activity, Upcoming invasive procedure, Emergency department visit, Upcoming dental procedure, Missed doses, Extra doses, Change in medications, Change in diet/appetite, Hospital admission, Bruising, Other complaints            Called and spoke to patient to discuss therapeutic INR of 2.60. She reported she forgot the new regimen and has been taking 3 mg daily for the last two weeks. Given therapeutic INR and no changes in diet or medications, reasonable to continue taking 3 mg daily. Requested patient to contact the clinic for any s/sx of unusual bleeding, bruising, clotting or any changes to diet or medications.    INR therapeutic  Reason(s) for out of range INR today: N/A      Warfarin Plan: Decrease to 3 mg daily.    Next INR in 2  week(s).    Whitney Hall, Pharmacy Intern

## 2025-03-31 ENCOUNTER — ANTICOAGULATION MONITORING (OUTPATIENT)
Dept: VASCULAR LAB | Facility: MEDICAL CENTER | Age: 85
End: 2025-03-31
Payer: MEDICARE

## 2025-03-31 DIAGNOSIS — I48.21 PERMANENT ATRIAL FIBRILLATION (HCC): Chronic | ICD-10-CM

## 2025-03-31 DIAGNOSIS — Z79.01 LONG TERM CURRENT USE OF ANTICOAGULANT THERAPY: Chronic | ICD-10-CM

## 2025-03-31 DIAGNOSIS — D68.69 SECONDARY HYPERCOAGULABLE STATE (HCC): ICD-10-CM

## 2025-03-31 LAB — INR PPP: 3 (ref 2–3.5)

## 2025-04-01 NOTE — PROGRESS NOTES
Anticoagulation Summary  As of 3/31/2025      INR goal:  2.5-3.0   TTR:  53.5% (9.8 y)   INR used for dosing:  3.00 (3/31/2025)   Warfarin maintenance plan:  3 mg (3 mg x 1) every day   Weekly warfarin total:  21 mg   Plan last modified:  Whitney Hall, Pharmacy Intern (3/25/2025)   Next INR check:  4/7/2025   Priority:  Routine   Target end date:  Indefinite    Indications    Permanent atrial fibrillation (HCC) [I48.21]  Long term current use of anticoagulant therapy [Z79.01]  Secondary hypercoagulable state (HCC) [D68.69]                 Anticoagulation Episode Summary       INR check location:  Home Draw    Preferred lab:  --    Send INR reminders to:  --    Comments:  Goal range changed to 2.5-3.2 per raghu vaca 8/8/2019          Anticoagulation Care Providers       Provider Role Specialty Phone number    Hu Gamez M.D. Referring Internal Medicine Clinical Cardiac Electrophysiology [phone]    [facility] Anticoagulation Services Responsible  [phone]          Anticoagulation Patient Findings  Patient Findings       Positives:  Signs/symptoms of bleeding (had blood in stool once in the last week but this resolved immediately)    Negatives:  Signs/symptoms of thrombosis, Laboratory test error suspected, Change in health, Change in alcohol use, Change in activity, Upcoming invasive procedure, Emergency department visit, Upcoming dental procedure, Missed doses, Extra doses, Change in medications, Change in diet/appetite, Hospital admission, Bruising, Other complaints            Called and spoke to patient .    INR therapeutic  Reason(s) for out of range INR today: N/A      Warfarin Plan: Continue regimen as listed above.    Next INR in 1 week(s).    Nayana Dunbar, PharmD, BCACP

## 2025-04-04 ENCOUNTER — ANTICOAGULATION MONITORING (OUTPATIENT)
Dept: VASCULAR LAB | Facility: MEDICAL CENTER | Age: 85
End: 2025-04-04
Payer: MEDICARE

## 2025-04-04 DIAGNOSIS — Z79.01 LONG TERM CURRENT USE OF ANTICOAGULANT THERAPY: Chronic | ICD-10-CM

## 2025-04-04 DIAGNOSIS — D68.69 SECONDARY HYPERCOAGULABLE STATE (HCC): ICD-10-CM

## 2025-04-04 DIAGNOSIS — I48.21 PERMANENT ATRIAL FIBRILLATION (HCC): Chronic | ICD-10-CM

## 2025-04-04 LAB — INR PPP: 2.6 (ref 2–3.5)

## 2025-04-04 NOTE — PROGRESS NOTES
OP   Telephone Anticoagulation Service Note      Anticoagulation Summary  As of 2025      INR goal:  2.5-3.0   TTR:  53.6% (9.8 y)   INR used for dosin.60 (2025)   Warfarin maintenance plan:  3 mg (3 mg x 1) every day   Weekly warfarin total:  21 mg   No change documented:  Emily Yi   Plan last modified:  Whitney Hall, Pharmacy Intern (3/25/2025)   Next INR check:  2025   Priority:  Routine   Target end date:  Indefinite    Indications    Permanent atrial fibrillation (HCC) [I48.21]  Long term current use of anticoagulant therapy [Z79.01]  Secondary hypercoagulable state (HCC) [D68.69]                 Anticoagulation Episode Summary       INR check location:  Home Draw    Preferred lab:  --    Send INR reminders to:  --    Comments:  Goal range changed to 2.5-3.2 per raghu vaca 2019          Anticoagulation Care Providers       Provider Role Specialty Phone number    Hu Gamez M.D. Referring Internal Medicine Clinical Cardiac Electrophysiology [phone]    [facility] Anticoagulation Services Responsible  [phone]          Anticoagulation Patient Findings  Patient Findings       Negatives:  Signs/symptoms of thrombosis, Signs/symptoms of bleeding, Laboratory test error suspected, Change in health, Change in alcohol use, Change in activity, Upcoming invasive procedure, Emergency department visit, Upcoming dental procedure, Missed doses, Extra doses, Change in medications, Change in diet/appetite, Hospital admission, Bruising, Other complaints          Spoke with the patient on the phone today, reporting a therapeutic INR of 2.6.  Confirmed the current warfarin dosing regimen and patient compliance.  Patient denies any interval changes to diet and/or medications.   Patient denies any signs/symptoms of bleeding or clotting.  Asked patient if there was an issue that may have prompted her to test her INR again so soon. However the patient reported that she thought she was  told to test on Friday but it should have been a week out.   She will continue with the current dosing regimen and will retest her INR again in 1 week.     Jason RobbD

## 2025-04-09 ENCOUNTER — OFFICE VISIT (OUTPATIENT)
Dept: SLEEP MEDICINE | Facility: MEDICAL CENTER | Age: 85
End: 2025-04-09
Attending: NURSE PRACTITIONER
Payer: MEDICARE

## 2025-04-09 VITALS
WEIGHT: 148 LBS | HEART RATE: 85 BPM | HEIGHT: 68 IN | OXYGEN SATURATION: 95 % | DIASTOLIC BLOOD PRESSURE: 72 MMHG | SYSTOLIC BLOOD PRESSURE: 130 MMHG | BODY MASS INDEX: 22.43 KG/M2

## 2025-04-09 DIAGNOSIS — Z87.891 FORMER SMOKER: ICD-10-CM

## 2025-04-09 DIAGNOSIS — G47.33 OBSTRUCTIVE SLEEP APNEA: Chronic | ICD-10-CM

## 2025-04-09 PROCEDURE — 99213 OFFICE O/P EST LOW 20 MIN: CPT | Performed by: NURSE PRACTITIONER

## 2025-04-09 PROCEDURE — 3078F DIAST BP <80 MM HG: CPT | Performed by: NURSE PRACTITIONER

## 2025-04-09 PROCEDURE — 3075F SYST BP GE 130 - 139MM HG: CPT | Performed by: NURSE PRACTITIONER

## 2025-04-09 ASSESSMENT — PATIENT HEALTH QUESTIONNAIRE - PHQ9: CLINICAL INTERPRETATION OF PHQ2 SCORE: 0
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
## 2025-04-11 ENCOUNTER — ANTICOAGULATION MONITORING (OUTPATIENT)
Dept: VASCULAR LAB | Facility: MEDICAL CENTER | Age: 85
End: 2025-04-11
Payer: MEDICARE

## 2025-04-11 DIAGNOSIS — D68.69 SECONDARY HYPERCOAGULABLE STATE (HCC): ICD-10-CM

## 2025-04-11 DIAGNOSIS — I48.21 PERMANENT ATRIAL FIBRILLATION (HCC): Chronic | ICD-10-CM

## 2025-04-11 DIAGNOSIS — Z79.01 LONG TERM CURRENT USE OF ANTICOAGULANT THERAPY: Chronic | ICD-10-CM

## 2025-04-11 LAB — INR PPP: 2.9 (ref 2–3.5)

## 2025-04-11 NOTE — PROGRESS NOTES
OP Anticoagulation Service Note    Date: 2025    Anticoagulation Summary  As of 2025      INR goal:  2.5-3.0   TTR:  53.7% (9.8 y)   INR used for dosin.90 (2025)   Warfarin maintenance plan:  3 mg (3 mg x 1) every day   Weekly warfarin total:  21 mg   Plan last modified:  Whitney Hall, Pharmacy Intern (3/25/2025)   Next INR check:  2025   Priority:  Routine   Target end date:  Indefinite    Indications    Permanent atrial fibrillation (HCC) [I48.21]  Long term current use of anticoagulant therapy [Z79.01]  Secondary hypercoagulable state (HCC) [D68.69]                 Anticoagulation Episode Summary       INR check location:  Home Draw    Preferred lab:  --    Send INR reminders to:  --    Comments:  Goal range changed to 2.5-3.2 per raghu vaca 2019          Anticoagulation Care Providers       Provider Role Specialty Phone number    Hu Gamez M.D. Referring Internal Medicine Clinical Cardiac Electrophysiology [phone]    [facility] Responsible  [phone]          Anticoagulation Patient Findings    ,      Patient's preferred phone number:  Madeline Dorantes  [address] [address] [phone]        HPI:   The reason for today's call is to prevent morbidity and mortality from a blood clot and/or stroke and to reduce the risk of bleeding while on a anticoagulant.     Care Team    PCP:  Kristin Figueroa D.O.  [address] [facility] [id number] [phone]      Patient Care Team                Kristin Figueroa D.O. PCP - General, Family Medicine [phone]     [address] [facility] [address]    [facility]  Respiratory Therapist, DME Supplier     [facility]  [phone]     [address] [facility] [address]    Jagruti Heath P.A.-C. Physician Assistant [phone]    Merged      [address] [address]            Assessment:     INR  therapeutic.     Lab Results   Component Value  "Date/Time    BUN 13 10/08/2024 10:03 PM    CREATININE 0.89 10/08/2024 10:03 PM    CREATININE 1.0 02/18/2009 02:40 PM     Lab Results   Component Value Date/Time    HEMOGLOBIN 13.4 10/30/2024 10:51 AM    HEMATOCRIT 41.3 10/30/2024 10:51 AM    PLATELETCT 211 10/30/2024 10:51 AM    ALKPHOSPHAT 128 (H) 03/17/2023 12:05 PM    ASTSGOT 19 03/17/2023 12:05 PM    ALTSGPT 17 03/17/2023 12:05 PM          Current Outpatient Medications:     warfarin, 3-6 mg, Oral, DAILY    metoprolol SR, 25 mg, Oral, Q EVENING    losartan, 25 mg, Oral, DAILY    INSULIN SYRINGE 1CC/31GX5/16\", USE TO INJECT B12 SUBCUTANEOUS WEEKLY    Apoaequorin (PREVAGEN PO), 1 Tablet, Oral, DAILY    omeprazole, 20 mg, Oral, QAM    Magnesium, 500 mg, Oral, QHS    Melatonin, 6 mg, Oral, QHS    cyanocobalamin, 1,000 mcg, Intramuscular, Q7 DAYS    liothyronine, 5 mcg, Oral, QAM    CALCIUM 600-D PO, 600 mg, Oral, DAILY    POTASSIUM ACETATE, 550 mg, Oral, Q EVENING      [id number] Stroke Risk Points: 7   Values used to calculate this score:    Points  Metrics       0        Has Congestive Heart Failure: No       1        Has Hypertension: Yes       2        Age: 84       0        Has Diabetes: No       2        Had Stroke: No                 Had TIA: Yes                 Had Thromboembolism: No       1        Has Vascular Disease: Yes       1        Clinically Relevant Sex: Female     No data recorded     Plan:     Continue the same warfarin dose, as noted above.       Follow-up:     As seen above      Additional information discussed with patient:     Asked patient to please call the anticoagulation clinic if they have any signs/symptoms of bleeding and/or thrombosis or any changes to diet or medications.      National recommendations regarding anticoagulation therapy:     The CHEST guidelines recommends frequent INR monitoring at regular intervals (a few days up to a max of 12 weeks) to ensure patients are on the proper dose of warfarin, and patients are not " having any complications from therapy.  INRs can dramatically change over a short time period due to diet, medications, and medical conditions.         [facility]  Phone: [phone]  Fax: [phone]  On call: [phone]  General scheduling/information [phone]  For emergencies please dial 911  Please do not use [employer] for urgent matters, call the phone numbers listed above.    This note was created using voice recognition software (Dragon). The accuracy of the dictation is limited by the abilities of the software. I have reviewed the note prior to signing, however some errors in grammar and context are still possible. If you have any questions related to this note please do not hesitate to contact our office.

## 2025-04-18 ENCOUNTER — ANTICOAGULATION MONITORING (OUTPATIENT)
Dept: VASCULAR LAB | Facility: MEDICAL CENTER | Age: 85
End: 2025-04-18
Payer: MEDICARE

## 2025-04-18 ENCOUNTER — TELEPHONE (OUTPATIENT)
Dept: VASCULAR LAB | Facility: MEDICAL CENTER | Age: 85
End: 2025-04-18
Payer: MEDICARE

## 2025-04-18 DIAGNOSIS — I48.21 PERMANENT ATRIAL FIBRILLATION (HCC): Chronic | ICD-10-CM

## 2025-04-18 DIAGNOSIS — Z79.01 LONG TERM CURRENT USE OF ANTICOAGULANT THERAPY: Chronic | ICD-10-CM

## 2025-04-18 DIAGNOSIS — D68.69 SECONDARY HYPERCOAGULABLE STATE (HCC): ICD-10-CM

## 2025-04-18 LAB — INR PPP: 3.3 (ref 2–3.5)

## 2025-04-18 NOTE — TELEPHONE ENCOUNTER
Caller:  Mary Dorantes    Topic/issue:   Mary is returning call, regarding her INR    Callback Number:   [phone]    Thank you,   Nahed FREEMAN

## 2025-04-18 NOTE — PROGRESS NOTES
Anticoagulation Summary  As of 4/18/2025      INR goal:  2.5-3.0   TTR:  53.6% (9.8 y)   INR used for dosing:  3.30 (4/18/2025)   Warfarin maintenance plan:  3 mg (3 mg x 1) every day   Weekly warfarin total:  21 mg   Plan last modified:  Whitney Hall, Pharmacy Intern (3/25/2025)   Next INR check:  4/25/2025   Priority:  Routine   Target end date:  Indefinite    Indications    Permanent atrial fibrillation (HCC) [I48.21]  Long term current use of anticoagulant therapy [Z79.01]  Secondary hypercoagulable state (HCC) [D68.69]                 Anticoagulation Episode Summary       INR check location:  Home Draw    Preferred lab:  --    Send INR reminders to:  --    Comments:  Goal range changed to 2.5-3.2 per raghu vaca 8/8/2019          Anticoagulation Care Providers       Provider Role Specialty Phone number    Hu Gamez M.D. Referring Internal Medicine Clinical Cardiac Electrophysiology [phone]    [facility] Responsible  [phone]          Anticoagulation Patient Findings      Left voicemail message to report a SUPRA therapeutic INR of 3.3.    Will have pt take a decreased dose of 1.5 mg today and then continue on with current warfarin dosing regimen.   Requested pt contact the clinic for any s/s of unusual bleeding, bruising, clotting or any changes to diet or medication.    FU INR in 1 week(s).    Garth Fortune, PharmD, BCACP

## 2025-04-18 NOTE — TELEPHONE ENCOUNTER
Pt c/b - states she will be having hernia surgery anticipated for next month. Date of procedure TBD. Pt advised to have operating providers office fax clearance request to our clinic - will complete asap upon receipt.    Garth Fortune, ClarisseD, BCACP

## 2025-04-24 ENCOUNTER — TELEPHONE (OUTPATIENT)
Dept: CARDIOLOGY | Facility: MEDICAL CENTER | Age: 85
End: 2025-04-24
Payer: MEDICARE

## 2025-04-24 NOTE — LETTER
PROCEDURE/SURGERY CLEARANCE FORM      Encounter Date: 4/24/2025    Patient: Madeline Dorantes  YOB: 1940    CARDIOLOGIST:  Gilson Ghotra M.D.   REFERRING DOCTOR:  No ref. provider found    Procedure/Surgery: Hernia Repair       PROCEDURE/SURGERY CLEARANCE FORM    Date: 4/24/2025   Patient Name: Madeline Dorantes    Dear Surgeon or Proceduralist,      Thank you for your request for cardiac stratification of our mutual patient Madeline Dorantes 1940. We have reviewed their [facility] records; and to the best of our understanding this patient has not had stenting, ablation, watchman, cardiothoracic surgery or hospitalization for cardiovascular reasons in the past 6 months.  Madeline Dorantes has been seen within the past 15 months and is considered to have non-modifiable cardiac risk for this low-risk procedure/surgery. They may proceed from a cardiovascular standpoint and may hold their antiplatelet/anticoagulation as briefly as possible. Please have patient resume this medication when hemodynamically stable to do so.     Aspirin or Prasugrel   - hold 7 days prior to procedure/surgery, resume when hemodynamically stable      Clopidrogrel or Ticagrelor  - hold 7 days for all neurological procedures, hold 5 days prior to all other procedure/surgery,  resume when hemodynamically stable       For WARFARIN please coordinate with [facility], they will send their own recommendations. ([phone]) INR testing and dose management.      Pradaxa/Xarelto/Eliquis/Savesya - hold 1 day prior to procedure for low bleeding risk procedure, 2 days for high bleeding risk procedure, or consider holding 3 days or longer for patients with reduced kidney function (CrCl <30mL/min) or spinal/cranial surgeries/procedures.      If they have a mechanical heart valve, please coordinate with [facility] Anticoagulation Service ([phone]) the proper management of their anticoagulant in the  periprocedural or perioperative period.      Some patients have higher risk for cardiovascular complications or holding medication. If our patient has had prior complications of holding antiplatelet or anticoagulants in the past and we have seen them after these events, we have addressed these concerns with the patient. They are at an unknown degree of increased risk for recurrent complication.  You may hold anticoagulation/antiplatelets for the procedure or surgery if the benefits of the procedure or surgery outweigh this nonmodifiable risk.      If Madeline Dorantes 1940 has new symptoms of heart failure decompensation, unstable arrythmia, or angina please reach out and we will assess the patient.      If you have other patient-specific concerns, please feel free to reach out to the patient's cardiologist directly at [phone].     Thank you,       [facility]                                         Gilson Ghotra M.D.   Electronically Signed

## 2025-04-25 ENCOUNTER — ANTICOAGULATION MONITORING (OUTPATIENT)
Dept: VASCULAR LAB | Facility: MEDICAL CENTER | Age: 85
End: 2025-04-25
Payer: MEDICARE

## 2025-04-25 DIAGNOSIS — D68.69 SECONDARY HYPERCOAGULABLE STATE (HCC): ICD-10-CM

## 2025-04-25 DIAGNOSIS — Z79.01 LONG TERM CURRENT USE OF ANTICOAGULANT THERAPY: Chronic | ICD-10-CM

## 2025-04-25 DIAGNOSIS — I48.21 PERMANENT ATRIAL FIBRILLATION (HCC): Chronic | ICD-10-CM

## 2025-04-25 LAB — INR PPP: 2.1 (ref 2–3.5)
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
## 2025-04-25 NOTE — TELEPHONE ENCOUNTER
"[facility]    Received anticoagulation clearance from [facility] regarding upcoming Hernia Repair.    Procedure date - TBD    Pt is on warfarin for atrial fibrillation and hx of VTE (SVT in 10/2024) .    Per current CHEST guidelines, pt is at moderate risk of VTE/stroke given CHADSVASc of at least 6 (HTN, age, hx of TIA, hx of VTE, and sex).            IF PT ON WARFARIN  Given pt's hx, will hold warfarin x 5 days prior to procedure and will bridge w/ Lovenox (technically not indicated in this pt, but operating provider requests which is reasonable given pt hx as well as higher INR goal). Pt scheduled into clinic on 5/8/25 (her earliest convenience) to discuss perioperative anticoagulation plan.            Informed surgery provider to defer \"medical\" or \"cardiac\" clearance to PCP/cardiology    Called and spoke to patient.    Garth Fortune, PharmD  "

## 2025-04-25 NOTE — PROGRESS NOTES
Anticoagulation Summary  As of 2025      INR goal:  2.5-3.0   TTR:  53.6% (9.8 y)   INR used for dosin.10 (2025)   Warfarin maintenance plan:  3 mg (3 mg x 1) every day   Weekly warfarin total:  21 mg   Plan last modified:  Whitney Hall, Pharmacy Intern (3/25/2025)   Next INR check:  2025   Priority:  Routine   Target end date:  Indefinite    Indications    Permanent atrial fibrillation (HCC) [I48.21]  Long term current use of anticoagulant therapy [Z79.01]  Secondary hypercoagulable state (HCC) [D68.69]                 Anticoagulation Episode Summary       INR check location:  Home Draw    Preferred lab:  --    Send INR reminders to:  --    Comments:  Goal range changed to 2.5-3.2 per raghu vaca 2019          Anticoagulation Care Providers       Provider Role Specialty Phone number    Hu Gamez M.D. Referring Internal Medicine Clinical Cardiac Electrophysiology [phone]    [facility] Anticoagulation Services Responsible  [phone]            Refer to Anticoagulation Patient Findings for HPI  Patient Findings       Positives:  Upcoming invasive procedure (Upcoming hernia repair - date TBD)    Negatives:  Signs/symptoms of thrombosis, Signs/symptoms of bleeding, Laboratory test error suspected, Change in health, Change in alcohol use, Change in activity, Emergency department visit, Upcoming dental procedure, Missed doses, Extra doses, Change in medications, Change in diet/appetite, Hospital admission, Bruising, Other complaints          Clinical Outcomes       Negatives:  Major bleeding event, Thromboembolic event, Anticoagulation-related hospital admission, Anticoagulation-related ED visit, Anticoagulation-related fatality            Date Referral Placed: 25 (Renewed today)    Spoke with pt.  INR is SUB therapeutic.     Pt verifies warfarin weekly dosing.     Will have pt BOLUS x 1 dose w/ 4.5 mg today and to then continue on w/ her current regimen.    Repeat INR in 1 week(s).      Garth Fortune, PharmD, BCACP

## 2025-05-02 ENCOUNTER — ANTICOAGULATION MONITORING (OUTPATIENT)
Dept: VASCULAR LAB | Facility: MEDICAL CENTER | Age: 85
End: 2025-05-02
Payer: MEDICARE

## 2025-05-02 DIAGNOSIS — I48.21 PERMANENT ATRIAL FIBRILLATION (HCC): Chronic | ICD-10-CM

## 2025-05-02 DIAGNOSIS — Z79.01 LONG TERM CURRENT USE OF ANTICOAGULANT THERAPY: Chronic | ICD-10-CM

## 2025-05-02 DIAGNOSIS — D68.69 SECONDARY HYPERCOAGULABLE STATE (HCC): ICD-10-CM

## 2025-05-02 LAB — INR PPP: 3.2 (ref 2–3.5)

## 2025-05-02 NOTE — PROGRESS NOTES
Anticoagulation Summary  As of 5/2/2025      INR goal:  2.5-3.0   TTR:  53.6% (9.9 y)   INR used for dosing:  3.20 (5/2/2025)   Warfarin maintenance plan:  3 mg (3 mg x 1) every day   Weekly warfarin total:  21 mg   Plan last modified:  Whitney Hall, Pharmacy Intern (3/25/2025)   Next INR check:  5/8/2025   Priority:  Routine   Target end date:  Indefinite    Indications    Permanent atrial fibrillation (HCC) [I48.21]  Long term current use of anticoagulant therapy [Z79.01]  Secondary hypercoagulable state (HCC) [D68.69]                 Anticoagulation Episode Summary       INR check location:  Home Draw    Preferred lab:  --    Send INR reminders to:  --    Comments:  Goal range changed to 2.5-3.2 per raghu vaca 8/8/2019          Anticoagulation Care Providers       Provider Role Specialty Phone number    Hu Gamez M.D. Referring Internal Medicine Clinical Cardiac Electrophysiology [phone]    [facility] Anticoagulation Services Responsible  [phone]          Anticoagulation Patient Findings  Patient Findings       Positives:  Upcoming invasive procedure (Hernia repair surgery (date TBD).)    Negatives:  Signs/symptoms of thrombosis, Signs/symptoms of bleeding, Laboratory test error suspected, Change in health, Change in alcohol use, Change in activity, Emergency department visit, Upcoming dental procedure, Missed doses, Extra doses, Change in medications, Change in diet/appetite, Hospital admission, Bruising, Other complaints          Clinical Outcomes       Negatives:  Major bleeding event, Thromboembolic event, Anticoagulation-related hospital admission, Anticoagulation-related ED visit, Anticoagulation-related fatality            INR is slightly supratherapeutic/therapeutic(? See comments section above)  Reason(s) for out of range INR today:  Unable to discuss.       Called and left VM for patient.    Pt is not on antiplatelet therapy.    Requested patient to contact the clinic for any s/sx of  unusual bleeding, bruising, clotting or any changes to diet or medications. Unless patient reports any changes that would warrant an adjustment to the plan:  Warfarin Plan: Continue regimen as listed above.    Next INR in 6 day(s) in clinic for bridging instructions prior to upcoming hernia repair surgery.    Vega Muller, PharmD

## 2025-05-06 ENCOUNTER — NON-PROVIDER VISIT (OUTPATIENT)
Dept: CARDIOLOGY | Facility: MEDICAL CENTER | Age: 85
End: 2025-05-06
Payer: MEDICARE

## 2025-05-06 PROCEDURE — 93294 REM INTERROG EVL PM/LDLS PM: CPT | Performed by: INTERNAL MEDICINE

## 2025-05-08 ENCOUNTER — ANTICOAGULATION VISIT (OUTPATIENT)
Dept: VASCULAR LAB | Facility: MEDICAL CENTER | Age: 85
End: 2025-05-08
Attending: INTERNAL MEDICINE
Payer: MEDICARE

## 2025-05-08 VITALS — WEIGHT: 150 LBS | BODY MASS INDEX: 22.81 KG/M2

## 2025-05-08 DIAGNOSIS — G45.9 TIA (TRANSIENT ISCHEMIC ATTACK): ICD-10-CM

## 2025-05-08 DIAGNOSIS — I48.21 PERMANENT ATRIAL FIBRILLATION (HCC): Chronic | ICD-10-CM

## 2025-05-08 DIAGNOSIS — Z79.01 LONG TERM CURRENT USE OF ANTICOAGULANT THERAPY: Chronic | ICD-10-CM

## 2025-05-08 DIAGNOSIS — D68.69 SECONDARY HYPERCOAGULABLE STATE (HCC): ICD-10-CM

## 2025-05-08 LAB — INR PPP: 2.7 (ref 2–3.5)

## 2025-05-08 PROCEDURE — 99213 OFFICE O/P EST LOW 20 MIN: CPT | Performed by: NURSE PRACTITIONER

## 2025-05-08 PROCEDURE — 85610 PROTHROMBIN TIME: CPT

## 2025-05-08 RX ORDER — ENOXAPARIN SODIUM 100 MG/ML
100 INJECTION SUBCUTANEOUS DAILY
Qty: 10 EACH | Refills: 1 | Status: SHIPPED | OUTPATIENT
Start: 2025-05-08

## 2025-05-08 NOTE — PATIENT INSTRUCTIONS
Date  Warfarin dosing PM Lovenox   5 Days before procedure 5/24/25 0mg NONE   4 Days before procedure 5/25/25 0mg NONE   3 Days before procedure 5/26/25 0mg Lovenox injection   2 Days before procedure 5/27/25 0mg Lovenox injection   1 Days before procedure 5/28/25 0mg NONE   PROCEDURE 5/29/25 4.5 mg (resume warfarin if okay with surgeon) NONE   1 Day after procedure 5/30/25 4.5 mg Restart Lovenox injections   2 Days after procedure 5/31/25 3 mg Lovenox injection   3 Days after procedure 6/1/25 3 mg Lovenox injection   4 Days after procedure 6/2/25 3 mg Lovenox injection   5 Days after procedure 6/3/25 TBD GET INR checked

## 2025-05-08 NOTE — PROGRESS NOTES
Anticoagulation Summary  As of 2025      INR goal:  2.5-3.0   TTR:  53.6% (9.9 y)   INR used for dosin.70 (2025)   Warfarin maintenance plan:  3 mg (3 mg x 1) every day   Weekly warfarin total:  21 mg   Plan last modified:  Whitney Hall, Pharmacy Intern (3/25/2025)   Next INR check:  2025   Priority:  Routine   Target end date:  Indefinite    Indications    Permanent atrial fibrillation (HCC) [I48.21]  Long term current use of anticoagulant therapy [Z79.01]  TIA (transient ischemic attack) [G45.9]  Secondary hypercoagulable state (HCC) [D68.69]                 Anticoagulation Episode Summary       INR check location:  Home Draw    Preferred lab:  --    Send INR reminders to:  --    Comments:  Goal range changed to 2.5-3.2 per raghu vaca 2019          Anticoagulation Care Providers       Provider Role Specialty Phone number    Hu Gamez M.D. Referring Internal Medicine Clinical Cardiac Electrophysiology [phone]    [facility] Anticoagulation Services Responsible  [phone]                Refer to Patient Findings for HPI:  Patient Findings       Positives:  Emergency department visit, Hospital admission    Negatives:  Signs/symptoms of thrombosis, Signs/symptoms of bleeding, Laboratory test error suspected, Change in health, Change in alcohol use, Change in activity, Upcoming invasive procedure, Upcoming dental procedure, Missed doses, Extra doses, Change in medications, Change in diet/appetite, Bruising, Other complaints    Comments:  She was in the ER at [facility] yesterday after tripping on some steps and falling forward on her face. She states her work up was neg including a head ct. She has some mild bruising to her face where her glasses are. She is scheduled for a hernia repair on 25 with Dr Castro. She will need to be off warfarin for 5 days prior. Will plan to bridge with Lovenox per surgeon's recommendation. CHADSVASc of at least 6 (HTN, age, hx of TIA , hx of VTE, and  sex).          Clinical Outcomes       Negatives:  Major bleeding event, Thromboembolic event, Anticoagulation-related hospital admission, Anticoagulation-related ED visit, Anticoagulation-related fatality    Comments:  She was in the ER at [facility] yesterday after tripping on some steps and falling forward on her face. She states her work up was neg including a head ct. She has some mild bruising to her face where her glasses are. She is scheduled for a hernia repair on 5/29/25 with Dr Castro. She will need to be off warfarin for 5 days prior. Will plan to bridge with Lovenox per surgeon's recommendation. CHADSVASc of at least 6 (HTN, age, hx of TIA 2016, hx of VTE, and sex).            Date Referral Placed: 4/25/25      Vitals:  Vitals:    05/08/25 1116   Weight: 68 kg (150 lb)     Pt declined vitals    Verified current warfarin dosing schedule.    Medications reconciled.  Pt is not on antiplatelet therapy.    Pt to have procedure on 5/29/25. Due to pt history lovenox bridging is indicated.   Pt to follow instructions as below, after procedure to ask operating MD when safe to resume anticoagulation, if no instructions given, pt will follow as below.     CHADSvasc 6 (HTN, age, hx of TIA 2016,and sex  Clot history - TIA 2016, SVT Oct 2024    Current weight:68 kg  CrCl = 48 ml/min  Lab Results   Component Value Date/Time    BUN 13 10/08/2024 10:03 PM    CREATININE 0.89 10/08/2024 10:03 PM    CREATININE 1.0 02/18/2009 02:40 PM          Used Lovenox before yes. Will dose with Lovenox 1.5 mg/kg. Rx for Lovenox 100 mg (#10, refill 1) [facility].     Date  Warfarin dosing PM Lovenox   5 Days before procedure 5/24/25 0mg NONE   4 Days before procedure 5/25/25 0mg NONE   3 Days before procedure 5/26/25 0mg Lovenox injection   2 Days before procedure 5/27/25 0mg Lovenox injection   1 Days before procedure 5/28/25 0mg NONE   PROCEDURE 5/29/25 4.5 mg (resume warfarin if okay with surgeon) NONE   1 Day after procedure  5/30/25 4.5 mg Restart Lovenox injections   2 Days after procedure 5/31/25 3 mg Lovenox injection   3 Days after procedure 6/1/25 3 mg Lovenox injection   4 Days after procedure 6/2/25 3 mg Lovenox injection   5 Days after procedure 6/3/25 TBD GET INR checked       A/P   INR is therapeutic.  Reason(s) for out of range INR today: N/A      Warfarin dosing recommendation: Continue regimen as listed above.    Pt educated to contact our clinic with any changes in medications or s/s of bleeding or thrombosis. Pt is aware to seek immediate medical attention for falls, head injury or deep cuts.    Request pt to recheck her INR 1 week(s). She will use her home monitor. Pt agrees.    DESTINY Cartagena.

## 2025-06-02 ENCOUNTER — ANTICOAGULATION MONITORING (OUTPATIENT)
Dept: VASCULAR LAB | Facility: MEDICAL CENTER | Age: 85
End: 2025-06-02
Payer: MEDICARE

## 2025-06-02 DIAGNOSIS — Z79.01 LONG TERM CURRENT USE OF ANTICOAGULANT THERAPY: Chronic | ICD-10-CM

## 2025-06-02 DIAGNOSIS — I48.21 PERMANENT ATRIAL FIBRILLATION (HCC): Primary | Chronic | ICD-10-CM

## 2025-06-02 DIAGNOSIS — D68.69 SECONDARY HYPERCOAGULABLE STATE (HCC): ICD-10-CM

## 2025-06-02 DIAGNOSIS — G45.9 TIA (TRANSIENT ISCHEMIC ATTACK): ICD-10-CM

## 2025-06-02 LAB — INR PPP: 1 (ref 2–3.5)

## 2025-06-02 NOTE — PROGRESS NOTES
Anticoagulation Summary  As of 2025      INR goal:  2.5-3.0   TTR:  53.3% (9.9 y)   INR used for dosin.00 (2025)   Warfarin maintenance plan:  3 mg (3 mg x 1) every day   Weekly warfarin total:  21 mg   Plan last modified:  Whitney Hall, Pharmacy Intern (3/25/2025)   Next INR check:  2025   Priority:  Routine   Target end date:  Indefinite    Indications    Permanent atrial fibrillation (HCC) [I48.21]  Long term current use of anticoagulant therapy [Z79.01]  TIA (transient ischemic attack) [G45.9]  Secondary hypercoagulable state (HCC) [D68.69]                 Anticoagulation Episode Summary       INR check location:  Home Draw    Preferred lab:  --    Send INR reminders to:  --    Comments:  Goal range changed to 2.5-3.2 per raghu vaca 2019          Anticoagulation Care Providers       Provider Role Specialty Phone number    Hu Gamez M.D. Referring Internal Medicine Clinical Cardiac Electrophysiology [phone]    [facility] Anticoagulation Services Responsible  [phone]          Anticoagulation Patient Findings      Called and left  for patient .    INR subtherapeutic  Reason(s) for out of range INR today: Patient had a hernia repair and is currently bridging with lovenox until INR is back in range 2.5-3.   Requested patient to contact the clinic for any s/sx of unusual bleeding, bruising, clotting or any changes to diet or medications.       Warfarin Plan: Bolus 6 mg today and tomorrow, then Continue regimen as listed above.  Discussed with Jason Yi PharmD    Next INR in 3 day(s).    Ora Choudhury, Pharmacy Intern

## 2025-06-05 ENCOUNTER — ANTICOAGULATION MONITORING (OUTPATIENT)
Dept: VASCULAR LAB | Facility: MEDICAL CENTER | Age: 85
End: 2025-06-05
Payer: MEDICARE

## 2025-06-05 ENCOUNTER — TELEPHONE (OUTPATIENT)
Dept: VASCULAR LAB | Facility: MEDICAL CENTER | Age: 85
End: 2025-06-05
Payer: MEDICARE

## 2025-06-05 DIAGNOSIS — G45.9 TIA (TRANSIENT ISCHEMIC ATTACK): ICD-10-CM

## 2025-06-05 DIAGNOSIS — Z79.01 LONG TERM CURRENT USE OF ANTICOAGULANT THERAPY: Chronic | ICD-10-CM

## 2025-06-05 DIAGNOSIS — I48.21 PERMANENT ATRIAL FIBRILLATION (HCC): Primary | Chronic | ICD-10-CM

## 2025-06-05 DIAGNOSIS — D68.69 SECONDARY HYPERCOAGULABLE STATE (HCC): ICD-10-CM

## 2025-06-05 LAB — INR PPP: 1.8 (ref 2–3.5)

## 2025-06-05 RX ORDER — ENOXAPARIN SODIUM 100 MG/ML
100 INJECTION SUBCUTANEOUS DAILY
Qty: 10 EACH | Refills: 0 | Status: SHIPPED | OUTPATIENT
Start: 2025-06-05

## 2025-06-05 NOTE — PROGRESS NOTES
Anticoagulation Summary  As of 2025      INR goal:  2.5-3.0   TTR:  53.3% (10 y)   INR used for dosin.80 (2025)   Warfarin maintenance plan:  3 mg (3 mg x 1) every day   Weekly warfarin total:  21 mg   Plan last modified:  Whitney Hall, Pharmacy Intern (3/25/2025)   Next INR check:  --   Priority:  Routine   Target end date:  Indefinite    Indications    Permanent atrial fibrillation (HCC) [I48.21]  Long term current use of anticoagulant therapy [Z79.01]  TIA (transient ischemic attack) [G45.9]  Secondary hypercoagulable state (HCC) [D68.69]                 Anticoagulation Episode Summary       INR check location:  Home Draw    Preferred lab:  --    Send INR reminders to:  --    Comments:  Goal range changed to 2.5-3.2 per raghu vaca 2019          Anticoagulation Care Providers       Provider Role Specialty Phone number    Hu Gamez M.D. Referring Internal Medicine Clinical Cardiac Electrophysiology [phone]    [facility] Anticoagulation Services Responsible  [phone]          Anticoagulation Patient Findings  Patient Findings       Negatives:  Signs/symptoms of thrombosis, Signs/symptoms of bleeding, Laboratory test error suspected, Change in health, Change in alcohol use, Change in activity, Upcoming invasive procedure, Emergency department visit, Upcoming dental procedure, Missed doses, Extra doses, Change in medications, Change in diet/appetite, Hospital admission, Bruising, Other complaints            Called and spoke to patient .    INR subtherapeutic  Reason(s) for out of range INR today: Recent warfarin interruption for hernia repair      Warfarin Plan: Bolus with 6 mg today, then Continue regimen as listed above. Continue Lovenox 100 mg q24h until INR is > 2.5.    Next INR in 2 day(s).    Nayana Dunbar, PharmD, BCACP

## 2025-06-07 ENCOUNTER — ANTICOAGULATION MONITORING (OUTPATIENT)
Dept: VASCULAR LAB | Facility: MEDICAL CENTER | Age: 85
End: 2025-06-07
Payer: MEDICARE

## 2025-06-07 DIAGNOSIS — G45.9 TIA (TRANSIENT ISCHEMIC ATTACK): ICD-10-CM

## 2025-06-07 DIAGNOSIS — D68.69 SECONDARY HYPERCOAGULABLE STATE (HCC): ICD-10-CM

## 2025-06-07 DIAGNOSIS — I48.21 PERMANENT ATRIAL FIBRILLATION (HCC): Primary | Chronic | ICD-10-CM

## 2025-06-07 DIAGNOSIS — Z79.01 LONG TERM CURRENT USE OF ANTICOAGULANT THERAPY: Chronic | ICD-10-CM

## 2025-06-07 LAB — INR PPP: 2 (ref 2–3.5)

## 2025-06-07 NOTE — PROGRESS NOTES
Anticoagulation Summary  As of 2025      INR goal:  2.5-3.0   TTR:  53.2% (10 y)   INR used for dosin.00 (2025)   Warfarin maintenance plan:  3 mg (3 mg x 1) every day   Weekly warfarin total:  21 mg   Plan last modified:  Whitney Hall, Pharmacy Intern (3/25/2025)   Next INR check:  2025   Priority:  Routine   Target end date:  Indefinite    Indications    Permanent atrial fibrillation (HCC) [I48.21]  Long term current use of anticoagulant therapy [Z79.01]  TIA (transient ischemic attack) [G45.9]  Secondary hypercoagulable state (HCC) [D68.69]                 Anticoagulation Episode Summary       INR check location:  Home Draw    Preferred lab:  --    Send INR reminders to:  --    Comments:  Goal range changed to 2.5-3.2 per raghu vaca 2019          Anticoagulation Care Providers       Provider Role Specialty Phone number    Hu Gamez M.D. Referring Internal Medicine Clinical Cardiac Electrophysiology [phone]    [facility] Anticoagulation Services Responsible  [phone]          Anticoagulation Patient Findings  Patient Findings       Negatives:  Signs/symptoms of thrombosis, Signs/symptoms of bleeding, Laboratory test error suspected, Change in health, Change in alcohol use, Change in activity, Upcoming invasive procedure, Emergency department visit, Upcoming dental procedure, Missed doses, Extra doses, Change in medications, Change in diet/appetite, Hospital admission, Bruising, Other complaints            Called and spoke to patient .    INR subtherapeutic  Reason(s) for out of range INR today: Recent warfarin interruption for hernia repair   Will continue to bolus conservatively as there may be a delay in pt's response due to advanced age.     Warfarin Plan: Bolus with 6 mg today, then Continue regimen as listed above. Continue Lovenox 100 mg q24h until INR is > 2.5.    Next INR in 2 day(s).    Nayana Dunbar, PharmD, BCACP

## 2025-06-09 ENCOUNTER — ANTICOAGULATION MONITORING (OUTPATIENT)
Dept: MEDICAL GROUP | Facility: PHYSICIAN GROUP | Age: 85
End: 2025-06-09
Payer: MEDICARE

## 2025-06-09 DIAGNOSIS — D68.69 SECONDARY HYPERCOAGULABLE STATE (HCC): ICD-10-CM

## 2025-06-09 DIAGNOSIS — G45.9 TIA (TRANSIENT ISCHEMIC ATTACK): ICD-10-CM

## 2025-06-09 DIAGNOSIS — I48.21 PERMANENT ATRIAL FIBRILLATION (HCC): Primary | Chronic | ICD-10-CM

## 2025-06-09 DIAGNOSIS — Z79.01 LONG TERM CURRENT USE OF ANTICOAGULANT THERAPY: Chronic | ICD-10-CM

## 2025-06-09 LAB — INR PPP: 2.7 (ref 2–3.5)

## 2025-06-09 NOTE — TELEPHONE ENCOUNTER
PT Name: Madeline Dorantes     PT Reports INR Value: 2.7    Date of INR Results: 06/09/2025    Concerns/Questions Reported: or N/A: requesting a call back to see if she is needing to take any more shots, Please advise    Callback Number: [phone]     Thank you  Misa GOLDSMITH

## 2025-06-10 NOTE — PROGRESS NOTES
Anticoagulation Summary  As of 2025      INR goal:  2.5-3.0   TTR:  53.2% (10 y)   INR used for dosin.70 (2025)   Warfarin maintenance plan:  3 mg (3 mg x 1) every day   Weekly warfarin total:  21 mg   Plan last modified:  Whitney Hall, Pharmacy Intern (3/25/2025)   Next INR check:  2025   Priority:  Routine   Target end date:  Indefinite    Indications    Permanent atrial fibrillation (HCC) [I48.21]  Long term current use of anticoagulant therapy [Z79.01]  TIA (transient ischemic attack) [G45.9]  Secondary hypercoagulable state (HCC) [D68.69]                 Anticoagulation Episode Summary       INR check location:  Home Draw    Preferred lab:  --    Send INR reminders to:  --    Comments:  Goal range changed to 2.5-3.2 per raghu vaca 2019          Anticoagulation Care Providers       Provider Role Specialty Phone number    Hu Gamez M.D. Referring Internal Medicine Clinical Cardiac Electrophysiology [phone]    [facility] Anticoagulation Services Responsible  [phone]          Anticoagulation Patient Findings    Spoke with patient today regarding therapeutic INR of 2.7.  Patient denies any signs/symptoms of bruising or bleeding or any changes in diet and medications.  Instructed patient to call clinic with any questions or concerns.      Pt is to continue with current warfarin dosing regimen.  STOP Lovenox injections.  Follow up in 1 weeks, to reduce risk of adverse events related to this high risk medication,  Warfarin.    Kristopher Escobar, PharmD, BCACP

## 2025-06-12 ENCOUNTER — NON-PROVIDER VISIT (OUTPATIENT)
Dept: CARDIOLOGY | Facility: PHYSICIAN GROUP | Age: 85
End: 2025-06-12
Payer: MEDICARE

## 2025-06-12 VITALS
HEIGHT: 68 IN | HEART RATE: 88 BPM | BODY MASS INDEX: 22.58 KG/M2 | DIASTOLIC BLOOD PRESSURE: 50 MMHG | SYSTOLIC BLOOD PRESSURE: 124 MMHG | RESPIRATION RATE: 16 BRPM | OXYGEN SATURATION: 96 % | WEIGHT: 149 LBS

## 2025-06-12 DIAGNOSIS — Z98.890 S/P AV NODAL ABLATION: ICD-10-CM

## 2025-06-12 DIAGNOSIS — I49.8 PACEMAKER-DEPENDENT DUE TO NATIVE CARDIAC RHYTHM INSUFFICIENT TO SUPPORT LIFE: Chronic | ICD-10-CM

## 2025-06-12 DIAGNOSIS — I48.21 PERMANENT ATRIAL FIBRILLATION (HCC): Chronic | ICD-10-CM

## 2025-06-12 DIAGNOSIS — I44.2 THIRD DEGREE AV BLOCK (HCC): Chronic | ICD-10-CM

## 2025-06-12 DIAGNOSIS — Z95.0 CARDIAC PACEMAKER IN SITU: Primary | Chronic | ICD-10-CM

## 2025-06-12 DIAGNOSIS — Z95.0 PACEMAKER-DEPENDENT DUE TO NATIVE CARDIAC RHYTHM INSUFFICIENT TO SUPPORT LIFE: Chronic | ICD-10-CM

## 2025-06-12 PROCEDURE — 93288 INTERROG EVL PM/LDLS PM IP: CPT | Performed by: NURSE PRACTITIONER

## 2025-06-12 NOTE — PROGRESS NOTES
Device is working normally. 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  Normal sensing and capture of RV lead; stable impedances. Battery longevity is 10.6 years.  No changes are made today.    Follow-up in 6 months for next PM check with me.    She is due for annual follow-up with Dr. Ghotra in October 2025.

## 2025-06-19 ENCOUNTER — ANTICOAGULATION MONITORING (OUTPATIENT)
Dept: VASCULAR LAB | Facility: MEDICAL CENTER | Age: 85
End: 2025-06-19
Payer: MEDICARE

## 2025-06-19 DIAGNOSIS — D68.69 SECONDARY HYPERCOAGULABLE STATE (HCC): ICD-10-CM

## 2025-06-19 DIAGNOSIS — G45.9 TIA (TRANSIENT ISCHEMIC ATTACK): ICD-10-CM

## 2025-06-19 DIAGNOSIS — I48.21 PERMANENT ATRIAL FIBRILLATION (HCC): Primary | Chronic | ICD-10-CM

## 2025-06-19 DIAGNOSIS — Z79.01 LONG TERM CURRENT USE OF ANTICOAGULANT THERAPY: Chronic | ICD-10-CM

## 2025-06-19 LAB — INR PPP: 2.2 (ref 2–3.5)

## 2025-06-19 NOTE — PROGRESS NOTES
Anticoagulation Summary  As of 2025      INR goal:  2.5-3.0   TTR:  53.2% (10 y)   INR used for dosin.20 (2025)   Warfarin maintenance plan:  3 mg (3 mg x 1) every day   Weekly warfarin total:  21 mg   Plan last modified:  Whitney Hall, Pharmacy Intern (3/25/2025)   Next INR check:  2025   Priority:  Routine   Target end date:  Indefinite    Indications    Permanent atrial fibrillation (HCC) [I48.21]  Long term current use of anticoagulant therapy [Z79.01]  TIA (transient ischemic attack) [G45.9]  Secondary hypercoagulable state (HCC) [D68.69]                 Anticoagulation Episode Summary       INR check location:  Home Draw    Preferred lab:  --    Send INR reminders to:  --    Comments:  Goal range changed to 2.5-3.2 per raghu vaca 2019          Anticoagulation Care Providers       Provider Role Specialty Phone number    Hu Gamez M.D. Referring Internal Medicine Clinical Cardiac Electrophysiology [phone]    [facility] Anticoagulation Services Responsible  [phone]          Anticoagulation Patient Findings  Patient Findings       Positives:  Missed doses (unsure when)    Negatives:  Signs/symptoms of thrombosis, Signs/symptoms of bleeding, Laboratory test error suspected, Change in health, Change in alcohol use, Change in activity, Upcoming invasive procedure, Emergency department visit, Upcoming dental procedure, Extra doses, Change in medications, Change in diet/appetite, Hospital admission, Bruising, Other complaints            Called and spoke to patient .    INR subtherapeutic  Reason(s) for out of range INR today: Missed dose(s)      Warfarin Plan: Bolus with 4.5 mg today, then Continue regimen as listed above.    Next INR in 1 week(s).    Nayana Dunbar, PharmD, BCACP

## 2025-06-23 ENCOUNTER — HOSPITAL ENCOUNTER (OUTPATIENT)
Dept: RADIOLOGY | Facility: MEDICAL CENTER | Age: 85
End: 2025-06-23
Attending: NURSE PRACTITIONER
Payer: MEDICARE

## 2025-06-23 ENCOUNTER — TELEPHONE (OUTPATIENT)
Dept: RADIOLOGY | Facility: MEDICAL CENTER | Age: 85
End: 2025-06-23

## 2025-06-23 DIAGNOSIS — I65.22 LEFT CAROTID ARTERY STENOSIS: ICD-10-CM

## 2025-06-23 PROCEDURE — 93880 EXTRACRANIAL BILAT STUDY: CPT | Mod: 26 | Performed by: INTERNAL MEDICINE

## 2025-06-23 PROCEDURE — 93880 EXTRACRANIAL BILAT STUDY: CPT

## 2025-06-23 NOTE — TELEPHONE ENCOUNTER
USD reviewed w/Dr. Yeboah, no evidence of in stent stenosis and there are no new findings. USD in 1 yr. Call placed to pt, no answer. LM with scan results and plan for repeat study in 1 yr. Direct JUAN call back information provided.

## 2025-07-01 ENCOUNTER — ANTICOAGULATION MONITORING (OUTPATIENT)
Dept: MEDICAL GROUP | Facility: PHYSICIAN GROUP | Age: 85
End: 2025-07-01
Payer: MEDICARE

## 2025-07-01 DIAGNOSIS — D68.69 SECONDARY HYPERCOAGULABLE STATE (HCC): ICD-10-CM

## 2025-07-01 DIAGNOSIS — Z79.01 LONG TERM CURRENT USE OF ANTICOAGULANT THERAPY: Chronic | ICD-10-CM

## 2025-07-01 DIAGNOSIS — I48.21 PERMANENT ATRIAL FIBRILLATION (HCC): Primary | Chronic | ICD-10-CM

## 2025-07-01 DIAGNOSIS — G45.9 TIA (TRANSIENT ISCHEMIC ATTACK): ICD-10-CM

## 2025-07-01 LAB — INR PPP: 2.2 (ref 2–3.5)

## 2025-07-01 NOTE — PROGRESS NOTES
Anticoagulation Summary  As of 2025      INR goal:  2.5-3.0   TTR:  53.0% (10 y)   INR used for dosin.20 (2025)   Warfarin maintenance plan:  4.5 mg (3 mg x 1.5) every Tue; 3 mg (3 mg x 1) all other days   Weekly warfarin total:  22.5 mg   Plan last modified:  Garth Fortune PharmD (2025)   Next INR check:  2025   Priority:  Routine   Target end date:  Indefinite    Indications    Permanent atrial fibrillation (HCC) [I48.21]  Long term current use of anticoagulant therapy [Z79.01]  TIA (transient ischemic attack) [G45.9]  Secondary hypercoagulable state (HCC) [D68.69]                 Anticoagulation Episode Summary       INR check location:  Home Draw    Preferred lab:  --    Send INR reminders to:  --    Comments:  Goal range changed to 2.5-3.2 per raghu vaca 2019          Anticoagulation Care Providers       Provider Role Specialty Phone number    Hu Gamez M.D. Referring Internal Medicine Clinical Cardiac Electrophysiology [phone]    [facility] Anticoagulation Services Responsible  [phone]            Refer to Anticoagulation Patient Findings for HPI  Patient Findings       Negatives:  Signs/symptoms of thrombosis, Signs/symptoms of bleeding, Laboratory test error suspected, Change in health, Change in alcohol use, Change in activity, Upcoming invasive procedure, Emergency department visit, Upcoming dental procedure, Missed doses, Extra doses, Change in medications, Change in diet/appetite, Hospital admission, Bruising, Other complaints          Clinical Outcomes       Negatives:  Major bleeding event, Thromboembolic event, Anticoagulation-related hospital admission, Anticoagulation-related ED visit, Anticoagulation-related fatality            Spoke with p.  INR is SUB therapeutic.     Pt verifies warfarin weekly dosing.     Will have pt begin newly increased regimen of 4.5 mg Tues and 3 mg ROW.    Repeat INR in 1 week(s).     Garth Fortune, PharmD, BCACP

## 2025-07-03 ENCOUNTER — OFFICE VISIT (OUTPATIENT)
Dept: URGENT CARE | Facility: PHYSICIAN GROUP | Age: 85
End: 2025-07-03
Payer: MEDICARE

## 2025-07-03 VITALS
WEIGHT: 150.5 LBS | RESPIRATION RATE: 16 BRPM | TEMPERATURE: 97.7 F | DIASTOLIC BLOOD PRESSURE: 60 MMHG | BODY MASS INDEX: 22.88 KG/M2 | OXYGEN SATURATION: 96 % | HEART RATE: 83 BPM | SYSTOLIC BLOOD PRESSURE: 132 MMHG

## 2025-07-03 DIAGNOSIS — L03.115 CELLULITIS OF RIGHT LOWER EXTREMITY: Primary | ICD-10-CM

## 2025-07-03 PROCEDURE — 99214 OFFICE O/P EST MOD 30 MIN: CPT | Performed by: NURSE PRACTITIONER

## 2025-07-03 PROCEDURE — 3078F DIAST BP <80 MM HG: CPT | Performed by: NURSE PRACTITIONER

## 2025-07-03 PROCEDURE — 3075F SYST BP GE 130 - 139MM HG: CPT | Performed by: NURSE PRACTITIONER

## 2025-07-03 RX ORDER — DOXYCYCLINE HYCLATE 100 MG
100 TABLET ORAL 2 TIMES DAILY
Qty: 14 TABLET | Refills: 0 | Status: SHIPPED | OUTPATIENT
Start: 2025-07-03 | End: 2025-07-10

## 2025-07-03 NOTE — PROGRESS NOTES
Subjective:   Madeline Dorantes is a 84 y.o. female who presents for Wound Check (Pimple removed on R leg wants it checked )       HPI  Patient is a pleasant 84 y.o. who presents for evaluation of right medial ankle wound with surrounding erythema and warmth.  Patient reports having a simple incision and drainage a few days prior.  Has been washing with soap and water but has noticed worsening symptoms.  Denies any streaking.    ROS  All other systems are negative except as documented above within HPI.    MEDS: Current Medications[1]  ALLERGIES: Allergies[2]    Patient's PMH, SocHx, SurgHx, FamHx, Drug allergies and medications were reviewed.     Objective:   /60   Pulse 83   Temp 36.5 °C (97.7 °F) (Temporal)   Resp 16   Wt 68.3 kg (150 lb 8 oz)   LMP  (LMP Unknown)   SpO2 96%   BMI 22.88 kg/m²     Physical Exam  Vitals and nursing note reviewed.   Constitutional:       General: She is awake.      Appearance: Normal appearance. She is well-developed.   HENT:      Head: Normocephalic and atraumatic.      Right Ear: External ear normal.      Left Ear: External ear normal.      Nose: Nose normal.      Mouth/Throat:      Mouth: Mucous membranes are moist.      Pharynx: Oropharynx is clear.   Eyes:      Extraocular Movements: Extraocular movements intact.      Conjunctiva/sclera: Conjunctivae normal.   Cardiovascular:      Rate and Rhythm: Normal rate and regular rhythm.   Pulmonary:      Effort: Pulmonary effort is normal.      Breath sounds: Normal breath sounds.   Musculoskeletal:         General: Normal range of motion.      Cervical back: Normal range of motion and neck supple.        Legs:       Comments: Small wound with surrounding erythema and warmth, minimal edema, no streaking   Skin:     General: Skin is warm and dry.   Neurological:      Mental Status: She is alert and oriented to person, place, and time.   Psychiatric:         Mood and Affect: Mood normal.         Behavior: Behavior normal.  "        Thought Content: Thought content normal.         Assessment/Plan:   Assessment    1. Cellulitis of right lower extremity  - doxycycline (VIBRAMYCIN) 100 MG Tab; Take 1 Tablet by mouth 2 times a day for 7 days.  Dispense: 14 Tablet; Refill: 0      Vital signs stable at today's acute urgent care visit.  Begin medications as listed. Recommend continue washing with soap and water as well as close monitoring.    Advised the patient to follow-up with the primary care provider if symptoms persist.  Red flags discussed and indications to immediately call 911 or present to the ED. All questions were encouraged and answered to the patient's satisfaction and understanding, and they agree to the plan of care.     This is an acute problem with uncertain prognosis, medication management and instructions as well as management options were provided.  I personally reviewed prior external notes and test results pertinent to today and independently reviewed and interpreted all diagnostics, to include POCT testing. Time spent evaluating this patient includes preparing for visit, counseling/education, exam, evaluation, obtaining history, and ordering lab/test/procedures.      Please note that this dictation was created using voice recognition software. I have made a reasonable attempt to correct obvious errors, but I expect that there are errors of grammar and possibly content that I did not discover before finalizing the note.           [1]   Current Outpatient Medications:     warfarin (COUMADIN) 3 MG Tab, Take 1-2 Tablets by mouth every day. or as directed by the Anticoagulation Clinic, Disp: 200 Tablet, Rfl: 1    metoprolol SR (TOPROL XL) 25 MG TABLET SR 24 HR, Take 1 Tablet by mouth every evening., Disp: 100 Tablet, Rfl: 3    losartan (COZAAR) 25 MG Tab, Take 1 Tablet by mouth every day., Disp: 100 Tablet, Rfl: 3    Insulin Syringe-Needle U-100 (INSULIN SYRINGE 1CC/31GX5/16\") 31G X 5/16\" 1 ML Misc, USE TO INJECT B12 " "SUBCUTANEOUS WEEKLY, Disp: , Rfl:     Apoaequorin (PREVAGEN PO), Take 1 tablet by mouth every day., Disp: , Rfl:     omeprazole (PRILOSEC) 20 MG delayed-release capsule, Take 20 mg by mouth every morning. Indications: Gastroesophageal Reflux Disease, Disp: , Rfl:     Magnesium 250 MG Tab, Take 500 mg by mouth at bedtime., Disp: , Rfl:     Melatonin 3 MG Cap, Take 6 mg by mouth at bedtime., Disp: , Rfl:     cyanocobalamin (VITAMIN B-12) 1000 MCG/ML Solution, Inject 1,000 mcg into the shoulder, thigh, or buttocks every 7 days. Once a week, Disp: , Rfl:     liothyronine (CYTOMEL) 5 MCG TABS, Take 5 mcg by mouth every morning. Indications: Underactive Thyroid, Disp: , Rfl:     CALCIUM 600-D PO, Take 600 mg by mouth every day., Disp: , Rfl:     POTASSIUM ACETATE, Take 550 mg by mouth every evening., Disp: , Rfl:     enoxaparin (LOVENOX) 100 MG/ML Solution Prefilled Syringe inj, Inject 100 mg under the skin every day., Disp: 10 Each, Rfl: 0  [2]   Allergies  Allergen Reactions    Ace Inhibitors Swelling     Angioedema 1/2014    Atorvastatin      rhabdomyolysis    Cefdinir Diarrhea     c-diff    Hmg-Coa-R Inhibitors        rhabdomyolysis    Lisinopril      Angioedema 1/2014    Alendronic Acid      \" didn't feel well\", nausea     Betadine [Povidone Iodine] Rash     Topical Vaginal application caused rash    Ciprofloxacin Unspecified       Severe headache    Fosamax      \" didn't feel well\", nausea     Sulfa Drugs Hives and Rash     Other reaction(s): Hives/Skin Rash    Tape      Adhesive tape hives, blisters. Paper tape okay.     Oxycodone      N/V    Tegaderm Alginate Ag Dressing [Alginate - Carboxymethylcellulose - Silver] Itching     Skin reaction to the Tegaderm causing inflammation and burning sensation     "

## 2025-07-07 LAB — INR PPP: 2.2 (ref 2–3.5)

## 2025-07-08 ENCOUNTER — TELEPHONE (OUTPATIENT)
Dept: VASCULAR LAB | Facility: MEDICAL CENTER | Age: 85
End: 2025-07-08
Payer: MEDICARE

## 2025-07-08 ENCOUNTER — ANTICOAGULATION MONITORING (OUTPATIENT)
Dept: VASCULAR LAB | Facility: MEDICAL CENTER | Age: 85
End: 2025-07-08
Payer: MEDICARE

## 2025-07-08 DIAGNOSIS — I48.21 PERMANENT ATRIAL FIBRILLATION (HCC): Primary | Chronic | ICD-10-CM

## 2025-07-08 DIAGNOSIS — Z79.01 LONG TERM CURRENT USE OF ANTICOAGULANT THERAPY: Chronic | ICD-10-CM

## 2025-07-08 DIAGNOSIS — D68.69 SECONDARY HYPERCOAGULABLE STATE (HCC): ICD-10-CM

## 2025-07-08 DIAGNOSIS — G45.9 TIA (TRANSIENT ISCHEMIC ATTACK): ICD-10-CM

## 2025-07-08 NOTE — PROGRESS NOTES
Anticoagulation Summary  As of 2025      INR goal:  2.5-3.0   TTR:  52.9% (10 y)   INR used for dosin.20 (2025)   Warfarin maintenance plan:  4.5 mg (3 mg x 1.5) every Tu, Sat; 3 mg (3 mg x 1) all other days   Weekly warfarin total:  24 mg   Plan last modified:  Allyson Toledo, Student (2025)   Next INR check:  7/15/2025   Priority:  Routine   Target end date:  Indefinite    Indications    Permanent atrial fibrillation (HCC) [I48.21]  Long term current use of anticoagulant therapy [Z79.01]  TIA (transient ischemic attack) [G45.9]  Secondary hypercoagulable state (HCC) [D68.69]                 Anticoagulation Episode Summary       INR check location:  Home Draw    Preferred lab:  --    Send INR reminders to:  AMB ANTICOAG POOL    Comments:  Goal range changed to 2.5-3.2 per raghu vaca 2019          Anticoagulation Care Providers       Provider Role Specialty Phone number    Hu Gamez M.D. Referring Internal Medicine Clinical Cardiac Electrophysiology [phone]    [facility] Anticoagulation Services Responsible  [phone]            Refer to Anticoagulation Patient Findings for HPI    Left pt voicemail for sub-therapeutic INR (2.2). Instructed pt to bolus 6 mg today with a change to dosing regimen on Tuesday and ; Pt to test in one week; unable to verify dosing.     Pt instructed to call clinic with any questions/concerns, s/s bleeding/bruising or if she has started/stopped any medications since last INR.     Discussed pt regimen with Garth Fortune, ClarisseD.        Allyson Toledo, Pharmacy Intern

## 2025-07-08 NOTE — TELEPHONE ENCOUNTER
Caller: Madeline Dorantes    Topic/issue: Patient confused about the voicemail that was left for her today - dosing instructions were not clear. Patient requesting a callback to discuss.     Callback Number: [phone]    Thank you,  Esmer PALOMO

## 2025-07-08 NOTE — TELEPHONE ENCOUNTER
Called pt back - reiterated directions outlined in 7/8/25 anticoag enc.    Garth Fortune, PharmD, BCACP

## 2025-07-17 ENCOUNTER — ANTICOAGULATION MONITORING (OUTPATIENT)
Dept: VASCULAR LAB | Facility: MEDICAL CENTER | Age: 85
End: 2025-07-17
Payer: MEDICARE

## 2025-07-17 DIAGNOSIS — Z79.01 LONG TERM CURRENT USE OF ANTICOAGULANT THERAPY: Chronic | ICD-10-CM

## 2025-07-17 DIAGNOSIS — G45.9 TIA (TRANSIENT ISCHEMIC ATTACK): ICD-10-CM

## 2025-07-17 DIAGNOSIS — D68.69 SECONDARY HYPERCOAGULABLE STATE (HCC): ICD-10-CM

## 2025-07-17 DIAGNOSIS — I48.21 PERMANENT ATRIAL FIBRILLATION (HCC): Primary | Chronic | ICD-10-CM

## 2025-07-17 LAB — INR PPP: 2.1 (ref 2–3.5)

## 2025-07-17 NOTE — PROGRESS NOTES
Anticoagulation Summary  As of 2025      INR goal:  2.5-3.0   TTR:  52.8% (10.1 y)   INR used for dosin.10 (2025)   Warfarin maintenance plan:  4.5 mg (3 mg x 1.5) every Tue, Thu, Sat; 3 mg (3 mg x 1) all other days   Weekly warfarin total:  25.5 mg   Plan last modified:  Vandana Vallecillo, PharmD (2025)   Next INR check:  2025   Priority:  Routine   Target end date:  Indefinite    Indications    Permanent atrial fibrillation (HCC) [I48.21]  Long term current use of anticoagulant therapy [Z79.01]  TIA (transient ischemic attack) [G45.9]  Secondary hypercoagulable state (HCC) [D68.69]                 Anticoagulation Episode Summary       INR check location:  Home Draw    Preferred lab:  --    Send INR reminders to:  AMB ANTICOAG POOL    Comments:  Goal range changed to 2.5-3.2 per raghu vaca 2019          Anticoagulation Care Providers       Provider Role Specialty Phone number    Hu Gamez M.D. Referring Internal Medicine Clinical Cardiac Electrophysiology [phone]    [facility] Anticoagulation Services Responsible  [phone]          Anticoagulation Patient Findings  Patient Findings       Negatives:  Signs/symptoms of thrombosis, Signs/symptoms of bleeding, Laboratory test error suspected, Change in health, Change in alcohol use, Change in activity, Upcoming invasive procedure, Emergency department visit, Upcoming dental procedure, Missed doses, Extra doses, Change in medications, Change in diet/appetite, Hospital admission, Bruising, Other complaints          Clinical Outcomes       Negatives:  Major bleeding event, Thromboembolic event, Anticoagulation-related hospital admission, Anticoagulation-related ED visit, Anticoagulation-related fatality            INR is subtherapeutic  Reason(s) for out of range INR today: Determining dose requirements      Called and spoke to patient.    Pt is not on antiplatelet therapy.    Warfarin Plan: Increase to 4.5mg Tue/Thurs/Sat, 3mg  AOD    Next INR in 1 week(s).    Vandana Vallecillo, PharmD

## 2025-07-31 ENCOUNTER — ANTICOAGULATION MONITORING (OUTPATIENT)
Dept: VASCULAR LAB | Facility: MEDICAL CENTER | Age: 85
End: 2025-07-31
Payer: MEDICARE

## 2025-07-31 DIAGNOSIS — Z79.01 LONG TERM CURRENT USE OF ANTICOAGULANT THERAPY: Chronic | ICD-10-CM

## 2025-07-31 DIAGNOSIS — I48.21 PERMANENT ATRIAL FIBRILLATION (HCC): Primary | Chronic | ICD-10-CM

## 2025-07-31 DIAGNOSIS — G45.9 TIA (TRANSIENT ISCHEMIC ATTACK): ICD-10-CM

## 2025-07-31 DIAGNOSIS — D68.69 SECONDARY HYPERCOAGULABLE STATE (HCC): ICD-10-CM

## 2025-07-31 LAB — INR PPP: 2.3 (ref 2–3.5)

## 2025-07-31 NOTE — PROGRESS NOTES
Anticoagulation Summary  As of 2025      INR goal:  2.5-3.0   TTR:  52.6% (10.1 y)   INR used for dosin.30 (2025)   Warfarin maintenance plan:  4.5 mg (3 mg x 1.5) every Tue, Thu, Sat; 3 mg (3 mg x 1) all other days   Weekly warfarin total:  25.5 mg   Plan last modified:  Vandana Vallecillo, ClarisseD (2025)   Next INR check:  2025   Priority:  Routine   Target end date:  Indefinite    Indications    Permanent atrial fibrillation (HCC) [I48.21]  Long term current use of anticoagulant therapy [Z79.01]  TIA (transient ischemic attack) [G45.9]  Secondary hypercoagulable state (HCC) [D68.69]                 Anticoagulation Episode Summary       INR check location:  Home Draw    Preferred lab:  --    Send INR reminders to:  AMB ANTICOAG POOL    Comments:  Goal range changed to 2.5-3.2 per raghu vaca 2019          Anticoagulation Care Providers       Provider Role Specialty Phone number    Hu Gamez M.D. Referring Internal Medicine Clinical Cardiac Electrophysiology [phone]    [facility] Anticoagulation Services Responsible  [phone]          Anticoagulation Patient Findings  Patient Findings       Positives:  Missed doses (only taking 3 mg on Tue instead of 4.5 mg)    Negatives:  Signs/symptoms of thrombosis, Signs/symptoms of bleeding, Laboratory test error suspected, Change in health, Change in alcohol use, Change in activity, Upcoming invasive procedure, Emergency department visit, Upcoming dental procedure, Extra doses, Change in medications, Change in diet/appetite, Hospital admission, Bruising, Other complaints            Called and spoke to patient .    INR subtherapeutic  Reason(s) for out of range INR today: Dose deviation    Will rechallenge previously intended regimen.    Warfarin Plan: Continue regimen as listed above.    Next INR in 2 week(s).    Nayana Dunbar, ClarisseD, BCACP

## 2025-08-06 ENCOUNTER — NON-PROVIDER VISIT (OUTPATIENT)
Dept: CARDIOLOGY | Facility: MEDICAL CENTER | Age: 85
End: 2025-08-06
Payer: MEDICARE

## 2025-08-06 PROCEDURE — 93294 REM INTERROG EVL PM/LDLS PM: CPT | Performed by: INTERNAL MEDICINE

## 2025-08-11 ENCOUNTER — APPOINTMENT (OUTPATIENT)
Dept: URBAN - METROPOLITAN AREA CLINIC 36 | Facility: CLINIC | Age: 85
Setting detail: DERMATOLOGY
End: 2025-08-11

## 2025-08-11 DIAGNOSIS — T81.89 OTHER COMPLICATIONS OF PROCEDURES, NOT ELSEWHERE CLASSIFIED: ICD-10-CM

## 2025-08-11 PROBLEM — T81.89XA OTHER COMPLICATIONS OF PROCEDURES, NOT ELSEWHERE CLASSIFIED, INITIAL ENCOUNTER: Status: ACTIVE | Noted: 2025-08-11

## 2025-08-11 PROCEDURE — ? PRESCRIPTION

## 2025-08-11 PROCEDURE — ? ORDER TESTS

## 2025-08-11 RX ORDER — GENTAMICIN SULFATE 1 MG/G
OINTMENT TOPICAL
Qty: 30 | Refills: 0 | Status: ERX | COMMUNITY
Start: 2025-08-11

## 2025-08-11 RX ORDER — CEPHALEXIN 500 MG/1
TABLET ORAL QID
Qty: 21 | Refills: 3 | Status: ERX | COMMUNITY
Start: 2025-08-11

## 2025-08-11 RX ADMIN — CEPHALEXIN: 500 TABLET ORAL at 00:00

## 2025-08-11 RX ADMIN — GENTAMICIN SULFATE: 1 OINTMENT TOPICAL at 00:00

## 2025-08-11 ASSESSMENT — LOCATION ZONE DERM: LOCATION ZONE: LEG

## 2025-08-11 ASSESSMENT — LOCATION DETAILED DESCRIPTION DERM: LOCATION DETAILED: RIGHT MEDIAL DISTAL PRETIBIAL REGION

## 2025-08-11 ASSESSMENT — LOCATION SIMPLE DESCRIPTION DERM: LOCATION SIMPLE: RIGHT PRETIBIAL REGION

## 2025-08-12 RX ORDER — GENTAMICIN SULFATE 1 MG/G
OINTMENT TOPICAL
Qty: 30 | Refills: 0 | Status: ERX

## 2025-08-12 RX ORDER — CEPHALEXIN 500 MG/1
TABLET ORAL QID
Qty: 21 | Refills: 3 | Status: ERX

## 2025-08-19 ENCOUNTER — APPOINTMENT (OUTPATIENT)
Dept: URBAN - METROPOLITAN AREA CLINIC 36 | Facility: CLINIC | Age: 85
Setting detail: DERMATOLOGY
End: 2025-08-19

## 2025-08-19 ENCOUNTER — ANTICOAGULATION MONITORING (OUTPATIENT)
Dept: CARDIOLOGY | Facility: MEDICAL CENTER | Age: 85
End: 2025-08-19
Payer: MEDICARE

## 2025-08-19 DIAGNOSIS — D68.69 SECONDARY HYPERCOAGULABLE STATE (HCC): ICD-10-CM

## 2025-08-19 DIAGNOSIS — L08.89 OTHER SPECIFIED LOCAL INFECTIONS OF THE SKIN AND SUBCUTANEOUS TISSUE: ICD-10-CM

## 2025-08-19 DIAGNOSIS — G45.9 TIA (TRANSIENT ISCHEMIC ATTACK): ICD-10-CM

## 2025-08-19 DIAGNOSIS — Z79.01 LONG TERM CURRENT USE OF ANTICOAGULANT THERAPY: Chronic | ICD-10-CM

## 2025-08-19 DIAGNOSIS — I48.21 PERMANENT ATRIAL FIBRILLATION (HCC): Primary | Chronic | ICD-10-CM

## 2025-08-19 LAB — INR PPP: 2.8 (ref 2–3.5)

## 2025-08-19 PROCEDURE — ? ADDITIONAL NOTES

## 2025-08-29 ENCOUNTER — ANTICOAGULATION MONITORING (OUTPATIENT)
Dept: VASCULAR LAB | Facility: MEDICAL CENTER | Age: 85
End: 2025-08-29
Payer: MEDICARE

## 2025-08-29 DIAGNOSIS — D68.69 SECONDARY HYPERCOAGULABLE STATE (HCC): ICD-10-CM

## 2025-08-29 DIAGNOSIS — Z79.01 LONG TERM CURRENT USE OF ANTICOAGULANT THERAPY: Chronic | ICD-10-CM

## 2025-08-29 DIAGNOSIS — I48.21 PERMANENT ATRIAL FIBRILLATION (HCC): Primary | Chronic | ICD-10-CM

## 2025-08-29 DIAGNOSIS — G45.9 TIA (TRANSIENT ISCHEMIC ATTACK): ICD-10-CM

## 2025-08-29 LAB — INR PPP: 2.5 (ref 2–3.5)

## (undated) DEVICE — SET EXTENSION WITH 2 PORTS (48EA/CA) ***PART #2C8610 IS A SUBSTITUTE*****

## (undated) DEVICE — PADDING CAST 6 IN STERILE - 6 X 4 YDS (24/CA)

## (undated) DEVICE — VESSELOOP MINI BLUE STERILE - SURG-I-LOOP (10EA/BX)

## (undated) DEVICE — DRAIN J-VAC 7MM FLAT - (10EA/CA)

## (undated) DEVICE — COVER LIGHT HANDLE FLEXIBLE - SOFT (2EA/PK 80PK/CA)

## (undated) DEVICE — NEPTUNE 4 PORT MANIFOLD - (20/PK)

## (undated) DEVICE — KIT ROOM DECONTAMINATION

## (undated) DEVICE — GLOVE BIOGEL SZ 8 SURGICAL PF LTX - (50PR/BX 4BX/CA)

## (undated) DEVICE — CANISTER SUCTION RIGID RED 1500CC (40EA/CA)

## (undated) DEVICE — DECANTER FLD BLS - (50/CA)

## (undated) DEVICE — ELECTRODE DUAL RETURN W/ CORD - (50/PK)

## (undated) DEVICE — SODIUM CHL. IRRIGATION 0.9% 3000ML (4EA/CA 65CA/PF)

## (undated) DEVICE — ELECTRODE 850 FOAM ADHESIVE - HYDROGEL RADIOTRNSPRNT (50/PK)

## (undated) DEVICE — RESERVOIR SUCTION 100 CC - SILICONE (20EA/CA)

## (undated) DEVICE — MASK ANESTHESIA ADULT  - (100/CA)

## (undated) DEVICE — GLOVE BIOGEL SZ 6 PF LATEX - (50EA/BX 4BX/CA)

## (undated) DEVICE — DRAPE MAGNETIC (INSTRA-MAG) - (30/CA)

## (undated) DEVICE — SHUNT CAROTID FLEXCEL 14.5CM - 5/BX

## (undated) DEVICE — SOD. CHL. INJ. 0.9% 250 ML - (36/CA 50CA/PF)

## (undated) DEVICE — CHLORAPREP 26 ML APPLICATOR - ORANGE TINT(25/CA)

## (undated) DEVICE — PACK MINOR BASIN - (2EA/CA)

## (undated) DEVICE — BAG, SPONGE COUNT 50600

## (undated) DEVICE — SUTURE 3-0 ETHILON FS-1 - (36/BX) 30 INCH

## (undated) DEVICE — SODIUM CHL. INJ. 0.9% 500ML (24EA/CA 50CA/PF)

## (undated) DEVICE — SUTURE 2-0 ETHILON FS - (36/BX) 18 INCH

## (undated) DEVICE — SODIUM CHL IRRIGATION 0.9% 1000ML (12EA/CA)

## (undated) DEVICE — SOD. CHL. INJ. 0.9% 1000 ML - (14EA/CA 60CA/PF)

## (undated) DEVICE — DRESSING TRANSPARENT FILM TEGADERM 4 X 4.75" (50EA/BX)"

## (undated) DEVICE — SUCTION INSTRUMENT YANKAUER BULBOUS TIP W/O VENT (50EA/CA)

## (undated) DEVICE — SHAVER4.0 AGGRESSIVE + FORMLA (5EA/BX)

## (undated) DEVICE — BLADE SURGICAL #11 - (50/BX)

## (undated) DEVICE — SPONGE GAUZESTER 4 X 4 4PLY - (128PK/CA)

## (undated) DEVICE — POLY UMBILICAL TAPE 1/8X30 - (36/BX)

## (undated) DEVICE — STAPLER SKIN DISP - (6/BX 10BX/CA) VISISTAT

## (undated) DEVICE — GOWN WARMING STANDARD FLEX - (30/CA)

## (undated) DEVICE — INTRAOP NEURO IN OR 1:1 PER 15 MIN

## (undated) DEVICE — TUBING PUMP WITH CONNECTOR REDEUCE (1EA)

## (undated) DEVICE — VESSELOOP MAXI BLUE STERILE- SURG-I-LOOP (10EA/BX)

## (undated) DEVICE — CLIP SM INTNL HRZN TI ESCP LGT - (24EA/PK 25PK/BX)

## (undated) DEVICE — SUTURE CV

## (undated) DEVICE — SUTURE 6-0 PROLENE BV-1 D/A 24 (36PK/BX)"

## (undated) DEVICE — HEAD HOLDER JUNIOR/ADULT

## (undated) DEVICE — DRAPE SURGICAL U 77X120 - (10/CA)

## (undated) DEVICE — DRAPE LARGE 3 QUARTER - (20/CA)

## (undated) DEVICE — GLOVE BIOGEL PI ORTHO SZ 8 PF LF (40PR/BX)

## (undated) DEVICE — WIPE INSTRUMENT MICROWIPE (20EA/SP)

## (undated) DEVICE — TUBE CONNECTING SUCTION - CLEAR PLASTIC STERILE 72 IN (50EA/CA)

## (undated) DEVICE — SYRINGE 20 ML LL (50EA/BX 4BX/CA)

## (undated) DEVICE — SUTURE 2-0 VICRYL PLUS CT-1 - 8 X 18 INCH(12/BX)

## (undated) DEVICE — KIT  I.V. START (100EA/CA)

## (undated) DEVICE — SET LEADWIRE 5 LEAD BEDSIDE DISPOSABLE ECG (1SET OF 5/EA)

## (undated) DEVICE — GLOVE SZ 7.5 BIOGEL PI MICRO - PF LF (50PR/BX)

## (undated) DEVICE — TOWELS CLOTH SURGICAL - (4/PK 20PK/CA)

## (undated) DEVICE — SENSOR SPO2 NEO LNCS ADHESIVE (20/BX) SEE USER NOTES

## (undated) DEVICE — Device

## (undated) DEVICE — SUTURE GENERAL

## (undated) DEVICE — HUMID-VENT HEAT AND MOISTURE EXCHANGE- (50/BX)

## (undated) DEVICE — GLOVE BIOGEL PI INDICATOR SZ 7.5 SURGICAL PF LF -(50/BX 4BX/CA)

## (undated) DEVICE — PROTECTOR ULNA NERVE - (36PR/CA)

## (undated) DEVICE — CLIP MED INTNL HRZN TI ESCP - (25/BX)

## (undated) DEVICE — KIT SURGIFLO W/OUT THROMBIN - (6EA/CA)

## (undated) DEVICE — GOWN SURGEONS LARGE - (32/CA)

## (undated) DEVICE — TUBING PATIENT W/CONNECTOR REDEUCE (1EA)

## (undated) DEVICE — KIT ANESTHESIA W/CIRCUIT & 3/LT BAG W/FILTER (20EA/CA)

## (undated) DEVICE — CANISTER SUCTION 3000ML MECHANICAL FILTER AUTO SHUTOFF MEDI-VAC NONSTERILE LF DISP  (40EA/CA)

## (undated) DEVICE — TUBING CLEARLINK DUO-VENT - C-FLO (48EA/CA)

## (undated) DEVICE — SLEEVE, VASO, THIGH, MED

## (undated) DEVICE — SUTURE 3-0 SILK 12 X 18 IN - (36/BX)

## (undated) DEVICE — LACTATED RINGERS INJ 1000 ML - (14EA/CA 60CA/PF)

## (undated) DEVICE — SUTURE 4-0 30CM STRATAFIX SPIRAL PS-2 (12EA/BX)

## (undated) DEVICE — GLOVE, LITE (PAIR)

## (undated) DEVICE — PACK KNEE ARTHROSCOPY SM OR - (2EA/CA)

## (undated) DEVICE — DRAPE STRLE REG TOWEL 18X24 - (10/BX 4BX/CA)"

## (undated) DEVICE — BANDAGE ELASTIC 6 IN X 5 YDS - LATEX FREE (10/BX)

## (undated) DEVICE — WATER IRRIGATION STERILE 1000ML (12EA/CA)